# Patient Record
Sex: MALE | Race: WHITE | Employment: OTHER | URBAN - METROPOLITAN AREA
[De-identification: names, ages, dates, MRNs, and addresses within clinical notes are randomized per-mention and may not be internally consistent; named-entity substitution may affect disease eponyms.]

---

## 2018-04-20 ENCOUNTER — TELEPHONE (OUTPATIENT)
Dept: CARDIOLOGY CLINIC | Age: 73
End: 2018-04-20

## 2018-04-20 ENCOUNTER — OFFICE VISIT (OUTPATIENT)
Dept: CARDIOLOGY CLINIC | Age: 73
End: 2018-04-20

## 2018-04-20 VITALS
HEIGHT: 63 IN | WEIGHT: 247.2 LBS | OXYGEN SATURATION: 96 % | HEART RATE: 111 BPM | DIASTOLIC BLOOD PRESSURE: 80 MMHG | RESPIRATION RATE: 24 BRPM | BODY MASS INDEX: 43.8 KG/M2 | SYSTOLIC BLOOD PRESSURE: 140 MMHG

## 2018-04-20 DIAGNOSIS — I50.9 CONGESTIVE HEART FAILURE, UNSPECIFIED HF CHRONICITY, UNSPECIFIED HEART FAILURE TYPE (HCC): ICD-10-CM

## 2018-04-20 DIAGNOSIS — I25.83 CORONARY ARTERY DISEASE DUE TO LIPID RICH PLAQUE: ICD-10-CM

## 2018-04-20 DIAGNOSIS — M79.89 LEG SWELLING: ICD-10-CM

## 2018-04-20 DIAGNOSIS — E66.01 OBESITY, MORBID (HCC): ICD-10-CM

## 2018-04-20 DIAGNOSIS — I25.10 CORONARY ARTERY DISEASE DUE TO LIPID RICH PLAQUE: ICD-10-CM

## 2018-04-20 DIAGNOSIS — R06.02 SOB (SHORTNESS OF BREATH): Primary | ICD-10-CM

## 2018-04-20 RX ORDER — FUROSEMIDE 80 MG/1
80 TABLET ORAL DAILY
Qty: 30 TAB | Refills: 0 | Status: SHIPPED | OUTPATIENT
Start: 2018-04-20 | End: 2018-05-09 | Stop reason: SDUPTHER

## 2018-04-20 RX ORDER — FUROSEMIDE 40 MG/1
TABLET ORAL DAILY
COMMUNITY
End: 2018-04-20 | Stop reason: SDUPTHER

## 2018-04-20 RX ORDER — ACETAMINOPHEN 325 MG/1
TABLET ORAL
Status: ON HOLD | COMMUNITY
End: 2019-05-23 | Stop reason: DRUGHIGH

## 2018-04-20 RX ORDER — SIMVASTATIN 10 MG/1
TABLET, FILM COATED ORAL
COMMUNITY
End: 2018-05-07 | Stop reason: SDUPTHER

## 2018-04-20 RX ORDER — ASPIRIN 81 MG/1
TABLET ORAL DAILY
COMMUNITY
End: 2018-05-07 | Stop reason: SDUPTHER

## 2018-04-20 RX ORDER — DULOXETIN HYDROCHLORIDE 60 MG/1
60 CAPSULE, DELAYED RELEASE ORAL DAILY
COMMUNITY
End: 2018-05-07 | Stop reason: SDUPTHER

## 2018-04-20 RX ORDER — FISH OIL/DHA/EPA 1200-144MG
1 CAPSULE ORAL 2 TIMES DAILY
Status: ON HOLD | COMMUNITY
End: 2019-05-23 | Stop reason: DRUGHIGH

## 2018-04-20 RX ORDER — LISINOPRIL 40 MG/1
40 TABLET ORAL DAILY
COMMUNITY
End: 2018-05-07 | Stop reason: SDUPTHER

## 2018-04-20 RX ORDER — CARVEDILOL 3.12 MG/1
TABLET ORAL 2 TIMES DAILY WITH MEALS
COMMUNITY
End: 2018-05-07 | Stop reason: SDUPTHER

## 2018-04-20 RX ORDER — ALLOPURINOL 100 MG/1
TABLET ORAL DAILY
COMMUNITY
End: 2018-05-07 | Stop reason: SDUPTHER

## 2018-04-20 RX ORDER — METOLAZONE 5 MG/1
TABLET ORAL DAILY
COMMUNITY
End: 2018-05-07 | Stop reason: SDUPTHER

## 2018-04-20 NOTE — MR AVS SNAPSHOT
[address] 
[phone] Patient: Nhi Mosquera 
MRN: [id number] :1945 Visit Information Date & Time Provider Department Dept. Phone Encounter #  
 2018  1:00 PM Valorie Wayne, [address] [facility] [phone] Your Appointments 2018  8:30 AM  
PHYSICAL PRE OP with Dylan Otoole MD  
[facility]) Appt Note: NP Diabetes, feels like can't breath ezra 18  
 [facility] [address] [address]  
[address] [facility] [address]  
  
    
 2018  1:30 PM  
ECHO CARDIOGRAMS 2D with [address] [facility]) Appt Note: DR TRONCOSO 2D ECHO COMPLETE ADULT (TTE) W OR WO CONTR [[id number]] (Order [id number])  
 [id number] [facility] [address]  
[phone] [id number] [facility] [address] [address]  
  
    
 2018  9:00 AM  
NUCLEAR MEDICINE with NUCLEAR, [facility]) Appt Note: Dr Matias Alexander [[id number]] (Order [id number]) 5'3\" 247 lbs [id number] [facility] [address]  
[phone] [id number] [facility] [address] Upcoming Health Maintenance Date Due DTaP/Tdap/Td series (1 - Tdap) 12/10/1968 BREAST CANCER SCRN MAMMOGRAM 12/10/1997 FOBT Q 1 YEAR AGE 50-75 12/10/1997 ZOSTER VACCINE AGE 60> 10/10/2007 GLAUCOMA SCREENING Q2Y 12/10/2012 Pneumococcal 65+ Low/Medium Risk (1 of 2 - PCV13) 12/10/2012 Influenza Age 5 to Adult 2017 Allergies as of 2018  Review Complete On: 2018 By: Zohra Viramontes LPN Severity Noted Reaction Type Reactions Morphine High 2018    Anaphylaxis Current Immunizations  Never Reviewed No immunizations on file. Not reviewed this visit You Were Diagnosed With   
  
 Codes Comments SOB (shortness of breath)    -  Primary ICD-10-CM: R06.02 
ICD-9-CM: 786.05 Obesity, morbid ([facility]     ICD-10-CM: E66.01 
ICD-9-CM: 278.01 Leg swelling     ICD-10-CM: M79.89 ICD-9-CM: 729.81 Coronary artery disease due to lipid rich plaque     ICD-10-CM: I25.10, I25.83 ICD-9-CM: 414.00, 414.3 Congestive heart failure, unspecified HF chronicity, unspecified heart failure type ([facility]     ICD-10-CM: I50.9 ICD-9-CM: 428.0 Vitals BP Pulse Resp Height(growth percentile) Weight(growth percentile) SpO2  
 140/80 (BP 1 Location: Right arm, BP Patient Position: Sitting) (!) 111 24 5' 3\" (1.6 m) 247 lb 3.2 oz (112.1 kg) 96% BMI Smoking Status 43.79 kg/m2 Former Smoker Vitals History BMI and BSA Data Body Mass Index Body Surface Area 43.79 kg/m 2 2.23 m 2 Preferred Pharmacy Pharmacy Name Phone [facility] [address] [place] [address] Dr Kapadia, [address] [phone] Your Updated Medication List  
  
   
This list is accurate as of 4/20/18  2:09 PM.  Always use your most recent med list. ADVIL 100 mg tablet Generic drug:  ibuprofen Take 100 mg by mouth every six (6) hours as needed for Pain. allopurinol 100 mg tablet Commonly known as:  [place] Take  by mouth daily. aspirin delayed-release 81 mg tablet Take  by mouth daily. carvedilol 3.125 mg tablet Commonly known as:  Clementina Alstrom Take  by mouth two (2) times daily (with meals). DULoxetine 60 mg capsule Commonly known as:  CYMBALTA Take 60 mg by mouth daily. fish oil-dha-epa 1,200-144-216 mg Cap Take 1 Cap by mouth daily. furosemide 80 mg tablet Commonly known as:  LASIX Take 1 Tab by mouth daily. lisinopril 40 mg tablet Commonly known as:  Mignon Bills Take 40 mg by mouth daily. metOLazone 5 mg tablet Commonly known as:  Cynthia Demar Take  by mouth daily. NovoLIN 70/30 U-100 Insulin 100 unit/mL (70-30) injection Generic drug:  insulin NPH/insulin regular  
by SubCUTAneous route. simvastatin 10 mg tablet Commonly known as:  ZOCOR Take  by mouth nightly. TYLENOL 325 mg tablet Generic drug:  acetaminophen Take  by mouth every four (4) hours as needed for Pain. Prescriptions Sent to Pharmacy Refills  
 furosemide (LASIX) 80 mg tablet 0 Sig: Take 1 Tab by mouth daily. Class: Normal  
 Pharmacy: [facility] DR DANA ZELAYA [address] Ph #: [phone] Route: Oral  
  
We Performed the Following 2D ECHO COMPLETE ADULT (TTE) W OR WO CONTR [05686 CPT(R)] AMB POC EKG ROUTINE W/ 12 LEADS, INTER & REP [39658 CPT(R)] METABOLIC PANEL, BASIC [97374 CPT(R)] NT-PRO BNP [id number] CPT(R)] To-Do List   
 04/20/2018 ECG:  STRESS TEST LEXISCAN/CARDIOLITE Introducing [facility]! Chel Curtis introduces [employer] patient portal. Now you can access parts of your medical record, email your doctor's office, and request medication refills online. 1. In your internet browser, go to [email or web address]. [employer] 2. Click on the First Time User? Click Here link in the Sign In box. You will see the New Member Sign Up page. 3. Enter your [employer] Access Code exactly as it appears below. You will not need to use this code after youve completed the sign-up process. If you do not sign up before the expiration date, you must request a new code. · [employer] Access Code: [email or web address] Expires: 7/19/2018  2:01 PM 
 
4. Enter the last four digits of your Social Security Number (xxxx) and Date of Birth (mm/dd/yyyy) as indicated and click Submit. You will be taken to the next sign-up page. 5. Create a [employer] ID.  This will be your [employer] login ID and cannot be changed, so think of one that is secure and easy to remember. 6. Create a [facility] password. You can change your password at any time. 7. Enter your Password Reset Question and Answer. This can be used at a later time if you forget your password. 8. Enter your e-mail address. You will receive e-mail notification when new information is available in [address] 9. Click Sign Up. You can now view and download portions of your medical record. 10. Click the Download Summary menu link to download a portable copy of your medical information. If you have questions, please visit the Frequently Asked Questions section of the [facility] website. Remember, [facility] is NOT to be used for urgent needs. For medical emergencies, dial 911. Now available from your iPhone and Android! Please provide this summary of care documentation to your next provider. If you have any questions after today's visit, please call [phone].

## 2018-04-20 NOTE — TELEPHONE ENCOUNTER
Spoke with Dr Christian's office was informed LOV 9/2017 was due to f/u in January but cancelled appointment   Will fax most recent labs and last office note.

## 2018-04-20 NOTE — PROGRESS NOTES
[address], [address] [facility], [address]  [phone]     Subjective:      Michael Staton is a 81 y.o. male pmhx HTN, HLD, DM, CAD with stents is here to establish care and further evaluation of BLE swelling and SOB. Denies chest pain, orthopnea, PND, palpitations, syncope, or presyncope. Cardiac risk factors: male, elevated BMI, HTN, HLD, CAD, sedentary lifestyle  Denies etoh/illegal drug use. Former smoker  Family hx: n/a for maternal and paternal.  Siblings: 1 has PFO, 1 had heart attack      Patient Active Problem List    Diagnosis Date Noted    Obesity, morbid ([address] 04/20/2018    SOB (shortness of breath) 04/20/2018      No primary care provider on file. No past medical history on file. No past surgical history on file. Allergies   Allergen Reactions    Morphine Anaphylaxis      No family history on file. Social History     Social History    Marital status:      Spouse name: N/A    Number of children: N/A    Years of education: N/A     Occupational History    Not on file. Social History Main Topics    Smoking status: Former Smoker     Quit date: 4/20/1963    Smokeless tobacco: Never Used    Alcohol use Not on file    Drug use: Not on file    Sexual activity: Not on file     Other Topics Concern    Not on file     Social History Narrative    No narrative on file      Current Outpatient Prescriptions   Medication Sig    insulin NPH/insulin regular (NOVOLIN 70/30 U-100 INSULIN) 100 unit/mL (70-30) injection by SubCUTAneous route.  simvastatin (ZOCOR) 10 mg tablet Take  by mouth nightly.  DULoxetine (CYMBALTA) 60 mg capsule Take 60 mg by mouth daily.  metOLazone (ZAROXOLYN) 5 mg tablet Take  by mouth daily.  aspirin delayed-release 81 mg tablet Take  by mouth daily.  fish oil-dha-epa 1,200-144-216 mg cap Take 1 Cap by mouth daily.  lisinopril (PRINIVIL, ZESTRIL) 40 mg tablet Take 40 mg by mouth daily.     carvedilol (COREG) 3.125 mg tablet Take  by mouth two (2) times daily (with meals).  allopurinol (ZYLOPRIM) 100 mg tablet Take  by mouth daily.  ibuprofen (ADVIL) 100 mg tablet Take 100 mg by mouth every six (6) hours as needed for Pain.  acetaminophen (TYLENOL) 325 mg tablet Take  by mouth every four (4) hours as needed for Pain.  furosemide (LASIX) 80 mg tablet Take 1 Tab by mouth daily. No current facility-administered medications for this visit. Review of Symptoms:  11 systems reviewed, negative other than as stated in the HPI    Physical ExamPhysical Exam:    Vitals:    04/20/18 1304 04/20/18 1322   BP: 140/84 140/80   Pulse: (!) 111    Resp: 24    SpO2: 96%    Weight: 247 lb 3.2 oz (112.1 kg)    Height: 5' 3\" (1.6 m)      Body mass index is 43.79 kg/(m^2). General PE   Gen:  NAD  Mental Status - Alert. General Appearance - Not in acute distress. Chest and Lung Exam   Inspection: Accessory muscles - No use of accessory muscles in breathing. Auscultation:   Breath sounds: - few crackles at the base  Cardiovascular   Inspection: Jugular vein - Bilateral - Inspection Normal.   Palpation/Percussion:   Apical Impulse: - Normal.   Auscultation: Rhythm - Regular. Heart Sounds - S1 WNL and S2 WNL. No S3 or S4. Murmurs & Other Heart Sounds: Auscultation of the heart reveals - No Murmurs. Peripheral Vascular   Upper Extremity: Inspection - Bilateral - No Cyanotic nailbeds or Digital clubbing. Lower Extremity:   Palpation: Edema - Bilateral - + 2  Abdomen:   Soft, non-tender, bowel sounds are active. Neuro: A&O times 3, CN and motor grossly WNL    Labs:   No results found for: CHOL, CHOLX, CHLST, CHOLV, 417018, HDL, LDL, LDLC, DLDLP, TGLX, TRIGL, TRIGP, CHHD, CHHDX  No results found for: CPK, CPKX, CPX  No results found for: NA, K, CL, CO2, AGAP, GLU, BUN, CREA, BUCR, GFRAA, GFRNA, CA, TBIL, TBILI, GPT, SGOT, AP, TP, ALB, GLOB, AGRAT, ALT    EKG:  ST with PVCs     Assessment:     Assessment:      1.  SOB (shortness of breath)    2. Obesity, morbid (HCC)    3. Leg swelling    4. Coronary artery disease due to lipid rich plaque    5. Congestive heart failure, unspecified HF chronicity, unspecified heart failure type ([facility]        Orders Placed This Encounter    METABOLIC PANEL, BASIC    NT-PRO BNP    AMB POC EKG ROUTINE W/ 12 LEADS, INTER & REP     Order Specific Question:   Reason for Exam:     Answer:   routine    LEXISCAN/CARDIOLITE, Clinic Performed     Standing Status:   Future     Standing Expiration Date:   10/20/2018     Order Specific Question:   Reason for Exam:     Answer:   sob    2D ECHO COMPLETE ADULT (TTE) W OR WO CONTR     Order Specific Question:   Reason for Exam:     Answer:   sob     Order Specific Question:   Contrast Enhancement (Bubble Study, Definity, Optison) may be used if criteria listed in established evidence-based protocol has been identified. Answer: Yes    insulin NPH/insulin regular (NOVOLIN 70/30 U-100 INSULIN) 100 unit/mL (70-30) injection     Sig: by SubCUTAneous route.  simvastatin (ZOCOR) 10 mg tablet     Sig: Take  by mouth nightly.  DULoxetine (CYMBALTA) 60 mg capsule     Sig: Take 60 mg by mouth daily.  metOLazone (ZAROXOLYN) 5 mg tablet     Sig: Take  by mouth daily.  aspirin delayed-release 81 mg tablet     Sig: Take  by mouth daily.  fish oil-dha-epa 1,200-144-216 mg cap     Sig: Take 1 Cap by mouth daily.  DISCONTD: furosemide (LASIX) 40 mg tablet     Sig: Take  by mouth daily.  lisinopril (PRINIVIL, ZESTRIL) 40 mg tablet     Sig: Take 40 mg by mouth daily.  carvedilol (COREG) 3.125 mg tablet     Sig: Take  by mouth two (2) times daily (with meals).  allopurinol (ZYLOPRIM) 100 mg tablet     Sig: Take  by mouth daily.  ibuprofen (ADVIL) 100 mg tablet     Sig: Take 100 mg by mouth every six (6) hours as needed for Pain.  acetaminophen (TYLENOL) 325 mg tablet     Sig: Take  by mouth every four (4) hours as needed for Pain.     furosemide (LASIX) 80 mg tablet     Sig: Take 1 Tab by mouth daily. Dispense:  30 Tab     Refill:  0        Plan: This is a 82 YO male with pmhx HTN, HLD, gout, DM, obesity who presents to clinic for further evaluation of BLE swelling and SOB x 1 year. He states he had coronary stents 5 years ago in [place]. He does not have any labwork, no medical record to review at all. States Dr Rochelle Hurtado, who is his endocrinologist refills all his prescription. Will request record. BLE swelling, SOB suspect underlying HF given the medications he's on  Clinically decompensated. He is OOB even just talking to me. O2 saturation 96% at rest.   On Lisinopril and Coreg which we will titrate up to 6.125 mg BID  Already on daily dose of Zaroxolyn and Furosemide. Will increase Furosemide to 80 mg. Will check BMP, proBNP. Will also order echocardiogram     CAD hx coronary stents 5 years ago in [place]  Denies CP. Endorses bilateral arm numbness, cardiac versus diabetic neuropathy  On ASA, BB, statin  Will obtain nuclear stress test    HTN: Mildly elevated. Has room to increase Coreg in future if needed. HLD: On statin per endocrinologist    DM:  On insulin therapy    Continue current care and f/u in 1 week    Oz Palomares MD

## 2018-04-20 NOTE — PROGRESS NOTES
1. Have you been to the ER, urgent care clinic since your last visit? Hospitalized since your last visit? NO    2. Have you seen or consulted any other health care providers outside of the [address] [place] [address] since your last visit? Include any pap smears or colon screening. DR. Slick Adrian    NEW PATIENT. C/O SWELLING IN BLE, SOB.

## 2018-04-20 NOTE — TELEPHONE ENCOUNTER
----- Message from Karan Stokes NP sent at 4/20/2018  1:56 PM EDT -----  Regarding: notes  Pls obtain records from Dr Rashard Mireles per Dr Marina Godinez

## 2018-04-21 LAB
BUN SERPL-MCNC: 38 MG/DL (ref 8–27)
BUN/CREAT SERPL: 25 (ref 10–24)
CALCIUM SERPL-MCNC: 9.7 MG/DL (ref 8.6–10.2)
CHLORIDE SERPL-SCNC: 101 MMOL/L (ref 96–106)
CO2 SERPL-SCNC: 29 MMOL/L (ref 18–29)
CREAT SERPL-MCNC: 1.52 MG/DL (ref 0.76–1.27)
GFR SERPLBLD CREATININE-BSD FMLA CKD-EPI: 46 ML/MIN/1.73
GFR SERPLBLD CREATININE-BSD FMLA CKD-EPI: 53 ML/MIN/1.73
GLUCOSE SERPL-MCNC: 136 MG/DL (ref 65–99)
INTERPRETATION: NORMAL
NT-PROBNP SERPL-MCNC: 943 PG/ML (ref 0–376)
POTASSIUM SERPL-SCNC: 4.9 MMOL/L (ref 3.5–5.2)
SODIUM SERPL-SCNC: 147 MMOL/L (ref 134–144)

## 2018-04-23 ENCOUNTER — OFFICE VISIT (OUTPATIENT)
Dept: INTERNAL MEDICINE CLINIC | Age: 73
End: 2018-04-23

## 2018-04-23 VITALS
SYSTOLIC BLOOD PRESSURE: 104 MMHG | BODY MASS INDEX: 43.38 KG/M2 | OXYGEN SATURATION: 94 % | HEIGHT: 63 IN | WEIGHT: 244.8 LBS | TEMPERATURE: 98.1 F | RESPIRATION RATE: 18 BRPM | DIASTOLIC BLOOD PRESSURE: 61 MMHG | HEART RATE: 110 BPM

## 2018-04-23 DIAGNOSIS — M79.642 PAIN IN BOTH HANDS: ICD-10-CM

## 2018-04-23 DIAGNOSIS — R53.83 FATIGUE, UNSPECIFIED TYPE: ICD-10-CM

## 2018-04-23 DIAGNOSIS — I50.9 CONGESTIVE HEART FAILURE, UNSPECIFIED HF CHRONICITY, UNSPECIFIED HEART FAILURE TYPE (HCC): ICD-10-CM

## 2018-04-23 DIAGNOSIS — E55.9 VITAMIN D DEFICIENCY: ICD-10-CM

## 2018-04-23 DIAGNOSIS — M79.641 PAIN IN BOTH HANDS: ICD-10-CM

## 2018-04-23 DIAGNOSIS — E78.00 PURE HYPERCHOLESTEROLEMIA: ICD-10-CM

## 2018-04-23 DIAGNOSIS — R79.89 ELEVATED SERUM CREATININE: ICD-10-CM

## 2018-04-23 DIAGNOSIS — Z91.89 AT RISK FOR SLEEP APNEA: ICD-10-CM

## 2018-04-23 DIAGNOSIS — R73.09 ELEVATED GLUCOSE: Primary | ICD-10-CM

## 2018-04-23 LAB — HBA1C MFR BLD HPLC: 8.9 % (ref 4.8–5.6)

## 2018-04-23 NOTE — MR AVS SNAPSHOT
[address] [place] [address] 
[phone] Patient: London Stevens 
MRN: [id number] :1945 Visit Information Date & Time Provider Department Dept. Phone Encounter #  
 2018  8:30 AM Higinio Moss, [address] [place] [address],[address] [phone] [id number] Follow-up Instructions Return in about 2 months (around 2018) for follow up pending labs and 2 months. .  
  
Your Appointments 2018  1:30 PM  
ECHO CARDIOGRAMS 2D with [facility] [address] [place] [address]) Appt Note: DR TRONCOSO 2D ECHO COMPLETE ADULT (TTE) W OR WO CONTR [[id number]] (Order [id number])  
 [address] [place] [address]  
[phone] 2 [address] [address]  
  
    
 2018  9:00 AM  
NUCLEAR MEDICINE with NUCLEAR, [facility] [address]) Appt Note: Dr Billie Cortez [[id number]] (Order [id number]) 5'3\" 247 lbs [address] [place] [address]  
[phone] [address] 24 Upcoming Health Maintenance Date Due Hepatitis C Screening 1945 DTaP/Tdap/Td series (1 - Tdap) 12/10/1968 FOBT Q 1 YEAR AGE 50-75 12/10/1997 ZOSTER VACCINE AGE 60> 10/10/2007 GLAUCOMA SCREENING Q2Y 12/10/2012 Pneumococcal 65+ Low/Medium Risk (1 of 2 - PCV13) 12/10/2012 Influenza Age 5 to Adult 2017 MEDICARE YEARLY EXAM 2018 Allergies as of 2018  Review Complete On: 2018 By: Higinio Moss MD  
  
 Severity Noted Reaction Type Reactions Morphine High 2018    Anaphylaxis Current Immunizations  Never Reviewed No immunizations on file. Not reviewed this visit You Were Diagnosed With   
  
 Codes Comments Elevated glucose    -  Primary ICD-10-CM: R73.09 
ICD-9-CM: 790.29 At risk for sleep apnea     ICD-10-CM: Z91.89 ICD-9-CM: V49.89 Uncontrolled type 2 diabetes mellitus with complication, with long-term current use of insulin (HCC)     ICD-10-CM: E11.8, E11.65, Z79.4 ICD-9-CM: 250.82, V58.67 Pure hypercholesterolemia     ICD-10-CM: E78.00 ICD-9-CM: 272.0 Congestive heart failure, unspecified HF chronicity, unspecified heart failure type ([facility]     ICD-10-CM: I50.9 ICD-9-CM: 428.0 Vitamin D deficiency     ICD-10-CM: E55.9 ICD-9-CM: 268.9 Fatigue, unspecified type     ICD-10-CM: R53.83 ICD-9-CM: 780.79 Class 3 obesity with serious comorbidity and body mass index (BMI) of 40.0 to 44.9 in adult, unspecified obesity type ([facility].)     ICD-10-CM: E66.9, Z68.41 
ICD-9-CM: 278.00, V85.41 Pain in both hands     ICD-10-CM: M79.641, C90.777 ICD-9-CM: 729.5 Vitals BP Pulse Temp Resp Height(growth percentile) Weight(growth percentile) 104/61 (BP 1 Location: Left arm, BP Patient Position: Sitting) (!) 110 98.1 °F (36.7 °C) (Oral) 18 5' 3\" (1.6 m) 244 lb 12.8 oz (111 kg) SpO2 BMI Smoking Status 94% 43.36 kg/m2 Former Smoker BMI and BSA Data Body Mass Index Body Surface Area  
 43.36 kg/m 2 2.22 m 2 Preferred Pharmacy Pharmacy Name Phone [facility] [address], [address] [place] [address] Avenue [phone] Your Updated Medication List  
  
   
This list is accurate as of 4/23/18  9:33 AM.  Always use your most recent med list. ADVIL 100 mg tablet Generic drug:  ibuprofen Take 100 mg by mouth every six (6) hours as needed for Pain. allopurinol 100 mg tablet Commonly known as:  Maciej Merles Take  by mouth daily. aspirin delayed-release 81 mg tablet Take  by mouth daily. carvedilol 3.125 mg tablet Commonly known as:  Erika Ballesteros Take  by mouth two (2) times daily (with meals). DULoxetine 60 mg capsule Commonly known as:  CYMBALTA Take 60 mg by mouth daily. fish oil-dha-epa 1,200-144-216 mg Cap Take 1 Cap by mouth daily. furosemide 80 mg tablet Commonly known as:  LASIX Take 1 Tab by mouth daily. lisinopril 40 mg tablet Commonly known as:  Matthew Little Take 40 mg by mouth daily. metOLazone 5 mg tablet Commonly known as:  Vertie Shone Take  by mouth daily. NovoLIN 70/30 U-100 Insulin 100 unit/mL (70-30) injection Generic drug:  insulin NPH/insulin regular  
by SubCUTAneous route. Takes 80 units in am and 70 units in pm.  
  
 simvastatin 10 mg tablet Commonly known as:  ZOCOR Take  by mouth nightly. TYLENOL 325 mg tablet Generic drug:  acetaminophen Take  by mouth every four (4) hours as needed for Pain. We Performed the Following AMB POC HEMOGLOBIN A1C [56124 CPT(R)] CBC W/O DIFF [47271 CPT(R)] LIPID PANEL [35158 CPT(R)] METABOLIC PANEL, COMPREHENSIVE [64551 CPT(R)] REFERRAL TO ENDOCRINOLOGY [VQR99 Custom] Comments:  
 Insulin requiring diabetic. REFERRAL TO OPHTHALMOLOGY [REF57 Custom] Comments:  
 Diabetic eye exam  
 REFERRAL TO SLEEP STUDIES [REF99 Custom] TSH 3RD GENERATION [05053 CPT(R)] VITAMIN D, 25 HYDROXY [id number] CPT(R)] Follow-up Instructions Return in about 2 months (around 6/23/2018) for follow up pending labs and 2 months. .  
  
  
Referral Information Referral ID Referred By Referred To  
  
 [id number] Divya Nettles [address] Shefali MD   
   [address] [place] [address] [address] [place], [facility] Phone: [phone] Fax: [phone] Visits Status Start Date End Date 1 New Request 4/23/18 4/23/19 If your referral has a status of pending review or denied, additional information will be sent to support the outcome of this decision. Referral ID Referred By Referred To  
 [id number] [facility]OP [facility]: [phone] Fax: [phone] Visits Status Start Date End Date 1 New Request 4/23/18 4/23/19 If your referral has a status of pending review or denied, additional information will be sent to support the outcome of this decision. Referral ID Referred By Referred To  
 [id number] Tho, Cedrick Alva Dr, MD  
   [address] [place] [address] [address] [place], [address] [place] [address] Phone: [phone] Fax: [phone] Visits Status Start Date End Date 1 New Request 4/23/18 4/23/19 If your referral has a status of pending review or denied, additional information will be sent to support the outcome of this decision. Patient Instructions Hand Arthritis: Exercises Your Care Instructions Here are some examples of exercises for hand arthritis. Start each exercise slowly. Ease off the exercise if you start to have pain. Your doctor or your physical or occupational therapist will tell you when you can start these exercises and which ones will work best for you. How to do the exercises Tendon glides 1. In this exercise, the steps follow one another to a make a continuous movement. 2. With your affected hand, point your fingers and thumb straight up. Your wrist should be relaxed, following the line of your fingers and thumb. 3. Curl your fingers so that the top two joints in them are bent, and your fingers wrap down. Your fingertips should touch or be near the base of your fingers. Your fingers will look like a hook. 4. Make a fist by bending your knuckles. Your thumb can gently rest against your index (pointing) finger. 5. Unwind your fingers slightly so that your fingertips can touch the base of your palm. Your thumb can rest against your index finger. 6. Move back to your starting position, with your fingers and thumb pointing up. 7. Repeat the series of motions 8 to 12 times. 8. Switch hands and repeat steps 1 through 6, even if only one hand is sore. Intrinsic flexion 1. Rest your affected hand on a table and bend the large joints where your fingers connect to your hand. Keep your thumb and the other joints in your fingers straight. 2. Slowly straighten your fingers. Your wrist should be relaxed, following the line of your fingers and thumb. 3. Move back to your starting position, with your hand bent. 4. Repeat 8 to 12 times. 5. Switch hands and repeat steps 1 through 4, even if only one hand is sore. Finger extension 1. Place your affected hand flat on a table. 2. Lift and then lower one finger at a time off the table. 3. Repeat 8 to 12 times. 4. Switch hands and repeat steps 1 through 3, even if only one hand is sore. MP extension 1. Place your good hand on a table, palm up. Put your affected hand on top of your good hand with your fingers wrapped around the thumb of your good hand like you are making a fist. 
2. Slowly uncurl the joints of your affected hand where your fingers connect to your hand so that only the top two joints of your fingers are bent. Your fingers will look like a hook. 3. Move back to your starting position, with your fingers wrapped around your good thumb. 4. Repeat 8 to 12 times. 5. Switch hands and repeat steps 1 through 4, even if only one hand is sore. PIP extension (with MP extension) 1. Place your good hand on a table, palm up. Put your affected hand on top of your good hand, palm up. 2. Use the thumb and fingers of your good hand to grasp below the middle joint of one finger of your affected hand. 3. Straighten the last two joints of that finger. 4. Repeat 8 to 12 times. 5. Repeat steps 1 through 4 with each finger. 6. Switch hands and repeat steps 1 through 5, even if only one hand is sore. DIP flexion 1. With your good hand, grasp one finger of your affected hand.  Your thumb will be on the top side of your finger just below the joint that is closest to your fingernail. 2. Slowly bend your affected finger only at the joint closest to your fingernail. 3. Repeat 8 to 12 times. 4. Repeat steps 1 through 3 with each finger. 5. Switch hands and repeat steps 1 through 4, even if only one hand is sore. Follow-up care is a key part of your treatment and safety. Be sure to make and go to all appointments, and call your doctor if you are having problems. It's also a good idea to know your test results and keep a list of the medicines you take. Where can you learn more? Go to http://lalo-ashley.info/. Enter V349 in the search box to learn more about \"Hand Arthritis: Exercises. \" Current as of: March 21, 2017 Content Version: 11.4 © 6894-0523 [employer]. Care instructions adapted under license by [employer] (which disclaims liability or warranty for this information). If you have questions about a medical condition or this instruction, always ask your healthcare professional. [address] any warranty or liability for your use of this information. Tylenol 500mg 1-2 twice a day as needed. Avoid ibuprofen (advil, motrin) and naproxen (aleve), bc, goodes, excedrin, extra aspirin. Avoid salt in diet. Low Sodium Diet (2,000 Milligram): Care Instructions Your Care Instructions Too much sodium causes your body to hold on to extra water. This can raise your blood pressure and force your heart and kidneys to work harder. In very serious cases, this could cause you to be put in the hospital. It might even be life-threatening. By limiting sodium, you will feel better and lower your risk of serious problems. The most common source of sodium is salt. People get most of the salt in their diet from canned, prepared, and packaged foods.  Fast food and restaurant meals also are very high in sodium. Your doctor will probably limit your sodium to less than 2,000 milligrams (mg) a day. This limit counts all the sodium in prepared and packaged foods and any salt you add to your food. Follow-up care is a key part of your treatment and safety. Be sure to make and go to all appointments, and call your doctor if you are having problems. It's also a good idea to know your test results and keep a list of the medicines you take. How can you care for yourself at home? Read food labels · Read labels on cans and food packages. The labels tell you how much sodium is in each serving. Make sure that you look at the serving size. If you eat more than the serving size, you have eaten more sodium. · Food labels also tell you the Percent Daily Value for sodium. Choose products with low Percent Daily Values for sodium. · Be aware that sodium can come in forms other than salt, including monosodium glutamate (MSG), sodium citrate, and sodium bicarbonate (baking soda). MSG is often added to Asian food. When you eat out, you can sometimes ask for food without MSG or added salt. Buy low-sodium foods · Buy foods that are labeled \"unsalted\" (no salt added), \"sodium-free\" (less than 5 mg of sodium per serving), or \"low-sodium\" (less than 140 mg of sodium per serving). Foods labeled \"reduced-sodium\" and \"light sodium\" may still have too much sodium. Be sure to read the label to see how much sodium you are getting. · Buy fresh vegetables, or frozen vegetables without added sauces. Buy low-sodium versions of canned vegetables, soups, and other canned goods. Prepare low-sodium meals · Cut back on the amount of salt you use in cooking. This will help you adjust to the taste. Do not add salt after cooking. One teaspoon of salt has about 2,300 mg of sodium. · Take the salt shaker off the table.  
· Flavor your food with garlic, lemon juice, onion, vinegar, herbs, and spices. Do not use soy sauce, lite soy sauce, steak sauce, onion salt, garlic salt, celery salt, mustard, or ketchup on your food. · Use low-sodium salad dressings, sauces, and ketchup. Or make your own salad dressings and sauces without adding salt. · Use less salt (or none) when recipes call for it. You can often use half the salt a recipe calls for without losing flavor. Other foods such as rice, pasta, and grains do not need added salt. · Rinse canned vegetables, and cook them in fresh water. This removes some-but not all-of the salt. · Avoid water that is naturally high in sodium or that has been treated with water softeners, which add sodium. Call your local water company to find out the sodium content of your water supply. If you buy bottled water, read the label and choose a sodium-free brand. Avoid high-sodium foods · Avoid eating: ¨ Smoked, cured, salted, and canned meat, fish, and poultry. ¨ Ham, francisco, hot dogs, and luncheon meats. ¨ Regular, hard, and processed cheese and regular peanut butter. ¨ Crackers with salted tops, and other salted snack foods such as pretzels, chips, and salted popcorn. ¨ Frozen prepared meals, unless labeled low-sodium. ¨ Canned and dried soups, broths, and bouillon, unless labeled sodium-free or low-sodium. ¨ Canned vegetables, unless labeled sodium-free or low-sodium. ¨ [place] fries, pizza, tacos, and other fast foods. ¨ Pickles, olives, ketchup, and other condiments, especially soy sauce, unless labeled sodium-free or low-sodium. Where can you learn more? Go to http://lalo-ashley.info/. Enter R470 in the search box to learn more about \"Low Sodium Diet (2,000 Milligram): Care Instructions. \" Current as of: May 12, 2017 Content Version: 11.4 © 3945-6432 [employer].  Care instructions adapted under license by [employer] (which disclaims liability or warranty for this information). If you have questions about a medical condition or this instruction, always ask your healthcare professional. [address] any warranty or liability for your use of this information. Introducing [facility] & HEALTH SERVICES! [facility] introduces miCab patient portal. Now you can access parts of your medical record, email your doctor's office, and request medication refills online. 1. In your internet browser, go to [email or web address] 2. Click on the First Time User? Click Here link in the Sign In box. You will see the New Member Sign Up page. 3. Enter your miCab Access Code exactly as it appears below. You will not need to use this code after youve completed the sign-up process. If you do not sign up before the expiration date, you must request a new code. · miCab Access Code: [email or web address] Expires: 7/19/2018  2:01 PM 
 
4. Enter the last four digits of your Social Security Number (xxxx) and Date of Birth (mm/dd/yyyy) as indicated and click Submit. You will be taken to the next sign-up page. 5. Create a miCab ID. This will be your miCab login ID and cannot be changed, so think of one that is secure and easy to remember. 6. Create a miCab password. You can change your password at any time. 7. Enter your Password Reset Question and Answer. This can be used at a later time if you forget your password. 8. Enter your e-mail address. You will receive e-mail notification when new information is available in [address] 9. Click Sign Up. You can now view and download portions of your medical record. 10. Click the Download Summary menu link to download a portable copy of your medical information. If you have questions, please visit the Frequently Asked Questions section of the miCab website. Remember, miCab is NOT to be used for urgent needs. For medical emergencies, dial 911. Now available from your iPhone and Android! Please provide this summary of care documentation to your next provider. Your primary care clinician is listed as [facility]. If you have any questions after today's visit, please call [phone].

## 2018-04-23 NOTE — PROGRESS NOTES
Reviewed record in preparation for visit and have obtained necessary documentation. Identified pt with two pt identifiers(name and ). Chief Complaint   Patient presents with    Establish Care     Pt nonfasting    New Patient    Diabetes       Health Maintenance Due   Topic Date Due    Hepatitis C Test  1945    DTaP/Tdap/Td  (1 - Tdap) 12/10/1968    Stool testing for trace blood  12/10/1997    Shingles Vaccine  10/10/2007    Glaucoma Screening   12/10/2012    Pneumococcal Vaccine (1 of 2 - PCV13) 12/10/2012    Flu Vaccine  2017    Annual Well Visit  2018       Mr. Edson Rubin has a reminder for a \"due or due soon\" health maintenance. I have asked that he discuss this further with his primary care provider for follow-up on this health maintenance. Coordination of Care Questionnaire:  :     1) Have you been to an emergency room, urgent care clinic since your last visit? no   Hospitalized since your last visit? no             2) Have you seen or consulted any other health care providers outside of [address] [place] [address] since your last visit? no  (Include any pap smears or colon screenings in this section.)    3) In the event something were to happen to you and you were unable to speak on your behalf, do you have an Advance Directive/ Living Will in place stating your wishes? NO    Do you have an Advance Directive on file? no    4) Are you interested in receiving information on Advance Directives? NO    Patient is accompanied by daughter I have received verbal consent from eNry Mora to discuss any/all medical information while they are present in the room.

## 2018-04-23 NOTE — PATIENT INSTRUCTIONS
Hand Arthritis: Exercises  Your Care Instructions  Here are some examples of exercises for hand arthritis. Start each exercise slowly. Ease off the exercise if you start to have pain. Your doctor or your physical or occupational therapist will tell you when you can start these exercises and which ones will work best for you. How to do the exercises  Tendon vonda    1. In this exercise, the steps follow one another to a make a continuous movement. 2. With your affected hand, point your fingers and thumb straight up. Your wrist should be relaxed, following the line of your fingers and thumb. 3. Curl your fingers so that the top two joints in them are bent, and your fingers wrap down. Your fingertips should touch or be near the base of your fingers. Your fingers will look like a hook. 4. Make a fist by bending your knuckles. Your thumb can gently rest against your index (pointing) finger. 5. Unwind your fingers slightly so that your fingertips can touch the base of your palm. Your thumb can rest against your index finger. 6. Move back to your starting position, with your fingers and thumb pointing up. 7. Repeat the series of motions 8 to 12 times. 8. Switch hands and repeat steps 1 through 6, even if only one hand is sore. Intrinsic flexion    1. Rest your affected hand on a table and bend the large joints where your fingers connect to your hand. Keep your thumb and the other joints in your fingers straight. 2. Slowly straighten your fingers. Your wrist should be relaxed, following the line of your fingers and thumb. 3. Move back to your starting position, with your hand bent. 4. Repeat 8 to 12 times. 5. Switch hands and repeat steps 1 through 4, even if only one hand is sore. Finger extension    1. Place your affected hand flat on a table. 2. Lift and then lower one finger at a time off the table. 3. Repeat 8 to 12 times.   4. Switch hands and repeat steps 1 through 3, even if only one hand is sore.  MP extension    1. Place your good hand on a table, palm up. Put your affected hand on top of your good hand with your fingers wrapped around the thumb of your good hand like you are making a fist.  2. Slowly uncurl the joints of your affected hand where your fingers connect to your hand so that only the top two joints of your fingers are bent. Your fingers will look like a hook. 3. Move back to your starting position, with your fingers wrapped around your good thumb. 4. Repeat 8 to 12 times. 5. Switch hands and repeat steps 1 through 4, even if only one hand is sore. PIP extension (with MP extension)    1. Place your good hand on a table, palm up. Put your affected hand on top of your good hand, palm up. 2. Use the thumb and fingers of your good hand to grasp below the middle joint of one finger of your affected hand. 3. Straighten the last two joints of that finger. 4. Repeat 8 to 12 times. 5. Repeat steps 1 through 4 with each finger. 6. Switch hands and repeat steps 1 through 5, even if only one hand is sore. DIP flexion    1. With your good hand, grasp one finger of your affected hand. Your thumb will be on the top side of your finger just below the joint that is closest to your fingernail. 2. Slowly bend your affected finger only at the joint closest to your fingernail. 3. Repeat 8 to 12 times. 4. Repeat steps 1 through 3 with each finger. 5. Switch hands and repeat steps 1 through 4, even if only one hand is sore. Follow-up care is a key part of your treatment and safety. Be sure to make and go to all appointments, and call your doctor if you are having problems. It's also a good idea to know your test results and keep a list of the medicines you take. Where can you learn more? Go to http://lalo-ashley.info/. Enter G104 in the search box to learn more about \"Hand Arthritis: Exercises. \"  Current as of: March 21, 2017  Content Version: 11.4  © 0879-0310 [employer]. Care instructions adapted under license by [employer] (which disclaims liability or warranty for this information). If you have questions about a medical condition or this instruction, always ask your healthcare professional. [address] any warranty or liability for your use of this information. Tylenol 500mg 1-2 twice a day as needed. Avoid ibuprofen (advil, motrin) and naproxen (aleve), bc, goodes, excedrin, extra aspirin. Avoid salt in diet. Low Sodium Diet (2,000 Milligram): Care Instructions  Your Care Instructions    Too much sodium causes your body to hold on to extra water. This can raise your blood pressure and force your heart and kidneys to work harder. In very serious cases, this could cause you to be put in the hospital. It might even be life-threatening. By limiting sodium, you will feel better and lower your risk of serious problems. The most common source of sodium is salt. People get most of the salt in their diet from canned, prepared, and packaged foods. Fast food and restaurant meals also are very high in sodium. Your doctor will probably limit your sodium to less than 2,000 milligrams (mg) a day. This limit counts all the sodium in prepared and packaged foods and any salt you add to your food. Follow-up care is a key part of your treatment and safety. Be sure to make and go to all appointments, and call your doctor if you are having problems. It's also a good idea to know your test results and keep a list of the medicines you take. How can you care for yourself at home? Read food labels  · Read labels on cans and food packages. The labels tell you how much sodium is in each serving. Make sure that you look at the serving size. If you eat more than the serving size, you have eaten more sodium. · Food labels also tell you the Percent Daily Value for sodium. Choose products with low Percent Daily Values for sodium.   · Be aware that sodium can come in forms other than salt, including monosodium glutamate (MSG), sodium citrate, and sodium bicarbonate (baking soda). MSG is often added to Asian food. When you eat out, you can sometimes ask for food without MSG or added salt. Buy low-sodium foods  · Buy foods that are labeled \"unsalted\" (no salt added), \"sodium-free\" (less than 5 mg of sodium per serving), or \"low-sodium\" (less than 140 mg of sodium per serving). Foods labeled \"reduced-sodium\" and \"light sodium\" may still have too much sodium. Be sure to read the label to see how much sodium you are getting. · Buy fresh vegetables, or frozen vegetables without added sauces. Buy low-sodium versions of canned vegetables, soups, and other canned goods. Prepare low-sodium meals  · Cut back on the amount of salt you use in cooking. This will help you adjust to the taste. Do not add salt after cooking. One teaspoon of salt has about 2,300 mg of sodium. · Take the salt shaker off the table. · Flavor your food with garlic, lemon juice, onion, vinegar, herbs, and spices. Do not use soy sauce, lite soy sauce, steak sauce, onion salt, garlic salt, celery salt, mustard, or ketchup on your food. · Use low-sodium salad dressings, sauces, and ketchup. Or make your own salad dressings and sauces without adding salt. · Use less salt (or none) when recipes call for it. You can often use half the salt a recipe calls for without losing flavor. Other foods such as rice, pasta, and grains do not need added salt. · Rinse canned vegetables, and cook them in fresh water. This removes some-but not all-of the salt. · Avoid water that is naturally high in sodium or that has been treated with water softeners, which add sodium. Call your local water company to find out the sodium content of your water supply. If you buy bottled water, read the label and choose a sodium-free brand.   Avoid high-sodium foods  · Avoid eating:  ¨ Smoked, cured, salted, and canned meat, fish, and poultry. ¨ Ham, francisco, hot dogs, and luncheon meats. ¨ Regular, hard, and processed cheese and regular peanut butter. ¨ Crackers with salted tops, and other salted snack foods such as pretzels, chips, and salted popcorn. ¨ Frozen prepared meals, unless labeled low-sodium. ¨ Canned and dried soups, broths, and bouillon, unless labeled sodium-free or low-sodium. ¨ Canned vegetables, unless labeled sodium-free or low-sodium. ¨ [place] fries, pizza, tacos, and other fast foods. ¨ Pickles, olives, ketchup, and other condiments, especially soy sauce, unless labeled sodium-free or low-sodium. Where can you learn more? Go to http://lalo-ashley.info/. Enter I881 in the search box to learn more about \"Low Sodium Diet (2,000 Milligram): Care Instructions. \"  Current as of: May 12, 2017  Content Version: 11.4  © 1861-9500 [employer]. Care instructions adapted under license by [employer] (which disclaims liability or warranty for this information). If you have questions about a medical condition or this instruction, always ask your healthcare professional. [address] any warranty or liability for your use of this information.

## 2018-04-23 NOTE — LETTER
5/17/2018 3:50 PM 
 
Mr. Nikunj Moran. 
[place] [address] Dear Nikunj Moran.: 
 
Please find your most recent results below. Resulted Orders AMB POC HEMOGLOBIN A1C Result Value Ref Range Hemoglobin A1c (POC) 8.9 (A) 4.8 - 5.6 % METABOLIC PANEL, COMPREHENSIVE Result Value Ref Range Glucose 352 (H) 65 - 99 mg/dL BUN 54 (H) 8 - 27 mg/dL Creatinine 1.67 (H) 0.76 - 1.27 mg/dL GFR est non-AA 41 (L) >59 mL/min/1.73 GFR est AA 47 (L) >59 mL/min/1.73  
 BUN/Creatinine ratio 32 (H) 10 - 24 Sodium 138 134 - 144 mmol/L Potassium 4.2 3.5 - 5.2 mmol/L Chloride 90 (L) 96 - 106 mmol/L  
 CO2 27 18 - 29 mmol/L Calcium 9.1 8.6 - 10.2 mg/dL Protein, total 6.3 6.0 - 8.5 g/dL Albumin 4.1 3.5 - 4.8 g/dL GLOBULIN, TOTAL 2.2 1.5 - 4.5 g/dL A-G Ratio 1.9 1.2 - 2.2 Bilirubin, total <0.2 0.0 - 1.2 mg/dL Alk. phosphatase 97 39 - 117 IU/L  
 AST (SGOT) 11 0 - 40 IU/L  
 ALT (SGPT) 18 0 - 44 IU/L Narrative Performed at:  [address]  [id number] : Elvis Vazquez MD, Phone:  [phone] CBC W/O DIFF Result Value Ref Range WBC 10.1 3.4 - 10.8 x10E3/uL  
 RBC 4.63 4.14 - 5.80 x10E6/uL HGB 14.1 13.0 - 17.7 g/dL HCT 42.7 37.5 - 51.0 % MCV 92 79 - 97 fL  
 MCH 30.5 26.6 - 33.0 pg  
 MCHC 33.0 31.5 - 35.7 g/dL  
 RDW 13.4 12.3 - 15.4 % PLATELET 097 111 - 283 x10E3/uL Narrative Performed at:  [address] [place]  [id number] : Elvis Vazquez MD, Phone:  [phone] VITAMIN D, 25 HYDROXY Result Value Ref Range VITAMIN D, 25-HYDROXY 11.3 (L) 30.0 - 100.0 ng/mL Comment:  
   Vitamin D deficiency has been defined by the [facility] practice guideline as a 
level of serum 25-OH vitamin D less than 20 ng/mL (1,2).  
The Endocrine Society went on to further define vitamin D 
 insufficiency as a level between 21 and 29 ng/mL (2). 1. IOM ([place] of Medicine). 2010. Dietary reference 
   intakes for calcium and D. [facility]: The 
   [employer]. 2. Neel MF, Ivan BAILEY, Reina SWARTZ, et al. 
   Evaluation, treatment, and prevention of vitamin D 
   deficiency: an Endocrine Society clinical practice 
   guideline. JCEM. 2011 Jul; 96(7):1911-30. Narrative Performed at:  [address]  [id number] : Torie Cosby MD, Phone:  [phone] TSH 3RD GENERATION Result Value Ref Range TSH 0.681 0.450 - 4.500 uIU/mL Narrative Performed at:  [address]  [id number] : Torie Cosby MD, Phone:  [phone] LIPID PANEL Result Value Ref Range Cholesterol, total 147 100 - 199 mg/dL Triglyceride 277 (H) 0 - 149 mg/dL HDL Cholesterol 35 (L) >39 mg/dL VLDL, calculated 55 (H) 5 - 40 mg/dL LDL, calculated 57 0 - 99 mg/dL Narrative Performed at:  [address]  [id number] : Torie Cosby MD, Phone:  [phone] RECOMMENDATIONS: 
Impaired kidney function and elevated triglycerides, both likely due to uncontrolled diabetes.   Low vitamin D, recommend vitamin D3 2,000 iu daily. Normal blood counts. Normal liver. Normal thyroid. We are referring you to  Dr. Ivy Novak, nephrology.  
    
   
 
 
 
Please call me if you have any questions: [phone] Sincerely, 
 
 
Rivera Vogt MD

## 2018-04-23 NOTE — PROGRESS NOTES
HPI: Michael Staton is a 81 y.o. male presents to establish. In with daughter in law. Moved from [place] 3 years ago. Has not seen a PCP. Sees endocrinologist, Dr Jadyn Murillo. On high dose insulin. Not consistent with diet. No DM retinopathy. Overdue for eye exam.  Has neuropathy in both feet to knees. On cymbalta, helpful for pain. Has limited ROM of hands and shoulder. Right shoulder surgery. Left ruptured bicep, no surgery. He reports taking advil. Per daughter in law, he is on tylenol. Treated for allopurinol. He does not recall having gout. Saw Dr. Lyndsey Rangel on 4/20. Was SOB at rest. The lasix was increased to 40mg to 80mg and on zaroxolyn  . Per daughter in law, he is less SOB at rest, still has ROLLE. Reports orthopnea for 2 years. The leg swelling is little better. Treated for CHF. Elevated BNP and creatinine 1.52. To have nuclear stress and echo. Falling asleep easily during the day. Feels fatigued. ROS:  As per HPI    Past Medical History:   Diagnosis Date    Arthritis     Asthma     CAD (coronary artery disease)     Calculus of kidney     Carotid artery disease (HCC)     Cong[facility] failure ([facility]     Diabetes ([facility]     Hypercholesterolemia     Hypertension      Past Surgical History:   Procedure Laterality Date    HX CAROTID STENT      HX CHOLECYSTECTOMY      HX HEENT       Social History     Social History    Marital status:      Spouse name: N/A    Number of children: N/A    Years of education: N/A     Social History Main Topics    Smoking status: Former Smoker     Quit date: 4/20/1963    Smokeless tobacco: Never Used    Alcohol use No    Drug use: No    Sexual activity: Yes     Partners: Female     Birth control/ protection: None     Other Topics Concern    None     Social History Narrative    ** Merged History Encounter **          No family history on file.   Current Outpatient Prescriptions   Medication Sig Dispense Refill    insulin NPH/insulin regular (NOVOLIN 70/30 U-100 INSULIN) 100 unit/mL (70-30) injection by SubCUTAneous route. Takes 80 units in am and 70 units in pm.      simvastatin (ZOCOR) 10 mg tablet Take  by mouth nightly.  DULoxetine (CYMBALTA) 60 mg capsule Take 60 mg by mouth daily.  metOLazone (ZAROXOLYN) 5 mg tablet Take  by mouth daily.  aspirin delayed-release 81 mg tablet Take  by mouth daily.  fish oil-dha-epa 1,200-144-216 mg cap Take 1 Cap by mouth daily.  lisinopril (PRINIVIL, ZESTRIL) 40 mg tablet Take 40 mg by mouth daily.  carvedilol (COREG) 3.125 mg tablet Take  by mouth two (2) times daily (with meals).  allopurinol (ZYLOPRIM) 100 mg tablet Take  by mouth daily.  ibuprofen (ADVIL) 100 mg tablet Take 100 mg by mouth every six (6) hours as needed for Pain.  acetaminophen (TYLENOL) 325 mg tablet Take  by mouth every four (4) hours as needed for Pain.  furosemide (LASIX) 80 mg tablet Take 1 Tab by mouth daily. 30 Tab 0    insulin NPH/insulin regular (NOVOLIN 70/30) 100 unit/mL (70-30) injection by SubCUTAneous route.  metolazone (ZAROXOLYN) 5 mg tablet Take 5 mg by mouth daily.  allopurinol (ZYLOPRIM) 100 mg tablet Take  by mouth daily.  carvedilol (COREG) 3.125 mg tablet Take  by mouth two (2) times a day.  DULoxetine (CYMBALTA) 60 mg capsule Take 60 mg by mouth daily.  furosemide (LASIX) 40 mg tablet Take 40 mg by mouth daily.  aspirin delayed-release 81 mg tablet Take 81 mg by mouth daily.  lisinopril (PRINIVIL, ZESTRIL) 40 mg tablet Take 40 mg by mouth daily.  simvastatin (ZOCOR) 10 mg tablet Take 10 mg by mouth nightly.        Allergies   Allergen Reactions    Morphine Anaphylaxis         Physical exam:  Visit Vitals    /61 (BP 1 Location: Left arm, BP Patient Position: Sitting)    Pulse (!) 110    Temp 98.1 °F (36.7 °C) (Oral)    Resp 18    Ht 5' 3\" (1.6 m)    Wt 244 lb 12.8 oz (111 kg)    SpO2 94%    BMI 43.36 kg/m2 General appearance - alert, well appearing, and in no distress  HEENT- PERLL,normal conjunctiva, TM normal bilaterally, mucous membranes moist, pharynx normal without lesions  Neck - supple, no significant adenopathy   Pulm- clear to auscultation, no wheezes, rales or rhonchi  CV- normal rate, regular tachycardia,normal S1, S2  Abdomen - soft, nontender, nondistended, no masses or organomegaly  Extrem-+2 pitting edema to knee bilaterally. Limited ROM of shoulder and hands. OA changes in hands. Neuro -alert, oriented,nonfocal      Assessment/Plan:    1. Elevated glucose    - AMB POC HEMOGLOBIN A1C    2. At risk for sleep apnea    - REFERRAL TO SLEEP STUDIES    3. Uncontrolled type 2 diabetes mellitus with complication, with long-term current use of insulin ([facility]- patient with uncontrolled diabetes. Recommend compliance with diabetic diet. Continue medications for diabetes. Monitor blood sugar readings as discussed. Keep up on regular diabetic eye exams. Refer to endocrinology.     - REFERRAL TO OPHTHALMOLOGY  - METABOLIC PANEL, COMPREHENSIVE  - CBC W/O DIFF  - REFERRAL TO ENDOCRINOLOGY    4. Pure hypercholesterolemia- Recommend AHA diet. Continue statin therapy. - LIPID PANEL    5. Congestive heart failure, unspecified HF chronicity, unspecified heart failure type ([facility]- continue medications as per cardiology. Low sodium diet. 6. Vitamin D deficiency    - VITAMIN D, 25 HYDROXY    7. Fatigue, unspecified type    - TSH 3RD GENERATION    8. Class 3 obesity with serious comorbidity and body mass index (BMI) of 40.0 to 44.9 in adult, unspecified obesity type (HCC)    - TSH 3RD GENERATION    9. Pain in both hands-given hand exercises. Use tylenol prn. Avoid NSAIDS. Follow-up Disposition:  Return in about 2 months (around 6/23/2018) for follow up pending labs and 2 months.  Whit Peck MD

## 2018-04-24 ENCOUNTER — CLINICAL SUPPORT (OUTPATIENT)
Dept: CARDIOLOGY CLINIC | Age: 73
End: 2018-04-24

## 2018-04-24 DIAGNOSIS — R06.02 SOB (SHORTNESS OF BREATH): Primary | ICD-10-CM

## 2018-04-24 LAB
25(OH)D3+25(OH)D2 SERPL-MCNC: 11.3 NG/ML (ref 30–100)
ALBUMIN SERPL-MCNC: 4.1 G/DL (ref 3.5–4.8)
ALBUMIN/GLOB SERPL: 1.9 {RATIO} (ref 1.2–2.2)
ALP SERPL-CCNC: 97 IU/L (ref 39–117)
ALT SERPL-CCNC: 18 IU/L (ref 0–44)
AST SERPL-CCNC: 11 IU/L (ref 0–40)
BILIRUB SERPL-MCNC: <0.2 MG/DL (ref 0–1.2)
BUN SERPL-MCNC: 54 MG/DL (ref 8–27)
BUN/CREAT SERPL: 32 (ref 10–24)
CALCIUM SERPL-MCNC: 9.1 MG/DL (ref 8.6–10.2)
CHLORIDE SERPL-SCNC: 90 MMOL/L (ref 96–106)
CHOLEST SERPL-MCNC: 147 MG/DL (ref 100–199)
CO2 SERPL-SCNC: 27 MMOL/L (ref 18–29)
CREAT SERPL-MCNC: 1.67 MG/DL (ref 0.76–1.27)
ERYTHROCYTE [DISTWIDTH] IN BLOOD BY AUTOMATED COUNT: 13.4 % (ref 12.3–15.4)
GFR SERPLBLD CREATININE-BSD FMLA CKD-EPI: 41 ML/MIN/1.73
GFR SERPLBLD CREATININE-BSD FMLA CKD-EPI: 47 ML/MIN/1.73
GLOBULIN SER CALC-MCNC: 2.2 G/DL (ref 1.5–4.5)
GLUCOSE SERPL-MCNC: 352 MG/DL (ref 65–99)
HCT VFR BLD AUTO: 42.7 % (ref 37.5–51)
HDLC SERPL-MCNC: 35 MG/DL
HGB BLD-MCNC: 14.1 G/DL (ref 13–17.7)
LDLC SERPL CALC-MCNC: 57 MG/DL (ref 0–99)
MCH RBC QN AUTO: 30.5 PG (ref 26.6–33)
MCHC RBC AUTO-ENTMCNC: 33 G/DL (ref 31.5–35.7)
MCV RBC AUTO: 92 FL (ref 79–97)
PLATELET # BLD AUTO: 267 X10E3/UL (ref 150–379)
POTASSIUM SERPL-SCNC: 4.2 MMOL/L (ref 3.5–5.2)
PROT SERPL-MCNC: 6.3 G/DL (ref 6–8.5)
RBC # BLD AUTO: 4.63 X10E6/UL (ref 4.14–5.8)
SODIUM SERPL-SCNC: 138 MMOL/L (ref 134–144)
TRIGL SERPL-MCNC: 277 MG/DL (ref 0–149)
TSH SERPL DL<=0.005 MIU/L-ACNC: 0.68 UIU/ML (ref 0.45–4.5)
VLDLC SERPL CALC-MCNC: 55 MG/DL (ref 5–40)
WBC # BLD AUTO: 10.1 X10E3/UL (ref 3.4–10.8)

## 2018-04-25 ENCOUNTER — TELEPHONE (OUTPATIENT)
Dept: INTERNAL MEDICINE CLINIC | Age: 73
End: 2018-04-25

## 2018-04-25 PROBLEM — E11.21 TYPE 2 DIABETES WITH NEPHROPATHY (HCC): Status: ACTIVE | Noted: 2018-04-25

## 2018-04-25 NOTE — PROGRESS NOTES
Notify patient's family. Impaired kidney function and elevated triglycerides, both likely due to uncontrolled diabetes. Low vitamin D, recommend vitamin D3 2,000 iu daily. Normal blood counts. Normal liver. Normal thyroid. Refer to Dr. Adam Cuevas, nephrology.

## 2018-04-25 NOTE — TELEPHONE ENCOUNTER
Nathan Segura pt's wife is calling to advise  to fax over a note from Monday 4/23/18 when the pt was last seen and lab work  to Kenneth Antunez C)[phone]. aNthan Segura stated, they advised to add a note stating the pt needs to be seen ASAP. Best contact number is [phone] or [phone].        Message received & copied from [facility]

## 2018-04-26 ENCOUNTER — CLINICAL SUPPORT (OUTPATIENT)
Dept: CARDIOLOGY CLINIC | Age: 73
End: 2018-04-26

## 2018-04-26 DIAGNOSIS — R06.02 SOB (SHORTNESS OF BREATH): Primary | ICD-10-CM

## 2018-04-27 ENCOUNTER — CLINICAL SUPPORT (OUTPATIENT)
Dept: CARDIOLOGY CLINIC | Age: 73
End: 2018-04-27

## 2018-04-27 DIAGNOSIS — I50.9 CONGESTIVE HEART FAILURE, UNSPECIFIED HF CHRONICITY, UNSPECIFIED HEART FAILURE TYPE (HCC): ICD-10-CM

## 2018-04-27 DIAGNOSIS — R06.02 SOB (SHORTNESS OF BREATH): ICD-10-CM

## 2018-04-27 DIAGNOSIS — I25.83 CORONARY ARTERY DISEASE DUE TO LIPID RICH PLAQUE: ICD-10-CM

## 2018-04-27 DIAGNOSIS — R93.1 ABNORMAL NUCLEAR CARDIAC IMAGING TEST: Primary | ICD-10-CM

## 2018-04-27 DIAGNOSIS — M79.89 LEG SWELLING: ICD-10-CM

## 2018-04-27 DIAGNOSIS — E66.01 OBESITY, MORBID (HCC): ICD-10-CM

## 2018-04-27 DIAGNOSIS — I25.10 CORONARY ARTERY DISEASE DUE TO LIPID RICH PLAQUE: ICD-10-CM

## 2018-04-30 NOTE — PROGRESS NOTES
Verified patient with two identifiers. Spoke with patient regarding STRESS test results. Lynette, please call pt to schedule a f/u with Dr. Kapil De.   Thanks!!

## 2018-05-07 ENCOUNTER — OFFICE VISIT (OUTPATIENT)
Dept: SLEEP MEDICINE | Age: 73
End: 2018-05-07

## 2018-05-07 ENCOUNTER — OFFICE VISIT (OUTPATIENT)
Dept: ENDOCRINOLOGY | Age: 73
End: 2018-05-07

## 2018-05-07 VITALS
BODY MASS INDEX: 42.1 KG/M2 | WEIGHT: 237.6 LBS | HEIGHT: 63 IN | SYSTOLIC BLOOD PRESSURE: 109 MMHG | DIASTOLIC BLOOD PRESSURE: 71 MMHG | HEART RATE: 111 BPM

## 2018-05-07 VITALS
HEIGHT: 63 IN | BODY MASS INDEX: 41.99 KG/M2 | WEIGHT: 237 LBS | SYSTOLIC BLOOD PRESSURE: 121 MMHG | TEMPERATURE: 98.3 F | HEART RATE: 103 BPM | DIASTOLIC BLOOD PRESSURE: 78 MMHG | OXYGEN SATURATION: 98 %

## 2018-05-07 DIAGNOSIS — G47.33 OBSTRUCTIVE SLEEP APNEA (ADULT) (PEDIATRIC): Primary | ICD-10-CM

## 2018-05-07 DIAGNOSIS — E11.21 TYPE 2 DIABETES WITH NEPHROPATHY (HCC): ICD-10-CM

## 2018-05-07 DIAGNOSIS — Z79.4 TYPE 2 DIABETES MELLITUS WITHOUT COMPLICATION, WITH LONG-TERM CURRENT USE OF INSULIN (HCC): Primary | ICD-10-CM

## 2018-05-07 DIAGNOSIS — E11.9 TYPE 2 DIABETES MELLITUS WITHOUT COMPLICATION, WITH LONG-TERM CURRENT USE OF INSULIN (HCC): Primary | ICD-10-CM

## 2018-05-07 DIAGNOSIS — I50.9 CONGESTIVE HEART FAILURE, UNSPECIFIED HF CHRONICITY, UNSPECIFIED HEART FAILURE TYPE (HCC): ICD-10-CM

## 2018-05-07 DIAGNOSIS — I10 ESSENTIAL HYPERTENSION WITH GOAL BLOOD PRESSURE LESS THAN 130/80: ICD-10-CM

## 2018-05-07 DIAGNOSIS — E78.5 HYPERLIPIDEMIA, UNSPECIFIED HYPERLIPIDEMIA TYPE: ICD-10-CM

## 2018-05-07 RX ORDER — METFORMIN HYDROCHLORIDE 500 MG/1
500 TABLET, EXTENDED RELEASE ORAL
Qty: 180 TAB | Refills: 3 | Status: ON HOLD | OUTPATIENT
Start: 2018-05-07 | End: 2018-05-11

## 2018-05-07 RX ORDER — ROSUVASTATIN CALCIUM 20 MG/1
10 TABLET, COATED ORAL
COMMUNITY
Start: 2018-02-02 | End: 2018-05-08 | Stop reason: ALTCHOICE

## 2018-05-07 NOTE — MR AVS SNAPSHOT
[facility] [address] [facility] [address] [address] [phone] Patient: Saba Leung 
MRN: [id number] :1945 Visit Information Date & Time Provider Department Dept. Phone Encounter #  
 2018  8:30 AM Susan Emmanuel, [address] [place] [address] Diabetes and Endocrinology [id number] Follow-up Instructions Return in about 2 months (around 2018). Your Appointments 2018  2:20 PM  
New Patient with Alexus Chu MD  
9[facility] I[facility]a ([facility] Appt Note: NP; referred by Dr. Napoleon Stanley; previously diagnosis of VIJAY; asap appt per cardiologist  
 [address] [place] [address], [address] [address] 72 [facility], [address] [address]  
  
    
 2018  2:15 PM  
ESTABLISHED PATIENT with Mirella Louis NP [facility] Appt Note: test results per Jessica Rides Dr. Myah Cummings has nothin and will be out for 2.5 weeks [address] [place] [address] [facility]  
[phone] [address] [facility] Upcoming Health Maintenance Date Due Hepatitis C Screening 1945 FOOT EXAM Q1 12/10/1957 MICROALBUMIN Q1 12/10/1957 EYE EXAM RETINAL OR DILATED Q1 12/10/1957 DTaP/Tdap/Td series (1 - Tdap) 12/10/1968 FOBT Q 1 YEAR AGE 50-75 12/10/1997 ZOSTER VACCINE AGE 60> 10/10/2007 GLAUCOMA SCREENING Q2Y 12/10/2012 Pneumococcal 65+ Low/Medium Risk (1 of 2 - PCV13) 12/10/2012 MEDICARE YEARLY EXAM 2018 Influenza Age 5 to Adult 2018 HEMOGLOBIN A1C Q6M 10/23/2018 LIPID PANEL Q1 2019 Allergies as of 2018  Review Complete On: 2018 By: Susan Emmanuel MD  
  
 Severity Noted Reaction Type Reactions Morphine High 2018    Anaphylaxis Current Immunizations  Never Reviewed No immunizations on file. Not reviewed this visit You Were Diagnosed With   
  
 Codes Comments Type 2 diabetes mellitus without complication, with long-term current use of insulin (HCC)    -  Primary ICD-10-CM: E11.9, Z79.4 ICD-9-CM: 250.00, V58.67 Essential hypertension with goal blood pressure less than 130/80     ICD-10-CM: I10 
ICD-9-CM: 401.9 Hyperlipidemia, unspecified hyperlipidemia type     ICD-10-CM: E78.5 ICD-9-CM: 272.4 Vitals BP Pulse Height(growth percentile) Weight(growth percentile) BMI Smoking Status 109/71 (BP 1 Location: Left arm, BP Patient Position: Sitting) (!) 111 5' 3\" (1.6 m) 237 lb 9.6 oz (107.8 kg) 42.09 kg/m2 Former Smoker BMI and BSA Data Body Mass Index Body Surface Area 42.09 kg/m 2 2.19 m 2 Preferred Pharmacy Pharmacy Name Phone [facility] [address], [address] [place] [address] Avenue [phone] Your Updated Medication List  
  
   
This list is accurate as of 5/7/18  9:28 AM.  Always use your most recent med list. ADVIL 100 mg tablet Generic drug:  ibuprofen Take 100 mg by mouth every six (6) hours as needed for Pain. allopurinol 100 mg tablet Commonly known as:  Theoplis Spry Take  by mouth daily. aspirin delayed-release 81 mg tablet Take 81 mg by mouth daily. carvedilol 3.125 mg tablet Commonly known as:  Martina Farmer Take  by mouth two (2) times a day. DULoxetine 60 mg capsule Commonly known as:  CYMBALTA Take 60 mg by mouth daily. fish oil-dha-epa 1,200-144-216 mg Cap Take 1 Cap by mouth daily. furosemide 80 mg tablet Commonly known as:  LASIX Take 1 Tab by mouth daily. lisinopril 40 mg tablet Commonly known as:  Yoselyn Thaddeus Take 40 mg by mouth daily. metFORMIN  mg tablet Commonly known as:  GLUCOPHAGE XR Take 1 Tab by mouth Before breakfast and dinner. metOLazone 5 mg tablet Commonly known as:  Lisa Mas Take 5 mg by mouth daily. NovoLIN 70/30 U-100 Insulin 100 unit/mL (70-30) injection Generic drug:  insulin NPH/insulin regular  
by SubCUTAneous route. Takes 70 units in am and 60 units in pm.  
  
 simvastatin 10 mg tablet Commonly known as:  ZOCOR Take 10 mg by mouth nightly. TYLENOL 325 mg tablet Generic drug:  acetaminophen Take  by mouth every four (4) hours as needed for Pain. Prescriptions Sent to Pharmacy Refills  
 metFORMIN ER (GLUCOPHAGE XR) 500 mg tablet 3 Sig: Take 1 Tab by mouth Before breakfast and dinner. Class: Normal  
 Pharmacy: [address], [address] [place] [address] #: [phone] Route: Oral  
  
We Performed the Following  DIABETES FOOT EXAM [7 Custom] MICROALBUMIN, UR, RAND W/ MICROALB/CREAT RATIO [id number] CPT(R)] REFERRAL TO DIABETIC EDUCATION [REF20 Custom] Comments:  
 Poor diet, uncontrolled DM 2, daughter reports he needs someone \"tough\" to work with him. Follow-up Instructions Return in about 2 months (around 7/7/2018). Referral Information Referral ID Referred By Referred To  
  
 [id number] Overton Alpers Not Available Visits Status Start Date End Date 1 New Request 5/7/18 5/7/19 If your referral has a status of pending review or denied, additional information will be sent to support the outcome of this decision. Patient Instructions Start metformin 500mg twice daily, in the AM and in the PM 
 
Insulin: Novolin 70/30 insulin:  
 
Before Breakfast: 70 units Before Dinner: 60 units (Insulin must be taken at the start of a meal, do not take at bedtime as before) *When you start metformin, monitor your blood sugars, if your blood sugars are steadily less than 130 most of the time, you need to reduce your insulin doses, it would be best to send me an update of your sugars so we can make the next step changes,  
 
 Veronika Espinoza. 6014 [facility] Diabetes & Endocrinology [address] [place] [address] Please note our new policy, you must arrive to the clinic 15 minutes before your appointment time to allow enough time for proper check-in, adequate time to spend with your doctor, and also to respect the appointment time of the next patient. Not arriving 15 minutes in advance may result in having your appointment rescheduled for the next available day/time. 
---------------------------------------------------------------------------------------------------------------------- Below you will find a glucose log sheet which you can use to record your blood sugars. Without checking and recording what your home glucose levels are, it will be difficult to make any changes to your medication dose, even when significant changes may be needed. Please feel free to use the log below to record your home glucose levels. At the very least, I would like for you to login the entire 2-3 weeks just before your visit so we can make your visit much more productive and beneficial to you. GLUCOSE LOG SHEET: 
 
Date Breakfast Lunch Dinner Bedtime Comments ? GLUCOSE LOG SHEET: 
 
Date Breakfast Lunch Dinner Bedtime Comments ? GLUCOSE LOG SHEET: 
 
Date Breakfast Lunch Dinner Bedtime Comments ? Introducing [facility]! [employer] introduces [employer] patient portal. Now you can access parts of your medical record, email your doctor's office, and request medication refills online. 1. In your internet browser, go to [email or web address]. [email or web address] Ratings 2. Click on the First Time User? Click Here link in the Sign In box. You will see the New Member Sign Up page. 3. Enter your [employer] Access Code exactly as it appears below. You will not need to use this code after youve completed the sign-up process. If you do not sign up before the expiration date, you must request a new code. · [employer] Access Code: [email or web address] Expires: 7/19/2018  2:01 PM 
 
4. Enter the last four digits of your Social Security Number (xxxx) and Date of Birth (mm/dd/yyyy) as indicated and click Submit. You will be taken to the next sign-up page. 5. Create a [employer] ID. This will be your [employer] login ID and cannot be changed, so think of one that is secure and easy to remember. 6. Create a [employer] password. You can change your password at any time. 7. Enter your Password Reset Question and Answer. This can be used at a later time if you forget your password. 8. Enter your e-mail address. You will receive e-mail notification when new information is available in [address] 9. Click Sign Up. You can now view and download portions of your medical record. 10. Click the Download Summary menu link to download a portable copy of your medical information. If you have questions, please visit the Frequently Asked Questions section of the [employer] website. Remember, [employer] is NOT to be used for urgent needs. For medical emergencies, dial 911. Now available from your iPhone and Android! Please provide this summary of care documentation to your next provider. Your primary care clinician is listed as Rutherford Barrier.  If you have any questions after today's visit, please call [phone].

## 2018-05-07 NOTE — PROGRESS NOTES
217 [facility]., [place]. [place], [address]  Tel.  [phone]  Fax. 100 [facility] 60  [place], [address] [facility]  Tel.  [phone]  Fax. [phone] [address]   Tel.  [phone]  Fax. [phone]         Subjective:      Saba Leung is an 81 y.o. male referred for evaluation for a sleep disorder. He complains of snoring, snorting, choking, periods of not breathing associated with excessive daytime sleepiness. Symptoms began several years ago, gradually worsening since that time. He usually can fall asleep in few minutes. Family or house members note snoring, periods of not breathing. He denies falling asleep while driving. Saba Leung does wake up frequently at night. He is not bothered by waking up too early and left unable to get back to sleep. He actually sleeps about   hours at night and wakes up about 1 times during the night. He does not work shifts: Jarek West indicates he does get too little sleep at night. His bedtime Is 12-1 am  . He awakens at 6:30 am  . He does take naps. He takes 7 naps a week lasting 1. He has the following observed behaviors: Loud snoring, Sleep talking, Pauses in breathing; Nightmares. Other remarks: Reji Favorite  Retired from construction  [employer] Sleepiness Score: 15   which reflect moderate daytime drowsiness. Allergies   Allergen Reactions    Morphine Anaphylaxis         Current Outpatient Prescriptions:     rosuvastatin (CRESTOR) 20 mg tablet, 10 mg., Disp: , Rfl:     insulin NPH/insulin regular (NOVOLIN 70/30 U-100 INSULIN) 100 unit/mL (70-30) injection, by SubCUTAneous route. Takes 70 units in am and 60 units in pm., Disp: , Rfl:     fish oil-dha-epa 1,200-144-216 mg cap, Take 1 Cap by mouth daily. , Disp: , Rfl:     furosemide (LASIX) 80 mg tablet, Take 1 Tab by mouth daily. , Disp: 30 Tab, Rfl: 0    metolazone (ZAROXOLYN) 5 mg tablet, Take 5 mg by mouth daily. , Disp: , Rfl:    allopurinol (ZYLOPRIM) 100 mg tablet, Take  by mouth daily. , Disp: , Rfl:     carvedilol (COREG) 3.125 mg tablet, Take  by mouth two (2) times a day., Disp: , Rfl:     DULoxetine (CYMBALTA) 60 mg capsule, Take 60 mg by mouth daily. , Disp: , Rfl:     aspirin delayed-release 81 mg tablet, Take 81 mg by mouth daily. , Disp: , Rfl:     lisinopril (PRINIVIL, ZESTRIL) 40 mg tablet, Take 40 mg by mouth daily. , Disp: , Rfl:     metFORMIN ER (GLUCOPHAGE XR) 500 mg tablet, Take 1 Tab by mouth Before breakfast and dinner., Disp: 180 Tab, Rfl: 3    ibuprofen (ADVIL) 100 mg tablet, Take 100 mg by mouth every six (6) hours as needed for Pain., Disp: , Rfl:     acetaminophen (TYLENOL) 325 mg tablet, Take  by mouth every four (4) hours as needed for Pain., Disp: , Rfl:     simvastatin (ZOCOR) 10 mg tablet, Take 10 mg by mouth nightly., Disp: , Rfl:      He  has a past medical history of Arthritis; Asthma; CAD (coronary artery disease); Calculus of kidney; Carotid artery disease ([address] Congestive heart failure ([address] Diabetes ([address] Hypercholesterolemia; and Hypertension. He  has a past surgical history that includes hx cholecystectomy; hx carotid stent; and hx heent. He family history includes Diabetes in his nephew; Heart Disease in his brother. He  reports that he quit smoking about 55 years ago. He has never used smokeless tobacco. He reports that he does not drink alcohol or use illicit drugs. Review of Systems:  Constitutional:  + weight gain. Eyes:  No blurred vision.   CVS:  No significant chest pain  Pulm: + significant shortness of breath  GI:  No significant nausea or vomiting  :  No significant nocturia  Musculoskeletal:  + significant joint pain at night  Skin:  No significant rashes  Neuro:  No significant dizziness   Psych:  No active mood issues    Sleep Review of Systems: notable for no difficulty falling asleep; +frequent awakenings at night;  regular dreaming noted; + nightmares ; no early morning headaches; no memory problems; no concentration issues; no history of any automobile or occupational accidents due to daytime drowsiness. Objective:     Visit Vitals    /78    Pulse (!) 103    Temp 98.3 °F (36.8 °C)    Ht 5' 3\" (1.6 m)    Wt 237 lb (107.5 kg)    SpO2 98%    BMI 41.98 kg/m2         General:   Not in acute distress   Eyes:  Anicteric sclerae, no obvious strabismus   Nose:  No obvious nasal septum deviation    Oropharynx:   Class 3 oropharyngeal outlet, thick tongue base, enlarged and boggy uvula, low-lying soft palate, narrow tonsilo-pharyngeal pilars   Tonsils:   tonsils are present and normal   Neck:   Neck circ. in \"inches\": 19.5; midline trachea   Chest/Lungs:  Equal lung expansion, clear on auscultation    CVS:  Normal rate, regular rhythm; no JVD   Skin:  Warm to touch; no obvious rashes   Neuro:  No focal deficits ; no obvious tremor    Psych:  Normal affect,  normal countenance;          Assessment:       ICD-10-CM ICD-9-CM    1. Obstructive sleep apnea (adult) (pediatric) G47.33 327.23 POLYSOMNOGRAPHY 1 NIGHT   2. Type 2 diabetes with nephropathy (HCC) E11.21 250.40      583.81    3. Congestive heart failure, unspecified HF chronicity, unspecified heart failure type ([facility] I50.9 428.0          Plan:     * The patient currently has a Moderate Risk for having sleep apnea. STOP-BANG score 8.  * PSG was ordered for initial evaluation. We will follow the American Academy of Sleep Medicine protocol regarding split-night procedures and offering a trial of Positive Airway Pressure (CPAP, BPAP, etc.)  * He was provided information on sleep apnea including coresponding risk factors and the importance of proper treatment. * Counseling was provided regarding proper sleep hygiene and safe driving. Treatment options for sleep apnea were reviewed. he is not against a trial of PAP if found to have significant sleep apnea.    The treatment plan was reviewed with the patient in detail and reviewed with the patient and the lead technologist. he understands that the lead technologist will be calling him  with the results and assisting with the next step in the treatment plan as outlined today during the consultation with me. All of his questions were addressed. 2. Type II diabetes - he continues on his current regimen. I have reviewed the relationship between sleep disordered breathing as it relates to diabetes. 3. CHF - he continues on his current regimen. In lab sleep study indicated due to history of heart failure. Thank you for allowing us to participate in your patient's medical care. We'll keep you updated on these investigations.   Logan Beck MD  Diplomate in Sleep Medicine  [facility]

## 2018-05-07 NOTE — Clinical Note
Thank you for the referral.  I will keep you informed of his progress.  [address], [place]ino Benz

## 2018-05-07 NOTE — PATIENT INSTRUCTIONS
Start metformin 500mg twice daily, in the AM and in the PM    Insulin: Novolin 70/30 insulin:     Before Breakfast: 70 units  Before Dinner: 60 units  (Insulin must be taken at the start of a meal, do not take at bedtime as before)    *When you start metformin, monitor your blood sugars, if your blood sugars are steadily less than 130 most of the time, you need to reduce your insulin doses, it would be best to send me an update of your sugars so we can make the next step changes,     Veronika Espinoza. [address] [facility] [place]      Please note our new policy, you must arrive to the clinic 15 minutes before your appointment time to allow enough time for proper check-in, adequate time to spend with your doctor, and also to respect the appointment time of the next patient. Not arriving 15 minutes in advance may result in having your appointment rescheduled for the next available day/time.  ----------------------------------------------------------------------------------------------------------------------    Below you will find a glucose log sheet which you can use to record your blood sugars. Without checking and recording what your home glucose levels are, it will be difficult to make any changes to your medication dose, even when significant changes may be needed. Please feel free to use the log below to record your home glucose levels. At the very least, I would like for you to login the entire 2-3 weeks just before your visit so we can make your visit much more productive and beneficial to you. GLUCOSE LOG SHEET:    Date Breakfast Lunch Dinner Bedtime Comments ? GLUCOSE LOG SHEET:    Date Breakfast Lunch Dinner Bedtime Comments ? GLUCOSE LOG SHEET:    Date Breakfast Lunch Dinner Bedtime Comments ?

## 2018-05-07 NOTE — PROGRESS NOTES
CONSULTATION REQUESTED BY: Shahid Lamas MD     REASON FOR CONSULT:  Uncontrolled type 2 diabetes    CHIEF COMPLAINT: Blood glucose is high    HISTORY OF PRESENT ILLNESS:   Marlen Muller is a 81 y.o. male with a PMHx as noted below who was referred to our endocrinology clinic for evaluation of uncontrolled type 2 diabetes. Diabetes History:  Diabetes was diagnosed x 10 years  Family History of diabetes is negative  Last A1c prior to initial visit was 8.9%    Current Home Regimen:  - Novolin 70/30; 80 units breakfast, 70 units dinner         Taking his evening dose at bedtime rather than at dinner time, which is 5 hours after his dinner ! Does not take insulin when sugar is in the 130's, about 10% of the time      Review of home glucose:  180-250 most of the time, rare lows but describes 60's on an occasion    Review of most recent diabetes-related labs:  Lab Results   Component Value Date    LPR4NZIW 8.9 (A) 04/23/2018    CHOL 147 04/23/2018    LDLC 57 04/23/2018    GFRAA 47 (L) 04/23/2018    GFRNA 41 (L) 04/23/2018    TSH 0.681 04/23/2018    VITD3 11.3 (L) 04/23/2018     Lab Key:  [id number] = IA-2 pancreatic islet cell autoantibody  GADLT = MARIAN-65 autoantibody   MCACR = Urine Microalbumin  INSUL = Insulin level  CPEPL = C-peptide level    PAST MEDICAL/SURGICAL HISTORY:   Past Medical History:   Diagnosis Date    Arthritis     Asthma     CAD (coronary artery disease)     Calculus of kidney     Carotid artery disease (HCC)     Congestive heart failure (HCC)     Diabetes ([address]     Hypercholesterolemia     Hypertension      Past Surgical History:   Procedure Laterality Date    HX CAROTID STENT      HX CHOLECYSTECTOMY      HX HEENT         ALLERGIES:   Allergies   Allergen Reactions    Morphine Anaphylaxis       MEDICATIONS ON ADMISSION:     Current Outpatient Prescriptions:     insulin NPH/insulin regular (NOVOLIN 70/30 U-100 INSULIN) 100 unit/mL (70-30) injection, by SubCUTAneous route.  Takes 80 units in am and 70 units in pm., Disp: , Rfl:     fish oil-dha-epa 1,200-144-216 mg cap, Take 1 Cap by mouth daily. , Disp: , Rfl:     furosemide (LASIX) 80 mg tablet, Take 1 Tab by mouth daily. , Disp: 30 Tab, Rfl: 0    metolazone (ZAROXOLYN) 5 mg tablet, Take 5 mg by mouth daily. , Disp: , Rfl:     allopurinol (ZYLOPRIM) 100 mg tablet, Take  by mouth daily. , Disp: , Rfl:     carvedilol (COREG) 3.125 mg tablet, Take  by mouth two (2) times a day., Disp: , Rfl:     DULoxetine (CYMBALTA) 60 mg capsule, Take 60 mg by mouth daily. , Disp: , Rfl:     aspirin delayed-release 81 mg tablet, Take 81 mg by mouth daily. , Disp: , Rfl:     lisinopril (PRINIVIL, ZESTRIL) 40 mg tablet, Take 40 mg by mouth daily. , Disp: , Rfl:     ibuprofen (ADVIL) 100 mg tablet, Take 100 mg by mouth every six (6) hours as needed for Pain., Disp: , Rfl:     acetaminophen (TYLENOL) 325 mg tablet, Take  by mouth every four (4) hours as needed for Pain., Disp: , Rfl:     simvastatin (ZOCOR) 10 mg tablet, Take 10 mg by mouth nightly., Disp: , Rfl:     SOCIAL HISTORY:   Social History     Social History    Marital status:      Spouse name: N/A    Number of children: N/A    Years of education: N/A     Occupational History    Not on file. Social History Main Topics    Smoking status: Former Smoker     Quit date: 4/20/1963    Smokeless tobacco: Never Used    Alcohol use No    Drug use: No    Sexual activity: Yes     Partners: Female     Birth control/ protection: None     Other Topics Concern    Not on file     Social History Narrative    ** Merged History Encounter **            FAMILY HISTORY:  Family History   Problem Relation Age of Onset    Heart Disease Brother     Diabetes Nephew        REVIEW OF SYSTEMS: Complete ROS assessed and noted for that which is described above, all else are negative.   Eyes: normal  ENT: normal  CVS: normal  Resp: normal  GI: normal  : normal  GYN: normal  Endocrine: normal  Integument: normal  Musculoskeletal: normal  Neuro: normal  Psych: normal      PHYSICAL EXAMINATION:    VITAL SIGNS:  Visit Vitals    /71 (BP 1 Location: Left arm, BP Patient Position: Sitting)    Pulse (!) 111    Ht 5' 3\" (1.6 m)    Wt 237 lb 9.6 oz (107.8 kg)    BMI 42.09 kg/m2       GENERAL: NCAT, Sitting comfortably, NAD  EYES: EOMI, non-icteric, no proptosis  Ear/Nose/Throat: NCAT, no inflammation, no masses  LYMPH NODES: No LAD  CARDIOVASCULAR: S1 S2, RRR, No murmur, 2+ radial pulses  RESPIRATORY: CTA b/l, no wheeze/rales  GASTROINTESTINAL: NT, ND  MUSCULOSKELETAL: Normal ROM, no atrophy  SKIN: warm, no edema/rash/ or other skin changes  NEUROLOGIC: 5/5 power all extremities, no tremor, AAOx3  PSYCHIATRIC: Normal affect, Normal insight and judgement         Diabetic foot exam performed by Davide Shah MD     Measurement  Response Nurse Comment Physician Comment   Monofilament  R - normal sensation with micro filament  L - normal sensation with micro filament     Pulse DP R - 2+ (normal)  L - 2+ (normal)     Vibration R - Normal    L - Normal     Structural deformity R - None  L - None     Skin Integrity / Deformity R - Yes - thick long nails, edema  L - Yes - thick long nails, small wound on great toe from self-clipping, wrapped in band-aid. Reviewed by:             REVIEW OF LABORATORY AND RADIOLOGY DATA:   Labs and documentation have been reviewed as described above. ASSESSMENT AND PLAN:   Nhi Mosquera is a 81 y.o. male with a PMHx as noted above who was referred to our endocrinology clinic for evaluation of uncontrolled type 2 diabetes. Problems:  Type 2 diabetes Uncontrolled  Hyperlipidemia  Hypertension    We had the pleasure of reviewing together the basics of diabetes including basic pathophysiology and diabetes care. We further discussed the importance of checking home glucose regularly and taking all of their scheduled medications in order to have the best possible outcome.  I was able to answer any questions they had in clinic today and they are invited to reach me if they have any further questions. Based upon our discussion together today we have decided to make the following changes:    A couple of findings for which we can make some good modifications. 1. Patient is taking his evening dose of mixed insulin at bedtime. We discussed how this should be taken with his dinner and not at bedtime due to the short acting insulin in this mix. Also since his dinner was about 5 hours before his bedtime, he is at very high risk of overnight lows when taking at bedtime. Additionally this results in delayed treatment of his meal at dinner with significant post prandial hyperglycemia. He demonstrated his understanding. 2. He has been holding his insulin when his sugar is normal, however should continue to take insulin with meals when normal to keep it normal. Discussed this point. 3. He is 81years of age, and if we can get him on some oral medications and reduce his insulin a bit, it would be better and safer for him. I have considered the use of metformin for him, and I have reviewed his renal function, and it is ok for him to take 500mg twice daily. As long as his GFR is >30, I believe the benefit significantly outweighs any risks. We will reduce his insulin a bit, and have him take it at the right time (with his meals) as part of an optimization. Patient will need further modification, but this is a good initial step, and he is advised to bring me a blood sugar log in 2-3 weeks after starting metformin. I advised that he could need a dose adjustment in his insulin soon and he is aware. His daughter who is present also understood that point.        PLAN  Type 2 Diabetes  Medications:  Start metformin 500mg ER BID  Reduce Novolin 70/30 insulin to  70 units with breakfast, 60 units with dinner   Again advised to take at start of dinner, not 5 hours later at bedtime as not safe or effective for treating post-prandial hyperglycemia  Advised to check glucose 2-3 times per day  Provided with glucose log sheets for later review. Labs: Urine Microalbumin  Feet: Examination performed today. Encouraged regular \"self-checks\" at home. Provided options for foot care centers to have professional foot/nail care. Eyes: Advised updated eye exam report faxed to our office for review. Kidney: GFR/Urine microalbumin as discussed. HTN: BP stable on coreg/lisinopril  HLD: stable on simvastatin 10mg    RTC: I would like to see them back in 2 months, to send updated log for me to review in 2-3 weeks  >60 minutes spent together with patient today of which >50% of this time was spent in counseling and coordination of care. Deisi Loving.  [address] [facility]

## 2018-05-07 NOTE — PATIENT INSTRUCTIONS
[address] [facility] [place], [address]  Tel.  [phone]  Fax. [address] [facility] [address]  [place], [address] [facility]  Tel.  [phone]  Fax. [phone] [address]  Tel.  [phone]  Fax. [phone]     Sleep Apnea: After Your Visit  Your Care Instructions  Sleep apnea occurs when you frequently stop breathing for 10 seconds or longer during sleep. It can be mild to severe, based on the number of times per hour that you stop breathing or have slowed breathing. Blocked or narrowed airways in your nose, mouth, or throat can cause sleep apnea. Your airway can become blocked when your throat muscles and tongue relax during sleep. Sleep apnea is common, occurring in 1 out of 20 individuals. Individuals having any of the following characteristics should be evaluated and treated right away due to high risk and detrimental consequences from untreated sleep apnea:  1. Obesity  2. Congestive Heart failure  3. Atrial Fibrillation  4. Uncontrolled Hypertension  5. Type II Diabetes  6. Night-time Arrhythmias  7. Stroke  8. Pulmonary Hypertension  9. High-risk Driving Populations (pilots, truck drivers, etc.)  10. Patients Considering Weight-loss Surgery    How do you know you have sleep apnea? You probably have sleep apnea if you answer 'yes' to 3 or more of the following questions:  S - Have you been told that you Snore? T - Are you often Tired during the day? O - Has anyone Observed you stop breathing while sleeping? P- Do you have (or are being treated for) high blood Pressure? B - Are you obese (Body Mass Index > 35)? A - Is your Age 48years old or older? N - Is your Neck size greater than 16 inches? G - Are you male Gender? A sleep physician can prescribe a breathing device that prevents tissues in the throat from blocking your airway.  Or your doctor may recommend using a dental device (oral breathing device) to help keep your airway open. In some cases, surgery may be needed to remove enlarged tissues in the throat. Follow-up care is a key part of your treatment and safety. Be sure to make and go to all appointments, and call your doctor if you are having problems. It's also a good idea to know your test results and keep a list of the medicines you take. How can you care for yourself at home? · Lose weight, if needed. It may reduce the number of times you stop breathing or have slowed breathing. · Go to bed at the same time every night. · Sleep on your side. It may stop mild apnea. If you tend to roll onto your back, sew a pocket in the back of your pajama top. Put a tennis ball into the pocket, and stitch the pocket shut. This will help keep you from sleeping on your back. · Avoid alcohol and medicines such as sleeping pills and sedatives before bed. · Do not smoke. Smoking can make sleep apnea worse. If you need help quitting, talk to your doctor about stop-smoking programs and medicines. These can increase your chances of quitting for good. · Prop up the head of your bed 4 to 6 inches by putting bricks under the legs of the bed. · Treat breathing problems, such as a stuffy nose, caused by a cold or allergies. · Use a continuous positive airway pressure (CPAP) breathing machine if lifestyle changes do not help your apnea and your doctor recommends it. The machine keeps your airway from closing when you sleep. · If CPAP does not help you, ask your doctor whether you should try other breathing machines. A bilevel positive airway pressure machine has two types of air pressureâone for breathing in and one for breathing out. Another device raises or lowers air pressure as needed while you breathe. · If your nose feels dry or bleeds when using one of these machines, talk with your doctor about increasing moisture in the air. A humidifier may help.   · If your nose is runny or stuffy from using a breathing machine, talk with your doctor about using decongestants or a corticosteroid nasal spray. When should you call for help? Watch closely for changes in your health, and be sure to contact your doctor if:  · You still have sleep apnea even though you have made lifestyle changes. · You are thinking of trying a device such as CPAP. · You are having problems using a CPAP or similar machine. Where can you learn more? Go to [employer]. Enter E262 in the search box to learn more about \"Sleep Apnea: After Your Visit. \"   © 4724-6937 Healthwise, Incorporated. Care instructions adapted under license by [employer] (which disclaims liability or warranty for this information). This care instruction is for use with your licensed healthcare professional. If you have questions about a medical condition or this instruction, always ask your healthcare professional. Angie Crowleys any warranty or liability for your use of this information. PROPER SLEEP HYGIENE    What to avoid  · Do not have drinks with caffeine, such as coffee or black tea, for 8 hours before bed. · Do not smoke or use other types of tobacco near bedtime. Nicotine is a stimulant and can keep you awake. · Avoid drinking alcohol late in the evening, because it can cause you to wake in the middle of the night. · Do not eat a big meal close to bedtime. If you are hungry, eat a light snack. · Do not drink a lot of water close to bedtime, because the need to urinate may wake you up during the night. · Do not read or watch TV in bed. Use the bed only for sleeping and sexual activity. What to try  · Go to bed at the same time every night, and wake up at the same time every morning. Do not take naps during the day. · Keep your bedroom quiet, dark, and cool. · Get regular exercise, but not within 3 to 4 hours of your bedtime. .  · Sleep on a comfortable pillow and mattress.   · If watching the clock makes you anxious, turn it facing away from you so you cannot see the time. · If you worry when you lie down, start a worry book. Well before bedtime, write down your worries, and then set the book and your concerns aside. · Try meditation or other relaxation techniques before you go to bed. · If you cannot fall asleep, get up and go to another room until you feel sleepy. Do something relaxing. Repeat your bedtime routine before you go to bed again. · Make your house quiet and calm about an hour before bedtime. Turn down the lights, turn off the TV, log off the computer, and turn down the volume on music. This can help you relax after a busy day. Drowsy Driving  The [address] [place] [address] Road Traffic Safety Administration cites drowsiness as a causing factor in more than 401,002 police reported crashes annually, resulting in 76,000 injuries and 1,500 deaths. Other surveys suggest 55% of people polled have driven while drowsy in the past year, 23% had fallen asleep but not crashed, 3% crashed, and 2% had and accident due to drowsy driving. Who is at risk? Young Drivers: One study of drowsy driving accidents states that 55% of the drivers were under 25 years. Of those, 75% were male. Shift Workers and Travelers: People who work overnight or travel across time zones frequently are at higher risk of experiencing Circadian Rhythm Disorders. They are trying to work and function when their body is programed to sleep. Sleep Deprived: Lack of sleep has a serious impact on your ability to pay attention or focus on a task. Consistently getting less than the average of 8 hours your body needs creates partial or cumulative sleep deprivation. Untreated Sleep Disorders: Sleep Apnea, Narcolepsy, R.L.S., and other sleep disorders (untreated) prevent a person from getting enough restful sleep. This leads to excessive daytime sleepiness and increases the risk for drowsy driving accidents by up to 7 times.   Medications / Alcohol: Even over the counter medications can cause drowsiness. Medications that impair a drivers attention should have a warning label. Alcohol naturally makes you sleepy and on its own can cause accidents. Combined with excessive drowsiness its effects are amplified. Signs of Drowsy Driving:   * You don't remember driving the last few miles   * You may drift out of your angelito   * You are unable to focus and your thoughts wander   * You may yawn more often than normal   * You have difficulty keeping your eyes open / nodding off   * Missing traffic signs, speeding, or tailgating  Prevention-   Good sleep hygiene, lifestyle and behavioral choices have the most impact on drowsy driving. There is no substitute for sleep and the average person requires 8 hours nightly. If you find yourself driving drowsy, stop and sleep. Consider the sleep hygiene tips provided during your visit as well. Medication Refill Policy: Refills for all medications require 1 week advance notice. Please have your pharmacy fax a refill request. We are unable to fax, or call in \"controled substance\" medications and you will need to pick these prescriptions up from our office. [employer] Activation    Thank you for requesting access to [employer]. Please follow the instructions below to securely access and download your online medical record. [employer] allows you to send messages to your doctor, view your test results, renew your prescriptions, schedule appointments, and more. How Do I Sign Up? 1. In your internet browser, go to [email or web address]. 2. Click on the First Time User? Click Here link in the Sign In box. You will see the New Member Sign Up page. 3. Enter your [employer] Access Code exactly as it appears below. You will not need to use this code after youve completed the sign-up process. If you do not sign up before the expiration date, you must request a new code.     [employer] Access Code: [email or web address]  Expires: 7/19/2018  2:01 PM (This is the date your [employer] access code will )    4. Enter the last four digits of your Social Security Number (xxxx) and Date of Birth (mm/dd/yyyy) as indicated and click Submit. You will be taken to the next sign-up page. 5. Create a Kilopasst ID. This will be your [employer] login ID and cannot be changed, so think of one that is secure and easy to remember. 6. Create a [employer] password. You can change your password at any time. 7. Enter your Password Reset Question and Answer. This can be used at a later time if you forget your password. 8. Enter your e-mail address. You will receive e-mail notification when new information is available in [address] 9. Click Sign Up. You can now view and download portions of your medical record. 10. Click the Download Summary menu link to download a portable copy of your medical information. Additional Information    If you have questions, please call [phone]. Remember, [employer] is NOT to be used for urgent needs. For medical emergencies, dial 911.

## 2018-05-08 ENCOUNTER — OFFICE VISIT (OUTPATIENT)
Dept: CARDIOLOGY CLINIC | Age: 73
End: 2018-05-08

## 2018-05-08 VITALS
BODY MASS INDEX: 41.64 KG/M2 | HEART RATE: 62 BPM | WEIGHT: 235 LBS | SYSTOLIC BLOOD PRESSURE: 118 MMHG | RESPIRATION RATE: 18 BRPM | DIASTOLIC BLOOD PRESSURE: 70 MMHG | HEIGHT: 63 IN | OXYGEN SATURATION: 96 %

## 2018-05-08 DIAGNOSIS — R06.09 DOE (DYSPNEA ON EXERTION): Primary | ICD-10-CM

## 2018-05-08 DIAGNOSIS — E78.00 PURE HYPERCHOLESTEROLEMIA: ICD-10-CM

## 2018-05-08 DIAGNOSIS — I50.22 CHRONIC SYSTOLIC CONGESTIVE HEART FAILURE (HCC): ICD-10-CM

## 2018-05-08 DIAGNOSIS — I25.10 ASHD (ARTERIOSCLEROTIC HEART DISEASE): ICD-10-CM

## 2018-05-08 DIAGNOSIS — E11.21 TYPE 2 DIABETES WITH NEPHROPATHY (HCC): ICD-10-CM

## 2018-05-08 RX ORDER — LISINOPRIL 20 MG/1
20 TABLET ORAL DAILY
Qty: 90 TAB | Refills: 1 | Status: SHIPPED | OUTPATIENT
Start: 2018-05-08 | End: 2018-05-09 | Stop reason: SDUPTHER

## 2018-05-08 RX ORDER — ISOSORBIDE MONONITRATE 30 MG/1
30 TABLET, EXTENDED RELEASE ORAL DAILY
Qty: 30 TAB | Refills: 1 | Status: SHIPPED | OUTPATIENT
Start: 2018-05-08 | End: 2018-07-17 | Stop reason: SDUPTHER

## 2018-05-08 RX ORDER — CARVEDILOL 6.25 MG/1
6.25 TABLET ORAL 2 TIMES DAILY
Qty: 60 TAB | Refills: 3 | Status: SHIPPED | OUTPATIENT
Start: 2018-05-08 | End: 2018-05-31 | Stop reason: SDUPTHER

## 2018-05-08 NOTE — PROGRESS NOTES
1. Have you been to the ER, urgent care clinic since your last visit? Hospitalized since your last visit? No    2. Have you seen or consulted any other health care providers outside of the [address] [place] [address] since your last visit? Include any pap smears or colon screening.  No    Chief Complaint   Patient presents with    Results     for stress test and echo    Shortness of Breath     exertional    Hand Swelling     bilateral    Ankle swelling     bilateral    Numbness     pt reports constant numbness and tingling sensation to arms bilaterally    Medication Evaluation     for cholesterol; pt was taking

## 2018-05-08 NOTE — PROGRESS NOTES
Divya Bowen DNP, ANP-BC  Subjective/HPI:     Anny Arellano is a 81 y.o. male is here for test results and reevaluation. Mr. Rhett Meier reports continued and progressive dyspnea on exertion which is limiting activities of daily life. He reports walking up a flight of stairs he becomes profoundly dyspneic and will also develop anterior chest fullness. Patient has a history of multivessel PTCA stenting approximately 10 years ago when in [facility], [place]. She also reports he is getting bilateral arm numbness and tingling, increasing fatigue. Patient is accompanied by his daughter-in-law who endorses she has noted him to be significantly fatigued and dyspneic with minimal activities. SPECT images demonstrate a medium, reversible abnormality of moderate degree in the inferior region   on the stress and rest images. Gated SPECT images demonstrates dyskinesis of the anterior, inferior, septal and apical region. The left ventricular ejection fraction was calculated to be 38 %. Impression:   Myocardial perfusion imaging is abnormal: there is a medium area of ischemia in the inferior wall. Overall left ventricular systolic function was abnormal: with regional wall motion abnormalities (as noted above). These test results indicate high likelihood for the presence of angiographically significant coronary artery disease    ECHO  Left ventricle: Systolic function was mildly reduced by EF (biplane method  of disks). Ejection fraction was estimated in the range of 45 % to 50 %. There was mild diffuse hypokinesis. Wall thickness was mildly increased. Features were consistent with a pseudonormal left ventricular filling  pattern, with concomitant abnormal relaxation and increased filling  pressure (grade 2 diastolic dysfunction). Left atrium: The atrium was mildly dilated. Aortic valve: The valve was probably trileaflet. Leaflets exhibited mildly  increased thickness and moderate calcification.  Not well visualized. PCP Provider  Raj Begum MD  Past Medical History:   Diagnosis Date    Arthritis     Asthma     CAD (coronary artery disease)     Calculus of kidney     Carotid artery disease (HCC)     [facility] failure ([facility]     Diabetes ([facility]     Hypercholesterolemia     Hypertension       Past Surgical History:   Procedure Laterality Date    HX CAROTID STENT      HX CHOLECYSTECTOMY      HX HEENT       Allergies   Allergen Reactions    Morphine Anaphylaxis      Family History   Problem Relation Age of Onset    Heart Disease Brother     Diabetes Nephew       Current Outpatient Prescriptions   Medication Sig    isosorbide mononitrate ER (IMDUR) 30 mg tablet Take 1 Tab by mouth daily.  carvedilol (COREG) 6.25 mg tablet Take 1 Tab by mouth two (2) times a day.  lisinopril (PRINIVIL, ZESTRIL) 20 mg tablet Take 1 Tab by mouth daily. Reduced 5/8/18    metFORMIN ER (GLUCOPHAGE XR) 500 mg tablet Take 1 Tab by mouth Before breakfast and dinner.  insulin NPH/insulin regular (NOVOLIN 70/30 U-100 INSULIN) 100 unit/mL (70-30) injection by SubCUTAneous route. Takes 70 units in am and 60 units in pm.    fish oil-dha-epa 1,200-144-216 mg cap Take 1 Cap by mouth daily.  acetaminophen (TYLENOL) 325 mg tablet Take  by mouth every four (4) hours as needed for Pain.  furosemide (LASIX) 80 mg tablet Take 1 Tab by mouth daily.  metolazone (ZAROXOLYN) 5 mg tablet Take 5 mg by mouth daily.  allopurinol (ZYLOPRIM) 100 mg tablet Take  by mouth daily.  DULoxetine (CYMBALTA) 60 mg capsule Take 60 mg by mouth daily.  aspirin delayed-release 81 mg tablet Take 81 mg by mouth daily.  simvastatin (ZOCOR) 10 mg tablet Take 10 mg by mouth nightly.  ibuprofen (ADVIL) 100 mg tablet Take 100 mg by mouth every six (6) hours as needed for Pain. No current facility-administered medications for this visit.        Vitals:    05/08/18 1421   BP: 118/70   Pulse: 62   Resp: 18   SpO2: 96% Weight: 235 lb (106.6 kg)   Height: 5' 3\" (1.6 m)     Social History     Social History    Marital status:      Spouse name: N/A    Number of children: N/A    Years of education: N/A     Occupational History    Not on file. Social History Main Topics    Smoking status: Former Smoker     Quit date: 4/20/1963    Smokeless tobacco: Never Used    Alcohol use No    Drug use: No    Sexual activity: Yes     Partners: Female     Birth control/ protection: None     Other Topics Concern    Not on file     Social History Narrative    ** Merged History Encounter **            I have reviewed the nurses notes, vitals, problem list, allergy list, medical history, family, social history and medications. Review of Symptoms:    General: Pt denies excessive weight gain or loss.  + Limitations in activities of daily life secondary to dyspnea  HEENT: Denies blurred vision, headaches, epistaxis and difficulty swallowing. Respiratory: Denies shortness of breath, + ROLLE, denies wheezing or stridor. Cardiovascular: + Chest fullness, palpitations, edema or PND  Gastrointestinal: Denies poor appetite, indigestion, abdominal pain or blood in stool  Musculoskeletal: Denies pain or swelling from muscles or joints  Neurologic: Denies tremor, paresthesias, or sensory motor disturbance  Skin: Denies rash, itching or texture change. Physical Exam:      General: Well developed, in no acute distress, cooperative and alert  HEENT: No carotid bruits, no JVD, trach is midline. Neck Supple, PEERL, EOM intact. Heart:  Normal S1/S2 negative S3 or S4. Regular, no murmur, gallop or rub.   Respiratory: Clear bilaterally x 4, no wheezing or rales  Abdomen:   Soft obese, non-tender, no masses, bowel sounds are active.   Extremities: +2 bilateral dependent nonpitting edema normal cap refill, no cyanosis, atraumatic. Neuro: A&Ox3, speech clear, gait stable. Skin: Skin color is normal. No rashes or lesions.  Non diaphoretic  Vascular: 2+ pulses symmetric in all extremities    Cardiographics    ECG: Sinus rhythm  No results found for this or any previous visit. Cardiology Labs:  Lab Results   Component Value Date/Time    Cholesterol, total 147 04/23/2018 10:02 AM    HDL Cholesterol 35 (L) 04/23/2018 10:02 AM    LDL, calculated 57 04/23/2018 10:02 AM    Triglyceride 277 (H) 04/23/2018 10:02 AM       Lab Results   Component Value Date/Time    Sodium 138 04/23/2018 10:02 AM    Potassium 4.2 04/23/2018 10:02 AM    Chloride 90 (L) 04/23/2018 10:02 AM    CO2 27 04/23/2018 10:02 AM    Anion gap 9 11/30/2015 02:44 AM    Glucose 352 (H) 04/23/2018 10:02 AM    BUN 54 (H) 04/23/2018 10:02 AM    Creatinine 1.67 (H) 04/23/2018 10:02 AM    BUN/Creatinine ratio 32 (H) 04/23/2018 10:02 AM    GFR est AA 47 (L) 04/23/2018 10:02 AM    GFR est non-AA 41 (L) 04/23/2018 10:02 AM    Calcium 9.1 04/23/2018 10:02 AM    Bilirubin, total <0.2 04/23/2018 10:02 AM    AST (SGOT) 11 04/23/2018 10:02 AM    Alk. phosphatase 97 04/23/2018 10:02 AM    Protein, total 6.3 04/23/2018 10:02 AM    Albumin 4.1 04/23/2018 10:02 AM    Globulin 3.9 11/30/2015 02:44 AM    A-G Ratio 1.9 04/23/2018 10:02 AM    ALT (SGPT) 18 04/23/2018 10:02 AM           Assessment:     Assessment:     Diagnoses and all orders for this visit:    1. ROLLE (dyspnea on exertion)  -     CARDIAC CATHETERIZATION; Future  -     METABOLIC PANEL, COMPREHENSIVE  -     CBC WITH AUTOMATED DIFF  -     PROTHROMBIN TIME + INR  -     URIC ACID    2. Pure hypercholesterolemia  -     AMB POC EKG ROUTINE W/ 12 LEADS, INTER & REP  -     CARDIAC CATHETERIZATION; Future  -     METABOLIC PANEL, COMPREHENSIVE  -     CBC WITH AUTOMATED DIFF  -     PROTHROMBIN TIME + INR  -     URIC ACID    3. Chronic systolic congestive heart failure (HCC)  -     CARDIAC CATHETERIZATION; Future  -     METABOLIC PANEL, COMPREHENSIVE  -     CBC WITH AUTOMATED DIFF  -     PROTHROMBIN TIME + INR  -     URIC ACID    4.  Type 2 diabetes with nephropathy ([facility]  -     CARDIAC CATHETERIZATION; Future  -     METABOLIC PANEL, COMPREHENSIVE  -     CBC WITH AUTOMATED DIFF  -     PROTHROMBIN TIME + INR  -     URIC ACID    5. ASHD (arteriosclerotic heart disease)  -     CARDIAC CATHETERIZATION; Future  -     METABOLIC PANEL, COMPREHENSIVE  -     CBC WITH AUTOMATED DIFF  -     PROTHROMBIN TIME + INR  -     URIC ACID    Other orders  -     isosorbide mononitrate ER (IMDUR) 30 mg tablet; Take 1 Tab by mouth daily. -     carvedilol (COREG) 6.25 mg tablet; Take 1 Tab by mouth two (2) times a day. -     lisinopril (PRINIVIL, ZESTRIL) 20 mg tablet; Take 1 Tab by mouth daily. Reduced 5/8/18        ICD-10-CM ICD-9-CM    1. ROLLE (dyspnea on exertion) R06.09 786.09 CARDIAC CATHETERIZATION      METABOLIC PANEL, COMPREHENSIVE      CBC WITH AUTOMATED DIFF      PROTHROMBIN TIME + INR      URIC ACID   2. Pure hypercholesterolemia E78.00 272.0 AMB POC EKG ROUTINE W/ 12 LEADS, INTER & REP      CARDIAC CATHETERIZATION      METABOLIC PANEL, COMPREHENSIVE      CBC WITH AUTOMATED DIFF      PROTHROMBIN TIME + INR      URIC ACID   3. Chronic systolic congestive heart failure (HCC) I50.22 428.22 CARDIAC CATHETERIZATION     823.8 METABOLIC PANEL, COMPREHENSIVE      CBC WITH AUTOMATED DIFF      PROTHROMBIN TIME + INR      URIC ACID   4. Type 2 diabetes with nephropathy (HCC) E11.21 250.40 CARDIAC CATHETERIZATION     829.67 METABOLIC PANEL, COMPREHENSIVE      CBC WITH AUTOMATED DIFF      PROTHROMBIN TIME + INR      URIC ACID   5.  ASHD (arteriosclerotic heart disease) I25.10 414.00 CARDIAC CATHETERIZATION      METABOLIC PANEL, COMPREHENSIVE      CBC WITH AUTOMATED DIFF      PROTHROMBIN TIME + INR      URIC ACID     Orders Placed This Encounter    METABOLIC PANEL, COMPREHENSIVE    CBC WITH AUTOMATED DIFF    PROTHROMBIN TIME + INR    URIC ACID    CARDIAC CATHETERIZATION     Standing Status:   Future     Standing Expiration Date:   11/8/2018     Order Specific Question: Reason for Exam:     Answer:   ROLLE as anginal equivalent    AMB POC EKG ROUTINE W/ 12 LEADS, INTER & REP     Order Specific Question:   Reason for Exam:     Answer:   Routine    isosorbide mononitrate ER (IMDUR) 30 mg tablet     Sig: Take 1 Tab by mouth daily. Dispense:  30 Tab     Refill:  1    carvedilol (COREG) 6.25 mg tablet     Sig: Take 1 Tab by mouth two (2) times a day. Dispense:  60 Tab     Refill:  3    lisinopril (PRINIVIL, ZESTRIL) 20 mg tablet     Sig: Take 1 Tab by mouth daily. Reduced 5/8/18     Dispense:  90 Tab     Refill:  1        Plan:     Patient is a 74-year-old male with a history of diabetes, hypertension, hyperlipidemia and multivessel atherosclerotic heart disease presenting with increasing degree of dyspnea on exertion which is now limiting as well as anterior chest fullness. The symptoms are consistent with angina functional class III, dyspnea on exertion as anginal equivalent. Nuclear stress test showing inferior wall ischemia. Will start second antianginal therapy isosorbide 30 mg, increase carvedilol, and decrease lisinopril to offset blood pressure lowering effect of medication changes. Discussed risk benefits of coronary angiography PTCA stenting, patient understands the risk and agrees to proceed. We will arrange procedure to be completed by Dr. Marv Sanabria 11 afternoon case. Discussed with patient not to engage in physical activity, if any chest pain or recurrence of dyspnea on exertion that does not resolve immediately with rest he is to proceed to the nearest ER. During the exam patient brought up the question as to why he takes allopurinol, was prescribed by primary care in [place] no history known to patient of gout. Will evaluate baseline uric acid level, discussed with patient the fact that he is on aggressive diuretics can increase his uric acid levels and allopurinol may have been used as preventative therapy.     Dependent edema: Weight is down 2 pounds with further titration of furosemide, has been maintained on Zaroxolyn daily. BUN 54 creatinine 1.67. Patient will need hydration prior to cardiac catheterization. Follow-up after procedure complete. Divya Bowen NP    This note was created using voice recognition software. Despite editing, there may be syntax errors.

## 2018-05-08 NOTE — MR AVS SNAPSHOT
[address] 
[phone] Patient: Payal Warner 
MRN: [id number] :1945 Visit Information Date & Time Provider Department Dept. Phone Encounter #  
 2018  2:15 PM Tg Lara NP [facility] [phone] Your Appointments 2018 11:50 AM  
Follow Up with Hari Andrews MD  
[facility]: [facility]u       dm           3 month  
 [address] [facility] [phone] [id number] [address] [facility] Upcoming Health Maintenance Date Due Hepatitis C Screening 1945 MICROALBUMIN Q1 12/10/1957 EYE EXAM RETINAL OR DILATED Q1 12/10/1957 DTaP/Tdap/Td series (1 - Tdap) 12/10/1968 FOBT Q 1 YEAR AGE 50-75 12/10/1997 ZOSTER VACCINE AGE 60> 10/10/2007 GLAUCOMA SCREENING Q2Y 12/10/2012 Pneumococcal 65+ Low/Medium Risk (1 of 2 - PCV13) 12/10/2012 MEDICARE YEARLY EXAM 2018 Influenza Age 5 to Adult 2018 HEMOGLOBIN A1C Q6M 10/23/2018 LIPID PANEL Q1 2019 FOOT EXAM Q1 2019 Allergies as of 2018  Review Complete On: 2018 By: Tg Lara NP Severity Noted Reaction Type Reactions Morphine High 2018    Anaphylaxis Current Immunizations  Never Reviewed No immunizations on file. Not reviewed this visit You Were Diagnosed With   
  
 Codes Comments ROLLE (dyspnea on exertion)    -  Primary ICD-10-CM: R06.09 
ICD-9-CM: 786.09   
 Pure hypercholesterolemia     ICD-10-CM: E78.00 ICD-9-CM: 272.0 Chronic systolic congestive heart failure (HCC)     ICD-10-CM: I50.22 ICD-9-CM: 428.22, 428.0 Type 2 diabetes with nephropathy (HCC)     ICD-10-CM: E11.21 
ICD-9-CM: 250.40, 583.81   
 ASHD (arteriosclerotic heart disease)     ICD-10-CM: I25.10 ICD-9-CM: 414.00 Vitals BP Pulse Resp Height(growth percentile) Weight(growth percentile) SpO2  
 118/70 (BP 1 Location: Right arm, BP Patient Position: Sitting) 62 18 5' 3\" (1.6 m) 235 lb (106.6 kg) 96% BMI Smoking Status 41.63 kg/m2 Former Smoker Vitals History BMI and BSA Data Body Mass Index Body Surface Area  
 41.63 kg/m 2 2.18 m 2 Preferred Pharmacy Pharmacy Name Phone [facility] [address], [address] [place] [address] Avenue [phone] Your Updated Medication List  
  
   
This list is accurate as of 5/8/18  3:33 PM.  Always use your most recent med list. ADVIL 100 mg tablet Generic drug:  ibuprofen Take 100 mg by mouth every six (6) hours as needed for Pain. allopurinol 100 mg tablet Commonly known as:  Vena Ditch Take  by mouth daily. aspirin delayed-release 81 mg tablet Take 81 mg by mouth daily. carvedilol 6.25 mg tablet Commonly known as:  Pennye Louder Take 1 Tab by mouth two (2) times a day. DULoxetine 60 mg capsule Commonly known as:  CYMBALTA Take 60 mg by mouth daily. fish oil-dha-epa 1,200-144-216 mg Cap Take 1 Cap by mouth daily. furosemide 80 mg tablet Commonly known as:  LASIX Take 1 Tab by mouth daily. isosorbide mononitrate ER 30 mg tablet Commonly known as:  IMDUR Take 1 Tab by mouth daily. lisinopril 20 mg tablet Commonly known as:  Virgil Boehringer Take 1 Tab by mouth daily. Reduced 5/8/18  
  
 metFORMIN  mg tablet Commonly known as:  GLUCOPHAGE XR Take 1 Tab by mouth Before breakfast and dinner. metOLazone 5 mg tablet Commonly known as:  [place] Most Take 5 mg by mouth daily. NovoLIN 70/30 U-100 Insulin 100 unit/mL (70-30) injection Generic drug:  insulin NPH/insulin regular  
by SubCUTAneous route. Takes 70 units in am and 60 units in pm.  
  
 simvastatin 10 mg tablet Commonly known as:  ZOCOR  
 Take 10 mg by mouth nightly. TYLENOL 325 mg tablet Generic drug:  acetaminophen Take  by mouth every four (4) hours as needed for Pain. Prescriptions Sent to Pharmacy Refills  
 isosorbide mononitrate ER (IMDUR) 30 mg tablet 1 Sig: Take 1 Tab by mouth daily. Class: Normal  
 Pharmacy: [facility] [address] DANA ZELAYA [address], [address] [place] [address] Ph #: [phone] Route: Oral  
 carvedilol (COREG) 6.25 mg tablet 3 Sig: Take 1 Tab by mouth two (2) times a day. Class: Normal  
 Pharmacy: [facility] [address] DANA ZELAYA [address], [address] [place] [address] Ph #: [phone] Route: Oral  
 lisinopril (PRINIVIL, ZESTRIL) 20 mg tablet 1 Sig: Take 1 Tab by mouth daily. Reduced 5/8/18 Class: Normal  
 Pharmacy: [facility] [address] DANA ZELAYA [address], [address] [place] [address] Ph #: [phone] Route: Oral  
  
We Performed the Following AMB POC EKG ROUTINE W/ 12 LEADS, INTER & REP [80484 CPT(R)] CBC WITH AUTOMATED DIFF [32124 CPT(R)] METABOLIC PANEL, COMPREHENSIVE [50330 CPT(R)] PROTHROMBIN TIME + INR [00479 CPT(R)] URIC ACID [id number] CPT(R)] To-Do List   
 05/10/2018 Cardiac Services:  CARDIAC CATHETERIZATION   
  
 07/03/2018 8:30 PM  
  Appointment with [facility] at [address] [place] [address] ([phone]) 1. Do not take a nap the day of the study  2. No caffeine after 12 noon the day of the study  3. Bring a 2 piece sleeping garment  4. Hair should be clean and dry, no oils, sprays, powders and remove wigs, weaves or other hair accessories  6. Patient should eat dinner prior to arriving for the test and a light breakfast will be provided upon discharge in the morning  7. Patient encouraged to bring activity items such as books, magazines, laptop, IPAD, etc, as well as toiletry items needed for the next morning  8. Bring all medications with you to the center  9.  For specific questions please contact the sleep center directly, [phone] to [phone] 10. Do not arrive more than 15 minutes prior to appt time and use the doorbell to enter the sleep center. Introducing [facility]! [employer] introduces Wizdee patient portal. Now you can access parts of your medical record, email your doctor's office, and request medication refills online. 1. In your internet browser, go to [email or web address] 2. Click on the First Time User? Click Here link in the Sign In box. You will see the New Member Sign Up page. 3. Enter your Wizdee Access Code exactly as it appears below. You will not need to use this code after youve completed the sign-up process. If you do not sign up before the expiration date, you must request a new code. · Wizdee Access Code: [email or web address] Expires: 7/19/2018  2:01 PM 
 
4. Enter the last four digits of your Social Security Number (xxxx) and Date of Birth (mm/dd/yyyy) as indicated and click Submit. You will be taken to the next sign-up page. 5. Create a Wizdee ID. This will be your Wizdee login ID and cannot be changed, so think of one that is secure and easy to remember. 6. Create a Wizdee password. You can change your password at any time. 7. Enter your Password Reset Question and Answer. This can be used at a later time if you forget your password. 8. Enter your e-mail address. You will receive e-mail notification when new information is available in [address] 9. Click Sign Up. You can now view and download portions of your medical record. 10. Click the Download Summary menu link to download a portable copy of your medical information. If you have questions, please visit the Frequently Asked Questions section of the Wizdee website. Remember, Wizdee is NOT to be used for urgent needs. For medical emergencies, dial 911. Now available from your iPhone and Android! Please provide this summary of care documentation to your next provider. Your primary care clinician is listed as Breonna [place]. If you have any questions after today's visit, please call [phone].

## 2018-05-09 ENCOUNTER — TELEPHONE (OUTPATIENT)
Dept: CARDIOLOGY CLINIC | Age: 73
End: 2018-05-09

## 2018-05-09 ENCOUNTER — HOSPITAL ENCOUNTER (OUTPATIENT)
Dept: SLEEP MEDICINE | Age: 73
Discharge: HOME | End: 2018-05-09
Attending: INTERNAL MEDICINE
Payer: MEDICARE

## 2018-05-09 DIAGNOSIS — R79.89 AZOTEMIA: Primary | ICD-10-CM

## 2018-05-09 DIAGNOSIS — G47.33 OBSTRUCTIVE SLEEP APNEA (ADULT) (PEDIATRIC): ICD-10-CM

## 2018-05-09 LAB
ALBUMIN SERPL-MCNC: 4.4 G/DL (ref 3.5–4.8)
ALBUMIN/CREAT UR: 456.2 MG/G CREAT (ref 0–30)
ALBUMIN/GLOB SERPL: 1.8 {RATIO} (ref 1.2–2.2)
ALP SERPL-CCNC: 78 IU/L (ref 39–117)
ALT SERPL-CCNC: 19 IU/L (ref 0–44)
AST SERPL-CCNC: 15 IU/L (ref 0–40)
BASOPHILS # BLD AUTO: 0.1 X10E3/UL (ref 0–0.2)
BASOPHILS NFR BLD AUTO: 1 %
BILIRUB SERPL-MCNC: 0.3 MG/DL (ref 0–1.2)
BUN SERPL-MCNC: 62 MG/DL (ref 8–27)
BUN/CREAT SERPL: 27 (ref 10–24)
CALCIUM SERPL-MCNC: 9.8 MG/DL (ref 8.6–10.2)
CHLORIDE SERPL-SCNC: 95 MMOL/L (ref 96–106)
CO2 SERPL-SCNC: 27 MMOL/L (ref 18–29)
CREAT SERPL-MCNC: 2.3 MG/DL (ref 0.76–1.27)
CREAT UR-MCNC: 38.1 MG/DL
EOSINOPHIL # BLD AUTO: 0.4 X10E3/UL (ref 0–0.4)
EOSINOPHIL NFR BLD AUTO: 3 %
ERYTHROCYTE [DISTWIDTH] IN BLOOD BY AUTOMATED COUNT: 13.8 % (ref 12.3–15.4)
GFR SERPLBLD CREATININE-BSD FMLA CKD-EPI: 28 ML/MIN/1.73
GFR SERPLBLD CREATININE-BSD FMLA CKD-EPI: 32 ML/MIN/1.73
GLOBULIN SER CALC-MCNC: 2.4 G/DL (ref 1.5–4.5)
GLUCOSE SERPL-MCNC: 83 MG/DL (ref 65–99)
HCT VFR BLD AUTO: 44.4 % (ref 37.5–51)
HGB BLD-MCNC: 15.1 G/DL (ref 13–17.7)
IMM GRANULOCYTES # BLD: 0 X10E3/UL (ref 0–0.1)
IMM GRANULOCYTES NFR BLD: 0 %
INR PPP: 1 (ref 0.8–1.2)
INTERPRETATION: NORMAL
LYMPHOCYTES # BLD AUTO: 1.3 X10E3/UL (ref 0.7–3.1)
LYMPHOCYTES NFR BLD AUTO: 12 %
Lab: NORMAL
MCH RBC QN AUTO: 30.2 PG (ref 26.6–33)
MCHC RBC AUTO-ENTMCNC: 34 G/DL (ref 31.5–35.7)
MCV RBC AUTO: 89 FL (ref 79–97)
MICROALBUMIN UR-MCNC: 173.8 UG/ML
MONOCYTES # BLD AUTO: 1.1 X10E3/UL (ref 0.1–0.9)
MONOCYTES NFR BLD AUTO: 10 %
NEUTROPHILS # BLD AUTO: 8 X10E3/UL (ref 1.4–7)
NEUTROPHILS NFR BLD AUTO: 74 %
PLATELET # BLD AUTO: 302 X10E3/UL (ref 150–379)
POTASSIUM SERPL-SCNC: 5 MMOL/L (ref 3.5–5.2)
PROT SERPL-MCNC: 6.8 G/DL (ref 6–8.5)
PROTHROMBIN TIME: 10.4 SEC (ref 9.1–12)
RBC # BLD AUTO: 5 X10E6/UL (ref 4.14–5.8)
SODIUM SERPL-SCNC: 141 MMOL/L (ref 134–144)
URATE SERPL-MCNC: 8.3 MG/DL (ref 3.7–8.6)
WBC # BLD AUTO: 10.8 X10E3/UL (ref 3.4–10.8)

## 2018-05-09 PROCEDURE — 95810 POLYSOM 6/> YRS 4/> PARAM: CPT | Performed by: INTERNAL MEDICINE

## 2018-05-09 RX ORDER — LISINOPRIL 20 MG/1
20 TABLET ORAL DAILY
Qty: 90 TAB | Refills: 1 | Status: SHIPPED | OUTPATIENT
Start: 2018-05-09 | End: 2018-05-31

## 2018-05-09 RX ORDER — SIMVASTATIN 10 MG/1
10 TABLET, FILM COATED ORAL
Qty: 90 TAB | Refills: 1 | Status: SHIPPED | OUTPATIENT
Start: 2018-05-09 | End: 2018-11-15 | Stop reason: SDUPTHER

## 2018-05-09 RX ORDER — FUROSEMIDE 40 MG/1
TABLET ORAL
Qty: 90 TAB | Refills: 0 | Status: ON HOLD | OUTPATIENT
Start: 2018-05-09 | End: 2018-05-11 | Stop reason: CLARIF

## 2018-05-09 RX ORDER — METOLAZONE 5 MG/1
5 TABLET ORAL DAILY
Qty: 90 TAB | Refills: 0 | Status: SHIPPED | OUTPATIENT
Start: 2018-05-09 | End: 2018-05-31 | Stop reason: SDUPTHER

## 2018-05-09 RX ORDER — FUROSEMIDE 20 MG/1
TABLET ORAL
Qty: 90 TAB | Refills: 0 | Status: SHIPPED | OUTPATIENT
Start: 2018-05-09 | End: 2018-05-31 | Stop reason: ALTCHOICE

## 2018-05-09 NOTE — TELEPHONE ENCOUNTER
Patient's wife has some questions in regards to medication change. Pt's wife states that Ana Connell is giving her hard time with getting the medication. Per pt's wife, patient is going to have a sleep study done tonight, also.     Thanks,    [employer]

## 2018-05-09 NOTE — TELEPHONE ENCOUNTER
Please call patients daughter in [place]) who is listed on HIPAA. Checking status of medication refills for Cholesterol medication has it been sent to pharmacy.     Thanks  Meda Spatz

## 2018-05-09 NOTE — PROGRESS NOTES
Spoke to patient using 2 identifiers. Per Tyra Ernst NP, pt was made aware that lab work showed a decline in his renal function from last visit. Yesterday, lisinopril was reduced from 40 mg to 20 mg. Would like to try reducing lasix from 80 mg to 60 mg daily. Repeat labs for renal panel in 2 wks. Pt verbalized understanding.

## 2018-05-09 NOTE — PROGRESS NOTES
Farzana: Please call patient lab work shows a decline in his renal function from last visit.   Yesterday I reduced his lisinopril from 40 mg down to 20 mg, I would like to try reducing his Lasix from 80 mg daily back down to 60 mg daily and repeat his renal panel in 2 weeks please (bmp)

## 2018-05-09 NOTE — TELEPHONE ENCOUNTER
Spoke to Meme, on HIPAA using 2 identifiers. She was made aware that I spoke to the pharmacist at [facility] who stated that all prescriptions are ready for .

## 2018-05-11 ENCOUNTER — HOSPITAL ENCOUNTER (OUTPATIENT)
Dept: CARDIAC CATH/INVASIVE PROCEDURES | Age: 73
Discharge: HOME | End: 2018-05-11
Attending: INTERNAL MEDICINE | Admitting: INTERNAL MEDICINE
Payer: MEDICARE

## 2018-05-11 VITALS
BODY MASS INDEX: 41.64 KG/M2 | HEART RATE: 100 BPM | WEIGHT: 235 LBS | RESPIRATION RATE: 20 BRPM | DIASTOLIC BLOOD PRESSURE: 101 MMHG | OXYGEN SATURATION: 83 % | HEIGHT: 63 IN | SYSTOLIC BLOOD PRESSURE: 143 MMHG | TEMPERATURE: 98.7 F

## 2018-05-11 DIAGNOSIS — I25.10 ASHD (ARTERIOSCLEROTIC HEART DISEASE): ICD-10-CM

## 2018-05-11 DIAGNOSIS — E78.00 PURE HYPERCHOLESTEROLEMIA: ICD-10-CM

## 2018-05-11 DIAGNOSIS — I50.22 CHRONIC SYSTOLIC CONGESTIVE HEART FAILURE (HCC): ICD-10-CM

## 2018-05-11 DIAGNOSIS — R06.09 DOE (DYSPNEA ON EXERTION): ICD-10-CM

## 2018-05-11 DIAGNOSIS — E11.21 TYPE 2 DIABETES WITH NEPHROPATHY (HCC): ICD-10-CM

## 2018-05-11 LAB
GLUCOSE BLD STRIP.AUTO-MCNC: 121 MG/DL (ref 65–100)
GLUCOSE BLD STRIP.AUTO-MCNC: 122 MG/DL (ref 65–100)
GLUCOSE BLD STRIP.AUTO-MCNC: 128 MG/DL (ref 65–100)
SERVICE CMNT-IMP: ABNORMAL

## 2018-05-11 PROCEDURE — 74011250636 HC RX REV CODE- 250/636

## 2018-05-11 PROCEDURE — 74011636320 HC RX REV CODE- 636/320: Performed by: INTERNAL MEDICINE

## 2018-05-11 PROCEDURE — 76937 US GUIDE VASCULAR ACCESS: CPT

## 2018-05-11 PROCEDURE — 77030004549 HC CATH ANGI DX PRF MRTM -A

## 2018-05-11 PROCEDURE — 77030030195 HC CATH ANGI DX PRF4 MRTM -A

## 2018-05-11 PROCEDURE — C1894 INTRO/SHEATH, NON-LASER: HCPCS

## 2018-05-11 PROCEDURE — 82962 GLUCOSE BLOOD TEST: CPT

## 2018-05-11 PROCEDURE — 77030010221 HC SPLNT WR POS TELE -B

## 2018-05-11 PROCEDURE — 77030019698 HC SYR ANGI MDLON MRTM -A

## 2018-05-11 PROCEDURE — 74011250636 HC RX REV CODE- 250/636: Performed by: INTERNAL MEDICINE

## 2018-05-11 PROCEDURE — 77030015766

## 2018-05-11 PROCEDURE — 77030008543 HC TBNG MON PRSS MRTM -A

## 2018-05-11 PROCEDURE — 74011000250 HC RX REV CODE- 250

## 2018-05-11 PROCEDURE — C1769 GUIDE WIRE: HCPCS

## 2018-05-11 PROCEDURE — 77030019569 HC BND COMPR RAD TERU -B

## 2018-05-11 PROCEDURE — 77030028837 HC SYR ANGI PWR INJ COEU -A

## 2018-05-11 RX ORDER — HEPARIN SODIUM 200 [USP'U]/100ML
INJECTION, SOLUTION INTRAVENOUS
Status: COMPLETED
Start: 2018-05-11 | End: 2018-05-11

## 2018-05-11 RX ORDER — HEPARIN SODIUM 1000 [USP'U]/ML
1000-10000 INJECTION, SOLUTION INTRAVENOUS; SUBCUTANEOUS
Status: DISCONTINUED | OUTPATIENT
Start: 2018-05-11 | End: 2018-05-11

## 2018-05-11 RX ORDER — METOPROLOL TARTRATE 5 MG/5ML
5 INJECTION INTRAVENOUS ONCE
Status: COMPLETED | OUTPATIENT
Start: 2018-05-11 | End: 2018-05-11

## 2018-05-11 RX ORDER — VERAPAMIL HYDROCHLORIDE 2.5 MG/ML
2.5 INJECTION, SOLUTION INTRAVENOUS ONCE
Status: COMPLETED | OUTPATIENT
Start: 2018-05-11 | End: 2018-05-11

## 2018-05-11 RX ORDER — FENTANYL CITRATE 50 UG/ML
25-50 INJECTION, SOLUTION INTRAMUSCULAR; INTRAVENOUS
Status: DISCONTINUED | OUTPATIENT
Start: 2018-05-11 | End: 2018-05-11

## 2018-05-11 RX ORDER — LIDOCAINE HYDROCHLORIDE 10 MG/ML
1-30 INJECTION, SOLUTION EPIDURAL; INFILTRATION; INTRACAUDAL; PERINEURAL
Status: DISCONTINUED | OUTPATIENT
Start: 2018-05-11 | End: 2018-05-11

## 2018-05-11 RX ORDER — METFORMIN HYDROCHLORIDE 500 MG/1
500 TABLET, EXTENDED RELEASE ORAL
Qty: 180 TAB | Refills: 3 | Status: SHIPPED
Start: 2018-05-11 | End: 2019-05-15 | Stop reason: SDUPTHER

## 2018-05-11 RX ORDER — SODIUM CHLORIDE 9 MG/ML
150 INJECTION, SOLUTION INTRAVENOUS
Status: COMPLETED | OUTPATIENT
Start: 2018-05-11 | End: 2018-05-11

## 2018-05-11 RX ORDER — MIDAZOLAM HYDROCHLORIDE 1 MG/ML
INJECTION, SOLUTION INTRAMUSCULAR; INTRAVENOUS
Status: COMPLETED
Start: 2018-05-11 | End: 2018-05-11

## 2018-05-11 RX ORDER — LIDOCAINE HYDROCHLORIDE 10 MG/ML
INJECTION, SOLUTION EPIDURAL; INFILTRATION; INTRACAUDAL; PERINEURAL
Status: COMPLETED
Start: 2018-05-11 | End: 2018-05-11

## 2018-05-11 RX ORDER — VERAPAMIL HYDROCHLORIDE 2.5 MG/ML
INJECTION, SOLUTION INTRAVENOUS
Status: COMPLETED
Start: 2018-05-11 | End: 2018-05-11

## 2018-05-11 RX ORDER — HEPARIN SODIUM 200 [USP'U]/100ML
500 INJECTION, SOLUTION INTRAVENOUS ONCE
Status: COMPLETED | OUTPATIENT
Start: 2018-05-11 | End: 2018-05-11

## 2018-05-11 RX ORDER — FENTANYL CITRATE 50 UG/ML
INJECTION, SOLUTION INTRAMUSCULAR; INTRAVENOUS
Status: DISCONTINUED
Start: 2018-05-11 | End: 2018-05-11

## 2018-05-11 RX ORDER — MIDAZOLAM HYDROCHLORIDE 1 MG/ML
.5-2 INJECTION, SOLUTION INTRAMUSCULAR; INTRAVENOUS
Status: DISCONTINUED | OUTPATIENT
Start: 2018-05-11 | End: 2018-05-11

## 2018-05-11 RX ORDER — METOPROLOL TARTRATE 5 MG/5ML
INJECTION INTRAVENOUS
Status: COMPLETED
Start: 2018-05-11 | End: 2018-05-11

## 2018-05-11 RX ORDER — HEPARIN SODIUM 1000 [USP'U]/ML
INJECTION, SOLUTION INTRAVENOUS; SUBCUTANEOUS
Status: COMPLETED
Start: 2018-05-11 | End: 2018-05-11

## 2018-05-11 RX ADMIN — MIDAZOLAM HYDROCHLORIDE 1.5 MG: 1 INJECTION, SOLUTION INTRAMUSCULAR; INTRAVENOUS at 14:54

## 2018-05-11 RX ADMIN — HEPARIN SODIUM 1000 UNITS: 200 INJECTION, SOLUTION INTRAVENOUS at 14:23

## 2018-05-11 RX ADMIN — VERAPAMIL HYDROCHLORIDE 2.5 MG: 2.5 INJECTION, SOLUTION INTRAVENOUS at 14:54

## 2018-05-11 RX ADMIN — HEPARIN SODIUM 1000 UNITS: 200 INJECTION, SOLUTION INTRAVENOUS at 14:22

## 2018-05-11 RX ADMIN — SODIUM CHLORIDE 150 ML/HR: 900 INJECTION, SOLUTION INTRAVENOUS at 12:30

## 2018-05-11 RX ADMIN — MIDAZOLAM HYDROCHLORIDE 1.5 MG: 1 INJECTION, SOLUTION INTRAMUSCULAR; INTRAVENOUS at 14:27

## 2018-05-11 RX ADMIN — HEPARIN SODIUM 2500 UNITS: 1000 INJECTION, SOLUTION INTRAVENOUS; SUBCUTANEOUS at 14:53

## 2018-05-11 RX ADMIN — LIDOCAINE HYDROCHLORIDE 6 ML: 10 INJECTION, SOLUTION EPIDURAL; INFILTRATION; INTRACAUDAL; PERINEURAL at 14:52

## 2018-05-11 RX ADMIN — IOPAMIDOL 70 ML: 755 INJECTION, SOLUTION INTRAVENOUS at 14:59

## 2018-05-11 RX ADMIN — METOPROLOL TARTRATE 5 MG: 5 INJECTION INTRAVENOUS at 14:50

## 2018-05-11 RX ADMIN — MIDAZOLAM HYDROCHLORIDE 1 MG: 1 INJECTION, SOLUTION INTRAMUSCULAR; INTRAVENOUS at 14:51

## 2018-05-11 RX ADMIN — NITROGLYCERIN 200 MCG: 5 INJECTION, SOLUTION INTRAVENOUS at 14:52

## 2018-05-11 RX ADMIN — METOROPROLOL TARTRATE 5 MG: 5 INJECTION, SOLUTION INTRAVENOUS at 14:50

## 2018-05-11 RX ADMIN — MIDAZOLAM 1.5 MG: 1 INJECTION INTRAMUSCULAR; INTRAVENOUS at 14:27

## 2018-05-11 RX ADMIN — VERAPAMIL HYDROCHLORIDE 2.5 MG: 2.5 INJECTION INTRAVENOUS at 14:54

## 2018-05-11 NOTE — H&P (VIEW-ONLY)
Mirella Louis DNP, ANP-BC  Subjective/HPI:     Saba Leung is a 81 y.o. male is here for test results and reevaluation. Mr. Sreekanth Bojorquez reports continued and progressive dyspnea on exertion which is limiting activities of daily life. He reports walking up a flight of stairs he becomes profoundly dyspneic and will also develop anterior chest fullness. Patient has a history of multivessel PTCA stenting approximately 10 years ago when in [place]. She also reports he is getting bilateral arm numbness and tingling, increasing fatigue. Patient is accompanied by his daughter-in-law who endorses she has noted him to be significantly fatigued and dyspneic with minimal activities. SPECT images demonstrate a medium, reversible abnormality of moderate degree in the inferior region   on the stress and rest images. Gated SPECT images demonstrates dyskinesis of the anterior, inferior, septal and apical region. The left ventricular ejection fraction was calculated to be 38 %. Impression:   Myocardial perfusion imaging is abnormal: there is a medium area of ischemia in the inferior wall. Overall left ventricular systolic function was abnormal: with regional wall motion abnormalities (as noted above). These test results indicate high likelihood for the presence of angiographically significant coronary artery disease    ECHO  Left ventricle: Systolic function was mildly reduced by EF (biplane method  of disks). Ejection fraction was estimated in the range of 45 % to 50 %. There was mild diffuse hypokinesis. Wall thickness was mildly increased. Features were consistent with a pseudonormal left ventricular filling  pattern, with concomitant abnormal relaxation and increased filling  pressure (grade 2 diastolic dysfunction). Left atrium: The atrium was mildly dilated. Aortic valve: The valve was probably trileaflet. Leaflets exhibited mildly  increased thickness and moderate calcification.  Not well visualized. PCP Provider  Ave Conley MD  Past Medical History:   Diagnosis Date    Arthritis     Asthma     CAD (coronary artery disease)     Calculus of kidney     Carotid artery disease (HCC)     [facility] [address]     Diabetes ([facility] [address]     Hypercholesterolemia     Hypertension       Past Surgical History:   Procedure Laterality Date    HX CAROTID STENT      HX CHOLECYSTECTOMY      HX HEENT       Allergies   Allergen Reactions    Morphine Anaphylaxis      Family History   Problem Relation Age of Onset    Heart Disease Brother     Diabetes Nephew       Current Outpatient Prescriptions   Medication Sig    isosorbide mononitrate ER (IMDUR) 30 mg tablet Take 1 Tab by mouth daily.  carvedilol (COREG) 6.25 mg tablet Take 1 Tab by mouth two (2) times a day.  lisinopril (PRINIVIL, ZESTRIL) 20 mg tablet Take 1 Tab by mouth daily. Reduced 5/8/18    metFORMIN ER (GLUCOPHAGE XR) 500 mg tablet Take 1 Tab by mouth Before breakfast and dinner.  insulin NPH/insulin regular (NOVOLIN 70/30 U-100 INSULIN) 100 unit/mL (70-30) injection by SubCUTAneous route. Takes 70 units in am and 60 units in pm.    fish oil-dha-epa 1,200-144-216 mg cap Take 1 Cap by mouth daily.  acetaminophen (TYLENOL) 325 mg tablet Take  by mouth every four (4) hours as needed for Pain.  furosemide (LASIX) 80 mg tablet Take 1 Tab by mouth daily.  metolazone (ZAROXOLYN) 5 mg tablet Take 5 mg by mouth daily.  allopurinol (ZYLOPRIM) 100 mg tablet Take  by mouth daily.  DULoxetine (CYMBALTA) 60 mg capsule Take 60 mg by mouth daily.  aspirin delayed-release 81 mg tablet Take 81 mg by mouth daily.  simvastatin (ZOCOR) 10 mg tablet Take 10 mg by mouth nightly.  ibuprofen (ADVIL) 100 mg tablet Take 100 mg by mouth every six (6) hours as needed for Pain. No current facility-administered medications for this visit.        Vitals:    05/08/18 1421   BP: 118/70   Pulse: 62   Resp: 18   SpO2: 96% Weight: 235 lb (106.6 kg)   Height: 5' 3\" (1.6 m)     Social History     Social History    Marital status:      Spouse name: N/A    Number of children: N/A    Years of education: N/A     Occupational History    Not on file. Social History Main Topics    Smoking status: Former Smoker     Quit date: 4/20/1963    Smokeless tobacco: Never Used    Alcohol use No    Drug use: No    Sexual activity: Yes     Partners: Female     Birth control/ protection: None     Other Topics Concern    Not on file     Social History Narrative    ** Merged History Encounter **            I have reviewed the nurses notes, vitals, problem list, allergy list, medical history, family, social history and medications. Review of Symptoms:    General: Pt denies excessive weight gain or loss.  + Limitations in activities of daily life secondary to dyspnea  HEENT: Denies blurred vision, headaches, epistaxis and difficulty swallowing. Respiratory: Denies shortness of breath, + ROLLE, denies wheezing or stridor. Cardiovascular: + Chest fullness, palpitations, edema or PND  Gastrointestinal: Denies poor appetite, indigestion, abdominal pain or blood in stool  Musculoskeletal: Denies pain or swelling from muscles or joints  Neurologic: Denies tremor, paresthesias, or sensory motor disturbance  Skin: Denies rash, itching or texture change. Physical Exam:      General: Well developed, in no acute distress, cooperative and alert  HEENT: No carotid bruits, no JVD, trach is midline. Neck Supple, PEERL, EOM intact. Heart:  Normal S1/S2 negative S3 or S4. Regular, no murmur, gallop or rub.   Respiratory: Clear bilaterally x 4, no wheezing or rales  Abdomen:   Soft obese, non-tender, no masses, bowel sounds are active.   Extremities: +2 bilateral dependent nonpitting edema normal cap refill, no cyanosis, atraumatic. Neuro: A&Ox3, speech clear, gait stable. Skin: Skin color is normal. No rashes or lesions.  Non diaphoretic  Vascular: 2+ pulses symmetric in all extremities    Cardiographics    ECG: Sinus rhythm  No results found for this or any previous visit. Cardiology Labs:  Lab Results   Component Value Date/Time    Cholesterol, total 147 04/23/2018 10:02 AM    HDL Cholesterol 35 (L) 04/23/2018 10:02 AM    LDL, calculated 57 04/23/2018 10:02 AM    Triglyceride 277 (H) 04/23/2018 10:02 AM       Lab Results   Component Value Date/Time    Sodium 138 04/23/2018 10:02 AM    Potassium 4.2 04/23/2018 10:02 AM    Chloride 90 (L) 04/23/2018 10:02 AM    CO2 27 04/23/2018 10:02 AM    Anion gap 9 11/30/2015 02:44 AM    Glucose 352 (H) 04/23/2018 10:02 AM    BUN 54 (H) 04/23/2018 10:02 AM    Creatinine 1.67 (H) 04/23/2018 10:02 AM    BUN/Creatinine ratio 32 (H) 04/23/2018 10:02 AM    GFR est AA 47 (L) 04/23/2018 10:02 AM    GFR est non-AA 41 (L) 04/23/2018 10:02 AM    Calcium 9.1 04/23/2018 10:02 AM    Bilirubin, total <0.2 04/23/2018 10:02 AM    AST (SGOT) 11 04/23/2018 10:02 AM    Alk. phosphatase 97 04/23/2018 10:02 AM    Protein, total 6.3 04/23/2018 10:02 AM    Albumin 4.1 04/23/2018 10:02 AM    Globulin 3.9 11/30/2015 02:44 AM    A-G Ratio 1.9 04/23/2018 10:02 AM    ALT (SGPT) 18 04/23/2018 10:02 AM           Assessment:     Assessment:     Diagnoses and all orders for this visit:    1. ROLLE (dyspnea on exertion)  -     CARDIAC CATHETERIZATION; Future  -     METABOLIC PANEL, COMPREHENSIVE  -     CBC WITH AUTOMATED DIFF  -     PROTHROMBIN TIME + INR  -     URIC ACID    2. Pure hypercholesterolemia  -     AMB POC EKG ROUTINE W/ 12 LEADS, INTER & REP  -     CARDIAC CATHETERIZATION; Future  -     METABOLIC PANEL, COMPREHENSIVE  -     CBC WITH AUTOMATED DIFF  -     PROTHROMBIN TIME + INR  -     URIC ACID    3. Chronic systolic congestive heart failure (HCC)  -     CARDIAC CATHETERIZATION; Future  -     METABOLIC PANEL, COMPREHENSIVE  -     CBC WITH AUTOMATED DIFF  -     PROTHROMBIN TIME + INR  -     URIC ACID    4.  Type 2 diabetes with nephropathy ([facility] [address]  -     CARDIAC CATHETERIZATION; Future  -     METABOLIC PANEL, COMPREHENSIVE  -     CBC WITH AUTOMATED DIFF  -     PROTHROMBIN TIME + INR  -     URIC ACID    5. ASHD (arteriosclerotic heart disease)  -     CARDIAC CATHETERIZATION; Future  -     METABOLIC PANEL, COMPREHENSIVE  -     CBC WITH AUTOMATED DIFF  -     PROTHROMBIN TIME + INR  -     URIC ACID    Other orders  -     isosorbide mononitrate ER (IMDUR) 30 mg tablet; Take 1 Tab by mouth daily. -     carvedilol (COREG) 6.25 mg tablet; Take 1 Tab by mouth two (2) times a day. -     lisinopril (PRINIVIL, ZESTRIL) 20 mg tablet; Take 1 Tab by mouth daily. Reduced 5/8/18        ICD-10-CM ICD-9-CM    1. ROLLE (dyspnea on exertion) R06.09 786.09 CARDIAC CATHETERIZATION      METABOLIC PANEL, COMPREHENSIVE      CBC WITH AUTOMATED DIFF      PROTHROMBIN TIME + INR      URIC ACID   2. Pure hypercholesterolemia E78.00 272.0 AMB POC EKG ROUTINE W/ 12 LEADS, INTER & REP      CARDIAC CATHETERIZATION      METABOLIC PANEL, COMPREHENSIVE      CBC WITH AUTOMATED DIFF      PROTHROMBIN TIME + INR      URIC ACID   3. Chronic systolic congestive heart failure (HCC) I50.22 428.22 CARDIAC CATHETERIZATION     883.0 METABOLIC PANEL, COMPREHENSIVE      CBC WITH AUTOMATED DIFF      PROTHROMBIN TIME + INR      URIC ACID   4. Type 2 diabetes with nephropathy (HCC) E11.21 250.40 CARDIAC CATHETERIZATION     717.78 METABOLIC PANEL, COMPREHENSIVE      CBC WITH AUTOMATED DIFF      PROTHROMBIN TIME + INR      URIC ACID   5.  ASHD (arteriosclerotic heart disease) I25.10 414.00 CARDIAC CATHETERIZATION      METABOLIC PANEL, COMPREHENSIVE      CBC WITH AUTOMATED DIFF      PROTHROMBIN TIME + INR      URIC ACID     Orders Placed This Encounter    METABOLIC PANEL, COMPREHENSIVE    CBC WITH AUTOMATED DIFF    PROTHROMBIN TIME + INR    URIC ACID    CARDIAC CATHETERIZATION     Standing Status:   Future     Standing Expiration Date:   11/8/2018     Order Specific Question: Reason for Exam:     Answer:   ROLLE as anginal equivalent    AMB POC EKG ROUTINE W/ 12 LEADS, INTER & REP     Order Specific Question:   Reason for Exam:     Answer:   Routine    isosorbide mononitrate ER (IMDUR) 30 mg tablet     Sig: Take 1 Tab by mouth daily. Dispense:  30 Tab     Refill:  1    carvedilol (COREG) 6.25 mg tablet     Sig: Take 1 Tab by mouth two (2) times a day. Dispense:  60 Tab     Refill:  3    lisinopril (PRINIVIL, ZESTRIL) 20 mg tablet     Sig: Take 1 Tab by mouth daily. Reduced 5/8/18     Dispense:  90 Tab     Refill:  1        Plan:     Patient is a 33-year-old male with a history of diabetes, hypertension, hyperlipidemia and multivessel atherosclerotic heart disease presenting with increasing degree of dyspnea on exertion which is now limiting as well as anterior chest fullness. The symptoms are consistent with angina functional class III, dyspnea on exertion as anginal equivalent. Nuclear stress test showing inferior wall ischemia. Will start second antianginal therapy isosorbide 30 mg, increase carvedilol, and decrease lisinopril to offset blood pressure lowering effect of medication changes. Discussed risk benefits of coronary angiography PTCA stenting, patient understands the risk and agrees to proceed. We will arrange procedure to be completed by Dr. Kiah Darby 11 afternoon case. Discussed with patient not to engage in physical activity, if any chest pain or recurrence of dyspnea on exertion that does not resolve immediately with rest he is to proceed to the nearest ER. During the exam patient brought up the question as to why he takes allopurinol, was prescribed by primary care in [place] no history known to patient of gout. Will evaluate baseline uric acid level, discussed with patient the fact that he is on aggressive diuretics can increase his uric acid levels and allopurinol may have been used as preventative therapy.     Dependent edema: Weight is down 2 pounds with further titration of furosemide, has been maintained on Zaroxolyn daily. BUN 54 creatinine 1.67. Patient will need hydration prior to cardiac catheterization. Follow-up after procedure complete. Palmer Corey NP    This note was created using voice recognition software. Despite editing, there may be syntax errors.

## 2018-05-11 NOTE — PROGRESS NOTES
Reason for Admission:   Cath. RRAT Score:    unknown                 Plan for utilizing home health:    Not at this time                      Likelihood of Readmission:                           Transition of Care Plan:    Patients son and daughter in-law are in the room and will be transporting pt home. Patient and wife have a home in [address] [place] [address], but have been staying at their son's home here in [place] for quite some time. Patient and wife plan to go back to [place] once pt is well enough. [facility] contacted. PCP- Dr Denia Álvarez Management Interventions  PCP Verified by CM: Yes (Dr Alek Day)  Mode of Transport at Discharge:  Other (see comment) (Son)  Transition of Care Consult (CM Consult): Discharge Planning  Discharge Durable Medical Equipment: No (no DME use)  Physical Therapy Consult: No  Occupational Therapy Consult: No  Speech Therapy Consult: No  Current Support Network: Lives with Spouse, Relative's Home (Lives in a one story home (sons home) with 4 steps to enter the home)  Confirm Follow Up Transport: Self  Plan discussed with Pt/Family/Caregiver: Yes (son and daughter in-law in room and verified info while pt slept)  Discharge Location  Discharge Placement: Home      [facility]  Ext [phone]

## 2018-05-11 NOTE — IP AVS SNAPSHOT
[address] [facility] 
[phone] Patient: John Rogers MRN: [id number] :1945 A check mary indicates which time of day the medication should be taken. My Medications CHANGE how you take these medications Instructions Each Dose to Equal  
 Morning Noon Evening Bedtime  
 furosemide 20 mg tablet Commonly known as:  LASIX What changed:   
- how much to take - when to take this 
- additional instructions Your last dose was: Your next dose is: Take one tablet by mouth daily. metFORMIN  mg tablet Commonly known as:  GLUCOPHAGE XR What changed:  additional instructions Your last dose was: Your next dose is: Take 1 Tab by mouth Before breakfast and dinner. Please restart Metformin on , 18  
 500 mg CONTINUE taking these medications Instructions Each Dose to Equal  
 Morning Noon Evening Bedtime  
 allopurinol 100 mg tablet Commonly known as:  Phisurjit Carter Your last dose was: Your next dose is: Take  by mouth daily. aspirin delayed-release 81 mg tablet Your last dose was: Your next dose is: Take 81 mg by mouth daily. 81 mg  
    
   
   
   
  
 carvedilol 6.25 mg tablet Commonly known as:  Jerardo Smiley Your last dose was: Your next dose is: Take 1 Tab by mouth two (2) times a day. 6.25 mg DULoxetine 60 mg capsule Commonly known as:  CYMBALTA Your last dose was: Your next dose is: Take 60 mg by mouth daily. 60 mg  
    
   
   
   
  
 fish oil-dha-epa 1,200-144-216 mg Cap Your last dose was: Your next dose is: Take 1 Cap by mouth daily. 1 Cap isosorbide mononitrate ER 30 mg tablet Commonly known as:  IMDUR Your last dose was: Your next dose is: Take 1 Tab by mouth daily. 30 mg  
    
   
   
   
  
 lisinopril 20 mg tablet Commonly known as:  Angela Medina Your last dose was: Your next dose is: Take 1 Tab by mouth daily. 20 mg  
    
   
   
   
  
 metOLazone 5 mg tablet Commonly known as:  Lisa Mas Your last dose was: Your next dose is: Take 1 Tab by mouth daily. 5 mg NovoLIN 70/30 U-100 Insulin 100 unit/mL (70-30) injection Generic drug:  insulin NPH/insulin regular Your last dose was: Your next dose is:    
   
   
 by SubCUTAneous route. Takes 70 units in am and 60 units in pm.  
     
   
   
   
  
 simvastatin 10 mg tablet Commonly known as:  ZOCOR Your last dose was: Your next dose is: Take 1 Tab by mouth nightly. 10 mg  
    
   
   
   
  
 TYLENOL 325 mg tablet Generic drug:  acetaminophen Your last dose was: Your next dose is: Take  by mouth every four (4) hours as needed for Pain. Where to Get Your Medications Information on where to get these meds will be given to you by the nurse or doctor. ! Ask your nurse or doctor about these medications  
  metFORMIN  mg tablet

## 2018-05-11 NOTE — IP AVS SNAPSHOT
[address] [facility] 
[phone] Patient: Butch Shea MRN: [id number] :1945 About your hospitalization You were admitted on:  May 11, 2018 You last received care in the:  [facility] 2 INTRVNTNL CARDIO You were discharged on:  May 11, 2018 Why you were hospitalized Your primary diagnosis was:  Not on File Follow-up Information Follow up With Details Comments Contact Info Bj Friedman MD   [address] [facility] [address] [facility] 
[phone] Amando ePrdue MD Schedule an appointment as soon as possible for a visit in 2 weeks  [address] [facility] 
[phone] Discharge Orders None A check mary indicates which time of day the medication should be taken. My Medications CHANGE how you take these medications Instructions Each Dose to Equal  
 Morning Noon Evening Bedtime  
 furosemide 20 mg tablet Commonly known as:  LASIX What changed:   
- how much to take - when to take this 
- additional instructions Your last dose was: Your next dose is: Take one tablet by mouth daily. metFORMIN  mg tablet Commonly known as:  GLUCOPHAGE XR What changed:  additional instructions Your last dose was: Your next dose is: Take 1 Tab by mouth Before breakfast and dinner. Please restart Metformin on , 18  
 500 mg CONTINUE taking these medications Instructions Each Dose to Equal  
 Morning Noon Evening Bedtime  
 allopurinol 100 mg tablet Commonly known as:  Marny Carlie Your last dose was: Your next dose is: Take  by mouth daily. aspirin delayed-release 81 mg tablet Your last dose was: Your next dose is: Take 81 mg by mouth daily. 81 mg  
    
   
   
   
  
 carvedilol 6.25 mg tablet Commonly known as:  Sharona William Your last dose was: Your next dose is: Take 1 Tab by mouth two (2) times a day. 6.25 mg DULoxetine 60 mg capsule Commonly known as:  CYMBALTA Your last dose was: Your next dose is: Take 60 mg by mouth daily. 60 mg  
    
   
   
   
  
 fish oil-dha-epa 1,200-144-216 mg Cap Your last dose was: Your next dose is: Take 1 Cap by mouth daily. 1 Cap  
    
   
   
   
  
 isosorbide mononitrate ER 30 mg tablet Commonly known as:  IMDUR Your last dose was: Your next dose is: Take 1 Tab by mouth daily. 30 mg  
    
   
   
   
  
 lisinopril 20 mg tablet Commonly known as:  Piero Decker Your last dose was: Your next dose is: Take 1 Tab by mouth daily. 20 mg  
    
   
   
   
  
 metOLazone 5 mg tablet Commonly known as:  Rosa Albert Your last dose was: Your next dose is: Take 1 Tab by mouth daily. 5 mg NovoLIN 70/30 U-100 Insulin 100 unit/mL (70-30) injection Generic drug:  insulin NPH/insulin regular Your last dose was: Your next dose is:    
   
   
 by SubCUTAneous route. Takes 70 units in am and 60 units in pm.  
     
   
   
   
  
 simvastatin 10 mg tablet Commonly known as:  ZOCOR Your last dose was: Your next dose is: Take 1 Tab by mouth nightly. 10 mg  
    
   
   
   
  
 TYLENOL 325 mg tablet Generic drug:  acetaminophen Your last dose was: Your next dose is: Take  by mouth every four (4) hours as needed for Pain. Where to Get Your Medications Information on where to get these meds will be given to you by the nurse or doctor. ! Ask your nurse or doctor about these medications  
  metFORMIN  mg tablet Discharge Instructions [address], [place], [address] [facility]  [phone] Patient ID: 
Lobo Rodriguez 
[id number] 
81 y.o. 
1945 Admit Date: 5/11/2018 Discharge Date: 5/11/2018 Admitting Physician: Amanda Gastelum MD  
 
Discharge Physician: Isaac Smith NP Admission Diagnoses:  
Pure hypercholesterolemia [E78.00] Chronic systolic congestive heart failure ([facility] [address] [address] [I50.22] Type 2 diabetes with nephropathy ([facility] [address] [address] [E11.21] ROLLE (dyspnea on exertion) [R06.09] ASHD (arteriosclerotic heart disease) [I25.10] Discharge Diagnoses: Active Problems: * No active hospital problems. * Discharge Condition: Good Cardiology Procedures this Admission:  Diagnostic left heart catheterization Disposition: home Reference discharge instructions provided by nursing for diet and activity. Signed: 
Isaac Smith NP 
5/11/2018 
3:46 PM 
 
 
Radial Cardiac Catheterization/Angiography Discharge Instructions It is normal to feel tired the first couple days. Take it easy and follow the physicians instructions. CHECK THE CATHETER INSERTION SITE DAILY: 
 
Remove the wrist dressing 24 hours after the procedure. You may shower 24 hours after the procedure. Wash with soap and water and pat dry. Gentle cleaning of the site with soap and water is sufficient, cover with a dry clean dressing or bandage. Do not apply creams or powders to the area. No soaking the wrist for 3 days. Leave the puncture site open to air after 24 hours post-procedure. CALL THE PHYSICIANS:  
 
If the site becomes red, swollen or feels warm to the touch If there is bleeding or drainage or if there is unusual pain at the radial site. If there is any minor oozing, you may apply a band-aid and remove after 12 hours. If the bleeding continues, hold pressure with the middle finger against the puncture site and the thumb against the back of the wrist,call 911 to be transported to the hospital. 
DO NOT DRIVE YOURSELF, [address]. ACTIVITY:  
For the first 24 hours do not manipulate the wrist. 
No lifting, pushing or pulling over 3-5 pounds with the affected wrist for 7 daysand no straining the insertion site. Do not life grocery bags or the garbage can, do not run the vacuum  or  for 7 days. Start with short walks as in the hospital and gradually increase as tolerated each day. It is recommended to walk 30 minutes 5-7 days per week. Follow your physicians instructions on activity. Avoid walking outside in extremes of heat or cold. Walk inside when it is cold and windy or hot and humid. Things to keep in mind: 
No driving for at least 24 hours, or as designated by your physician. Limit the number of times you go up and down the stairs Take rests and pace yourself with activity. Be careful and do not strain with bowel movements. MEDICATIONS: 
 
Take all medications as prescribed Call your physician if you have any questions Keep an updated list of your medications with you at all times and give a list to your physician and pharmacist 
 
SIGNS AND SYMPTOMS:  
Be cautious of symptoms of angina or recurrent symptoms such as chest discomfort, unusual shortness of breath or fatigue. These could be symptoms of restenosis, a new blockage or a heart attack. If your symptoms are relieved with rest it is still recommended that you notify your physician of recurrent chest pain or discomfort. For CHEST PAIN or symptoms of angina not relieved with rest:  If the discomfort is not relieved with rest, and you have been prescribed Nitroglycerin, take as directed (taken under the tongue, one at a time 5 minutes apart for a total of 3 doses).  If the discomfort is not relieved after the 3rd nitroglycerin, call 911. If you have not been prescribed Nitroglycerin  and your chest discomfort is not relieved with rest, call 911. AFTER CARE:  
Follow up with your physician as instructed. Follow a heart healthy diet with proper portion control, daily stress management, daily exercise, blood pressure and cholesterol control , and smoking cessation. Introducing [facility]! [address] introduces [employer] patient portal. Now you can access parts of your medical record, email your doctor's office, and request medication refills online. 1. In your internet browser, go to [email or web address] 2. Click on the First Time User? Click Here link in the Sign In box. You will see the New Member Sign Up page. 3. Enter your [employer] Access Code exactly as it appears below. You will not need to use this code after youve completed the sign-up process. If you do not sign up before the expiration date, you must request a new code. · [employer] Access Code: [email or web address] Expires: 7/19/2018  2:01 PM 
 
4. Enter the last four digits of your Social Security Number (xxxx) and Date of Birth (mm/dd/yyyy) as indicated and click Submit. You will be taken to the next sign-up page. 5. Create a [employer] ID. This will be your [employer] login ID and cannot be changed, so think of one that is secure and easy to remember. 6. Create a [employer] password. You can change your password at any time. 7. Enter your Password Reset Question and Answer. This can be used at a later time if you forget your password. 8. Enter your e-mail address. You will receive e-mail notification when new information is available in [address] 9. Click Sign Up. You can now view and download portions of your medical record. 10. Click the Download Summary menu link to download a portable copy of your medical information.  
 
If you have questions, please visit the Frequently Asked Questions section of the Friend[employer] W[employer] App. Remember, StayT[employer] is NOT to be used for urgent needs. For medical emergencies, dial 911. Now available from your iPhone and Android! Introducing [facility] As a [facility] patient, I wanted to make you aware of our electronic visit tool called [facility]. [facility] 24/7 allows you to connect within minutes with a medical provider 24 hours a day, seven days a week via a mobile device or tablet or logging into a secure website from your computer. You can access [facility] P[facility] from anywhere in the [place]. A virtual visit might be right for you when you have a simple condition and feel like you just dont want to get out of bed, or cant get away from work for an appointment, when your regular [facility] provider is not available (evenings, weekends or holidays), or when youre out of town and need minor care. Electronic visits cost only $49 and if the [facility] 24/7 provider determines a prescription is needed to treat your condition, one can be electronically transmitted to a nearby pharmacy*. Please take a moment to enroll today if you have not already done so. The enrollment process is free and takes just a few minutes. To enroll, please download the [facility] 24/7 lindsay to your tablet or phone, or visit [email or web address] to enroll on your computer. And, as an [address] [place] [address] patient with a [employer] account, the results of your visits will be scanned into your electronic medical record and your primary care provider will be able to view the scanned results. We urge you to continue to see your regular [facility] provider for your ongoing medical care. And while your primary care provider may not be the one available when you seek a [facility] virtual visit, the peace of mind you get from getting a real diagnosis real time can be priceless. For more information on [facility]den oPnd, view our Frequently Asked Questions (FAQs) at [email or web address]. Sincerely, 
 
Marlin Santiago MD 
Chief Medical Officer Brandee8 Vivien Salomon *:  certain medications cannot be prescribed via [facility] Providers Seen During Your Hospitalization Provider Specialty Primary office phone Mark Jaimes MD Cardiology [phone] Your Primary Care Physician (PCP) Primary Care Physician Office Phone Office Fax Kareem Estephania [phone] You are allergic to the following Allergen Reactions Morphine Anaphylaxis Recent Documentation Height Weight BMI Smoking Status 1.6 m 106.6 kg 41.63 kg/m2 Former Smoker Emergency Contacts Name Discharge Info Relation Home Work Mobile Debra Magdaleno DISCHARGE CAREGIVER [3] Spouse [3] Vadim Magdaleno DISCHARGE CAREGIVER [3] Child [2] [phone] Sharlene Lutz DISCHARGE CAREGIVER [3] Other Relative [6] [phone] Patient Belongings The following personal items are in your possession at time of discharge: 
  Dental Appliances: None         Home Medications: None   Jewelry: Ring  Clothing: At bedside    Other Valuables: None Please provide this summary of care documentation to your next provider. Signatures-by signing, you are acknowledging that this After Visit Summary has been reviewed with you and you have received a copy. Patient Signature:  ____________________________________________________________ Date:  ____________________________________________________________  
  
Bre Horton Provider Signature:  ____________________________________________________________ Date:  ____________________________________________________________

## 2018-05-11 NOTE — INTERVAL H&P NOTE
H&P Update:  Cassidy Hernandez Sr. was seen and examined. History and physical has been reviewed. The patient has been examined.  There have been no significant clinical changes since the completion of the originally dated History and Physical.    Signed By: Maosn Mcgowan MD     May 11, 2018 2:29 PM

## 2018-05-11 NOTE — DISCHARGE INSTRUCTIONS
[address] [place] [address] [address] [facility]  [phone]        Patient ID:  Jemima Mason  [id number]  81 y.o.  1945    Admit Date: 5/11/2018    Discharge Date: 5/11/2018     Admitting Physician: Kaelyn Rich MD     Discharge Physician: Adore Biggs NP    Admission Diagnoses:   Pure hypercholesterolemia [F89.90]  Chronic systolic congestive heart failure (HCC) [I50.22]  Type 2 diabetes with nephropathy ([address] [E11.21]  ROLLE (dyspnea on exertion) [R06.09]  ASHD (arteriosclerotic heart disease) [I25.10]    Discharge Diagnoses: Active Problems:    * No active hospital problems. *      Discharge Condition: Good    Cardiology Procedures this Admission:  Diagnostic left heart catheterization    Disposition: home    Reference discharge instructions provided by nursing for diet and activity. Signed:  Adore Biggs NP  5/11/2018  3:46 PM      Radial Cardiac Catheterization/Angiography Discharge Instructions    It is normal to feel tired the first couple days. Take it easy and follow the physicians instructions. CHECK THE CATHETER INSERTION SITE DAILY:    Remove the wrist dressing 24 hours after the procedure. You may shower 24 hours after the procedure. Wash with soap and water and pat dry. Gentle cleaning of the site with soap and water is sufficient, cover with a dry clean dressing or bandage. Do not apply creams or powders to the area. No soaking the wrist for 3 days. Leave the puncture site open to air after 24 hours post-procedure. CALL THE PHYSICIANS:     If the site becomes red, swollen or feels warm to the touch  If there is bleeding or drainage or if there is unusual pain at the radial site. If there is any minor oozing, you may apply a band-aid and remove after 12 hours.    If the bleeding continues, hold pressure with the middle finger against the puncture site and the thumb against the back of the wrist,call 911 to be transported to the hospital.  DO NOT DRIVE YOURSELF, OR HAVE ANYONE ELSE DRIVE YOU - CALL [phone]. ACTIVITY:   For the first 24 hours do not manipulate the wrist.  No lifting, pushing or pulling over 3-5 pounds with the affected wrist for 7 daysand no straining the insertion site. Do not life grocery bags or the garbage can, do not run the vacuum  or  for 7 days. Start with short walks as in the hospital and gradually increase as tolerated each day. It is recommended to walk 30 minutes 5-7 days per week. Follow your physicians instructions on activity. Avoid walking outside in extremes of heat or cold. Walk inside when it is cold and windy or hot and humid. Things to keep in mind:  No driving for at least 24 hours, or as designated by your physician. Limit the number of times you go up and down the stairs  Take rests and pace yourself with activity. Be careful and do not strain with bowel movements. MEDICATIONS:    Take all medications as prescribed  Call your physician if you have any questions  Keep an updated list of your medications with you at all times and give a list to your physician and pharmacist    SIGNS AND SYMPTOMS:   Be cautious of symptoms of angina or recurrent symptoms such as chest discomfort, unusual shortness of breath or fatigue. These could be symptoms of restenosis, a new blockage or a heart attack. If your symptoms are relieved with rest it is still recommended that you notify your physician of recurrent chest pain or discomfort. For CHEST PAIN or symptoms of angina not relieved with rest:  If the discomfort is not relieved with rest, and you have been prescribed Nitroglycerin, take as directed (taken under the tongue, one at a time 5 minutes apart for a total of 3 doses). If the discomfort is not relieved after the 3rd nitroglycerin, call 911. If you have not been prescribed Nitroglycerin  and your chest discomfort is not relieved with rest, call 911.      AFTER CARE: Follow up with your physician as instructed. Follow a heart healthy diet with proper portion control, daily stress management, daily exercise, blood pressure and cholesterol control , and smoking cessation.

## 2018-05-15 ENCOUNTER — DOCUMENTATION ONLY (OUTPATIENT)
Dept: SLEEP MEDICINE | Age: 73
End: 2018-05-15

## 2018-05-18 ENCOUNTER — TELEPHONE (OUTPATIENT)
Dept: ENDOCRINOLOGY | Age: 73
End: 2018-05-18

## 2018-05-18 DIAGNOSIS — Z79.4 TYPE 2 DIABETES MELLITUS WITH STAGE 3 CHRONIC KIDNEY DISEASE, WITH LONG-TERM CURRENT USE OF INSULIN (HCC): Primary | ICD-10-CM

## 2018-05-18 DIAGNOSIS — E11.22 TYPE 2 DIABETES MELLITUS WITH STAGE 3 CHRONIC KIDNEY DISEASE, WITH LONG-TERM CURRENT USE OF INSULIN (HCC): Primary | ICD-10-CM

## 2018-05-18 DIAGNOSIS — N18.30 TYPE 2 DIABETES MELLITUS WITH STAGE 3 CHRONIC KIDNEY DISEASE, WITH LONG-TERM CURRENT USE OF INSULIN (HCC): Primary | ICD-10-CM

## 2018-05-18 NOTE — TELEPHONE ENCOUNTER
Thank you Eileen Gutierrez,     This is because he is responding so well to the metformin. We can now start backing off on the insulin more. Plan:   Continue metformin 500mg twice daily,   Reduce insulin to 55 units with breakfast, 45 units with dinner,      Nelly Johnson.  [address] [facility] Endocrinology  [address] [place] [address]

## 2018-05-18 NOTE — TELEPHONE ENCOUNTER
I received a call from Mr. Raisa Velásquez who is concerned that his blood sugars are dropping too low. He is taking his Metformin 500mg before breakfast and dinner along with his 70/30 insulin and he is waking up between 4-5 every morning cold, sweaty and dizzy. He doesn't take his blood sugar then because he feels like he needs to eat something because it is so low. He is scared because that was how it was when he went into a coma. His numbers are :  5/15/2018--90 Breakfast and 91 Dinner  5/16/2018--110 Breakfast and 101 Dinner  5/17/2018--68 Breakfast and 72 Dinner  5/18/2018--79 Breakfast      I told him I would pass this on to Dr. Mehul Joyner and give him a call back.  [phone]  Onel Adams

## 2018-05-18 NOTE — TELEPHONE ENCOUNTER
Patient did not receive a call back earlier today, he paged me this afternoon, I advised him of the plan, except reducing insulin instead to 50 / 40. Problem 1: Renal function,  When arriving home, I was reviewing his chart and noted an acute significant decline in his renal function. He reports taking 60mg (40+20) of lasix twice daily in addition to the zaroxolyn. I am a bit concerned about dehydration. Notes also that he is constipated. I advised him to hold this evenings lasix dose (took the AM), and take only 40mg twice daily this weekend. Advised he be sure to stay hydrated with water, one cup with each meal. I advised him I would let Dr. Roni Santana know. He will hold this evenings metformin, take only once daily, I am repeating BMP right away, will either stop metformin at that time or increase it back up to twice daily. Note that the uncontrolled DM is a big risk factor for him, and the metformin brought his sugars down and almost cut his insulin dose in half. Ideally we can get his GFR back up into the 40's but if not, will consider an alternative. Patient advised to call me over the weekend with concerns,     Edwige Patterson.  [address] Endocrinology  [employer]

## 2018-05-21 ENCOUNTER — TELEPHONE (OUTPATIENT)
Dept: CARDIOLOGY CLINIC | Age: 73
End: 2018-05-21

## 2018-05-21 NOTE — TELEPHONE ENCOUNTER
Pt's wife called back and she states to please call her  to discuss his medications. Not sure if the dosage is correct or not. Please call back as soon as possible.  [phone] (H)      Thanks  Gege Redd

## 2018-05-21 NOTE — TELEPHONE ENCOUNTER
Verified patient with two identifiers. Spoke with Adria Estrada on HIPAA, pt was lethargic, dizzy over the weekend. Spoke with Dr. Navneet Carter, he ran labs today waiting for results to come back. Dr. Navneet Carter stated pt was dehydrated, his diuretics may need to be adjusted. Dr. Clay Essex advised pt to only take Lasix once a day until lab results come back, then cardiologist can review for correct dosage.

## 2018-05-21 NOTE — TELEPHONE ENCOUNTER
Pt's wife called- she spoke with endocrinologist and was told to call us regarding the dieretics that he is on. Her phone number is [phone].  Thanks, [facility]

## 2018-05-22 LAB
BUN SERPL-MCNC: 81 MG/DL (ref 8–27)
BUN/CREAT SERPL: 46 (ref 10–24)
CALCIUM SERPL-MCNC: 10.1 MG/DL (ref 8.6–10.2)
CHLORIDE SERPL-SCNC: 101 MMOL/L (ref 96–106)
CO2 SERPL-SCNC: 25 MMOL/L (ref 18–29)
CREAT SERPL-MCNC: 1.76 MG/DL (ref 0.76–1.27)
GFR SERPLBLD CREATININE-BSD FMLA CKD-EPI: 38 ML/MIN/1.73
GFR SERPLBLD CREATININE-BSD FMLA CKD-EPI: 44 ML/MIN/1.73
GLUCOSE SERPL-MCNC: 44 MG/DL (ref 65–99)
INTERPRETATION: NORMAL
Lab: NORMAL
POTASSIUM SERPL-SCNC: 6.2 MMOL/L (ref 3.5–5.2)
SODIUM SERPL-SCNC: 141 MMOL/L (ref 134–144)

## 2018-05-23 DIAGNOSIS — R79.89 AZOTEMIA: ICD-10-CM

## 2018-05-23 NOTE — TELEPHONE ENCOUNTER
Patient has hospital follow up scheduled 5/31/2018 1015 am will address medication questions at time of visit.

## 2018-05-24 ENCOUNTER — TELEPHONE (OUTPATIENT)
Dept: ENDOCRINOLOGY | Age: 73
End: 2018-05-24

## 2018-05-24 NOTE — TELEPHONE ENCOUNTER
I called Mr. Enrique Rhodes and ended up speaking with both him and his wife. I relayed the message from Dr. Stephanie Washington. They understood the information and will do as instructed.   Mayte Mckay

## 2018-05-24 NOTE — TELEPHONE ENCOUNTER
----- Message from Savanah Fay MD sent at 5/24/2018 11:03 AM EDT -----  Shannon Yovani, please let Mr. Pierre Cousins know that his kidney function is slowly improving, though not back to his baseline yet. 1. He needs to stay hydrated. 2. He needs to be careful with over diuresis with his diuretics, again he should discuss with his cardiologist.   3. He can increase the metformin back up to 2 times daily for now. 4. His potassium is elevated, if he is taking a potassium supplement he needs to hold it for a few days. 5. H needs to keep an eye on his sugars and if still on the low side, we need an update so I can decrease his insulin further. Thanks,    Tim Neal.  [address] [facility] Endocrinology  [facility]

## 2018-05-24 NOTE — TELEPHONE ENCOUNTER
Wicho Kerr, please let Mr. Servando Isaac know that his kidney function is slowly improving, though not back to his baseline yet. 1. He needs to stay hydrated. 2. He needs to be careful with over diuresis with his diuretics, again he should discuss with his cardiologist.   3. He can increase the metformin back up to 2 times daily for now. 4. His potassium is elevated, if he is taking a potassium supplement he needs to hold it for a few days. 5. H needs to keep an eye on his sugars and if still on the low side, we need an update so I can decrease his insulin further. Thanks,    Drenda Habermann.  [address] [facility] Endocrinology  [address] [place] [address]

## 2018-05-31 ENCOUNTER — TELEPHONE (OUTPATIENT)
Dept: INTERNAL MEDICINE CLINIC | Age: 73
End: 2018-05-31

## 2018-05-31 ENCOUNTER — OFFICE VISIT (OUTPATIENT)
Dept: CARDIOLOGY CLINIC | Age: 73
End: 2018-05-31

## 2018-05-31 VITALS
OXYGEN SATURATION: 93 % | RESPIRATION RATE: 24 BRPM | HEIGHT: 63 IN | WEIGHT: 236.6 LBS | HEART RATE: 103 BPM | BODY MASS INDEX: 41.92 KG/M2 | SYSTOLIC BLOOD PRESSURE: 122 MMHG | DIASTOLIC BLOOD PRESSURE: 70 MMHG

## 2018-05-31 DIAGNOSIS — I51.89 DIASTOLIC DYSFUNCTION: Primary | ICD-10-CM

## 2018-05-31 DIAGNOSIS — E11.21 TYPE 2 DIABETES WITH NEPHROPATHY (HCC): ICD-10-CM

## 2018-05-31 DIAGNOSIS — E78.00 PURE HYPERCHOLESTEROLEMIA: ICD-10-CM

## 2018-05-31 DIAGNOSIS — I50.22 CHRONIC SYSTOLIC CONGESTIVE HEART FAILURE (HCC): ICD-10-CM

## 2018-05-31 RX ORDER — FUROSEMIDE 80 MG/1
TABLET ORAL
COMMUNITY
Start: 2018-04-20 | End: 2018-05-31 | Stop reason: ALTCHOICE

## 2018-05-31 RX ORDER — FUROSEMIDE 40 MG/1
TABLET ORAL
COMMUNITY
Start: 2018-05-09 | End: 2018-05-31 | Stop reason: SDUPTHER

## 2018-05-31 RX ORDER — LISINOPRIL 2.5 MG/1
2.5 TABLET ORAL DAILY
Qty: 30 TAB | Refills: 1 | Status: SHIPPED | OUTPATIENT
Start: 2018-05-31 | End: 2018-08-02 | Stop reason: SDUPTHER

## 2018-05-31 RX ORDER — CARVEDILOL 12.5 MG/1
12.5 TABLET ORAL 2 TIMES DAILY
Qty: 60 TAB | Refills: 3 | Status: SHIPPED | OUTPATIENT
Start: 2018-05-31 | End: 2018-11-02 | Stop reason: SDUPTHER

## 2018-05-31 RX ORDER — METOLAZONE 5 MG/1
5 TABLET ORAL EVERY OTHER DAY
Qty: 45 TAB | Refills: 0 | Status: SHIPPED | OUTPATIENT
Start: 2018-05-31 | End: 2018-11-16

## 2018-05-31 RX ORDER — FUROSEMIDE 40 MG/1
40 TABLET ORAL DAILY
Qty: 30 TAB | Refills: 1 | Status: SHIPPED | OUTPATIENT
Start: 2018-05-31 | End: 2019-01-16 | Stop reason: SDUPTHER

## 2018-05-31 NOTE — Clinical Note
Ef 50% range, has diastolic dysfunction, will need to maintain diuretics, came to us on 5mg zaroxylen and high dose lasix. Has CKD, and hyperkalemia, I significantly dropped the ACEI dose, and reduced Zaroxylen dose in 1/2. Labs to be done in a few weeks and he prefers to follow up with Dr Carolina Hernandez due to his out of pocket cost for Cardiology.  Will follow up with you in 1 month, if you need anything from me let me know thanks Hali Villarreal

## 2018-05-31 NOTE — PROGRESS NOTES
Tyra Ernst DNP, ANP-BC  Subjective/HPI:     Tamiko Lentz is a 81 y.o. male is here for hospital follow-up from diagnostic cardiac catheterization showing nonobstructive coronary artery disease. As part of precath labs noted significant jump in creatinine, dose of lisinopril was reduced with improvement in his creatinine however there is been elevation in his potassium level. Cardiac catheterization:  CORONARY CIRCULATION: The coronary circulation is co-dominant. Left main:  Normal. LAD: Angiography showed minor luminal irregularities. Proximal  LAD: There was a 30 % stenosis. Distal LAD: There was a 40 % stenosis. 1st  diagonal: There was a 30 % stenosis. Mid circumflex: There was a discrete  25 % stenosis. RCA: Angiography showed minor luminal irregularities. Echocardiogram    Left ventricle: Systolic function was mildly reduced by EF (biplane method  of disks). Ejection fraction was estimated in the range of 45 % to 50 %. There was mild diffuse hypokinesis. Wall thickness was mildly increased. Features were consistent with a pseudonormal left ventricular filling  pattern, with concomitant abnormal relaxation and increased filling  pressure (grade 2 diastolic dysfunction). Left atrium: The atrium was mildly dilated. Aortic valve: The valve was probably trileaflet. Leaflets exhibited mildly  increased thickness and moderate calcification. Not well visualized.     PCP Provider  Willy Brooks MD  Past Medical History:   Diagnosis Date    Arthritis     Asthma     CAD (coronary artery disease)     Calculus of kidney     Carotid artery disease (HCC)     [facility]     Diabetes ([facility]     Hypercholesterolemia     Hypertension       Past Surgical History:   Procedure Laterality Date    HX CAROTID STENT      HX CHOLECYSTECTOMY      HX HEENT       Allergies   Allergen Reactions    Morphine Anaphylaxis    Gabapentin Other (comments)     Made him \"loopy\"    Lyrica [Pregabalin] Other (comments)     Makes him \"loopy\"      Family History   Problem Relation Age of Onset    Heart Disease Brother     Diabetes Nephew       Current Outpatient Prescriptions   Medication Sig    metOLazone (ZAROXOLYN) 5 mg tablet Take 1 Tab by mouth every other day.  carvedilol (COREG) 12.5 mg tablet Take 1 Tab by mouth two (2) times a day.  furosemide (LASIX) 40 mg tablet Take 1 Tab by mouth daily.  lisinopril (PRINIVIL, ZESTRIL) 2.5 mg tablet Take 1 Tab by mouth daily. Restart on 6/7/18    metFORMIN ER (GLUCOPHAGE XR) 500 mg tablet Take 1 Tab by mouth Before breakfast and dinner. Please restart Metformin on Sunday, 5/13/18 (Patient taking differently: Take 500 mg by mouth daily. Please restart Metformin on Sunday, 5/13/18)    simvastatin (ZOCOR) 10 mg tablet Take 1 Tab by mouth nightly.  isosorbide mononitrate ER (IMDUR) 30 mg tablet Take 1 Tab by mouth daily.  insulin NPH/insulin regular (NOVOLIN 70/30 U-100 INSULIN) 100 unit/mL (70-30) injection by SubCUTAneous route. Takes 50 units in am and 40 units in pm.    fish oil-dha-epa 1,200-144-216 mg cap Take 1 Cap by mouth daily.  acetaminophen (TYLENOL) 325 mg tablet Take  by mouth every four (4) hours as needed for Pain.  DULoxetine (CYMBALTA) 60 mg capsule Take 60 mg by mouth daily.  aspirin delayed-release 81 mg tablet Take 81 mg by mouth daily. No current facility-administered medications for this visit. Vitals:    05/31/18 1025 05/31/18 1043   BP: 110/68 122/70   Pulse: (!) 103    Resp: 24    SpO2: 93%    Weight: 236 lb 9.6 oz (107.3 kg)    Height: 5' 3\" (1.6 m)      Social History     Social History    Marital status:      Spouse name: N/A    Number of children: N/A    Years of education: N/A     Occupational History    Not on file. Social History Main Topics    Smoking status: Former Smoker     Quit date: 4/20/1963    Smokeless tobacco: Never Used    Alcohol use No    Drug use:  No  Sexual activity: Yes     Partners: Female     Birth control/ protection: None     Other Topics Concern    Not on file     Social History Narrative    ** Merged History Encounter **            I have reviewed the nurses notes, vitals, problem list, allergy list, medical history, family, social history and medications. Review of Symptoms:    General: Pt denies excessive weight gain or loss. Pt is able to conduct ADL's  HEENT: Denies blurred vision, headaches, epistaxis and difficulty swallowing. Respiratory: Denies shortness of breath, sign ROLLE, wheezing or stridor. Cardiovascular: Denies precordial pain, palpitations, + edema or PND  Gastrointestinal: Denies poor appetite, indigestion, abdominal pain or blood in stool  Musculoskeletal: Denies pain or swelling from muscles or joints  Neurologic: Denies tremor, paresthesias, or sensory motor disturbance  Skin: Denies rash, itching or texture change. Physical Exam:      General: Well developed, obese in no acute distress, cooperative and alert  HEENT: No carotid bruits, no JVD, trach is midline. Neck Supple  Heart:  Normal S1/S2 negative S3 or S4. Regular, no murmur, gallop or rub.   Respiratory: Clear bilaterally x 4, no wheezing or rales  Abdomen:   Soft, non-tender, no masses, bowel sounds are active.   Extremities: +1 bilateral dependent pitting ankle edema, normal cap refill, no cyanosis, atraumatic. Osteoarthritic hands bilaterally  Neuro: A&Ox3, speech clear, gait stable. Skin: Skin color is normal. No rashes or lesions. Non diaphoretic  Vascular: 2+ pulses symmetric in all extremities    Cardiographics    ECG:   No results found for this or any previous visit.       Cardiology Labs:  Lab Results   Component Value Date/Time    Cholesterol, total 147 04/23/2018 10:02 AM    HDL Cholesterol 35 (L) 04/23/2018 10:02 AM    LDL, calculated 57 04/23/2018 10:02 AM    Triglyceride 277 (H) 04/23/2018 10:02 AM       Lab Results   Component Value Date/Time Sodium 141 05/21/2018 01:44 PM    Potassium 6.2 (H) 05/21/2018 01:44 PM    Chloride 101 05/21/2018 01:44 PM    CO2 25 05/21/2018 01:44 PM    Anion gap 9 11/30/2015 02:44 AM    Glucose 44 (L) 05/21/2018 01:44 PM    BUN 81 (HH) 05/21/2018 01:44 PM    Creatinine 1.76 (H) 05/21/2018 01:44 PM    BUN/Creatinine ratio 46 (H) 05/21/2018 01:44 PM    GFR est AA 44 (L) 05/21/2018 01:44 PM    GFR est non-AA 38 (L) 05/21/2018 01:44 PM    Calcium 10.1 05/21/2018 01:44 PM    Bilirubin, total 0.3 05/08/2018 03:51 PM    AST (SGOT) 15 05/08/2018 03:51 PM    Alk. phosphatase 78 05/08/2018 03:51 PM    Protein, total 6.8 05/08/2018 03:51 PM    Albumin 4.4 05/08/2018 03:51 PM    Globulin 3.9 11/30/2015 02:44 AM    A-G Ratio 1.8 05/08/2018 03:51 PM    ALT (SGPT) 19 05/08/2018 03:51 PM           Assessment:     Assessment:     Diagnoses and all orders for this visit:    1. Diastolic dysfunction    2. Chronic systolic congestive heart failure (HCC)  -     METABOLIC PANEL, BASIC; Future    3. Pure hypercholesterolemia    4. Type 2 diabetes with nephropathy (HCC)    Other orders  -     metOLazone (ZAROXOLYN) 5 mg tablet; Take 1 Tab by mouth every other day. -     carvedilol (COREG) 12.5 mg tablet; Take 1 Tab by mouth two (2) times a day. -     furosemide (LASIX) 40 mg tablet; Take 1 Tab by mouth daily. -     lisinopril (PRINIVIL, ZESTRIL) 2.5 mg tablet; Take 1 Tab by mouth daily. Restart on 6/7/18        ICD-10-CM [id number]    1. Diastolic dysfunction M86.0 429.9    2. Chronic systolic congestive heart failure (HCC) V32.22 855.10 METABOLIC PANEL, BASIC     428.0    3. Pure hypercholesterolemia E78.00 272.0    4.  Type 2 diabetes with nephropathy (HCC) E11.21 250.40      583.81      Orders Placed This Encounter    METABOLIC PANEL, BASIC     Standing Status:   Future     Standing Expiration Date:   11/30/2018    DISCONTD: furosemide (LASIX) 80 mg tablet    DISCONTD: furosemide (LASIX) 40 mg tablet    metOLazone (ZAROXOLYN) 5 mg tablet Sig: Take 1 Tab by mouth every other day. Dispense:  45 Tab     Refill:  0    carvedilol (COREG) 12.5 mg tablet     Sig: Take 1 Tab by mouth two (2) times a day. Dispense:  60 Tab     Refill:  3    furosemide (LASIX) 40 mg tablet     Sig: Take 1 Tab by mouth daily. Dispense:  30 Tab     Refill:  1    lisinopril (PRINIVIL, ZESTRIL) 2.5 mg tablet     Sig: Take 1 Tab by mouth daily. Restart on 6/7/18     Dispense:  30 Tab     Refill:  1        Plan:     Patient is a 53-year-old male status post diagnostic cardiac catheterization showing nonobstructive coronary artery disease. LDL at target (57) discussed with patient need for improvement in diet exercise and weight loss for risk factor modification. There is a history of systolic heart failure: Echocardiogram shows an ejection fraction 45-45% however also noted grade 2 diastolic dysfunction. Patient came into this practice with significant dosing of diuretics from previous healthcare providers in [place]. His renal function has been compromised in reviewing labs from 2015. I will further reduce ACE inhibitor secondary to hyperkalemia down to 2.5 mg daily. His weight has been stable we will continue furosemide 40 mg daily and will trial reduction of Zaroxolyn from 5 mg daily to every other day. Should he remain hyperkalemic will then need to discontinue ACE inhibitor altogether. Recommend strict sodium restriction 1500 mg daily, fluid intake not to exceed 2 quarts. Compression stockings and legs elevated when sitting dependent. We will repeat renal function in 2-3 weeks. Patient will follow up with primary care due to cost issue of co-pays between [facility] versus primary care. Patient advised to maintain daily weight, if his weight is exceeding 242 pounds he will need to call the office for instructions on diuretic dosing.     Carvedilol increased to 12.5 mg twice a day for hypertension control given for reduction of ACE inhibitor as well as preservation of systolic function. Follow-up with Dr. Sergio Couch in 6 months. Ethyl Search, NP    This note was created using voice recognition software. Despite editing, there may be syntax errors.

## 2018-05-31 NOTE — PROGRESS NOTES
1. Have you been to the ER, urgent care clinic since your last visit? Hospitalized since your last visit? Yes, at [facility] on 5/11/18 for pure cholesterolemia    2. Have you seen or consulted any other health care providers outside of the [facility] since your last visit? Include any pap smears or colon screening.  No    Chief Complaint   Patient presents with   [facility] Follow Up     for cholesterolemia    Fatigue    Shortness of Breath

## 2018-05-31 NOTE — TELEPHONE ENCOUNTER
Attempted to return call to patients daughter in law Mela Vargas ([facility]), phone ringing busy. Will attempt to call again later.

## 2018-05-31 NOTE — MR AVS SNAPSHOT
Tona Fischer [address] [facility] 
[phone] Patient: Evin Schafer 
MRN: [id number] :1945 Visit Information Date & Time Provider Department Dept. Phone Encounter #  
 2018 10:15 AM Rachel Portillo NP [facility] [phone] [id number] Your Appointments 2018 11:50 AM  
Follow Up with MD [facility] [address] [place] [address]) Appt Note: f/u       dm           3 month  
 [address] [facility] [address] [id number] [address]  
  
    
 2018 10:45 AM  
ESTABLISHED PATIENT with Rachel Portillo NP [facility] [address] [place] [address]) [address]  
[phone] [address] [place] [address] Upcoming Health Maintenance Date Due Hepatitis C Screening 1945 EYE EXAM RETINAL OR DILATED Q1 12/10/1957 DTaP/Tdap/Td series (1 - Tdap) 12/10/1968 FOBT Q 1 YEAR AGE 50-75 12/10/1997 ZOSTER VACCINE AGE 60> 10/10/2007 GLAUCOMA SCREENING Q2Y 12/10/2012 Pneumococcal 65+ Low/Medium Risk (1 of 2 - PCV13) 12/10/2012 MEDICARE YEARLY EXAM 2018 Influenza Age 5 to Adult 2018 HEMOGLOBIN A1C Q6M 10/23/2018 LIPID PANEL Q1 2019 FOOT EXAM Q1 2019 MICROALBUMIN Q1 2019 Allergies as of 2018  Review Complete On: 2018 By: Violetta Sanchez Kanu Severity Noted Reaction Type Reactions Morphine High 2018    Anaphylaxis Gabapentin  2018    Other (comments) Made him \"loopy\" Lyrica [Pregabalin]  2018    Other (comments) Makes him \"loopy\" Current Immunizations  Never Reviewed No immunizations on file. Not reviewed this visit You Were Diagnosed With   
  
 Codes Comments Chronic systolic congestive heart failure (HCC)    -  Primary ICD-10-CM: K27.02 ICD-9-CM: 428.22, 428.0 Vitals BP Pulse Resp Height(growth percentile) Weight(growth percentile) SpO2  
 122/70 (BP 1 Location: Left arm, BP Patient Position: Sitting) (!) 103 24 5' 3\" (1.6 m) 236 lb 9.6 oz (107.3 kg) 93% BMI Smoking Status 41.91 kg/m2 Former Smoker Vitals History BMI and BSA Data Body Mass Index Body Surface Area 41.91 kg/m 2 2.18 m 2 Preferred Pharmacy Pharmacy Name Phone [facility] [address] [place] [address] Dr Kapadia, [address] [phone] Your Updated Medication List  
  
   
This list is accurate as of 5/31/18 11:20 AM.  Always use your most recent med list.  
  
  
  
  
 aspirin delayed-release 81 mg tablet Take 81 mg by mouth daily. carvedilol 12.5 mg tablet Commonly known as:  Pink Parrot Take 1 Tab by mouth two (2) times a day. DULoxetine 60 mg capsule Commonly known as:  CYMBALTA Take 60 mg by mouth daily. fish oil-dha-epa 1,200-144-216 mg Cap Take 1 Cap by mouth daily. furosemide 40 mg tablet Commonly known as:  LASIX Take 1 Tab by mouth daily. isosorbide mononitrate ER 30 mg tablet Commonly known as:  IMDUR Take 1 Tab by mouth daily. lisinopril 2.5 mg tablet Commonly known as:  Gary Cuong Take 1 Tab by mouth daily. Restart on 6/7/18  
  
 metFORMIN  mg tablet Commonly known as:  GLUCOPHAGE XR Take 1 Tab by mouth Before breakfast and dinner. Please restart Metformin on Sunday, 5/13/18  
  
 metOLazone 5 mg tablet Commonly known as:  Cynthia Demar Take 1 Tab by mouth every other day. NovoLIN 70/30 U-100 Insulin 100 unit/mL (70-30) injection Generic drug:  insulin NPH/insulin regular  
by SubCUTAneous route. Takes 50 units in am and 40 units in pm.  
  
 simvastatin 10 mg tablet Commonly known as:  ZOCOR Take 1 Tab by mouth nightly. TYLENOL 325 mg tablet Generic drug:  acetaminophen Take  by mouth every four (4) hours as needed for Pain. Prescriptions Sent to Pharmacy Refills  
 metOLazone (ZAROXOLYN) 5 mg tablet 0 Sig: Take 1 Tab by mouth every other day. Class: Normal  
 Pharmacy: [facility] DR DANA HAWKINS [address]ia, [address] Ph #: [phone] Route: Oral  
 carvedilol (COREG) 12.5 mg tablet 3 Sig: Take 1 Tab by mouth two (2) times a day. Class: Normal  
 Pharmacy: [facility] DR DANA HAWKINS [address]ia, [address] Ph #: [phone] Route: Oral  
 furosemide (LASIX) 40 mg tablet 1 Sig: Take 1 Tab by mouth daily. Class: Normal  
 Pharmacy: [facility] DR DANA HAWKINS [address] Kapadia, [address] Ph #: [phone] Route: Oral  
 lisinopril (PRINIVIL, ZESTRIL) 2.5 mg tablet 1 Sig: Take 1 Tab by mouth daily. Restart on 6/7/18 Class: Normal  
 Pharmacy: [facility] DR CORTEZ [address] Kapadia, [address] Ph #: [phone] Route: Oral  
  
To-Do List   
 06/11/2018 Lab:  METABOLIC PANEL, BASIC Introducing [facility]! [employer] introduces Anaphore patient portal. Now you can access parts of your medical record, email your doctor's office, and request medication refills online. 1. In your internet browser, go to [email or web address] 2. Click on the First Time User? Click Here link in the Sign In box. You will see the New Member Sign Up page. 3. Enter your Anaphore Access Code exactly as it appears below. You will not need to use this code after youve completed the sign-up process. If you do not sign up before the expiration date, you must request a new code. · Anaphore Access Code: [email or web address] Expires: 7/19/2018  2:01 PM 
 
4. Enter the last four digits of your Social Security Number (xxxx) and Date of Birth (mm/dd/yyyy) as indicated and click Submit.  You will be taken to the next sign-up page. 5. Create a newScale ID. This will be your newScale login ID and cannot be changed, so think of one that is secure and easy to remember. 6. Create a newScale password. You can change your password at any time. 7. Enter your Password Reset Question and Answer. This can be used at a later time if you forget your password. 8. Enter your e-mail address. You will receive e-mail notification when new information is available in [address] 9. Click Sign Up. You can now view and download portions of your medical record. 10. Click the Download Summary menu link to download a portable copy of your medical information. If you have questions, please visit the Frequently Asked Questions section of the newScale website. Remember, newScale is NOT to be used for urgent needs. For medical emergencies, dial 911. Now available from your iPhone and Android! Please provide this summary of care documentation to your next provider. Your primary care clinician is listed as Ayleen Marie. If you have any questions after today's visit, please call [phone].

## 2018-05-31 NOTE — TELEPHONE ENCOUNTER
Spoke with patients wife Adria Jose Guadalupereece (Addie Jaramillo)  Adria Estrada states that patient is having severe arthritis pain. Adria Estrada states that the pain in patients hands is so bad that he can not hold onto anything. Adria Estrada is requesting an appointment for the patient as soon as possible. Offered an appointment on 06/06/18 @ 3:30pm.  Adria Estrada verbalized understanding of information discussed w/ no further questions at this time.

## 2018-05-31 NOTE — TELEPHONE ENCOUNTER
Danielle Alexandra, Daughter in law, requesting call to patient's home. Carlos Manuel Bal is in so much pain with arthritis in hands. Does not have enough money to see specialist.  Has run out of food this month.  Spec copays are 40$, PCP is just 10$.  Saw cardiologist today who told him to see PCP.  Has CHF, DM.  She ould like him to see Dr Joce Angulo received & copied from [facility]

## 2018-06-06 ENCOUNTER — OFFICE VISIT (OUTPATIENT)
Dept: INTERNAL MEDICINE CLINIC | Age: 73
End: 2018-06-06

## 2018-06-06 VITALS
SYSTOLIC BLOOD PRESSURE: 90 MMHG | BODY MASS INDEX: 41.99 KG/M2 | HEIGHT: 63 IN | WEIGHT: 237 LBS | RESPIRATION RATE: 20 BRPM | HEART RATE: 96 BPM | DIASTOLIC BLOOD PRESSURE: 59 MMHG | OXYGEN SATURATION: 96 % | TEMPERATURE: 98.1 F

## 2018-06-06 DIAGNOSIS — M54.5 LOW BACK PAIN, UNSPECIFIED BACK PAIN LATERALITY, UNSPECIFIED CHRONICITY, WITH SCIATICA PRESENCE UNSPECIFIED: Primary | ICD-10-CM

## 2018-06-06 DIAGNOSIS — M25.551 RIGHT HIP PAIN: ICD-10-CM

## 2018-06-06 DIAGNOSIS — M25.551 PAIN OF BOTH HIP JOINTS: ICD-10-CM

## 2018-06-06 DIAGNOSIS — M54.50 CHRONIC BILATERAL LOW BACK PAIN WITHOUT SCIATICA: ICD-10-CM

## 2018-06-06 DIAGNOSIS — M25.552 LEFT HIP PAIN: ICD-10-CM

## 2018-06-06 DIAGNOSIS — M25.50 ARTHRALGIA, UNSPECIFIED JOINT: Primary | ICD-10-CM

## 2018-06-06 DIAGNOSIS — M25.552 PAIN OF BOTH HIP JOINTS: ICD-10-CM

## 2018-06-06 DIAGNOSIS — E55.9 VITAMIN D DEFICIENCY: ICD-10-CM

## 2018-06-06 DIAGNOSIS — M79.641 PAIN IN BOTH HANDS: ICD-10-CM

## 2018-06-06 DIAGNOSIS — M79.642 PAIN IN BOTH HANDS: ICD-10-CM

## 2018-06-06 DIAGNOSIS — M79.642 LEFT HAND PAIN: ICD-10-CM

## 2018-06-06 DIAGNOSIS — M79.641 RIGHT HAND PAIN: ICD-10-CM

## 2018-06-06 DIAGNOSIS — G89.29 CHRONIC BILATERAL LOW BACK PAIN WITHOUT SCIATICA: ICD-10-CM

## 2018-06-06 RX ORDER — CHOLECALCIFEROL TAB 125 MCG (5000 UNIT) 125 MCG
5000 TAB ORAL DAILY
Qty: 30 TAB | Refills: 5 | Status: SHIPPED | OUTPATIENT
Start: 2018-06-06

## 2018-06-06 RX ORDER — ALLOPURINOL 100 MG/1
TABLET ORAL
COMMUNITY
Start: 2018-04-25 | End: 2018-11-16 | Stop reason: ALTCHOICE

## 2018-06-06 NOTE — PROGRESS NOTES
John Saunders is a 81 y.o. male who presents with arthritis pain. In with daughter. Reports diffuse joint pain. This is ongoing for many years. No prior rheumatology evaluation. Pain in both hands, both wrists, both elbows, both shoulders. Upper back pain. Lower back pain. Bilateral hips and bilateral knees. Both ankles and toe pain. Tried voltaren gel. Tried lidoderm. Has renal insufficiency, cannot take NSAIDS. Has diabetic neuropathy and nephropathy. Sees Dr. Stefano Armendariz, endocrinology. Past Medical History:   Diagnosis Date    Arthritis     Asthma     CAD (coronary artery disease)     Calculus of kidney     Carotid artery disease (HCC)     Congestive heart failure (HCC)     Diabetes ([facility] [address]     Hypercholesterolemia     Hypertension        Family History   Problem Relation Age of Onset    Heart Disease Brother     Diabetes Nephew        Social History     Social History    Marital status:      Spouse name: N/A    Number of children: N/A    Years of education: N/A     Occupational History    Not on file. Social History Main Topics    Smoking status: Former Smoker     Quit date: 4/20/1963    Smokeless tobacco: Never Used    Alcohol use No    Drug use: No    Sexual activity: Yes     Partners: Female     Birth control/ protection: None     Other Topics Concern    Not on file     Social History Narrative    ** Merged History Encounter **            Current Outpatient Prescriptions on File Prior to Visit   Medication Sig Dispense Refill    metOLazone (ZAROXOLYN) 5 mg tablet Take 1 Tab by mouth every other day. 45 Tab 0    carvedilol (COREG) 12.5 mg tablet Take 1 Tab by mouth two (2) times a day. 60 Tab 3    furosemide (LASIX) 40 mg tablet Take 1 Tab by mouth daily. 30 Tab 1    lisinopril (PRINIVIL, ZESTRIL) 2.5 mg tablet Take 1 Tab by mouth daily.  Restart on 6/7/18 30 Tab 1    metFORMIN ER (GLUCOPHAGE XR) 500 mg tablet Take 1 Tab by mouth Before breakfast and dinner. Please restart Metformin on Sunday, 5/13/18 (Patient taking differently: Take 500 mg by mouth two (2) times a day. Please restart Metformin on Sunday, 5/13/18) 180 Tab 3    simvastatin (ZOCOR) 10 mg tablet Take 1 Tab by mouth nightly. 90 Tab 1    isosorbide mononitrate ER (IMDUR) 30 mg tablet Take 1 Tab by mouth daily. 30 Tab 1    insulin NPH/insulin regular (NOVOLIN 70/30 U-100 INSULIN) 100 unit/mL (70-30) injection by SubCUTAneous route. Takes 50 units in am and 40 units in pm.      fish oil-dha-epa 1,200-144-216 mg cap Take 1 Cap by mouth daily.  acetaminophen (TYLENOL) 325 mg tablet Take  by mouth every four (4) hours as needed for Pain.  DULoxetine (CYMBALTA) 60 mg capsule Take 60 mg by mouth daily.  aspirin delayed-release 81 mg tablet Take 81 mg by mouth daily. No current facility-administered medications on file prior to visit. Review of Systems  Pertinent items are noted in HPI. Objective:     Visit Vitals    BP 90/59 (BP 1 Location: Right arm, BP Patient Position: Sitting)    Pulse 96    Temp 98.1 °F (36.7 °C) (Oral)    Resp 20    Ht 5' 3\" (1.6 m)    Wt 237 lb (107.5 kg)    SpO2 96%    BMI 41.98 kg/m2     Gen: well appearing male  HEENT:   PERRL,normal conjunctiva. OP no erythema, no exudates, MMM  Neck:  No masses or LAD  Resp:  No wheezing, no rhonchi, no rales. CV:  RRR, normal S1S2, no murmur. GI: soft, nontender, upright exam.   Extrem:  +2 pulses, no edema, warm distally, hand OA changes. Back- paraspinous muscle pain on palpation lower back, no vertebral pain  Neuro- alert, unsteady gait,  negative SLR, 4/5 motor in lower extremities      Assessment/Plan:       ICD-10-CM ICD-9-CM    1. Arthralgia, unspecified joint M25.50 719.40 TONI QL, W/REFLEX CASCADE      RHEUMATOID FACTOR, QL      COMPLEMENT, C3      COMPLEMENT, C4   2. Pain in both hands M79.641 729.5 CANCELED: XR HAND LT AP/LAT    M79.642  CANCELED: XR HAND RT AP/LAT   3.  Pain of both hip joints M25.551 719.45 XR HIPS BI W AP PELV    M25.552     4. Chronic bilateral low back pain without sciatica M54.5 724.2 XR SPINE LUMB 2 OR 3 V    G89.29 338.29    5. Vitamin D deficiency E55.9 268.9 cholecalciferol, VITAMIN D3, (VITAMIN D3) 5,000 unit tab tablet   Tylenol 500 mg 1-2 twice a day as needed for joint pain. Starting vitamin D3 5,000 iu daily. Follow-up Disposition:  Return for follow up for fasting labs.   Marvin Patricia MD

## 2018-06-06 NOTE — PROGRESS NOTES
Reviewed record in preparation for visit and have obtained necessary documentation. Identified pt with two pt identifiers(name and ). Chief Complaint   Patient presents with    Arthritis     back, bilateral hand, arms, legs,hips and elbow       Health Maintenance Due   Topic Date Due   Jordis Cipro Test  1945    Eye Exam  12/10/1957    DTaP/Tdap/Td  (1 - Tdap) 12/10/1968    Stool testing for trace blood  12/10/1997    Shingles Vaccine  10/10/2007    Glaucoma Screening   12/10/2012    Pneumococcal Vaccine (1 of 2 - PCV13) 12/10/2012    Annual Well Visit  2018       Mr. Dora Nelson has a reminder for a \"due or due soon\" health maintenance. I have asked that he discuss this further with his primary care provider for follow-up on this health maintenance. Coordination of Care Questionnaire:  :     1) Have you been to an emergency room, urgent care clinic since your last visit? no   Hospitalized since your last visit? no             2) Have you seen or consulted any other health care providers outside of [address] [place] [address] since your last visit? no  (Include any pap smears or colon screenings in this section.)    3) In the event something were to happen to you and you were unable to speak on your behalf, do you have an Advance Directive/ Living Will in place stating your wishes? NO    Do you have an Advance Directive on file? no    4) Are you interested in receiving information on Advance Directives? NO    Patient is accompanied by daughter I have received verbal consent from Frandy Pederson to discuss any/all medical information while they are present in the room.

## 2018-06-06 NOTE — MR AVS SNAPSHOT
[address] [facility] 
[phone] Patient: Nikunj Moran 
MRN: [id number] :1945 Visit Information Date & Time Provider Department Dept. Phone Encounter #  
 2018  3:30 PM Royal Chao, [address] [id number] Follow-up Instructions Return for follow up for fasting labs. .  
  
Your Appointments 2018 11:50 AM  
Follow Up with Scotty Crum MD  
[facility] [address] [place] [address]) Appt Note: f/u       dm           3 month  
 [phone] [address]  
  
    
 2018 10:45 AM  
ESTABLISHED PATIENT with [facility] [address] [facility]) [address] [facility]  
[phone] [address] [facility] Upcoming Health Maintenance Date Due Hepatitis C Screening 1945 EYE EXAM RETINAL OR DILATED Q1 12/10/1957 DTaP/Tdap/Td series (1 - Tdap) 12/10/1968 FOBT Q 1 YEAR AGE 50-75 12/10/1997 ZOSTER VACCINE AGE 60> 10/10/2007 GLAUCOMA SCREENING Q2Y 12/10/2012 Pneumococcal 65+ Low/Medium Risk (1 of 2 - PCV13) 12/10/2012 MEDICARE YEARLY EXAM 2018 Influenza Age 5 to Adult 2018 HEMOGLOBIN A1C Q6M 10/23/2018 LIPID PANEL Q1 2019 FOOT EXAM Q1 2019 MICROALBUMIN Q1 2019 Allergies as of 2018  Review Complete On: 2018 By: Lj Canas LPN Severity Noted Reaction Type Reactions Morphine High 2018    Anaphylaxis Gabapentin  2018    Other (comments) Made him \"loopy\" Lyrica [Pregabalin]  2018    Other (comments) Makes him \"loopy\" Current Immunizations  Never Reviewed No immunizations on file. Not reviewed this visit You Were Diagnosed With   
  
 Codes Comments Arthralgia, unspecified joint    -  Primary ICD-10-CM: M25.50 ICD-9-CM: 719.40 Pain in both hands     ICD-10-CM: M79.641, A82.030 ICD-9-CM: 729.5 Pain of both hip joints     ICD-10-CM: M25.551, M25.552 ICD-9-CM: 719.45 Chronic bilateral low back pain without sciatica     ICD-10-CM: M54.5, G89.29 ICD-9-CM: 724.2, 338.29 Vitamin D deficiency     ICD-10-CM: E55.9 ICD-9-CM: 268.9 Vitals BP Pulse Temp Resp Height(growth percentile) Weight(growth percentile) 90/59 (BP 1 Location: Right arm, BP Patient Position: Sitting) 96 98.1 °F (36.7 °C) (Oral) 20 5' 3\" (1.6 m) 237 lb (107.5 kg) SpO2 BMI Smoking Status 96% 41.98 kg/m2 Former Smoker Vitals History BMI and BSA Data Body Mass Index Body Surface Area 41.98 kg/m 2 2.19 m 2 Preferred Pharmacy Pharmacy Name Phone [facility] [address] [place] [address] Dr Kapadia, [address] [phone] Your Updated Medication List  
  
   
This list is accurate as of 6/6/18  5:06 PM.  Always use your most recent med list.  
  
  
  
  
 allopurinol 100 mg tablet Commonly known as:  ZYLOPRIM  
  
 aspirin delayed-release 81 mg tablet Take 81 mg by mouth daily. carvedilol 12.5 mg tablet Commonly known as:  Wendall Lundborg Take 1 Tab by mouth two (2) times a day. cholecalciferol (VITAMIN D3) 5,000 unit Tab tablet Commonly known as:  VITAMIN D3 Take 1 Tab by mouth daily. DULoxetine 60 mg capsule Commonly known as:  CYMBALTA Take 60 mg by mouth daily. fish oil-dha-epa 1,200-144-216 mg Cap Take 1 Cap by mouth daily. furosemide 40 mg tablet Commonly known as:  LASIX Take 1 Tab by mouth daily. isosorbide mononitrate ER 30 mg tablet Commonly known as:  IMDUR Take 1 Tab by mouth daily. lisinopril 2.5 mg tablet Commonly known as:  Tildon Navneet  
 Take 1 Tab by mouth daily. Restart on 6/7/18  
  
 metFORMIN  mg tablet Commonly known as:  GLUCOPHAGE XR Take 1 Tab by mouth Before breakfast and dinner. Please restart Metformin on Sunday, 5/13/18  
  
 metOLazone 5 mg tablet Commonly known as:  Luvenia Bunde Take 1 Tab by mouth every other day. NovoLIN 70/30 U-100 Insulin 100 unit/mL (70-30) injection Generic drug:  insulin NPH/insulin regular  
by SubCUTAneous route. Takes 50 units in am and 40 units in pm.  
  
 simvastatin 10 mg tablet Commonly known as:  ZOCOR Take 1 Tab by mouth nightly. TYLENOL 325 mg tablet Generic drug:  acetaminophen Take  by mouth every four (4) hours as needed for Pain. Prescriptions Sent to Pharmacy Refills  
 cholecalciferol, VITAMIN D3, (VITAMIN D3) 5,000 unit tab tablet 5 Sig: Take 1 Tab by mouth daily. Class: Normal  
 Pharmacy: [facility] DR DANA HAWKINS [address] Dr Kapadia, [address] Ph #: [phone] Route: Oral  
  
We Performed the Following RHEUMATOID FACTOR, QL [id number] CPT(R)] Follow-up Instructions Return for follow up for fasting labs. Benna Him To-Do List   
 06/06/2018 Lab:  TONI QL, W/REFLEX CASCADE   
  
 06/06/2018 Lab:  COMPLEMENT, C3   
  
 06/06/2018 Lab:  COMPLEMENT, C4   
  
 06/06/2018 Imaging:  XR HAND LT AP/LAT   
  
 06/06/2018 Imaging:  XR HAND RT AP/LAT   
  
 06/06/2018 Imaging:  XR HIPS BI W AP PELV   
  
 06/06/2018 Imaging:  XR SPINE LUMB 2 OR 3 V Referral Information Referral ID Referred By Referred To  
  
 [id number] Leila Alonso Not Available Visits Status Start Date End Date 1 New Request 6/6/18 6/6/19 If your referral has a status of pending review or denied, additional information will be sent to support the outcome of this decision. Referral ID Referred By Referred To  
 [id number] Leila Alonso Not Available Visits Status Start Date End Date 1 New Request 6/6/18 6/6/19 If your referral has a status of pending review or denied, additional information will be sent to support the outcome of this decision. Referral ID Referred By Referred To  
 [id number] Rimma Darnell Not Available Visits Status Start Date End Date 1 New Request 6/6/18 6/6/19 If your referral has a status of pending review or denied, additional information will be sent to support the outcome of this decision. Referral ID Referred By Referred To  
 [id number] Rimma Darnell Not Available Visits Status Start Date End Date 1 New Request 6/6/18 6/6/19 If your referral has a status of pending review or denied, additional information will be sent to support the outcome of this decision. Patient Instructions Tylenol 500 mg 1-2 twice a day as needed for joint pain. Starting vitamin D3 5,000 iu daily. Introducing \[facility] [place] [facility]\"" & [facility] SERVICES! Leo Kong introduces [employer] patient portal. Now you can access parts of your medical record, email your doctor's office, and request medication refills online. 1. In your internet browser, go to [email or web address] 2. Click on the First Time User? Click Here link in the Sign In box. You will see the New Member Sign Up page. 3. Enter your [facility]ia Access Code exactly as it appears below. You will not need to use this code after youve completed the sign-up process. If you do not sign up before the expiration date, you must request a new code. · [facility] Access Code: [email or web address] Expires: 7/19/2018  2:01 PM 
 
4. Enter the last four digits of your Social Security Number (xxxx) and Date of Birth (mm/dd/yyyy) as indicated and click Submit. You will be taken to the next sign-up page. 5. Create a [facility]ia ID. This will be your [facility]ia login ID and cannot be changed, so think of one that is secure and easy to remember. 6. Create a [employer] password. You can change your password at any time. 7. Enter your Password Reset Question and Answer. This can be used at a later time if you forget your password. 8. Enter your e-mail address. You will receive e-mail notification when new information is available in [address] 9. Click Sign Up. You can now view and download portions of your medical record. 10. Click the Download Summary menu link to download a portable copy of your medical information. If you have questions, please visit the Frequently Asked Questions section of the [employer] website. Remember, [employer] is NOT to be used for urgent needs. For medical emergencies, dial 911. Now available from your iPhone and Android! Please provide this summary of care documentation to your next provider. Your primary care clinician is listed as Ulises Campbell. If you have any questions after today's visit, please call [phone].

## 2018-06-09 LAB
BUN SERPL-MCNC: 42 MG/DL (ref 8–27)
BUN/CREAT SERPL: 30 (ref 10–24)
CALCIUM SERPL-MCNC: 9 MG/DL (ref 8.6–10.2)
CHLORIDE SERPL-SCNC: 106 MMOL/L (ref 96–106)
CO2 SERPL-SCNC: 23 MMOL/L (ref 18–29)
CREAT SERPL-MCNC: 1.4 MG/DL (ref 0.76–1.27)
GFR SERPLBLD CREATININE-BSD FMLA CKD-EPI: 51 ML/MIN/1.73
GFR SERPLBLD CREATININE-BSD FMLA CKD-EPI: 58 ML/MIN/1.73
GLUCOSE SERPL-MCNC: 127 MG/DL (ref 65–99)
INTERPRETATION: NORMAL
POTASSIUM SERPL-SCNC: 5.8 MMOL/L (ref 3.5–5.2)
SODIUM SERPL-SCNC: 143 MMOL/L (ref 134–144)

## 2018-06-10 NOTE — PROGRESS NOTES
Please call patient, labs look improved after medication changes, I would like to repeat just a BMP in 1 month to follow the trend.

## 2018-06-11 DIAGNOSIS — I50.22 CHRONIC SYSTOLIC CONGESTIVE HEART FAILURE (HCC): ICD-10-CM

## 2018-06-12 ENCOUNTER — TELEPHONE (OUTPATIENT)
Dept: INTERNAL MEDICINE CLINIC | Age: 73
End: 2018-06-12

## 2018-06-12 ENCOUNTER — TELEPHONE (OUTPATIENT)
Dept: CARDIOLOGY CLINIC | Age: 73
End: 2018-06-12

## 2018-06-12 ENCOUNTER — TELEPHONE (OUTPATIENT)
Dept: ENDOCRINOLOGY | Age: 73
End: 2018-06-12

## 2018-06-12 DIAGNOSIS — I10 ESSENTIAL HYPERTENSION: Primary | ICD-10-CM

## 2018-06-12 NOTE — PROGRESS NOTES
Spoke to patient using 2 identifiers. Per Miguel Cook NP, pt was made aware kidney function is medication related, keep doing what he has been doing.

## 2018-06-12 NOTE — PROGRESS NOTES
Spoke to patient using 2 identifiers. Per Mariela Bermudez, ELIZABETH, pt was made aware that labs look improved after medication changes. Would like to repeat BMP in 1 mo to follow trends. Shawnee Smiley, patient would like to know what he can do to improve kidney function prior to lab draw such as more hydration, etc?  Please advise.

## 2018-06-12 NOTE — TELEPHONE ENCOUNTER
Called and spoke with patient and his wife in response to their in3Dgalleryt message. Reassured them that the patients kidney function appeared to be improving, there is no harm in seeing a nephrologist and they could discuss that with Dr. Sailaja Piper. I recommended they send me an updated sugar log for me to review which they will do tomorrow. Divya Restrepo.  [address] [facility]  [address] [place] [address]

## 2018-07-09 ENCOUNTER — OFFICE VISIT (OUTPATIENT)
Dept: ENDOCRINOLOGY | Age: 73
End: 2018-07-09

## 2018-07-09 VITALS
WEIGHT: 235.1 LBS | DIASTOLIC BLOOD PRESSURE: 76 MMHG | TEMPERATURE: 98 F | BODY MASS INDEX: 41.66 KG/M2 | RESPIRATION RATE: 18 BRPM | HEIGHT: 63 IN | SYSTOLIC BLOOD PRESSURE: 138 MMHG | HEART RATE: 98 BPM | OXYGEN SATURATION: 98 %

## 2018-07-09 DIAGNOSIS — N18.30 TYPE 2 DIABETES MELLITUS WITH STAGE 3 CHRONIC KIDNEY DISEASE, WITH LONG-TERM CURRENT USE OF INSULIN (HCC): Primary | ICD-10-CM

## 2018-07-09 DIAGNOSIS — E11.22 TYPE 2 DIABETES MELLITUS WITH STAGE 3 CHRONIC KIDNEY DISEASE, WITH LONG-TERM CURRENT USE OF INSULIN (HCC): Primary | ICD-10-CM

## 2018-07-09 DIAGNOSIS — Z79.4 TYPE 2 DIABETES MELLITUS WITH STAGE 3 CHRONIC KIDNEY DISEASE, WITH LONG-TERM CURRENT USE OF INSULIN (HCC): Primary | ICD-10-CM

## 2018-07-09 DIAGNOSIS — E78.5 HYPERLIPIDEMIA, UNSPECIFIED HYPERLIPIDEMIA TYPE: ICD-10-CM

## 2018-07-09 DIAGNOSIS — I10 ESSENTIAL HYPERTENSION WITH GOAL BLOOD PRESSURE LESS THAN 130/80: ICD-10-CM

## 2018-07-09 LAB — HBA1C MFR BLD HPLC: 5.8 %

## 2018-07-09 NOTE — PROGRESS NOTES
CHIEF COMPLAINT: f/u evaluation for uncontrolled type 2 diabetes    HISTORY OF PRESENT ILLNESS:   Jaimee Mclaughlin is a 81 y.o. male with a PMHx as noted below who presents to the endocrinology clinic for f/u evaluation of uncontrolled type 2 diabetes. Patient reports that he had adjusted his insulin doses just a bit. Currently taking the following meds:  Continue metformin 500mg twice daily,   Novolin 70/30 to 40 units with breakfast, 30 units with dinner,    Review of home blood glucose:  AM:  55-80's mostly  Lunch:   Dinner:     Review of most recent diabetes-related labs:  Lab Results   Component Value Date    OPV1MYPJ 8.9 (A) 04/23/2018    CHOL 147 04/23/2018    LDLC 57 04/23/2018    GFRAA 58 (L) 06/08/2018    GFRNA 51 (L) 06/08/2018    MCACR 456.2 (H) 05/07/2018    TSH 0.681 04/23/2018    VITD3 11.3 (L) 04/23/2018     Lab Key:  [id number] = IA-2 pancreatic islet cell autoantibody  GADLT = MARIAN-65 autoantibody   MCACR = Urine Microalbumin  INSUL = Insulin level  CPEPL = C-peptide level      PAST MEDICAL/SURGICAL HISTORY:   Past Medical History:   Diagnosis Date    Arthritis     Asthma     CAD (coronary artery disease)     Calculus of kidney     Carotid artery disease (HCC)     Congestive heart failure (HCC)     Diabetes ([address]     Hypercholesterolemia     Hypertension      Past Surgical History:   Procedure Laterality Date    HX CAROTID STENT      HX CHOLECYSTECTOMY      HX HEENT         ALLERGIES:   Allergies   Allergen Reactions    Morphine Anaphylaxis    Gabapentin Other (comments)     Made him \"loopy\"    Lyrica [Pregabalin] Other (comments)     Makes him \"loopy\"       MEDICATIONS ON ADMISSION:     Current Outpatient Prescriptions:     cholecalciferol, VITAMIN D3, (VITAMIN D3) 5,000 unit tab tablet, Take 1 Tab by mouth daily. , Disp: 30 Tab, Rfl: 5    metOLazone (ZAROXOLYN) 5 mg tablet, Take 1 Tab by mouth every other day., Disp: 45 Tab, Rfl: 0    carvedilol (COREG) 12.5 mg tablet, Take 1 Tab by mouth two (2) times a day., Disp: 60 Tab, Rfl: 3    furosemide (LASIX) 40 mg tablet, Take 1 Tab by mouth daily. , Disp: 30 Tab, Rfl: 1    lisinopril (PRINIVIL, ZESTRIL) 2.5 mg tablet, Take 1 Tab by mouth daily. Restart on 6/7/18, Disp: 30 Tab, Rfl: 1    metFORMIN ER (GLUCOPHAGE XR) 500 mg tablet, Take 1 Tab by mouth Before breakfast and dinner. Please restart Metformin on Sunday, 5/13/18 (Patient taking differently: Take 500 mg by mouth two (2) times a day. Please restart Metformin on Sunday, 5/13/18), Disp: 180 Tab, Rfl: 3    simvastatin (ZOCOR) 10 mg tablet, Take 1 Tab by mouth nightly., Disp: 90 Tab, Rfl: 1    isosorbide mononitrate ER (IMDUR) 30 mg tablet, Take 1 Tab by mouth daily. , Disp: 30 Tab, Rfl: 1    insulin NPH/insulin regular (NOVOLIN 70/30 U-100 INSULIN) 100 unit/mL (70-30) injection, by SubCUTAneous route. Takes 50 units in am and 40 units in pm., Disp: , Rfl:     fish oil-dha-epa 1,200-144-216 mg cap, Take 1 Cap by mouth daily. , Disp: , Rfl:     acetaminophen (TYLENOL) 325 mg tablet, Take  by mouth every four (4) hours as needed for Pain., Disp: , Rfl:     DULoxetine (CYMBALTA) 60 mg capsule, Take 60 mg by mouth daily. , Disp: , Rfl:     aspirin delayed-release 81 mg tablet, Take 81 mg by mouth daily. , Disp: , Rfl:     allopurinol (ZYLOPRIM) 100 mg tablet, , Disp: , Rfl:     SOCIAL HISTORY:   Social History     Social History    Marital status:      Spouse name: N/A    Number of children: N/A    Years of education: N/A     Occupational History    Not on file.      Social History Main Topics    Smoking status: Former Smoker     Quit date: 4/20/1963    Smokeless tobacco: Never Used    Alcohol use No    Drug use: No    Sexual activity: Yes     Partners: Female     Birth control/ protection: None     Other Topics Concern    Not on file     Social History Narrative    ** Merged History Encounter **            FAMILY HISTORY:  Family History   Problem Relation Age of Onset    Heart Disease Brother     Diabetes Nephew        REVIEW OF SYSTEMS: Complete ROS assessed and noted for that which is described above, all else are negative. Eyes: normal  ENT: normal  CVS: normal  Resp: normal  GI: normal  : normal  GYN: normal  Endocrine: normal  Integument: normal  Musculoskeletal: normal  Neuro: normal  Psych: normal      PHYSICAL EXAMINATION:    VITAL SIGNS:  Visit Vitals    /76 (BP 1 Location: Left arm, BP Patient Position: Sitting)    Pulse 98    Temp 98 °F (36.7 °C) (Oral)    Resp 18    Ht 5' 3\" (1.6 m)    Wt 235 lb 1.6 oz (106.6 kg)    SpO2 98%    BMI 41.65 kg/m2       GENERAL: NCAT, Sitting comfortably, NAD  EYES: EOMI, non-icteric, no proptosis  Ear/Nose/Throat: NCAT, no inflammation, no masses  LYMPH NODES: No LAD  CARDIOVASCULAR: S1 S2, RRR, No murmur, 2+ radial pulses  RESPIRATORY: CTA b/l, no wheeze/rales  GASTROINTESTINAL: ND  MUSCULOSKELETAL: Normal ROM, no atrophy  SKIN: warm, no edema/rash/ or other skin changes  NEUROLOGIC: 5/5 power all extremities, no tremors, AAOx3  PSYCHIATRIC: Normal affect, Normal insight and judgement         REVIEW OF LABORATORY AND RADIOLOGY DATA:   Labs and documentation have been reviewed as described above. ASSESSMENT AND PLAN:   Beth Peter is a 81 y.o. male with a PMHx as noted above who presents to the endocrinology clinic for f/u evaluation of uncontrolled type 2 diabetes. DM2 with hypoglycemia  HTN  HLD    Patients insulin requirements and blood sugar control improved quite a bit with the metformin. He his on far less insulin than before and requires further reductions in dose to avoid hypoglycemia. This is overall a good direction as he would be less likely to gain weight from the insulin, and his better diabetes control will reduce risk of further kidney damage.      DM2:  Continue metformin 500mg twice per day  Novolin 70/30 insulin:    Reduce to 32 units with breakfast   Reduce to 20 units with dinner  Continue to check glucose 4x/day  Provided with new log sheets      HTN: BP stable continue lisinopril/coreg  HLD: stable, simvastatin 10mg    Labs: up to date    * Feet: Patient has need for diabetic shoes. He currently has shoes that are in poor condition for his feet. He does have some mild sensory deficits in his feet and sub optimal circulation as evidenced by the condition of skin / thickened nails / pulse. I am treating the patient under a comprehensive plan of care for diabetes and the patient would benefit from diabetic footwear to protect their feet, and this includes shoes and insoles. 3 month f/u visit. Óscar Albert.  [address] [facility]

## 2018-07-09 NOTE — PROGRESS NOTES
Mack Collins is a 81 y.o. male  Chief Complaint   Patient presents with    Diabetes     follow up 3 month     Visit Vitals    /76 (BP 1 Location: Left arm, BP Patient Position: Sitting)    Pulse 98    Temp 98 °F (36.7 °C) (Oral)    Resp 18    Ht 5' 3\" (1.6 m)    Wt 235 lb 1.6 oz (106.6 kg)    SpO2 98%    BMI 41.65 kg/m2     1. Have you been to the ER, urgent care clinic since your last visit? Hospitalized since your last visit?no    2. Have you seen or consulted any other health care providers outside of the [address] [place] [address] since your last visit? Include any pap smears or colon screening.  no

## 2018-07-09 NOTE — PATIENT INSTRUCTIONS
Continue metformin 500mg twice per day    Novolin 70/30 insulin:    Reduce to 32 units with breakfast   Reduce to 20 units with dinner    It is ok for your blood sugar to be in the 130-160's range on average. Riddhi Asher. [address]      Please note our new policy, you must arrive to the clinic 15 minutes before your appointment time to allow enough time for proper check-in, adequate time to spend with your doctor, and also to respect the appointment time of the next patient. Not arriving 15 minutes in advance may result in having your appointment rescheduled for the next available day/time.  ----------------------------------------------------------------------------------------------------------------------    Below you will find a glucose log sheet which you can use to record your blood sugars. Without checking and recording what your home glucose levels are, it will be difficult to make any changes to your medication dose, even when significant changes may be needed. Please feel free to use the log below to record your home glucose levels. At the very least, I would like for you to login the entire 2-3 weeks just before your visit so we can make your visit much more productive and beneficial to you. GLUCOSE LOG SHEET:    Date Breakfast Lunch Dinner Bedtime Comments ? GLUCOSE LOG SHEET:    Date Breakfast Lunch Dinner Bedtime Comments ?                                                                                                                                                                                                                                                  GLUCOSE LOG SHEET:    Date Breakfast Lunch Dinner Bedtime Comments ?

## 2018-07-09 NOTE — MR AVS SNAPSHOT
[address] [place] [address] [address] [address] [phone] Patient: Maikol Ferguson 
MRN: [id number] :1945 Visit Information Date & Time Provider Department Dept. Phone Encounter #  
 2018 11:50 AM Mayi Carter, [address] [place] [address] Diabetes and Endocrinology [phone] Follow-up Instructions Return in about 3 months (around 10/9/2018). Your Appointments 2018 10:45 AM  
ESTABLISHED PATIENT with Rigo Randle NP [facility] [address] [place] [address]) [address] [facility]  
[phone] [address] [facility] Upcoming Health Maintenance Date Due Hepatitis C Screening 1945 EYE EXAM RETINAL OR DILATED Q1 12/10/1957 DTaP/Tdap/Td series (1 - Tdap) 12/10/1968 FOBT Q 1 YEAR AGE 50-75 12/10/1997 ZOSTER VACCINE AGE 60> 10/10/2007 GLAUCOMA SCREENING Q2Y 12/10/2012 Pneumococcal 65+ Low/Medium Risk (1 of 2 - PCV13) 12/10/2012 MEDICARE YEARLY EXAM 2018 Influenza Age 5 to Adult 2018 HEMOGLOBIN A1C Q6M 10/23/2018 LIPID PANEL Q1 2019 FOOT EXAM Q1 2019 MICROALBUMIN Q1 2019 Allergies as of 2018  Review Complete On: 2018 By: Mayi Carter MD  
  
 Severity Noted Reaction Type Reactions Morphine High 2018    Anaphylaxis Gabapentin  2018    Other (comments) Made him \"loopy\" Lyrica [Pregabalin]  2018    Other (comments) Makes him \"loopy\" Current Immunizations  Reviewed on 2018 No immunizations on file. Reviewed by Adolfo Chavez LPN on 3997 at 82:55 PM  
You Were Diagnosed With   
  
 Codes Comments Type 2 diabetes mellitus with stage 3 chronic kidney disease, with long-term current use of insulin (HCC)    -  Primary ICD-10-CM: E11.22, N18.3, Z79.4 ICD-9-CM: 250.40, 585.3, V58.67   
 Essential hypertension with goal blood pressure less than 130/80     ICD-10-CM: I10 
ICD-9-CM: 401.9 Hyperlipidemia, unspecified hyperlipidemia type     ICD-10-CM: E78.5 ICD-9-CM: 272.4 Vitals BP Pulse Temp Resp Height(growth percentile) Weight(growth percentile) 138/76 (BP 1 Location: Left arm, BP Patient Position: Sitting) 98 98 °F (36.7 °C) (Oral) 18 5' 3\" (1.6 m) 235 lb 1.6 oz (106.6 kg) SpO2 BMI Smoking Status 98% 41.65 kg/m2 Former Smoker Vitals History BMI and BSA Data Body Mass Index Body Surface Area  
 41.65 kg/m 2 2.18 m 2 Preferred Pharmacy Pharmacy Name Phone [facility] [address], [address] [place] [address] Avenue [phone] Your Updated Medication List  
  
   
This list is accurate as of 7/9/18 12:49 PM.  Always use your most recent med list.  
  
  
  
  
 allopurinol 100 mg tablet Commonly known as:  ZYLOPRIM  
  
 aspirin delayed-release 81 mg tablet Take 81 mg by mouth daily. carvedilol 12.5 mg tablet Commonly known as:  Darcie Varun Take 1 Tab by mouth two (2) times a day. cholecalciferol (VITAMIN D3) 5,000 unit Tab tablet Commonly known as:  VITAMIN D3 Take 1 Tab by mouth daily. DULoxetine 60 mg capsule Commonly known as:  CYMBALTA Take 60 mg by mouth daily. fish oil-dha-epa 1,200-144-216 mg Cap Take 1 Cap by mouth daily. furosemide 40 mg tablet Commonly known as:  LASIX Take 1 Tab by mouth daily. isosorbide mononitrate ER 30 mg tablet Commonly known as:  IMDUR Take 1 Tab by mouth daily. lisinopril 2.5 mg tablet Commonly known as:  Lawrence Economy Take 1 Tab by mouth daily. Restart on 6/7/18  
  
 metFORMIN  mg tablet Commonly known as:  GLUCOPHAGE XR Take 1 Tab by mouth Before breakfast and dinner. Please restart Metformin on Sunday, 5/13/18  
  
 metOLazone 5 mg tablet Commonly known as:  Winthrop Harada  
 Take 1 Tab by mouth every other day. NovoLIN 70/30 U-100 Insulin 100 unit/mL (70-30) injection Generic drug:  insulin NPH/insulin regular  
by SubCUTAneous route. Takes 50 units in am and 40 units in pm.  
  
 simvastatin 10 mg tablet Commonly known as:  ZOCOR Take 1 Tab by mouth nightly. TYLENOL 325 mg tablet Generic drug:  acetaminophen Take  by mouth every four (4) hours as needed for Pain. We Performed the Following AMB POC HEMOGLOBIN A1C [73343 CPT(R)] Follow-up Instructions Return in about 3 months (around 10/9/2018). Patient Instructions Continue metformin 500mg twice per day Novolin 70/30 insulin:  
 Reduce to 32 units with breakfast 
 Reduce to 20 units with dinner It is ok for your blood sugar to be in the 130-160's range on average. Mireya Skinner. [address] [facility] [address] [place] [address] Please note our new policy, you must arrive to the clinic 15 minutes before your appointment time to allow enough time for proper check-in, adequate time to spend with your doctor, and also to respect the appointment time of the next patient. Not arriving 15 minutes in advance may result in having your appointment rescheduled for the next available day/time. 
---------------------------------------------------------------------------------------------------------------------- Below you will find a glucose log sheet which you can use to record your blood sugars. Without checking and recording what your home glucose levels are, it will be difficult to make any changes to your medication dose, even when significant changes may be needed. Please feel free to use the log below to record your home glucose levels. At the very least, I would like for you to login the entire 2-3 weeks just before your visit so we can make your visit much more productive and beneficial to you.   
 
GLUCOSE LOG SHEET: 
 
 Date Breakfast Lunch Dinner Bedtime Comments ? GLUCOSE LOG SHEET: 
 
Date Breakfast Lunch Dinner Bedtime Comments ? GLUCOSE LOG SHEET: 
 
Date Breakfast Lunch Dinner Bedtime Comments ? Introducing [facility]! Dear Jose Encinas: Thank you for requesting a [employer] account. Our records indicate that you already have an active [employer] account. You can access your account anytime at [email or web address] Did you know that you can access your hospital and ER discharge instructions at any time in [employer]? You can also review all of your test results from your hospital stay or ER visit. Additional Information If you have questions, please visit the Frequently Asked Questions section of the [employer] website at [email or web address] Remember, [employer] is NOT to be used for urgent needs. For medical emergencies, dial 911. Now available from your iPhone and Android! Please provide this summary of care documentation to your next provider. Your primary care clinician is listed as Fabiola Jenkins. If you have any questions after today's visit, please call [phone].

## 2018-07-12 DIAGNOSIS — I10 ESSENTIAL HYPERTENSION: ICD-10-CM

## 2018-07-14 LAB
BUN SERPL-MCNC: 33 MG/DL (ref 8–27)
BUN/CREAT SERPL: 22 (ref 10–24)
CALCIUM SERPL-MCNC: 9.9 MG/DL (ref 8.6–10.2)
CHLORIDE SERPL-SCNC: 103 MMOL/L (ref 96–106)
CO2 SERPL-SCNC: 27 MMOL/L (ref 20–29)
CREAT SERPL-MCNC: 1.47 MG/DL (ref 0.76–1.27)
GLUCOSE SERPL-MCNC: 78 MG/DL (ref 65–99)
INTERPRETATION: NORMAL
POTASSIUM SERPL-SCNC: 5.1 MMOL/L (ref 3.5–5.2)
SODIUM SERPL-SCNC: 144 MMOL/L (ref 134–144)

## 2018-07-18 RX ORDER — ISOSORBIDE MONONITRATE 30 MG/1
TABLET, EXTENDED RELEASE ORAL
Qty: 30 TAB | Refills: 1 | Status: SHIPPED | OUTPATIENT
Start: 2018-07-18 | End: 2018-09-12 | Stop reason: SDUPTHER

## 2018-07-19 NOTE — PROGRESS NOTES
Spoke to patient using 2 identifiers. Per Hanane Moreno, NP, pt was made aware that labs look stable. Continue current regimen.

## 2018-08-01 ENCOUNTER — DOCUMENTATION ONLY (OUTPATIENT)
Dept: CARDIOLOGY CLINIC | Age: 73
End: 2018-08-01

## 2018-08-01 ENCOUNTER — TELEPHONE (OUTPATIENT)
Dept: CARDIOLOGY CLINIC | Age: 73
End: 2018-08-01

## 2018-08-01 NOTE — PROGRESS NOTES
Received approval for metolazone 8/1/2018 through 12/31/2018    [address] [place] [address] notified and will notify patient

## 2018-08-01 NOTE — PROGRESS NOTES
Faxed over tier exception request reference medication  Metolozone to [address] fax#[phone]  Ph#[phone]  Fax confirmation received

## 2018-08-03 ENCOUNTER — TELEPHONE (OUTPATIENT)
Dept: ENDOCRINOLOGY | Age: 73
End: 2018-08-03

## 2018-08-03 NOTE — TELEPHONE ENCOUNTER
I returned Debra's call. She wanted to know if Mr. Edda Riedel should take Allopurinol or not for his gout. I asked Dr. Chris Atkins who said that he does not treat that and he should talk to his PCP. I passed this on to [facility] and she will have him do that.   Wolf

## 2018-08-03 NOTE — TELEPHONE ENCOUNTER
Pt wife Divya Waldron called in with questions about not taking medication. ...zyloprim for gout issue. [phone].

## 2018-08-06 ENCOUNTER — TELEPHONE (OUTPATIENT)
Dept: INTERNAL MEDICINE CLINIC | Age: 73
End: 2018-08-06

## 2018-08-06 NOTE — TELEPHONE ENCOUNTER
[facility]  Two pt identifiers confirmed. Chance Brewer states that patient was prescribed allopurinol by his previous provider in [place]. Chance Brewer states that she would like to know if patient should still be taking this medication with his current kidney issues. Advised patient that I will have to check with Dr. Jyoti Ramirez and I will call her back with her recommendations. Chance Brewer verbalized understanding of information discussed w/ no further questions at this time.

## 2018-08-06 NOTE — TELEPHONE ENCOUNTER
----- Message from [place] sent at 8/3/2018  -----  Regarding: Dr. Nany Vincent, pt's wife is requesting a call back in regards to his medication, Allipurinol. She would like to know if he should take that medication, with his kidney health or not.  Best contact number: [phone]      Message copied/pasted from [facility]

## 2018-08-07 ENCOUNTER — TELEPHONE (OUTPATIENT)
Dept: CARDIOLOGY CLINIC | Age: 73
End: 2018-08-07

## 2018-08-07 NOTE — TELEPHONE ENCOUNTER
Spoke to patient's spouse, Divya Ellisve on HIPAA using 2 identifiers. She stated she is getting conflicting information regarding allopurinol medication - cardiology advised not to take it, but PCP saying it's ok to take it. She is concerned about his kidney function. She also stated that her  is not having a flare up now, but would like to know if he can or cannot take it for future  flare ups. Please advise.

## 2018-08-07 NOTE — TELEPHONE ENCOUNTER
Spoke to patient's wife, Judy Wolf on HIPAA using 2 identifiers. Per Larence Heimlich, NP, with regards to allopurinol, she was made aware that from cardiology standpoint it's OK to take, it can affect renal function at times a lower rx is used. Defer to Dr. Coni Thomas, pcp. She verbalized understanding.

## 2018-08-07 NOTE — TELEPHONE ENCOUNTER
MD Sophie Jones LPN        Caller: Unspecified (Yesterday,  6:59 AM)                      Allopurinol 100mg is the low dose that is used when someone has kidney impairment, so, yes it is good to stay on it to prevent gout and it will not hurt kidneys       Spoke with Debra (JUAN). Two pt identifiers confirmed. Debra notified of the above message from Dr. Jyoti Ramirez. Chance Brewer verbalized understanding of information discussed w/ no further questions at this time.

## 2018-10-01 RX ORDER — LISINOPRIL 2.5 MG/1
TABLET ORAL
Qty: 90 TAB | Refills: 0 | Status: SHIPPED | OUTPATIENT
Start: 2018-10-01 | End: 2019-01-08 | Stop reason: SDUPTHER

## 2018-10-09 ENCOUNTER — OFFICE VISIT (OUTPATIENT)
Dept: ENDOCRINOLOGY | Age: 73
End: 2018-10-09

## 2018-10-09 VITALS
SYSTOLIC BLOOD PRESSURE: 117 MMHG | WEIGHT: 226.4 LBS | BODY MASS INDEX: 40.11 KG/M2 | DIASTOLIC BLOOD PRESSURE: 73 MMHG | HEART RATE: 87 BPM | HEIGHT: 63 IN

## 2018-10-09 DIAGNOSIS — I10 ESSENTIAL HYPERTENSION WITH GOAL BLOOD PRESSURE LESS THAN 130/80: ICD-10-CM

## 2018-10-09 DIAGNOSIS — N18.30 TYPE 2 DIABETES MELLITUS WITH STAGE 3 CHRONIC KIDNEY DISEASE, WITH LONG-TERM CURRENT USE OF INSULIN (HCC): Primary | ICD-10-CM

## 2018-10-09 DIAGNOSIS — E78.5 HYPERLIPIDEMIA, UNSPECIFIED HYPERLIPIDEMIA TYPE: ICD-10-CM

## 2018-10-09 DIAGNOSIS — Z79.4 TYPE 2 DIABETES MELLITUS WITH STAGE 3 CHRONIC KIDNEY DISEASE, WITH LONG-TERM CURRENT USE OF INSULIN (HCC): Primary | ICD-10-CM

## 2018-10-09 DIAGNOSIS — E11.22 TYPE 2 DIABETES MELLITUS WITH STAGE 3 CHRONIC KIDNEY DISEASE, WITH LONG-TERM CURRENT USE OF INSULIN (HCC): Primary | ICD-10-CM

## 2018-10-09 LAB — HBA1C MFR BLD HPLC: 5.6 %

## 2018-10-09 RX ORDER — GLIMEPIRIDE 4 MG/1
4 TABLET ORAL
Qty: 90 TAB | Refills: 3 | Status: SHIPPED | OUTPATIENT
Start: 2018-10-09 | End: 2019-05-17 | Stop reason: ALTCHOICE

## 2018-10-09 NOTE — PROGRESS NOTES
CHIEF COMPLAINT: f/u evaluation for uncontrolled type 2 diabetes    HISTORY OF PRESENT ILLNESS:   Barbara Moody is a 81 y.o. male with a PMHx as noted below who presents to the endocrinology clinic for f/u evaluation of uncontrolled type 2 diabetes. A1c today is 5.6%.     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  GADLT = MARIAN-65 autoantibody   MCACR = Urine Microalbumin  INSUL = Insulin level  CPEPL = C-peptide level      PAST MEDICAL/SURGICAL HISTORY:   Past Medical History:   Diagnosis Date    Arthritis     Asthma     CAD (coronary artery disease)     Calculus of kidney     Carotid artery disease (HCC)     Congestive heart failure (HCC)     Diabetes ([facility] [address]     Hypercholesterolemia     Hypertension      Past Surgical History:   Procedure Laterality Date    HX CAROTID STENT      HX CHOLECYSTECTOMY      HX HEENT         ALLERGIES:   Allergies   Allergen Reactions    Morphine Anaphylaxis    Gabapentin Other (comments)     Made him \"loopy\"    Lyrica [Pregabalin] Other (comments)     Makes him \"loopy\"       MEDICATIONS ON ADMISSION:     Current Outpatient Prescriptions:     lisinopril (PRINIVIL, ZESTRIL) 2.5 mg tablet, TAKE 1 TABLET BY MOUTH ONCE DAILY *RESTART  6/7/2018*, Disp: 90 Tab, Rfl: 0    isosorbide mononitrate ER (IMDUR) 30 mg tablet, TAKE 1 TABLET BY MOUTH ONCE DAILY, Disp: 30 Tab, Rfl: 12    allopurinol (ZYLOPRIM) 100 mg tablet, , Disp: , Rfl:     metOLazone (ZAROXOLYN) 5 mg tablet, Take 1 Tab by mouth every other day., Disp: 45 Tab, Rfl: 0    carvedilol (COREG) 12.5 mg tablet, Take 1 Tab by mouth two (2) times a day., Disp: 60 Tab, Rfl: 3    furosemide (LASIX) 40 mg tablet, Take 1 Tab by mouth daily. , Disp: 30 Tab, Rfl: 1    metFORMIN ER (GLUCOPHAGE XR) 500 mg tablet, Take 1 Tab by mouth Before breakfast and dinner. Please restart Metformin on Sunday, 5/13/18 (Patient taking differently: Take 500 mg by mouth two (2) times a day. Please restart Metformin on Sunday, 5/13/18), Disp: 180 Tab, Rfl: 3    simvastatin (ZOCOR) 10 mg tablet, Take 1 Tab by mouth nightly., Disp: 90 Tab, Rfl: 1    insulin NPH/insulin regular (NOVOLIN 70/30 U-100 INSULIN) 100 unit/mL (70-30) injection, by SubCUTAneous route. Takes 25 units in am and 15 units in pm., Disp: , Rfl:     fish oil-dha-epa 1,200-144-216 mg cap, Take 1 Cap by mouth daily. , Disp: , Rfl:     acetaminophen (TYLENOL) 325 mg tablet, Take  by mouth every four (4) hours as needed for Pain., Disp: , Rfl:     DULoxetine (CYMBALTA) 60 mg capsule, Take 60 mg by mouth daily. , Disp: , Rfl:     aspirin delayed-release 81 mg tablet, Take 81 mg by mouth daily. , Disp: , Rfl:     cholecalciferol, VITAMIN D3, (VITAMIN D3) 5,000 unit tab tablet, Take 1 Tab by mouth daily. , Disp: 30 Tab, Rfl: 5    SOCIAL HISTORY:   Social History     Social History    Marital status:      Spouse name: N/A    Number of children: N/A    Years of education: N/A     Occupational History    Not on file.      Social History Main Topics    Smoking status: Former Smoker     Quit date: 4/20/1963    Smokeless tobacco: Never Used    Alcohol use No    Drug use: No    Sexual activity: Yes     Partners: Female     Birth control/ protection: None     Other Topics Concern    Not on file     Social History Narrative    ** Merged History Encounter **            FAMILY HISTORY:  Family History   Problem Relation Age of Onset    Heart Disease Brother     Diabetes Nephew        REVIEW OF SYSTEMS: Complete ROS assessed and noted for that which is described above, all else are negative. Eyes: normal  ENT: normal  CVS: normal  Resp: normal  GI: normal  : normal  GYN: normal  Endocrine: normal  Integument: normal  Musculoskeletal: normal  Neuro: normal  Psych: normal      PHYSICAL EXAMINATION:    VITAL SIGNS:  Visit Vitals    /73 (BP 1 Location: Right arm, BP Patient Position: Sitting)    Pulse 87    Ht 5' 3\" (1.6 m)    Wt 226 lb 6.4 oz (102.7 kg)    BMI 40.1 kg/m2       GENERAL: NCAT, Sitting comfortably, NAD  EYES: EOMI, non-icteric, no proptosis  Ear/Nose/Throat: NCAT, no inflammation, no masses  LYMPH NODES: No LAD  CARDIOVASCULAR: S1 S2, RRR, No murmur, 2+ radial pulses  RESPIRATORY: CTA b/l, no wheeze/rales  GASTROINTESTINAL: ND  MUSCULOSKELETAL: Normal ROM, no atrophy  SKIN: warm, no edema/rash/ or other skin changes  NEUROLOGIC: 5/5 power all extremities, no tremors, AAOx3  PSYCHIATRIC: Normal affect, Normal insight and judgement    REVIEW OF LABORATORY AND RADIOLOGY DATA:   Labs and documentation have been reviewed as described above. ASSESSMENT AND PLAN:   Clark Yoder is a 81 y.o. male with a PMHx as noted above who presents to the endocrinology clinic for f/u evaluation of uncontrolled type 2 diabetes. DM2  HTN  HLD    Patients sugars have been stable however many numbers <80 and suggest he could do well with a trial of glimepiride in place of the insulin. He has lost another 10 pounds since last visit and his daughter has continued to encourage him with his eating habits. In this case its worth a try. His daughter notes that he will be trying to move back up to [place] at some point however she wanted him to get all his medical conditions controlled with his doctors here first, and it would be more ideal for him to go back out of state alone on oral medicines rather than insulin.  We did note that there may be a chance he would need to restart insulin only at much smaller doses of insulin however depending on his response. He is advised to update me with any instability in his sugars. DM2:  Continue metformin 500mg twice per day  Stop Novolin 70/30 insulin  Start Glimepiride 4mg before breakfast each day  Continue to check glucose 1-2 times daily for now  Provided with new log sheets    HTN: BP stable continue lisinopril/coreg  HLD: stable, simvastatin 10mg    Labs: up to date    3 month f/u visit. Shirley Orellana.  [address] [facility]

## 2018-10-09 NOTE — PATIENT INSTRUCTIONS
Metformin 500mg twice daily    Glimepiride 4mg tablet once daily before breakfast     Stop insulin for now to see if we no longer need it, as long as most blood sugars remain less than 150 would be the goal,         Please note our new policy, you must arrive to the clinic 15 minutes before your appointment time to allow enough time for proper check-in, adequate time to spend with your doctor, and also to respect the appointment time of the next patient. Not arriving 15 minutes in advance may result in having your appointment rescheduled for the next available day/time.  ----------------------------------------------------------------------------------------------------------------------    Below you will find a glucose log sheet which you can use to record your blood sugars. Without checking and recording what your home glucose levels are, it will be difficult to make any changes to your medication dose, even when significant changes may be needed. Please feel free to use the log below to record your home glucose levels. At the very least, I would like for you to login the entire 2-3 weeks just before your visit so we can make your visit much more productive and beneficial to you. GLUCOSE LOG SHEET:    Date Breakfast Lunch Dinner Bedtime Comments ? GLUCOSE LOG SHEET:    Date Breakfast Lunch Dinner Bedtime Comments ? GLUCOSE LOG SHEET:    Date Breakfast Lunch Dinner Bedtime Comments ?

## 2018-10-09 NOTE — MR AVS SNAPSHOT
[address] [facility] [address] [address] [phone] Patient: Martell Rosales 
MRN: [id number] :1945 Visit Information Date & Time Provider Department Dept. Phone Encounter #  
 10/9/2018 12:10 PM Demetrio Ames, [facility] Endocrinology [phone] Follow-up Instructions Return in about 3 months (around 2019). Your Appointments 2018 10:45 AM  
ESTABLISHED PATIENT with Noble Elmore NP [facility]) [address]  
[phone] [address] [place] [address] Upcoming Health Maintenance Date Due Hepatitis C Screening 1945 EYE EXAM RETINAL OR DILATED Q1 12/10/1957 DTaP/Tdap/Td series (1 - Tdap) 12/10/1968 Shingrix Vaccine Age 50> (1 of 2) 12/10/1997 FOBT Q 1 YEAR AGE 50-75 12/10/1997 GLAUCOMA SCREENING Q2Y 12/10/2012 Pneumococcal 65+ Low/Medium Risk (1 of 2 - PCV13) 12/10/2012 MEDICARE YEARLY EXAM 2018 Influenza Age 5 to Adult 2018 HEMOGLOBIN A1C Q6M 2019 LIPID PANEL Q1 2019 FOOT EXAM Q1 2019 MICROALBUMIN Q1 2019 Allergies as of 10/9/2018  Review Complete On: 10/9/2018 By: Demetrio Ames MD  
  
 Severity Noted Reaction Type Reactions Morphine High 2018    Anaphylaxis Gabapentin  2018    Other (comments) Made him \"loopy\" Lyrica [Pregabalin]  2018    Other (comments) Makes him \"loopy\" Current Immunizations  Reviewed on 2018 No immunizations on file. Not reviewed this visit You Were Diagnosed With   
  
 Codes Comments Type 2 diabetes mellitus with stage 3 chronic kidney disease, with long-term current use of insulin (HCC)    -  Primary ICD-10-CM: E11.22, N18.3, Z79.4 ICD-9-CM: 250.40, 585.3, V58.67   
 Essential hypertension with goal blood pressure less than 130/80     ICD-10-CM: I10 
ICD-9-CM: 401.9 Hyperlipidemia, unspecified hyperlipidemia type     ICD-10-CM: E78.5 ICD-9-CM: 272.4 Vitals BP Pulse Height(growth percentile) Weight(growth percentile) BMI Smoking Status 117/73 (BP 1 Location: Right arm, BP Patient Position: Sitting) 87 5' 3\" (1.6 m) 226 lb 6.4 oz (102.7 kg) 40.1 kg/m2 Former Smoker Vitals History BMI and BSA Data Body Mass Index Body Surface Area  
 40.1 kg/m 2 2.14 m 2 Preferred Pharmacy Pharmacy Name Phone [facility] [address], [address] [place] [address] Avenue [phone] Your Updated Medication List  
  
   
This list is accurate as of 10/9/18 12:37 PM.  Always use your most recent med list.  
  
  
  
  
 allopurinol 100 mg tablet Commonly known as:  ZYLOPRIM  
  
 aspirin delayed-release 81 mg tablet Take 81 mg by mouth daily. carvedilol 12.5 mg tablet Commonly known as:  Libby Hoots Take 1 Tab by mouth two (2) times a day. cholecalciferol (VITAMIN D3) 5,000 unit Tab tablet Commonly known as:  VITAMIN D3 Take 1 Tab by mouth daily. DULoxetine 60 mg capsule Commonly known as:  CYMBALTA Take 60 mg by mouth daily. fish oil-dha-epa 1,200-144-216 mg Cap Take 1 Cap by mouth daily. furosemide 40 mg tablet Commonly known as:  LASIX Take 1 Tab by mouth daily. glimepiride 4 mg tablet Commonly known as:  AMARYL Take 1 Tab by mouth Daily (before breakfast). isosorbide mononitrate ER 30 mg tablet Commonly known as:  IMDUR  
TAKE 1 TABLET BY MOUTH ONCE DAILY  
  
 lisinopril 2.5 mg tablet Commonly known as:  PRINIVIL, ZESTRIL  
TAKE 1 TABLET BY MOUTH ONCE DAILY *RESTART  6/7/2018*  
  
 metFORMIN  mg tablet Commonly known as:  GLUCOPHAGE XR Take 1 Tab by mouth Before breakfast and dinner. Please restart Metformin on Sunday, 5/13/18  
  
 metOLazone 5 mg tablet Commonly known as:  [email or web address] [facility] Take 1 Tab by mouth every other day. NovoLIN 70/30 U-100 Insulin 100 unit/mL (70-30) injection Generic drug:  insulin NPH/insulin regular  
by SubCUTAneous route. Takes 25 units in am and 15 units in pm.  
  
 simvastatin 10 mg tablet Commonly known as:  ZOCOR Take 1 Tab by mouth nightly. TYLENOL 325 mg tablet Generic drug:  acetaminophen Take  by mouth every four (4) hours as needed for Pain. Prescriptions Sent to Pharmacy Refills  
 glimepiride (AMARYL) 4 mg tablet 3 Sig: Take 1 Tab by mouth Daily (before breakfast). Class: Normal  
 Pharmacy: [facility] [address] [place] [address], [address] [place] [address] #: [phone] Route: Oral  
  
Follow-up Instructions Return in about 3 months (around 1/9/2019). Patient Instructions Metformin 500mg twice daily Glimepiride 4mg tablet once daily before breakfast  
 
Stop insulin for now to see if we no longer need it, as long as most blood sugars remain less than 150 would be the goal, Please note our new policy, you must arrive to the clinic 15 minutes before your appointment time to allow enough time for proper check-in, adequate time to spend with your doctor, and also to respect the appointment time of the next patient. Not arriving 15 minutes in advance may result in having your appointment rescheduled for the next available day/time. 
---------------------------------------------------------------------------------------------------------------------- Below you will find a glucose log sheet which you can use to record your blood sugars. Without checking and recording what your home glucose levels are, it will be difficult to make any changes to your medication dose, even when significant changes may be needed. Please feel free to use the log below to record your home glucose levels.  At the very least, I would like for you to login the entire 2-3 weeks just before your visit so we can make your visit much more productive and beneficial to you. GLUCOSE LOG SHEET: 
 
Date Breakfast Lunch Dinner Bedtime Comments ? GLUCOSE LOG SHEET: 
 
Date Breakfast Lunch Dinner Bedtime Comments ? GLUCOSE LOG SHEET: 
 
Date Breakfast Lunch Dinner Bedtime Comments ? Introducing [facility]! Dear Jalil: Thank you for requesting a [employer] account. Our records indicate that you already have an active [employer] account. You can access your account anytime at [email or web address] Did you know that you can access your hospital and ER discharge instructions at any time in [employer]? You can also review all of your test results from your hospital stay or ER visit. Additional Information If you have questions, please visit the Frequently Asked Questions section of the [employer] website at [email or web address] Remember, [employer] is NOT to be used for urgent needs. For medical emergencies, dial 911. Now available from your iPhone and Android! Please provide this summary of care documentation to your next provider. Your primary care clinician is listed as Antonia Bedoya. If you have any questions after today's visit, please call [phone].

## 2018-11-02 RX ORDER — CARVEDILOL 12.5 MG/1
TABLET ORAL
Qty: 60 TAB | Refills: 3 | Status: SHIPPED | OUTPATIENT
Start: 2018-11-02 | End: 2019-03-15 | Stop reason: SDUPTHER

## 2018-11-14 ENCOUNTER — OFFICE VISIT (OUTPATIENT)
Dept: CARDIOLOGY CLINIC | Age: 73
End: 2018-11-14

## 2018-11-14 VITALS
SYSTOLIC BLOOD PRESSURE: 122 MMHG | BODY MASS INDEX: 39.67 KG/M2 | RESPIRATION RATE: 16 BRPM | HEIGHT: 63 IN | OXYGEN SATURATION: 95 % | DIASTOLIC BLOOD PRESSURE: 80 MMHG | WEIGHT: 223.9 LBS | HEART RATE: 91 BPM

## 2018-11-14 DIAGNOSIS — I50.22 CHRONIC SYSTOLIC CONGESTIVE HEART FAILURE (HCC): ICD-10-CM

## 2018-11-14 DIAGNOSIS — E11.21 TYPE 2 DIABETES WITH NEPHROPATHY (HCC): ICD-10-CM

## 2018-11-14 DIAGNOSIS — I51.89 DIASTOLIC DYSFUNCTION: Primary | ICD-10-CM

## 2018-11-14 DIAGNOSIS — E78.00 PURE HYPERCHOLESTEROLEMIA: ICD-10-CM

## 2018-11-14 DIAGNOSIS — I25.10 ASHD (ARTERIOSCLEROTIC HEART DISEASE): ICD-10-CM

## 2018-11-14 NOTE — PROGRESS NOTES
Chief Complaint   Patient presents with    Cholesterol Problem     6 month follow up, C/O off/on chest discomfort normally occurs with agitation     1. Have you been to the ER, urgent care clinic since your last visit? Hospitalized since your last visit? No    2. Have you seen or consulted any other health care providers outside of the [address] [place] [address] since your last visit? Include any pap smears or colon screening.  No

## 2018-11-14 NOTE — PROGRESS NOTES
Wade Menendez DNP, ANP-BC  Subjective/HPI:     Saúl Bledsoe is a 81 y.o. male is here for routine f/u. The patient denies chest pain/ shortness of breath, orthopnea, PND, LE edema, palpitations, syncope, presyncope or fatigue. 5/2018 cath    SUMMARY:    --  CORONARY CIRCULATION:  --  Proximal LAD: There was a 30 % stenosis. --  Distal LAD: There was a 40 % stenosis. --  1st diagonal: There was a 30 % stenosis. --  Mid circumflex: There was a discrete 25 % stenosis. PCP Provider  Darnell Argueta MD  Past Medical History:   Diagnosis Date    Arthritis     Asthma     CAD (coronary artery disease)     Calculus of kidney     Carotid artery disease (HCC)     Congestive heart failure (HCC)     Diabetes ([address]     Hypercholesterolemia     Hypertension       Past Surgical History:   Procedure Laterality Date    HX CAROTID STENT      HX CHOLECYSTECTOMY      HX HEENT       Allergies   Allergen Reactions    Morphine Anaphylaxis    Gabapentin Other (comments)     Made him \"loopy\"    Lyrica [Pregabalin] Other (comments)     Makes him \"loopy\"      Family History   Problem Relation Age of Onset    Heart Disease Brother     Diabetes Nephew       Current Outpatient Medications   Medication Sig    carvedilol (COREG) 12.5 mg tablet TAKE 1 TABLET BY MOUTH TWICE DAILY    glimepiride (AMARYL) 4 mg tablet Take 1 Tab by mouth Daily (before breakfast).  lisinopril (PRINIVIL, ZESTRIL) 2.5 mg tablet TAKE 1 TABLET BY MOUTH ONCE DAILY *RESTART  6/7/2018*    isosorbide mononitrate ER (IMDUR) 30 mg tablet TAKE 1 TABLET BY MOUTH ONCE DAILY    cholecalciferol, VITAMIN D3, (VITAMIN D3) 5,000 unit tab tablet Take 1 Tab by mouth daily.  furosemide (LASIX) 40 mg tablet Take 1 Tab by mouth daily.  metFORMIN ER (GLUCOPHAGE XR) 500 mg tablet Take 1 Tab by mouth Before breakfast and dinner.  Please restart Metformin on Sunday, 5/13/18 (Patient taking differently: Take 500 mg by mouth two (2) times a day. Please restart Metformin on , 18)    simvastatin (ZOCOR) 10 mg tablet Take 1 Tab by mouth nightly.  fish oil-dha-epa 1,200-144-216 mg cap Take 1 Cap by mouth daily.  acetaminophen (TYLENOL) 325 mg tablet Take  by mouth every four (4) hours as needed for Pain.  DULoxetine (CYMBALTA) 60 mg capsule Take 60 mg by mouth daily.  aspirin delayed-release 81 mg tablet Take 81 mg by mouth daily.  allopurinol (ZYLOPRIM) 100 mg tablet     metOLazone (ZAROXOLYN) 5 mg tablet Take 1 Tab by mouth every other day.  insulin NPH/insulin regular (NOVOLIN 70/30 U-100 INSULIN) 100 unit/mL (70-30) injection by SubCUTAneous route. Takes 25 units in am and 15 units in pm.     No current facility-administered medications for this visit.        Vitals:    18 1332 18 1346   BP: 118/78 122/80   Pulse: 91    Resp: 16    SpO2: 95%    Weight: 223 lb 14.4 oz (101.6 kg)    Height: 5' 3\" (1.6 m)      Social History     Socioeconomic History    Marital status:      Spouse name: Not on file    Number of children: Not on file    Years of education: Not on file    Highest education level: Not on file   Social Needs    Financial resource strain: Not on file    Food insecurity - worry: Not on file    Food insecurity - inability: Not on file   [employer] needs - medical: Not on file   [employer] needs - non-medical: Not on file   Occupational History    Not on file   Tobacco Use    Smoking status: Former Smoker     Last attempt to quit: 1963     Years since quittin.6    Smokeless tobacco: Never Used   Substance and Sexual Activity    Alcohol use: No     Alcohol/week: 0.0 oz    Drug use: No    Sexual activity: Yes     Partners: Female     Birth control/protection: None   Other Topics Concern    Not on file   Social History Narrative    ** Merged History Encounter **            I have reviewed the nurses notes, vitals, problem list, allergy list, medical history, family, social history and medications. Review of Symptoms:    General: Pt denies excessive weight gain or loss. Pt is able to conduct ADL's  HEENT: Denies blurred vision, headaches, epistaxis and difficulty swallowing. Respiratory: Denies shortness of breath, ROLLE, wheezing or stridor. Cardiovascular: Denies precordial pain, palpitations, edema or PND  Gastrointestinal: Denies poor appetite, indigestion, abdominal pain or blood in stool  Musculoskeletal: Denies pain or swelling from muscles or joints  Neurologic: Denies tremor, paresthesias, or sensory motor disturbance  Skin: Denies rash, itching or texture change. Physical Exam:      General: Well developed, in no acute distress, cooperative and alert  HEENT: No carotid bruits, no JVD, trach is midline. Neck Supple, PEERL, EOM intact. Heart:  Normal S1/S2 negative S3 or S4. Regular, no murmur, gallop or rub.   Respiratory: Clear bilaterally x 4, no wheezing or rales  Abdomen:   Soft, non-tender, no masses, bowel sounds are active.   Extremities:  No edema, normal cap refill, no cyanosis, atraumatic. Neuro: A&Ox3, speech clear, gait stable. Skin: Skin color is normal. No rashes or lesions. Non diaphoretic  Vascular: 2+ pulses symmetric in all extremities    Cardiographics    ECG: NSR   No results found for this or any previous visit.       Cardiology Labs:  Lab Results   Component Value Date/Time    Cholesterol, total 147 04/23/2018 10:02 AM    HDL Cholesterol 35 (L) 04/23/2018 10:02 AM    LDL, calculated 57 04/23/2018 10:02 AM    Triglyceride 277 (H) 04/23/2018 10:02 AM       Lab Results   Component Value Date/Time    Sodium 144 07/13/2018 02:41 PM    Potassium 5.1 07/13/2018 02:41 PM    Chloride 103 07/13/2018 02:41 PM    CO2 27 07/13/2018 02:41 PM    Anion gap 9 11/30/2015 02:44 AM    Glucose 78 07/13/2018 02:41 PM    BUN 33 (H) 07/13/2018 02:41 PM    Creatinine 1.47 (H) 07/13/2018 02:41 PM    BUN/Creatinine ratio 22 07/13/2018 02:41 PM    GFR est AA 55 (L) 07/13/2018 02:41 PM    GFR est non-AA 48 (L) 07/13/2018 02:41 PM    Calcium 9.9 07/13/2018 02:41 PM    Bilirubin, total 0.3 05/08/2018 03:51 PM    AST (SGOT) 15 05/08/2018 03:51 PM    Alk. phosphatase 78 05/08/2018 03:51 PM    Protein, total 6.8 05/08/2018 03:51 PM    Albumin 4.4 05/08/2018 03:51 PM    Globulin 3.9 11/30/2015 02:44 AM    A-G Ratio 1.8 05/08/2018 03:51 PM    ALT (SGPT) 19 05/08/2018 03:51 PM           Assessment:     Assessment:     Diagnoses and all orders for this visit:    1. ASHD (arteriosclerotic heart disease)    2. Pure hypercholesterolemia  -     AMB POC EKG ROUTINE W/ 12 LEADS, INTER & REP    3. Chronic systolic congestive heart failure ([facility]    4. Type 2 diabetes with nephropathy ([facility]    5. Diastolic dysfunction        ICD-10-CM ICD-9-CM    1. ASHD (arteriosclerotic heart disease) I25.10 414.00    2. Pure hypercholesterolemia E78.00 272.0 AMB POC EKG ROUTINE W/ 12 LEADS, INTER & REP   3. Chronic systolic congestive heart failure (HCC) I50.22 428.22      428.0    4. Type 2 diabetes with nephropathy (HCC) E11.21 250.40      583.81    5. Diastolic dysfunction Q01.1 429.9      Orders Placed This Encounter    AMB POC EKG ROUTINE W/ 12 LEADS, INTER & REP     Order Specific Question:   Reason for Exam:     Answer:   routine        Plan:     1. Atherosclerotic heart disease: Nonobstructive catheterization April 2018. 2.  Systolic heart failure: [place] heart class IIb, ejection fraction 45-50% continue lisinopril and carvedilol. 3.  Hypertension: Controlled continue current therapy  4. Grade 2 diastolic dysfunction: Weight has been trending down, he has been on furosemide 40 mg daily, at last visit Zaroxolyn was decreased to 5 mg every other day however he questions whether he is taking it at all at this point. He will call back to confirm if he is on or off Zaroxolyn. 5.  Type 2 diabetes:  Followed by endocrinology has significantly improved his diet, he is off insulin, he is losing weight. 6.  Hyperlipidemia: LDL 57 April 2018  We will check labs tomorrow CMP, lipids, CK. Follow-up with cardiology in 6 months. Erica Buckley NP        11/16/18: Pt called back he is NOT of zaroxylen, BMP elevated will restart at 2.5mg M/W/F, repeat labs in 1 month. This note was created using voice recognition software. Despite editing, there may be syntax errors.

## 2018-11-15 RX ORDER — SIMVASTATIN 10 MG/1
TABLET, FILM COATED ORAL
Qty: 90 TAB | Refills: 1 | Status: SHIPPED | OUTPATIENT
Start: 2018-11-15 | End: 2019-05-15 | Stop reason: SDUPTHER

## 2018-11-16 ENCOUNTER — TELEPHONE (OUTPATIENT)
Dept: CARDIOLOGY CLINIC | Age: 73
End: 2018-11-16

## 2018-11-16 DIAGNOSIS — I50.9 CONGESTIVE HEART FAILURE, UNSPECIFIED HF CHRONICITY, UNSPECIFIED HEART FAILURE TYPE (HCC): Primary | ICD-10-CM

## 2018-11-16 LAB
ALBUMIN SERPL-MCNC: 4.2 G/DL (ref 3.5–4.8)
ALBUMIN/GLOB SERPL: 1.8 {RATIO} (ref 1.2–2.2)
ALP SERPL-CCNC: 74 IU/L (ref 39–117)
ALT SERPL-CCNC: 20 IU/L (ref 0–44)
AST SERPL-CCNC: 12 IU/L (ref 0–40)
BILIRUB SERPL-MCNC: 0.7 MG/DL (ref 0–1.2)
BUN SERPL-MCNC: 39 MG/DL (ref 8–27)
BUN/CREAT SERPL: 32 (ref 10–24)
CALCIUM SERPL-MCNC: 9.6 MG/DL (ref 8.6–10.2)
CHLORIDE SERPL-SCNC: 103 MMOL/L (ref 96–106)
CHOLEST SERPL-MCNC: 103 MG/DL (ref 100–199)
CK SERPL-CCNC: 179 U/L (ref 24–204)
CO2 SERPL-SCNC: 27 MMOL/L (ref 20–29)
CREAT SERPL-MCNC: 1.22 MG/DL (ref 0.76–1.27)
GLOBULIN SER CALC-MCNC: 2.3 G/DL (ref 1.5–4.5)
GLUCOSE SERPL-MCNC: 126 MG/DL (ref 65–99)
HDLC SERPL-MCNC: 41 MG/DL
INTERPRETATION, 910389: NORMAL
LDLC SERPL CALC-MCNC: 45 MG/DL (ref 0–99)
NT-PROBNP SERPL-MCNC: 2327 PG/ML (ref 0–376)
POTASSIUM SERPL-SCNC: 4.7 MMOL/L (ref 3.5–5.2)
PROT SERPL-MCNC: 6.5 G/DL (ref 6–8.5)
SODIUM SERPL-SCNC: 146 MMOL/L (ref 134–144)
TRIGL SERPL-MCNC: 84 MG/DL (ref 0–149)
VLDLC SERPL CALC-MCNC: 17 MG/DL (ref 5–40)

## 2018-11-16 RX ORDER — METOLAZONE 5 MG/1
2.5 TABLET ORAL
Qty: 40 TAB | Refills: 1 | Status: ON HOLD | OUTPATIENT
Start: 2018-11-16 | End: 2019-05-23 | Stop reason: DRUGHIGH

## 2018-11-16 NOTE — TELEPHONE ENCOUNTER
----- Message from Clarence Gray NP sent at 11/16/2018  2:32 PM EST -----  [facility]: Please call patient lab work shows his kidneys are good, his heart failure blood test is elevated his shortness of breath is from being slightly fluid overloaded. Will restart Zaroxolyn however at 2.5 mg every Monday Wednesday Friday repeat labs in 30 days complete metabolic panel, proBNP. I have sent the prescription already to his pharmacy [facility].   I also took allopurinol off his medication list.

## 2018-11-16 NOTE — PROGRESS NOTES
Paulie: Please call patient lab work shows his kidneys are good, his heart failure blood test is elevated his shortness of breath is from being slightly fluid overloaded. Will restart Zaroxolyn however at 2.5 mg every Monday Wednesday Friday repeat labs in 30 days complete metabolic panel, proBNP. I have sent the prescription already to his pharmacy [facility].   I also took allopurinol off his medication list.

## 2018-11-16 NOTE — PROGRESS NOTES
Spoke with patient Jyoti Hay daughter in law (verified [employer])  Verified patient with 2 patient identifiers  Informed per Jame Melo NP patient lab work shows his kidneys are good, his heart failure blood test is elevated his shortness of breath is from being slightly fluid overloaded. Will restart Zaroxolyn however at 2.5 mg every Monday Wednesday Friday . Will repeat labs in 30 days. Informed  also took allopurinol off his medication list.  Jyoti Hay  verbalized understanding.

## 2018-11-19 ENCOUNTER — TELEPHONE (OUTPATIENT)
Dept: ENDOCRINOLOGY | Age: 73
End: 2018-11-19

## 2018-11-19 NOTE — TELEPHONE ENCOUNTER
Pt called regarding blood sugar. He states when he takes his Rx Metformin at night his blood sugar is dropping down to 50-60 in the morning. But if he don't take his Metformin at night his getting a reading of  in the morning. Please give pt a call back when you get a chance @ [phone].

## 2018-11-20 NOTE — TELEPHONE ENCOUNTER
I returned this call and Mr. Adi Brown said that when he takes his metformin in the PM his AM blood sugar is between 50-60. If he dose not take the PM dose then his blood sugar is between 99 and 110. I asked him what it ran the rest of the day but he said he only takes it in the morning. I also spoke with  His daughter in law who wonders if maybe he shoule cut back on the metformin because she doesn't want him to go too low. I told both of them that I would pass this on to Dr. Kim Garcia and get back to them.   Ryan Gillespie

## 2018-11-20 NOTE — TELEPHONE ENCOUNTER
The patients lower sugars are from the glimepiride not the metformin. He should continue the metformin twice daily,   But he should follow these steps:     1. Reduce the glimepiride to only 2mg (half tablet) each day before breakfast. He can see how he does with this and the metformin twice daily. 2. If sugars after few days remain low in the AM, he can try without the glimepiride, but continuing only the metformin twice daily. Thanks,     Venkata Para.  [facility]  [address] [place] [address]

## 2018-11-20 NOTE — TELEPHONE ENCOUNTER
I called and spoke with Marcia Horta (mr. Lutz's daughter in law) and relayed the message from Dr. Luz Elena Mcgarry.  She wrote this down and will explain it to Mr. Tristen East

## 2018-11-28 DIAGNOSIS — I25.10 ASHD (ARTERIOSCLEROTIC HEART DISEASE): ICD-10-CM

## 2018-11-28 DIAGNOSIS — I51.89 DIASTOLIC DYSFUNCTION: ICD-10-CM

## 2018-12-16 DIAGNOSIS — I50.9 CONGESTIVE HEART FAILURE, UNSPECIFIED HF CHRONICITY, UNSPECIFIED HEART FAILURE TYPE (HCC): ICD-10-CM

## 2018-12-17 ENCOUNTER — TELEPHONE (OUTPATIENT)
Dept: ENDOCRINOLOGY | Age: 73
End: 2018-12-17

## 2018-12-17 RX ORDER — DULOXETIN HYDROCHLORIDE 60 MG/1
60 CAPSULE, DELAYED RELEASE ORAL DAILY
Qty: 30 CAP | Refills: 1 | Status: SHIPPED | OUTPATIENT
Start: 2018-12-17 | End: 2019-02-21 | Stop reason: SDUPTHER

## 2018-12-17 NOTE — TELEPHONE ENCOUNTER
Patient called, regarding his Duloxetine 60 mg prescription through [employer]. He can be reached at:  [phone].

## 2018-12-17 NOTE — TELEPHONE ENCOUNTER
Needs a 30 day supply  As she will will have to pay out of pocket until gthe first of the year. The insurance company is also supposed to be sending something regarding getting a tier reduction. I told her we had not received anything yet. She will call them again.   Hailee Law

## 2018-12-17 NOTE — TELEPHONE ENCOUNTER
Needs a 30 day supply  As she will will have to pay out of pocket until gthe first of the year. The insurance company is also supposed to be sending something regarding getting a tier reduction. I told her we had not received anything yet. She will call them again.   Jennifer Smith

## 2018-12-18 ENCOUNTER — TELEPHONE (OUTPATIENT)
Dept: ENDOCRINOLOGY | Age: 73
End: 2018-12-18

## 2018-12-18 NOTE — TELEPHONE ENCOUNTER
Pt's wife Mrs. Phyllis Rivera called to let you that she contacted [facility], she was told they faxed over something about lowering the Tier on pt  Rx Duloxetine.

## 2018-12-22 LAB
ALBUMIN SERPL-MCNC: 4.2 G/DL (ref 3.5–4.8)
ALBUMIN/GLOB SERPL: 1.8 {RATIO} (ref 1.2–2.2)
ALP SERPL-CCNC: 73 IU/L (ref 39–117)
ALT SERPL-CCNC: 17 IU/L (ref 0–44)
AST SERPL-CCNC: 15 IU/L (ref 0–40)
BILIRUB SERPL-MCNC: 0.2 MG/DL (ref 0–1.2)
BUN SERPL-MCNC: 37 MG/DL (ref 8–27)
BUN/CREAT SERPL: 31 (ref 10–24)
CALCIUM SERPL-MCNC: 9.5 MG/DL (ref 8.6–10.2)
CHLORIDE SERPL-SCNC: 105 MMOL/L (ref 96–106)
CO2 SERPL-SCNC: 23 MMOL/L (ref 20–29)
CREAT SERPL-MCNC: 1.21 MG/DL (ref 0.76–1.27)
GLOBULIN SER CALC-MCNC: 2.4 G/DL (ref 1.5–4.5)
GLUCOSE SERPL-MCNC: 73 MG/DL (ref 65–99)
NT-PROBNP SERPL-MCNC: 1564 PG/ML (ref 0–376)
POTASSIUM SERPL-SCNC: 4.4 MMOL/L (ref 3.5–5.2)
PROT SERPL-MCNC: 6.6 G/DL (ref 6–8.5)
SODIUM SERPL-SCNC: 144 MMOL/L (ref 134–144)

## 2018-12-27 ENCOUNTER — TELEPHONE (OUTPATIENT)
Dept: CARDIOLOGY CLINIC | Age: 73
End: 2018-12-27

## 2018-12-27 ENCOUNTER — DOCUMENTATION ONLY (OUTPATIENT)
Dept: CARDIOLOGY CLINIC | Age: 73
End: 2018-12-27

## 2018-12-27 NOTE — TELEPHONE ENCOUNTER
Called and spoke to Melvin Armendariz NP regarding rx for metolazone. Also spoke to Eugene Rouse and gave her his phone number should there be any questions. Follow up call placed to Eugene Rouse and she stated that all questions were answered by NP and office will be notified.

## 2018-12-27 NOTE — TELEPHONE ENCOUNTER
Pt please can you return Ms. Daryl Hartley call  From [employer] need a appeal for pt medication metolazone she can be reached at [phone] thx.

## 2018-12-27 NOTE — PROGRESS NOTES
Daryl Hartley from [employer] called to let us know that pt's metolazone has been approved from 1-1-19 thru 12-31-19.

## 2019-01-04 ENCOUNTER — TELEPHONE (OUTPATIENT)
Dept: CARDIOLOGY CLINIC | Age: 74
End: 2019-01-04

## 2019-01-08 RX ORDER — LISINOPRIL 2.5 MG/1
TABLET ORAL
Qty: 90 TAB | Refills: 0 | Status: SHIPPED | OUTPATIENT
Start: 2019-01-08 | End: 2019-04-08 | Stop reason: SDUPTHER

## 2019-01-09 ENCOUNTER — HOSPITAL ENCOUNTER (OUTPATIENT)
Dept: MRI IMAGING | Age: 74
Discharge: HOME | End: 2019-01-09
Attending: PHYSICAL MEDICINE & REHABILITATION
Payer: MEDICARE

## 2019-01-09 DIAGNOSIS — M47.12 CERVICAL SPONDYLOSIS WITH MYELOPATHY: ICD-10-CM

## 2019-01-09 PROCEDURE — 72141 MRI NECK SPINE W/O DYE: CPT

## 2019-01-16 RX ORDER — FUROSEMIDE 40 MG/1
TABLET ORAL
Qty: 30 TAB | Refills: 1 | Status: SHIPPED | OUTPATIENT
Start: 2019-01-16 | End: 2019-03-15 | Stop reason: SDUPTHER

## 2019-01-17 ENCOUNTER — OFFICE VISIT (OUTPATIENT)
Dept: ENDOCRINOLOGY | Age: 74
End: 2019-01-17

## 2019-01-17 VITALS
WEIGHT: 213.8 LBS | HEIGHT: 63 IN | BODY MASS INDEX: 37.88 KG/M2 | SYSTOLIC BLOOD PRESSURE: 104 MMHG | HEART RATE: 99 BPM | DIASTOLIC BLOOD PRESSURE: 65 MMHG

## 2019-01-17 DIAGNOSIS — G62.9 NEUROPATHY: ICD-10-CM

## 2019-01-17 DIAGNOSIS — I10 ESSENTIAL HYPERTENSION WITH GOAL BLOOD PRESSURE LESS THAN 130/80: ICD-10-CM

## 2019-01-17 DIAGNOSIS — E78.5 HYPERLIPIDEMIA, UNSPECIFIED HYPERLIPIDEMIA TYPE: ICD-10-CM

## 2019-01-17 DIAGNOSIS — Z79.4 TYPE 2 DIABETES MELLITUS WITH STAGE 3 CHRONIC KIDNEY DISEASE, WITH LONG-TERM CURRENT USE OF INSULIN (HCC): Primary | ICD-10-CM

## 2019-01-17 DIAGNOSIS — N18.30 TYPE 2 DIABETES MELLITUS WITH STAGE 3 CHRONIC KIDNEY DISEASE, WITH LONG-TERM CURRENT USE OF INSULIN (HCC): Primary | ICD-10-CM

## 2019-01-17 DIAGNOSIS — E11.22 TYPE 2 DIABETES MELLITUS WITH STAGE 3 CHRONIC KIDNEY DISEASE, WITH LONG-TERM CURRENT USE OF INSULIN (HCC): Primary | ICD-10-CM

## 2019-01-17 LAB — HBA1C MFR BLD HPLC: 5.3 %

## 2019-01-17 NOTE — PATIENT INSTRUCTIONS
Your HbA1c is 5.3% today,    Continue metformin 500mg twice per day,    Trial off glimepiride, let us know if sugars rise > 150 persistently      Please note our new policy, you must arrive to the clinic 15 minutes before your appointment time to allow enough time for proper check-in, adequate time to spend with your doctor, and also to respect the appointment time of the next patient. Not arriving 15 minutes in advance may result in having your appointment rescheduled for the next available day/time.  ----------------------------------------------------------------------------------------------------------------------    Below you will find a glucose log sheet which you can use to record your blood sugars. Without checking and recording what your home glucose levels are, it will be difficult to make any changes to your medication dose, even when significant changes may be needed. Please feel free to use the log below to record your home glucose levels. At the very least, I would like for you to login the entire 2-3 weeks just before your visit so we can make your visit much more productive and beneficial to you. GLUCOSE LOG SHEET:    Date Breakfast Lunch Dinner Bedtime Comments ? GLUCOSE LOG SHEET:    Date Breakfast Lunch Dinner Bedtime Comments ? GLUCOSE LOG SHEET:    Date Breakfast Lunch Dinner Bedtime Comments ?

## 2019-02-08 ENCOUNTER — OFFICE VISIT (OUTPATIENT)
Dept: NEUROLOGY | Age: 74
End: 2019-02-08

## 2019-02-08 VITALS
OXYGEN SATURATION: 97 % | SYSTOLIC BLOOD PRESSURE: 138 MMHG | DIASTOLIC BLOOD PRESSURE: 70 MMHG | HEIGHT: 63 IN | WEIGHT: 213 LBS | BODY MASS INDEX: 37.74 KG/M2 | HEART RATE: 62 BPM

## 2019-02-08 DIAGNOSIS — G95.9 MYELOPATHY (HCC): Primary | ICD-10-CM

## 2019-02-08 NOTE — PROGRESS NOTES
HISTORY OF PRESENT ILLNESS  Lisa Collins is a 72 y.o. male. This patient is a 69-year old right-handed  male who comes in today with pain and weakness in his upper extremities as a complaint. He states this is been going on slowly over the last 18 months. He is having trouble walking he has some trouble holding his water at times and his arms are weak and have pain running from his neck down into them. He denies any bowel complaints. He also has tingling in his hands. He does have a history of congestive heart failure diabetes and hypertension. Review of Systems   Constitutional:        Review of systems is positive for fatigue, hearing loss, joint pain, leg swelling, muscle pain, muscle weakness, neuropathy, shortness of breath and snoring. Complete review of systems done all others negative     Current Outpatient Medications on File Prior to Visit   Medication Sig Dispense Refill    furosemide (LASIX) 40 mg tablet TAKE 1 TABLET BY MOUTH ONCE DAILY 30 Tab 1    lisinopril (PRINIVIL, ZESTRIL) 2.5 mg tablet TAKE 1 TABLET BY MOUTH ONCE DAILY. *RESTART 6/7/2018* 90 Tab 0    DULoxetine (CYMBALTA) 60 mg capsule Take 1 Cap by mouth daily. For feet pain 30 Cap 1    metOLazone (ZAROXOLYN) 5 mg tablet Take 0.5 Tabs by mouth every Monday, Wednesday, Friday. 40 Tab 1    simvastatin (ZOCOR) 10 mg tablet TAKE 1 TABLET BY MOUTH NIGHTLY 90 Tab 1    carvedilol (COREG) 12.5 mg tablet TAKE 1 TABLET BY MOUTH TWICE DAILY 60 Tab 3    isosorbide mononitrate ER (IMDUR) 30 mg tablet TAKE 1 TABLET BY MOUTH ONCE DAILY 30 Tab 12    cholecalciferol, VITAMIN D3, (VITAMIN D3) 5,000 unit tab tablet Take 1 Tab by mouth daily. 30 Tab 5    metFORMIN ER (GLUCOPHAGE XR) 500 mg tablet Take 1 Tab by mouth Before breakfast and dinner. Please restart Metformin on Sunday, 5/13/18 (Patient taking differently: Take 500 mg by mouth two (2) times a day.  Please restart Metformin on Sunday, 5/13/18) 180 Tab 3    fish oil-dha-epa 1,200-144-216 mg cap Take 1 Cap by mouth daily.  acetaminophen (TYLENOL) 325 mg tablet Take  by mouth every four (4) hours as needed for Pain.  aspirin delayed-release 81 mg tablet Take 81 mg by mouth daily.  glimepiride (AMARYL) 4 mg tablet Take 1 Tab by mouth Daily (before breakfast). 90 Tab 3     No current facility-administered medications on file prior to visit. Past Medical History:   Diagnosis Date    Arthritis     Asthma     CAD (coronary artery disease)     Calculus of kidney     Carotid artery disease (HCC)     Congestive heart failure (HCC)     Diabetes ([facility] [address]     Hypercholesterolemia     Hypertension      Family History   Problem Relation Age of Onset    Heart Disease Brother     Diabetes Nephew      Social History     Tobacco Use    Smoking status: Former Smoker     Last attempt to quit: 1963     Years since quittin.8    Smokeless tobacco: Never Used   Substance Use Topics    Alcohol use: No     Alcohol/week: 0.0 oz    Drug use: No     /70   Pulse 62   Ht 5' 3\" (1.6 m)   Wt 96.6 kg (213 lb)   SpO2 97%   BMI 37.73 kg/m²     MRI Results (most recent):  Results from Hospital Encounter encounter on 19   MRI CERV SPINE WO CONT    Narrative EXAM: MRI CERV SPINE WO CONT    INDICATION: Bilateral upper extremity numbness and tingling. Limited abduction  of upper extremities. COMPARISON: None    TECHNIQUE: MR imaging of the cervical spine was performed using the following  sequences: sagittal T1, T2. Patient terminated examination prior to completion  secondary to shortness of breath. Examination presented for my interpretation on  2019. CONTRAST:  None. FINDINGS:    There is normal alignment of the cervical spine. Vertebral body heights are  maintained. Degenerative bone marrow signal is chronic at C6 and C7. Disc  desiccation is moderate at C6-C7. No discitis. The craniocervical junction is intact.  There is cord compression at C3 and C4. Hyperintense T2 signal is in the spinal cord at C3-C4. No cord expansion or  syrinx. Paraspinal soft tissues are not well evaluated. C3-C4: Severe central spinal canal stenosis. C4-C5: Moderate central spinal canal stenosis. C5-C6: Mild central spinal canal stenosis. C6-C7: Mild central spinal canal stenosis. C7-T1: No stenosis. Impression IMPRESSION:    1. Limited study, sagittal imaging only. Patient terminated examination prior to  completion. 2. Severe central spinal canal stenosis at C3-4 with cord compression. Cord  myelomalacia is more likely than cord contusion. Completing the examination with  sedation or anesthesia may provide more information. 23X         Physical Exam  Constitutional: Oriented to person, place, and time, appears well-developed and well-nourished. No distress. HENT:   Head: Normocephalic and atraumatic. Mouth/Throat: Oropharynx is clear and moist. No oropharyngeal exudate. Eyes: Conjunctivae and EOM are normal. Pupils are equal, round, and reactive to light. No scleral icterus. Neck: Normal range of motion. Neck supple. No thyromegaly present. Cardiovascular: Normal rate, regular rhythm and normal heart sounds. Musculoskeletal: Normal range of motion, exhibits no edema, tenderness or deformity. Lymphadenopathy: no cervical adenopathy. Neurological: Alert and oriented to person, place, and time. He is diffusely weak but weaker in his knee extensors and flexors anywhere else. Deep tendon reflexes are brisker in his arms than they should be, he is normal at the knees is ankle reflexes absent both of his toes appear to be upgoing. Displays no atrophy and no tremor. No cranial nerve deficit he has moderate decreased vibratory sensation in both his upper and lower extremities, lower extremities. Exhibits normal muscle tone. when he closes his eyes he will fall if not kept upright , no seizure activity. Coordination normal,    Speech, language and mentation are normal  Visual fields are full to confrontation, funduscopic exam reveals flat discs, the retina and vasculature are normal   Skin: Skin is warm and dry. No rash noted, not diaphoretic. No erythema. Psychiatric: Normal mood and affect,  behavior is normal. Judgment and thought content normal.   Vitals reviewed. ASSESSMENT and PLAN  CERVICAL MYELOPATHY  I suspect most of this patient's complaints are related to cervical myelopathy, there may be some minimal radiculopathy here as well but by far and away most worrisome problem is myelopathy. I am going to refer him to Dr. Treva Menon, neurosurgery for consideration of decompression. HYPERTENSION  Stroke risk, stable, managed by primary care  DIABETES MELLITUS  Stroke Risk, stable, managed by primary care  HYPERCHOLESTEROLEMIA  Stroke risk, managed by primary care  This note will not be viewable in MyChart.

## 2019-02-08 NOTE — LETTER
2/8/2019 2:48 PM 
 
Patient:  Iscac Reeder Sr  
YOB: 1945 Date of Visit: 2/8/2019 Dear Sabrina Vizcarra MD 
[address] [address] VIA In Basket Gina Estrella MD 
[address] [facility] [place] [facility] [address] VIA Facsimile: [phone] 
 : Thank you for referring Mr. Álvaro Cerna to me for evaluation/treatment. Below are the relevant portions of my assessment and plan of care. HISTORY OF PRESENT ILLNESS Lucy Morgan is a 72 y.o. male. This patient is a 69-year old right-handed  male who comes in today with pain and weakness in his upper extremities as a complaint. He states this is been going on slowly over the last 18 months. He is having trouble walking he has some trouble holding his water at times and his arms are weak and have pain running from his neck down into them. He denies any bowel complaints. He also has tingling in his hands. He does have a history of congestive heart failure diabetes and hypertension. Review of Systems Constitutional:  
     Review of systems is positive for fatigue, hearing loss, joint pain, leg swelling, muscle pain, muscle weakness, neuropathy, shortness of breath and snoring. Complete review of systems done all others negative Current Outpatient Medications on File Prior to Visit Medication Sig Dispense Refill  furosemide (LASIX) 40 mg tablet TAKE 1 TABLET BY MOUTH ONCE DAILY 30 Tab 1  
 lisinopril (PRINIVIL, ZESTRIL) 2.5 mg tablet TAKE 1 TABLET BY MOUTH ONCE DAILY. *RESTART 6/7/2018* 90 Tab 0  
 DULoxetine (CYMBALTA) 60 mg capsule Take 1 Cap by mouth daily. For feet pain 30 Cap 1  
 metOLazone (ZAROXOLYN) 5 mg tablet Take 0.5 Tabs by mouth every Monday, Wednesday, Friday. 40 Tab 1  
 simvastatin (ZOCOR) 10 mg tablet TAKE 1 TABLET BY MOUTH NIGHTLY 90 Tab 1  carvedilol (COREG) 12.5 mg tablet TAKE 1 TABLET BY MOUTH TWICE DAILY 60 Tab 3  
  isosorbide mononitrate ER (IMDUR) 30 mg tablet TAKE 1 TABLET BY MOUTH ONCE DAILY 30 Tab 12  
 cholecalciferol, VITAMIN D3, (VITAMIN D3) 5,000 unit tab tablet Take 1 Tab by mouth daily. 30 Tab 5  
 metFORMIN ER (GLUCOPHAGE XR) 500 mg tablet Take 1 Tab by mouth Before breakfast and dinner. Please restart Metformin on , 18 (Patient taking differently: Take 500 mg by mouth two (2) times a day. Please restart Metformin on , 18) 180 Tab 3  
 fish oil-dha-epa 1,200-144-216 mg cap Take 1 Cap by mouth daily.  acetaminophen (TYLENOL) 325 mg tablet Take  by mouth every four (4) hours as needed for Pain.  aspirin delayed-release 81 mg tablet Take 81 mg by mouth daily.  glimepiride (AMARYL) 4 mg tablet Take 1 Tab by mouth Daily (before breakfast). 90 Tab 3 No current facility-administered medications on file prior to visit. Past Medical History:  
Diagnosis Date  Arthritis  Asthma  CAD (coronary artery disease)  Calculus of kidney  Carotid artery disease ([address]  Congestive heart failure ([facility] [address]  Diabetes ([facility] [address]  Hypercholesterolemia  Hypertension Family History Problem Relation Age of Onset  Heart Disease Brother  Diabetes Nephew Social History Tobacco Use  Smoking status: Former Smoker Last attempt to quit: 1963 Years since quittin.8  Smokeless tobacco: Never Used Substance Use Topics  Alcohol use: No  
  Alcohol/week: 0.0 oz  Drug use: No  
 
/70   Pulse 62   Ht 5' 3\" (1.6 m)   Wt 96.6 kg (213 lb)   SpO2 97%   BMI 37.73 kg/m² MRI Results (most recent): 
Results from Hospital Encounter encounter on 19 MRI CERV SPINE WO CONT Narrative EXAM: MRI CERV SPINE WO CONT INDICATION: Bilateral upper extremity numbness and tingling. Limited abduction 
of upper extremities. COMPARISON: None TECHNIQUE: MR imaging of the cervical spine was performed using the following sequences: sagittal T1, T2. Patient terminated examination prior to completion 
secondary to shortness of breath. Examination presented for my interpretation on 
1/14/2019. CONTRAST:  None. FINDINGS: 
 
There is normal alignment of the cervical spine. Vertebral body heights are 
maintained. Degenerative bone marrow signal is chronic at C6 and C7. Disc 
desiccation is moderate at C6-C7. No discitis. The craniocervical junction is intact. There is cord compression at C3 and C4. Hyperintense T2 signal is in the spinal cord at C3-C4. No cord expansion or 
syrinx. Paraspinal soft tissues are not well evaluated. C3-C4: Severe central spinal canal stenosis. C4-C5: Moderate central spinal canal stenosis. C5-C6: Mild central spinal canal stenosis. C6-C7: Mild central spinal canal stenosis. C7-T1: No stenosis. Impression IMPRESSION: 
 
1. Limited study, sagittal imaging only. Patient terminated examination prior to 
completion. 2. Severe central spinal canal stenosis at C3-4 with cord compression. Cord 
myelomalacia is more likely than cord contusion. Completing the examination with 
sedation or anesthesia may provide more information. 23X Physical Exam 
Constitutional: Oriented to person, place, and time, appears well-developed and well-nourished. No distress. HENT:  
Head: Normocephalic and atraumatic. Mouth/Throat: Oropharynx is clear and moist. No oropharyngeal exudate. Eyes: Conjunctivae and EOM are normal. Pupils are equal, round, and reactive to light. No scleral icterus. Neck: Normal range of motion. Neck supple. No thyromegaly present. Cardiovascular: Normal rate, regular rhythm and normal heart sounds. Musculoskeletal: Normal range of motion, exhibits no edema, tenderness or deformity. Lymphadenopathy: no cervical adenopathy. Neurological: Alert and oriented to person, place, and time. He is diffusely weak but weaker in his knee extensors and flexors anywhere else. Deep tendon reflexes are brisker in his arms than they should be, he is normal at the knees is ankle reflexes absent both of his toes appear to be upgoing. Displays no atrophy and no tremor. No cranial nerve deficit he has moderate decreased vibratory sensation in both his upper and lower extremities, lower extremities. Exhibits normal muscle tone. when he closes his eyes he will fall if not kept upright , no seizure activity. Coordination normal, Speech, language and mentation are normal 
Visual fields are full to confrontation, funduscopic exam reveals flat discs, the retina and vasculature are normal  
Skin: Skin is warm and dry. No rash noted, not diaphoretic. No erythema. Psychiatric: Normal mood and affect,  behavior is normal. Judgment and thought content normal.  
Vitals reviewed. ASSESSMENT and PLAN 
CERVICAL MYELOPATHY I suspect most of this patient's complaints are related to cervical myelopathy, there may be some minimal radiculopathy here as well but by far and away most worrisome problem is myelopathy. I am going to refer him to Dr. John Sánchez, neurosurgery for consideration of decompression. HYPERTENSION Stroke risk, stable, managed by primary care DIABETES MELLITUS Stroke Risk, stable, managed by primary care HYPERCHOLESTEROLEMIA Stroke risk, managed by primary care This note will not be viewable in [address] If you have questions, please do not hesitate to call me. I look forward to following Mr. Fran Malone along with you. Sincerely, Kalli Christy MD

## 2019-02-08 NOTE — PATIENT INSTRUCTIONS

## 2019-02-11 ENCOUNTER — DOCUMENTATION ONLY (OUTPATIENT)
Dept: NEUROLOGY | Age: 74
End: 2019-02-11

## 2019-02-21 ENCOUNTER — TELEPHONE (OUTPATIENT)
Dept: ENDOCRINOLOGY | Age: 74
End: 2019-02-21

## 2019-02-21 RX ORDER — DULOXETIN HYDROCHLORIDE 60 MG/1
60 CAPSULE, DELAYED RELEASE ORAL DAILY
Qty: 90 CAP | Refills: 1 | Status: SHIPPED | OUTPATIENT
Start: 2019-02-21 | End: 2019-08-13 | Stop reason: SDUPTHER

## 2019-02-21 NOTE — TELEPHONE ENCOUNTER
I returned this call and Ms. Joce Musa wanted me to do another script for the cymbalta and see if the insurance will pay for it. This was done.   Larry Wong

## 2019-02-21 NOTE — TELEPHONE ENCOUNTER
I returned this call and Ms. Richard Vanessa wanted me to do another script for the cymbalta and see if the insurance will pay for it. This was done.   Dalton Booth

## 2019-03-15 ENCOUNTER — TELEPHONE (OUTPATIENT)
Dept: CARDIOLOGY CLINIC | Age: 74
End: 2019-03-15

## 2019-03-15 RX ORDER — FUROSEMIDE 40 MG/1
TABLET ORAL
Qty: 30 TAB | Refills: 1 | Status: SHIPPED | OUTPATIENT
Start: 2019-03-15 | End: 2019-05-15 | Stop reason: SDUPTHER

## 2019-03-15 RX ORDER — CARVEDILOL 12.5 MG/1
TABLET ORAL
Qty: 60 TAB | Refills: 3 | Status: SHIPPED | OUTPATIENT
Start: 2019-03-15 | End: 2019-06-27 | Stop reason: SDUPTHER

## 2019-03-15 NOTE — TELEPHONE ENCOUNTER
Please call daughter back, Cruz Allen on HIPAA, and advise if patient can have a MRI in which they will put him to sleep.

## 2019-03-15 NOTE — TELEPHONE ENCOUNTER
Spoke to Brandon brody, pt's daughter on HIPAA using 2 identifiers. Per Anuj Torres NP, she was made aware that he can have the MRI with sedation.

## 2019-03-21 ENCOUNTER — DOCUMENTATION ONLY (OUTPATIENT)
Dept: SLEEP MEDICINE | Age: 74
End: 2019-03-21

## 2019-03-21 ENCOUNTER — OFFICE VISIT (OUTPATIENT)
Dept: SLEEP MEDICINE | Age: 74
End: 2019-03-21

## 2019-03-21 VITALS
OXYGEN SATURATION: 95 % | SYSTOLIC BLOOD PRESSURE: 128 MMHG | HEIGHT: 63 IN | DIASTOLIC BLOOD PRESSURE: 82 MMHG | WEIGHT: 220 LBS | BODY MASS INDEX: 38.98 KG/M2 | HEART RATE: 104 BPM | RESPIRATION RATE: 17 BRPM

## 2019-03-21 DIAGNOSIS — G47.33 OBSTRUCTIVE SLEEP APNEA (ADULT) (PEDIATRIC): Primary | ICD-10-CM

## 2019-03-21 DIAGNOSIS — E11.21 TYPE 2 DIABETES WITH NEPHROPATHY (HCC): ICD-10-CM

## 2019-03-21 DIAGNOSIS — I50.9 CONGESTIVE HEART FAILURE, UNSPECIFIED HF CHRONICITY, UNSPECIFIED HEART FAILURE TYPE (HCC): ICD-10-CM

## 2019-03-21 NOTE — PROGRESS NOTES
[address] [facility]. [place], [address]  Tel.  [phone]  Fax. [address] [facility] [address]  [place], [address] [facility]  Tel.  [phone]  Fax. [phone] [address] [facility] [address]   Tel.  [phone]  Fax. [phone]     S>Kirill Stone is a 72 y.o. male seen for a positive airway pressure follow-up. He reports no problems using the device. The following problems are identified:    Drowsiness no Problems exhaling no   Snoring No, but feels he needs more air later in the night Forget to put on Yes and takes off during night to eat and often doesn't put it back   Mask Comfortable yes Can't fall asleep no   Dry Mouth no Mask falls off no   Air Leaking no Frequent awakenings no     Download reviewed. He admits that his sleep has improved. Therapy Apnea Index averaged over PAP use: 5 /hr which reflects improved sleep breathing condition. Allergies   Allergen Reactions    Morphine Anaphylaxis    Gabapentin Other (comments)     Made him \"loopy\"    Lyrica [Pregabalin] Other (comments)     Makes him \"loopy\"       He has a current medication list which includes the following prescription(s): furosemide, carvedilol, duloxetine, lisinopril, metolazone, simvastatin, glimepiride, isosorbide mononitrate er, cholecalciferol (vitamin d3), metformin er, fish oil-dha-epa, acetaminophen, and aspirin delayed-release. Zaira Juan He  has a past medical history of Arthritis, Asthma, CAD (coronary artery disease), Calculus of kidney, Carotid artery disease ([facility] [address] Congestive heart failure ([facility] [address] Diabetes ([facility] [address] Hypercholesterolemia, and Hypertension.     [place] Sleepiness Score: 18   and Modified F.O.S.Q. Score Total / 2: 13.5      O>    Visit Vitals  /82 (BP 1 Location: Left arm, BP Patient Position: Sitting)   Pulse (!) 104   Resp 17   Ht 5' 3\" (1.6 m)   Wt 220 lb (99.8 kg)   SpO2 95%   BMI 38.97 kg/m²           General:   Alert, oriented, not in distress   Neck: No JVD    Chest/Lungs:  symetrical lung expansion , no accessory muscle use    Extremities:  no obvious rashes , negative edema    Neuro:  No focal deficits ; No obvious tremor    Psych:  Normal affect ,  Normal countenance ;         A>    ICD-10-CM ICD-9-CM    1. Obstructive sleep apnea (adult) (pediatric) G47.33 327.23    2. Type 2 diabetes with nephropathy (HCC) E11.21 250.40      583.81    3. Congestive heart failure, unspecified HF chronicity, unspecified heart failure type ([facility]) I50.9 428.0      AHI = 20(5-18). On CPAP :  8-12 cmH2O. Compliant:      no    Therapeutic Response:  Positive    P>      * change setting to 8-12 cmH20   Results of sleep study in m-drive    * He was asked to contact our office for any problems regarding PAP therapy. * Counseling was provided regarding the importance of regular PAP use and on proper sleep hygiene and safe driving. * Re-enforced proper and regular cleaning for the device. 2. Type II diabetes - he continues on his current regimen. I have reviewed the relationship between sleep disordered breathing as it relates to diabetes. 3. CHF - he continues on his current regimen. I have reviewed the relationship between heart failure and sleep disordered breathing.     Electronically signed by    Brittany Banegas MD  Diplomate in Sleep Medicine  [facility]

## 2019-03-21 NOTE — PROGRESS NOTES
PAP Settings Change  Interaction Date 3/21/2019 Created By BAM Trujillo  Per Deb Velazco MD  [id number]   Setup date 6/7/2018  Device Model - DreamStation Auto CPAP   Device Mode - Auto CPAP - None    Current Parameter   Value  Min Pressure 8 cmH2O  Max Pressure 10 cmH2O    New Parameter   Value  Min Pressure 8 cmH2O  Max Pressure 12 cmH2O

## 2019-03-21 NOTE — PATIENT INSTRUCTIONS
[facility] [place], [address]  Tel.  [phone]  Fax. 100 [facility] 60  [place], [address] [facility], [place]  Tel.  [phone]  Fax. [phone] [address]  Tel.  [phone]  Fax. [phone]     Learning About CPAP for Sleep Apnea  What is CPAP? CPAP is a small machine that you use at home every night while you sleep. It increases air pressure in your throat to keep your airway open. When you have sleep apnea, this can help you sleep better so you feel much better. CPAP stands for \"continuous positive airway pressure. \"  The CPAP machine will have one of the following:  · A mask that covers your nose and mouth  · Prongs that fit into your nose  · A mask that covers your nose only, the most common type. This type is called NCPAP. The N stands for \"nasal.\"  Why is it done? CPAP is usually the best treatment for obstructive sleep apnea. It is the first treatment choice and the most widely used. Your doctor may suggest CPAP if you have:  · Moderate to severe sleep apnea. · Sleep apnea and coronary artery disease (CAD) or heart failure. How does it help? · CPAP can help you have more normal sleep, so you feel less sleepy and more alert during the daytime. · CPAP may help keep heart failure or other heart problems from getting worse. · NCPAP may help lower your blood pressure. · If you use CPAP, your bed partner may also sleep better because you are not snoring or restless. What are the side effects? Some people who use CPAP have:  · A dry or stuffy nose and a sore throat. · Irritated skin on the face. · Sore eyes. · Bloating. If you have any of these problems, work with your doctor to fix them. Here are some things you can try:  · Be sure the mask or nasal prongs fit well. · See if your doctor can adjust the pressure of your CPAP. · If your nose is dry, try a humidifier.   · If your nose is runny or stuffy, try decongestant medicine or a steroid nasal spray. If these things do not help, you might try a different type of machine. Some machines have air pressure that adjusts on its own. Others have air pressures that are different when you breathe in than when you breathe out. This may reduce discomfort caused by too much pressure in your nose. Where can you learn more? Go to [employer]be  Enter David Denney in the search box to learn more about \"Learning About CPAP for Sleep Apnea. \"   © 8039-0859 Healthwise, Incorporated. Care instructions adapted under license by [employer] (which disclaims liability or warranty for this information). This care instruction is for use with your licensed healthcare professional. If you have questions about a medical condition or this instruction, always ask your healthcare professional. [address] any warranty or liability for your use of this information. Content Version: 2.5.52504; Last Revised: January 11, 2010  PROPER SLEEP HYGIENE    What to avoid  · Do not have drinks with caffeine, such as coffee or black tea, for 8 hours before bed. · Do not smoke or use other types of tobacco near bedtime. Nicotine is a stimulant and can keep you awake. · Avoid drinking alcohol late in the evening, because it can cause you to wake in the middle of the night. · Do not eat a big meal close to bedtime. If you are hungry, eat a light snack. · Do not drink a lot of water close to bedtime, because the need to urinate may wake you up during the night. · Do not read or watch TV in bed. Use the bed only for sleeping and sexual activity. What to try  · Go to bed at the same time every night, and wake up at the same time every morning. Do not take naps during the day. · Keep your bedroom quiet, dark, and cool. · Get regular exercise, but not within 3 to 4 hours of your bedtime. .  · Sleep on a comfortable pillow and mattress.   · If watching the clock makes you anxious, turn it facing away from you so you cannot see the time. · If you worry when you lie down, start a worry book. Well before bedtime, write down your worries, and then set the book and your concerns aside. · Try meditation or other relaxation techniques before you go to bed. · If you cannot fall asleep, get up and go to another room until you feel sleepy. Do something relaxing. Repeat your bedtime routine before you go to bed again. · Make your house quiet and calm about an hour before bedtime. Turn down the lights, turn off the TV, log off the computer, and turn down the volume on music. This can help you relax after a busy day. Drowsy Driving: The [employer] cites drowsiness as a causing factor in more than 703,841 police reported crashes annually, resulting in 76,000 injuries and 1,500 deaths. Other surveys suggest 55% of people polled have driven while drowsy in the past year, 23% had fallen asleep but not crashed, 3% crashed, and 2% had and accident due to drowsy driving. Who is at risk? Young Drivers: One study of drowsy driving accidents states that 55% of the drivers were under 25 years. Of those, 75% were male. Shift Workers and Travelers: People who work overnight or travel across time zones frequently are at higher risk of experiencing Circadian Rhythm Disorders. They are trying to work and function when their body is programed to sleep. Sleep Deprived: Lack of sleep has a serious impact on your ability to pay attention or focus on a task. Consistently getting less than the average of 8 hours your body needs creates partial or cumulative sleep deprivation. Untreated Sleep Disorders: Sleep Apnea, Narcolepsy, R.L.S., and other sleep disorders (untreated) prevent a person from getting enough restful sleep. This leads to excessive daytime sleepiness and increases the risk for drowsy driving accidents by up to 7 times.   Medications / Alcohol: Even over the counter medications can cause drowsiness. Medications that impair a drivers attention should have a warning label. Alcohol naturally makes you sleepy and on its own can cause accidents. Combined with excessive drowsiness its effects are amplified. Signs of Drowsy Driving:   * You don't remember driving the last few miles   * You may drift out of your angelito   * You are unable to focus and your thoughts wander   * You may yawn more often than normal   * You have difficulty keeping your eyes open / nodding off   * Missing traffic signs, speeding, or tailgating  Prevention-   Good sleep hygiene, lifestyle and behavioral choices have the most impact on drowsy driving. There is no substitute for sleep and the average person requires 8 hours nightly. If you find yourself driving drowsy, stop and sleep. Consider the sleep hygiene tips provided during your visit as well. Medication Refill Policy: Refills for all medications require 1 week advance notice. Please have your pharmacy fax a refill request. We are unable to fax, or call in \"controled substance\" medications and you will need to pick these prescriptions up from our office. AdHack Activation    Thank you for requesting access to AdHack. Please follow the instructions below to securely access and download your online medical record. AdHack allows you to send messages to your doctor, view your test results, renew your prescriptions, schedule appointments, and more. How Do I Sign Up? 1. In your internet browser, go to [email or web address]. 2. Click on the First Time User? Click Here link in the Sign In box. You will see the New Member Sign Up page. 3. Enter your AdHack Access Code exactly as it appears below. You will not need to use this code after youve completed the sign-up process. If you do not sign up before the expiration date, you must request a new code. AdHack Access Code:  Activation code not generated  Current Omaze Status: Active (This is the date your Omaze access code will )    4. Enter the last four digits of your Social Security Number (xxxx) and Date of Birth (mm/dd/yyyy) as indicated and click Submit. You will be taken to the next sign-up page. 5. Create a Solidagext ID. This will be your Omaze login ID and cannot be changed, so think of one that is secure and easy to remember. 6. Create a Omaze password. You can change your password at any time. 7. Enter your Password Reset Question and Answer. This can be used at a later time if you forget your password. 8. Enter your e-mail address. You will receive e-mail notification when new information is available in [address] 9. Click Sign Up. You can now view and download portions of your medical record. 10. Click the Download Summary menu link to download a portable copy of your medical information. Additional Information    If you have questions, please call [phone]. Remember, Omaze is NOT to be used for urgent needs. For medical emergencies, dial 911.

## 2019-03-27 ENCOUNTER — ANESTHESIA EVENT (OUTPATIENT)
Dept: MRI IMAGING | Age: 74
End: 2019-03-27
Payer: MEDICARE

## 2019-03-28 ENCOUNTER — HOSPITAL ENCOUNTER (OUTPATIENT)
Dept: MRI IMAGING | Age: 74
Discharge: HOME | End: 2019-03-28
Attending: ORTHOPAEDIC SURGERY
Payer: MEDICARE

## 2019-03-28 ENCOUNTER — ANESTHESIA (OUTPATIENT)
Dept: MRI IMAGING | Age: 74
End: 2019-03-28
Payer: MEDICARE

## 2019-03-28 VITALS
BODY MASS INDEX: 38.98 KG/M2 | WEIGHT: 220 LBS | HEIGHT: 63 IN | RESPIRATION RATE: 10 BRPM | OXYGEN SATURATION: 94 % | SYSTOLIC BLOOD PRESSURE: 121 MMHG | HEART RATE: 81 BPM | DIASTOLIC BLOOD PRESSURE: 65 MMHG | TEMPERATURE: 98.4 F

## 2019-03-28 DIAGNOSIS — M54.12 CERVICAL RADICULOPATHY: ICD-10-CM

## 2019-03-28 DIAGNOSIS — M47.12 CERVICAL SPONDYLOSIS WITH MYELOPATHY: ICD-10-CM

## 2019-03-28 DIAGNOSIS — M48.02 SPINAL STENOSIS IN CERVICAL REGION: ICD-10-CM

## 2019-03-28 DIAGNOSIS — M54.2 CERVICALGIA: ICD-10-CM

## 2019-03-28 LAB
GLUCOSE BLD STRIP.AUTO-MCNC: 169 MG/DL (ref 65–100)
SERVICE CMNT-IMP: ABNORMAL

## 2019-03-28 PROCEDURE — 77030026438 HC STYL ET INTUB CARD -A: Performed by: ANESTHESIOLOGY

## 2019-03-28 PROCEDURE — 74011250636 HC RX REV CODE- 250/636

## 2019-03-28 PROCEDURE — 82962 GLUCOSE BLOOD TEST: CPT

## 2019-03-28 PROCEDURE — 77030008684 HC TU ET CUF COVD -B: Performed by: ANESTHESIOLOGY

## 2019-03-28 PROCEDURE — 72141 MRI NECK SPINE W/O DYE: CPT

## 2019-03-28 PROCEDURE — 76210000006 HC OR PH I REC 0.5 TO 1 HR

## 2019-03-28 PROCEDURE — 76060000035 HC ANESTHESIA 2 TO 2.5 HR

## 2019-03-28 RX ORDER — SODIUM CHLORIDE, SODIUM LACTATE, POTASSIUM CHLORIDE, CALCIUM CHLORIDE 600; 310; 30; 20 MG/100ML; MG/100ML; MG/100ML; MG/100ML
25 INJECTION, SOLUTION INTRAVENOUS CONTINUOUS
Status: CANCELLED | OUTPATIENT
Start: 2019-03-28

## 2019-03-28 RX ORDER — DIPHENHYDRAMINE HYDROCHLORIDE 50 MG/ML
12.5 INJECTION, SOLUTION INTRAMUSCULAR; INTRAVENOUS AS NEEDED
Status: CANCELLED | OUTPATIENT
Start: 2019-03-28 | End: 2019-03-28

## 2019-03-28 RX ORDER — LIDOCAINE HYDROCHLORIDE 10 MG/ML
0.1 INJECTION, SOLUTION EPIDURAL; INFILTRATION; INTRACAUDAL; PERINEURAL AS NEEDED
Status: CANCELLED | OUTPATIENT
Start: 2019-03-28

## 2019-03-28 RX ORDER — SODIUM CHLORIDE 0.9 % (FLUSH) 0.9 %
5-40 SYRINGE (ML) INJECTION EVERY 8 HOURS
Status: CANCELLED | OUTPATIENT
Start: 2019-03-28

## 2019-03-28 RX ORDER — FENTANYL CITRATE 50 UG/ML
25 INJECTION, SOLUTION INTRAMUSCULAR; INTRAVENOUS
Status: CANCELLED | OUTPATIENT
Start: 2019-03-28

## 2019-03-28 RX ORDER — SODIUM CHLORIDE 0.9 % (FLUSH) 0.9 %
5-40 SYRINGE (ML) INJECTION AS NEEDED
Status: CANCELLED | OUTPATIENT
Start: 2019-03-28

## 2019-03-28 NOTE — ROUTINE PROCESS
Dr. Walter Madrigal into assess pt prior to MRI with general anesthesia, assessment completed and procedure explained along with risks and benefits; consent obtained at this time. Pt being prepped for MRI.

## 2019-03-28 NOTE — DISCHARGE INSTRUCTIONS
[address]  Radiology Department  [phone]    Radiologist:  Dr. Fidencio Virgen    Date:  3/28/2019      MRI With Sedation Discharge Instructions      Go home and rest and restrict your activity the next 24 hours. You have been given sedating medications, so do not drive or drink alcohol today. Resume your previous diet and medications. You may return to work and resume normal activities tomorrow. Results of your MRI will be sent to your physician as soon as they become available.

## 2019-03-28 NOTE — PERIOP NOTES
Report given to Evy Tijerina RN to return to [facility] for discharge process.

## 2019-03-28 NOTE — ANESTHESIA PREPROCEDURE EVALUATION
Relevant Problems   No relevant active problems       Anesthetic History               Review of Systems / Medical History  Patient summary reviewed, nursing notes reviewed and pertinent labs reviewed    Pulmonary        Sleep apnea: CPAP  Shortness of breath  Asthma     Comments: Former smoker - Quit 1963   Neuro/Psych             Comments: Cervical Spondylosis with myelopathy/Cervicalgia Cardiovascular    Hypertension      CHF: orthopnea, dyspnea on exertion    CAD and hyperlipidemia    Exercise tolerance: <4 METS  Comments: Carotid Stenosis s/p Carotid Stent   GI/Hepatic/Renal         Renal disease: stones       Endo/Other    Diabetes    Obesity and arthritis  Pertinent negatives: No morbid obesity   Other Findings            Physical Exam    Airway  Mallampati: II  TM Distance: > 6 cm  Neck ROM: normal range of motion   Mouth opening: Normal     Cardiovascular  Regular rate and rhythm,  S1 and S2 normal,  no murmur, click, rub, or gallop             Dental  No notable dental hx       Pulmonary  Breath sounds clear to auscultation               Abdominal  GI exam deferred       Other Findings            Anesthetic Plan    ASA: 3  Anesthesia type: general          Induction: Intravenous  Anesthetic plan and risks discussed with: Patient

## 2019-03-28 NOTE — PROGRESS NOTES
TRANSFER - OUT REPORT:    Verbal report given to Efrain RN(name[facility]  being transferred to pacu(unit) for routine progression of care       Report consisted of patients Situation, Background, Assessment and   Recommendations(SBAR). Information from the following report(s) Procedure Summary was reviewed with the receiving nurse. Lines:   Peripheral IV 03/28/19 Anterior; Left Hand (Active)   Site Assessment Clean, dry, & intact 3/28/2019 11:45 AM   Phlebitis Assessment 0 3/28/2019 11:45 AM   Infiltration Assessment 0 3/28/2019 11:45 AM   Dressing Status Clean, dry, & intact 3/28/2019 11:45 AM   Dressing Type Tape;Transparent 3/28/2019 11:45 AM   Hub Color/Line Status Pink; Infusing 3/28/2019 11:45 AM        Opportunity for questions and clarification was provided.

## 2019-03-28 NOTE — ANESTHESIA POSTPROCEDURE EVALUATION
* No procedures listed *.    general    Anesthesia Post Evaluation        Patient location during evaluation: PACU  Note status: Adequate. Level of consciousness: responsive to verbal stimuli and sleepy but conscious  Pain management: satisfactory to patient  Airway patency: patent  Anesthetic complications: no  Cardiovascular status: acceptable  Respiratory status: acceptable  Hydration status: acceptable  Comments: +Post-Anesthesia Evaluation and Assessment    Patient: Judy Puckett MRN: [id number]  SSN: [id number]   YOB: 1945  Age: 72 y.o. Sex: male      Cardiovascular Function/Vital Signs    /65 (BP 1 Location: Right arm, BP Patient Position: At rest;Head of bed elevated (Comment degrees))   Pulse 81   Temp 36.8 °C (98.2 °F)   Resp 10   Ht 5' 3\" (1.6 m)   Wt 99.8 kg (220 lb)   SpO2 95%   BMI 38.97 kg/m²     Patient is status post * No procedures listed *. Nausea/Vomiting: Controlled. Postoperative hydration reviewed and adequate. Pain:  Pain Scale 1: Numeric (0 - 10) (03/28/19 1215)  Pain Intensity 1: 0 (03/28/19 1215)   Managed. Neurological Status:   Neuro (WDL): Exceptions to WDL (03/28/19 1145)   At baseline. Mental Status and Level of Consciousness: Arousable. Pulmonary Status:   O2 Device: Room air (03/28/19 1215)   Adequate oxygenation and airway patent. Complications related to anesthesia: None    Post-anesthesia assessment completed. No concerns. Signed By: Annemarie Rivera MD    3/28/2019  Post anesthesia nausea and vomiting:  controlled      Vitals Value Taken Time   /39 3/28/2019 12:20 PM   Temp 36.8 °C (98.2 °F) 3/28/2019 11:52 AM   Pulse 85 3/28/2019 12:25 PM   Resp 13 3/28/2019 12:25 PM   SpO2 88 % 3/28/2019 12:25 PM   Vitals shown include unvalidated device data.

## 2019-03-28 NOTE — PERIOP NOTES
Handoff Report from Operating Room to PACU    Report received from Eloina Mccord, [facility] and Domonique Cartagena CRNA regarding Sam Urrutia Sr.      * No surgeons listed *  And * No procedures listed *  confirmed   with allergies discussed. Anesthesia type, drugs, patient history, complications, estimated blood loss, vital signs, intake and output, and last pain medication, lines and temperature were reviewed. Recovery in main PACU s/p MRI with general anesthesia.

## 2019-04-08 RX ORDER — LISINOPRIL 2.5 MG/1
TABLET ORAL
Qty: 90 TAB | Refills: 0 | Status: SHIPPED | OUTPATIENT
Start: 2019-04-08 | End: 2019-07-08 | Stop reason: SDUPTHER

## 2019-05-03 ENCOUNTER — OFFICE VISIT (OUTPATIENT)
Dept: CARDIOLOGY CLINIC | Age: 74
End: 2019-05-03

## 2019-05-03 VITALS
HEART RATE: 80 BPM | WEIGHT: 217.5 LBS | BODY MASS INDEX: 38.54 KG/M2 | DIASTOLIC BLOOD PRESSURE: 62 MMHG | SYSTOLIC BLOOD PRESSURE: 106 MMHG | HEIGHT: 63 IN | RESPIRATION RATE: 16 BRPM | OXYGEN SATURATION: 96 %

## 2019-05-03 DIAGNOSIS — I25.10 ASHD (ARTERIOSCLEROTIC HEART DISEASE): Primary | ICD-10-CM

## 2019-05-03 DIAGNOSIS — E78.00 PURE HYPERCHOLESTEROLEMIA: ICD-10-CM

## 2019-05-03 DIAGNOSIS — I50.22 CHRONIC SYSTOLIC CONGESTIVE HEART FAILURE (HCC): ICD-10-CM

## 2019-05-03 NOTE — PROGRESS NOTES
1. Have you been to the ER, urgent care clinic since your last visit? Hospitalized since your last visit? NO    2. Have you seen or consulted any other health care providers outside of the [address] [place] [address] since your last visit? Include any pap smears or colon screening. YES, [facility]     CARDIAC CLEARANCE. C/O SWELLING IN BLE.

## 2019-05-03 NOTE — PROGRESS NOTES
Kati Hernández MD          NAME:  Beth Peter   :   1945   MRN:   [id number]   PCP:  Ayana Salvador MD           Subjective: The patient is a 72y.o. year old male  who returns for a routine follow-up. Since the last visit, patient reports no change in exercise tolerance, chest pain, edema, medication intolerance, palpitations, shortness of breath, PND/orthopnea wheezing, sputum, syncope, dizziness or light headedness. Doing well. Past Medical History:   Diagnosis Date    Arthritis     Asthma     CAD (coronary artery disease)     Calculus of kidney     Carotid artery disease (HCC)     Congestive heart failure (HCC)     Diabetes ([facility] [address]     Hypercholesterolemia     Hypertension         ICD-10-CM ICD-9-CM    1. ASHD (arteriosclerotic heart disease) I25.10 414.00    2. Pure hypercholesterolemia E78.00 272.0 AMB POC EKG ROUTINE W/ 12 LEADS, INTER & REP   3. Chronic systolic congestive heart failure (HCC) I50.22 428.22      428.0       Social History     Tobacco Use    Smoking status: Former Smoker     Last attempt to quit: 1963     Years since quittin.0    Smokeless tobacco: Never Used   Substance Use Topics    Alcohol use: No     Alcohol/week: 0.0 oz      Family History   Problem Relation Age of Onset    Heart Disease Brother     Diabetes Nephew         Review of Systems  Cardiovascular: Negative except as noted in HPI      Objective:       Vitals:    19 1454 19 1503   BP: 100/60 106/62   Pulse: 80    Resp: 16    SpO2: 96%    Weight: 217 lb 8 oz (98.7 kg)    Height: 5' 3\" (1.6 m)     Body mass index is 38.53 kg/m². General PE  Mental Status - Alert. General Appearance - Not in acute distress. Chest and Lung Exam   Inspection: Accessory muscles - No use of accessory muscles in breathing.   Auscultation:   Breath sounds: - Normal.    Cardiovascular   Inspection: Jugular vein - Bilateral - Inspection Normal.  Palpation/Percussion:   Apical Impulse: - Normal.  Auscultation: Rhythm - Regular. Heart Sounds - S1 WNL and S2 WNL. No S3 or S4. Murmurs & Other Heart Sounds: Auscultation of the heart reveals - No Murmurs. Peripheral Vascular   Upper Extremity: Inspection - Bilateral - No Cyanotic nailbeds or Digital clubbing. Lower Extremity:   Palpation: Edema - Bilateral - No edema. Data Review:     EKG -  EKG: unchanged from previous tracings, normal sinus rhythm, nonspecific ST and T waves changes. LABS- @brieflabs@      Allergies reviewed  Allergies   Allergen Reactions    Morphine Anaphylaxis    Gabapentin Other (comments)     Made him \"loopy\"    Lyrica [Pregabalin] Other (comments)     Makes him \"loopy\"       Medications reviewed  Current Outpatient Medications   Medication Sig    lisinopril (PRINIVIL, ZESTRIL) 2.5 mg tablet TAKE 1 TABLET BY MOUTH ONCE DAILY    furosemide (LASIX) 40 mg tablet TAKE 1 TABLET BY MOUTH ONCE DAILY    carvedilol (COREG) 12.5 mg tablet TAKE 1 TABLET BY MOUTH TWICE DAILY    DULoxetine (CYMBALTA) 60 mg capsule Take 1 Cap by mouth daily. For feet pain    metOLazone (ZAROXOLYN) 5 mg tablet Take 0.5 Tabs by mouth every Monday, Wednesday, Friday.  simvastatin (ZOCOR) 10 mg tablet TAKE 1 TABLET BY MOUTH NIGHTLY    glimepiride (AMARYL) 4 mg tablet Take 1 Tab by mouth Daily (before breakfast).  isosorbide mononitrate ER (IMDUR) 30 mg tablet TAKE 1 TABLET BY MOUTH ONCE DAILY    cholecalciferol, VITAMIN D3, (VITAMIN D3) 5,000 unit tab tablet Take 1 Tab by mouth daily.  metFORMIN ER (GLUCOPHAGE XR) 500 mg tablet Take 1 Tab by mouth Before breakfast and dinner. Please restart Metformin on Sunday, 5/13/18 (Patient taking differently: Take 500 mg by mouth two (2) times a day. Please restart Metformin on Sunday, 5/13/18)    fish oil-dha-epa 1,200-144-216 mg cap Take 1 Cap by mouth daily.  acetaminophen (TYLENOL) 325 mg tablet Take  by mouth every four (4) hours as needed for Pain.     aspirin delayed-release 81 mg tablet Take 81 mg by mouth daily. No current facility-administered medications for this visit. Assessment:       ICD-10-CM ICD-9-CM    1. ASHD (arteriosclerotic heart disease) I25.10 414.00    2. Pure hypercholesterolemia E78.00 272.0 AMB POC EKG ROUTINE W/ 12 LEADS, INTER & REP   3. Chronic systolic congestive heart failure (HCC) I50.22 428.22      428.0         Orders Placed This Encounter    AMB POC EKG ROUTINE W/ 12 LEADS, INTER & REP     Order Specific Question:   Reason for Exam:     Answer:   ROUTINE       Plan:     No angina or SOB. CHF comp. EKG fine. Neg cath one year ago. Mild LV dysfunction by echo one year ago. Cleared for cervical spine surgery.     Eric Crain MD

## 2019-05-11 LAB
ALBUMIN SERPL-MCNC: 4 G/DL (ref 3.5–4.8)
ALBUMIN/CREAT UR: 1624.4 MG/G CREAT (ref 0–30)
ALBUMIN/GLOB SERPL: 1.7 {RATIO} (ref 1.2–2.2)
ALP SERPL-CCNC: 74 IU/L (ref 39–117)
ALT SERPL-CCNC: 22 IU/L (ref 0–44)
AST SERPL-CCNC: 14 IU/L (ref 0–40)
BILIRUB SERPL-MCNC: 0.3 MG/DL (ref 0–1.2)
BUN SERPL-MCNC: 28 MG/DL (ref 8–27)
BUN/CREAT SERPL: 23 (ref 10–24)
CALCIUM SERPL-MCNC: 9.4 MG/DL (ref 8.6–10.2)
CHLORIDE SERPL-SCNC: 101 MMOL/L (ref 96–106)
CHOLEST SERPL-MCNC: 116 MG/DL (ref 100–199)
CO2 SERPL-SCNC: 25 MMOL/L (ref 20–29)
CREAT SERPL-MCNC: 1.21 MG/DL (ref 0.76–1.27)
CREAT UR-MCNC: 69.2 MG/DL
EST. AVERAGE GLUCOSE BLD GHB EST-MCNC: 174 MG/DL
GLOBULIN SER CALC-MCNC: 2.4 G/DL (ref 1.5–4.5)
GLUCOSE SERPL-MCNC: 172 MG/DL (ref 65–99)
HBA1C MFR BLD: 7.7 % (ref 4.8–5.6)
HDLC SERPL-MCNC: 43 MG/DL
INTERPRETATION, 910389: NORMAL
INTERPRETATION: NORMAL
LDLC SERPL CALC-MCNC: 49 MG/DL (ref 0–99)
Lab: NORMAL
MICROALBUMIN UR-MCNC: 1124.1 UG/ML
PDF IMAGE, 910387: NORMAL
POTASSIUM SERPL-SCNC: 4.8 MMOL/L (ref 3.5–5.2)
PROT SERPL-MCNC: 6.4 G/DL (ref 6–8.5)
SODIUM SERPL-SCNC: 143 MMOL/L (ref 134–144)
TRIGL SERPL-MCNC: 118 MG/DL (ref 0–149)
VLDLC SERPL CALC-MCNC: 24 MG/DL (ref 5–40)

## 2019-05-15 RX ORDER — METFORMIN HYDROCHLORIDE 500 MG/1
TABLET, EXTENDED RELEASE ORAL
Qty: 180 TAB | Refills: 3 | Status: SHIPPED | OUTPATIENT
Start: 2019-05-15 | End: 2020-04-06

## 2019-05-15 RX ORDER — SIMVASTATIN 10 MG/1
TABLET, FILM COATED ORAL
Qty: 90 TAB | Refills: 1 | Status: SHIPPED | OUTPATIENT
Start: 2019-05-15 | End: 2019-11-04 | Stop reason: SDUPTHER

## 2019-05-15 RX ORDER — FUROSEMIDE 40 MG/1
TABLET ORAL
Qty: 30 TAB | Refills: 1 | Status: SHIPPED | OUTPATIENT
Start: 2019-05-15 | End: 2019-07-08 | Stop reason: SDUPTHER

## 2019-05-16 ENCOUNTER — HOSPITAL ENCOUNTER (OUTPATIENT)
Dept: PREADMISSION TESTING | Age: 74
Discharge: HOME | End: 2019-05-16
Attending: ORTHOPAEDIC SURGERY
Payer: MEDICARE

## 2019-05-16 ENCOUNTER — HOSPITAL ENCOUNTER (OUTPATIENT)
Dept: GENERAL RADIOLOGY | Age: 74
Discharge: HOME | End: 2019-05-16
Attending: ORTHOPAEDIC SURGERY
Payer: MEDICARE

## 2019-05-16 VITALS
HEART RATE: 53 BPM | OXYGEN SATURATION: 99 % | DIASTOLIC BLOOD PRESSURE: 88 MMHG | HEIGHT: 63 IN | BODY MASS INDEX: 39.88 KG/M2 | WEIGHT: 225.09 LBS | RESPIRATION RATE: 20 BRPM | SYSTOLIC BLOOD PRESSURE: 133 MMHG | TEMPERATURE: 98.1 F

## 2019-05-16 LAB
25(OH)D3 SERPL-MCNC: 59.9 NG/ML (ref 30–100)
ABO + RH BLD: NORMAL
ALBUMIN SERPL-MCNC: 3.5 G/DL (ref 3.5–5)
ALBUMIN/GLOB SERPL: 1 {RATIO} (ref 1.1–2.2)
ALP SERPL-CCNC: 74 U/L (ref 45–117)
ALT SERPL-CCNC: 28 U/L (ref 12–78)
ANION GAP SERPL CALC-SCNC: 7 MMOL/L (ref 5–15)
APPEARANCE UR: CLEAR
AST SERPL-CCNC: 17 U/L (ref 15–37)
BACTERIA URNS QL MICRO: NEGATIVE /HPF
BILIRUB SERPL-MCNC: 0.3 MG/DL (ref 0.2–1)
BILIRUB UR QL: NEGATIVE
BLOOD GROUP ANTIBODIES SERPL: NORMAL
BUN SERPL-MCNC: 45 MG/DL (ref 6–20)
BUN/CREAT SERPL: 29 (ref 12–20)
CALCIUM SERPL-MCNC: 8.9 MG/DL (ref 8.5–10.1)
CHLORIDE SERPL-SCNC: 100 MMOL/L (ref 97–108)
CO2 SERPL-SCNC: 29 MMOL/L (ref 21–32)
COLOR UR: ABNORMAL
CREAT SERPL-MCNC: 1.56 MG/DL (ref 0.7–1.3)
EPITH CASTS URNS QL MICRO: ABNORMAL /LPF
ERYTHROCYTE [DISTWIDTH] IN BLOOD BY AUTOMATED COUNT: 12.7 % (ref 11.5–14.5)
GLOBULIN SER CALC-MCNC: 3.4 G/DL (ref 2–4)
GLUCOSE SERPL-MCNC: 263 MG/DL (ref 65–100)
GLUCOSE UR STRIP.AUTO-MCNC: 100 MG/DL
HCT VFR BLD AUTO: 42.4 % (ref 36.6–50.3)
HGB BLD-MCNC: 14.1 G/DL (ref 12.1–17)
HGB UR QL STRIP: ABNORMAL
HYALINE CASTS URNS QL MICRO: ABNORMAL /LPF (ref 0–5)
INR PPP: 1 (ref 0.9–1.1)
KETONES UR QL STRIP.AUTO: NEGATIVE MG/DL
LEUKOCYTE ESTERASE UR QL STRIP.AUTO: NEGATIVE
MCH RBC QN AUTO: 30.1 PG (ref 26–34)
MCHC RBC AUTO-ENTMCNC: 33.3 G/DL (ref 30–36.5)
MCV RBC AUTO: 90.6 FL (ref 80–99)
NITRITE UR QL STRIP.AUTO: NEGATIVE
NRBC # BLD: 0 K/UL (ref 0–0.01)
NRBC BLD-RTO: 0 PER 100 WBC
PH UR STRIP: 5.5 [PH] (ref 5–8)
PLATELET # BLD AUTO: 227 K/UL (ref 150–400)
PMV BLD AUTO: 10.3 FL (ref 8.9–12.9)
POTASSIUM SERPL-SCNC: 4.3 MMOL/L (ref 3.5–5.1)
PREALB SERPL-MCNC: 30.1 MG/DL (ref 20–40)
PROT SERPL-MCNC: 6.9 G/DL (ref 6.4–8.2)
PROT UR STRIP-MCNC: 30 MG/DL
PROTHROMBIN TIME: 10.6 SEC (ref 9–11.1)
RBC # BLD AUTO: 4.68 M/UL (ref 4.1–5.7)
RBC #/AREA URNS HPF: ABNORMAL /HPF (ref 0–5)
SODIUM SERPL-SCNC: 136 MMOL/L (ref 136–145)
SP GR UR REFRACTOMETRY: 1.01 (ref 1–1.03)
SPECIMEN EXP DATE BLD: NORMAL
UA: UC IF INDICATED,UAUC: ABNORMAL
UROBILINOGEN UR QL STRIP.AUTO: 0.2 EU/DL (ref 0.2–1)
WBC # BLD AUTO: 9.9 K/UL (ref 4.1–11.1)
WBC URNS QL MICRO: ABNORMAL /HPF (ref 0–4)

## 2019-05-16 PROCEDURE — 86900 BLOOD TYPING SEROLOGIC ABO: CPT

## 2019-05-16 PROCEDURE — 85027 COMPLETE CBC AUTOMATED: CPT

## 2019-05-16 PROCEDURE — 97116 GAIT TRAINING THERAPY: CPT

## 2019-05-16 PROCEDURE — 81001 URINALYSIS AUTO W/SCOPE: CPT

## 2019-05-16 PROCEDURE — 36415 COLL VENOUS BLD VENIPUNCTURE: CPT

## 2019-05-16 PROCEDURE — 80053 COMPREHEN METABOLIC PANEL: CPT

## 2019-05-16 PROCEDURE — 97161 PT EVAL LOW COMPLEX 20 MIN: CPT

## 2019-05-16 PROCEDURE — 71046 X-RAY EXAM CHEST 2 VIEWS: CPT

## 2019-05-16 PROCEDURE — 82306 VITAMIN D 25 HYDROXY: CPT

## 2019-05-16 PROCEDURE — 84134 ASSAY OF PREALBUMIN: CPT

## 2019-05-16 PROCEDURE — 85610 PROTHROMBIN TIME: CPT

## 2019-05-16 RX ORDER — ACETAMINOPHEN 10 MG/ML
1000 INJECTION, SOLUTION INTRAVENOUS ONCE
Status: CANCELLED | OUTPATIENT
Start: 2019-05-21 | End: 2019-05-21

## 2019-05-16 RX ORDER — CEFAZOLIN SODIUM/WATER 2 G/20 ML
2 SYRINGE (ML) INTRAVENOUS ONCE
Status: CANCELLED | OUTPATIENT
Start: 2019-05-21 | End: 2019-05-21

## 2019-05-16 RX ORDER — SODIUM CHLORIDE, SODIUM LACTATE, POTASSIUM CHLORIDE, CALCIUM CHLORIDE 600; 310; 30; 20 MG/100ML; MG/100ML; MG/100ML; MG/100ML
25 INJECTION, SOLUTION INTRAVENOUS CONTINUOUS
Status: CANCELLED | OUTPATIENT
Start: 2019-05-21

## 2019-05-16 NOTE — PERIOP NOTES
Orthopedic and Spine Patients: Instructions on When You Can    Eat or Drink Before Surgery    You were given 2 pre-surgery drinks at your pre-admission testing appointment.   Night before surgery:    o Drink one pre-surgery bottle at bedtime. o  No Food after midnight!   Day of Surgery:    o  Drink the 2nd  pre-surgery bottle 1 hour before you get to the hospital.        For questions call Pre-Admission Testing at [phone]. They are available from 8:00 am-5:00 pm, Monday through Friday.

## 2019-05-16 NOTE — PERIOP NOTES
Preventing Infections Before and After - Your Surgery    IMPORTANT INSTRUCTIONS    Please read and follow these instructions carefully. If you are unable to comply with the below instructions your procedure will be cancelled. Every Night for Three (3) nights before your surgery:  1. Shower with an antibacterial soap, such as Dial, or the soap provided at your preassessment appointment. A shower is better than a bath for cleaning your skin. 2. If needed, ask someone to help you reach all areas of your body. Dont forget to clean your belly button with every shower. The night before your surgery: If you lose your Hibiclens please contact surgery center or you can purchase it at a local pharmacy  1. On the night before your surgery, shower with an antibacterial soap, such as Dial, or the soap provided at your preassessment appointment. 2. With one packet of Hibiclens in hand, turn water off.  3. Apply Hibiclens antiseptic skin cleanser with a clean, freshly washed washcloth. ? Gently apply to your body from chin to toes (except the genital area) and especially the area(s) where your incision(s) will be. ? Leave Hibiclens on your skin for at least 20 seconds. CAUTION: If needed, Hibiclens may be used to clean the folds of skin of the legs (such as in the area of the groin) and on your buttocks and hips. However, do not use Hibiclens above the neck or in the genital area (your bottom) or put inside any area of your body. 4. Turn the water back on and rinse. 5. Dry gently with a clean, freshly washed towel. 6. After your shower, do not use any powder, deodorant, perfumes or lotion. 7. Use clean, freshly washed towels and washcloths every time you shower. 8. Wear clean, freshly washed pajamas to bed the night before surgery. 9. Sleep on clean, freshly washed sheets. 10. Do not allow pets to sleep in your bed with you. The Morning of your surgery:  1.  Shower again thoroughly with an antibacterial soap, such as Dial or the soap provided at your preassessment appointment. If needed, ask someone for help to reach all areas of your body. Dont forget to clean your belly button! Rinse. 2. Dry gently with a clean, freshly washed towel. 3. After your shower, do not use any powder, deodorant, perfumes or lotion prior to surgery. 4. Put on clean, freshly washed clothing. Tips to help prevent infections after your surgery:  1. Protect your surgical wound from germs:  ? Hand washing is the most important thing you and your caregivers can do to prevent infections. ? Keep your bandage clean and dry! ? Do not touch your surgical wound. 2. Use clean, freshly washed towels and washcloths every time you shower; do not share bath linens with others. 3. Until your surgical wound is healed, wear clothing and sleep on bed linens each day that are clean and freshly washed. 4. Do not allow pets to sleep in your bed with you or touch your surgical wound. 5. Do not smoke - smoking delays wound healing. This may be a good time to stop smoking. 6. If you have diabetes, it is important for you to manage your blood sugar levels properly before your surgery as well as after your surgery. Poorly managed blood sugar levels slow down wound healing and prevent you from healing completely. If you lose your Hibiclens, please call the [facility], or it is available for purchase at your pharmacy.                ___________________      ___________________      ________________  (Signature of Patient)          (Witness)                   (Date and Time)

## 2019-05-16 NOTE — PERIOP NOTES
[facility]  Preoperative Instructions        Surgery Date: 5/21/2019        Time of Arrival: 10:00 a.m.    1. On the day of your surgery, please report to the Surgical Services Registration Desk and sign in at your designated time. The Surgery Center is located to the right of the Emergency Room. 2. You must have someone with you to drive you home. You should not drive a car for 24 hours following surgery. Please make arrangements for a friend or family member to stay with you for the first 24 hours after your surgery. 3. Do not have anything to eat or drink (including water, gum, mints, coffee, juice) after midnight. ?This may not apply to medications prescribed by your physician. ?(Please note below the special instructions with medications to take the morning of your procedure.)    4. We recommend you do not drink any alcoholic beverages for 24 hours before and after your surgery. 5. Contact your surgeons office for instructions on the following medications: non-steroidal anti-inflammatory drugs (i.e. Advil, Aleve), vitamins, and supplements. (Some surgeons will want you to stop these medications prior to surgery and others may allow you to take them)  **If you are currently taking Plavix, Coumadin, Aspirin and/or other blood-thinning agents, contact your surgeon for instructions. ** Your surgeon will partner with the physician prescribing these medications to determine if it is safe to stop or if you need to continue taking. Please do not stop taking these medications without instructions from your surgeon    6. Wear comfortable clothes. Wear glasses instead of contacts. Do not bring any money or jewelry. Please bring picture ID, insurance card, and any prearranged co-payment or hospital payment. Do not wear make-up, particularly mascara the morning of your surgery. Do not wear nail polish, particularly if you are having foot /hand surgery.   Wear your hair loose or down, no ponytails, buns, haroon pins or clips. All body piercings must be removed. Please shower with antibacterial soap for three consecutive days before and on the morning of surgery, but do not apply any lotions, powders or deodorants after the shower on the day of surgery. Please use a fresh towels after each shower. Please sleep in clean clothes and change bed linens the night before surgery. Please do not shave for 48 hours prior to surgery. Shaving of the face is acceptable. 7. You should understand that if you do not follow these instructions your surgery may be cancelled. If your physical condition changes (I.e. fever, cold or flu) please contact your surgeon as soon as possible. 8. It is important that you be on time. If a situation occurs where you may be late, please call [phone] (OR Holding Area). 9. If you have any questions and or problems, please call [phone] (Pre-admission Testing). 10. Your surgery time may be subject to change. You will receive a phone call the evening prior if your time changes. 11.  If having outpatient surgery, you must have someone to drive you here, stay with you during the duration of your stay, and to drive you home at time of discharge. 12.   In an effort to improve the efficiency, privacy, and safety for all of our Pre-op patients visitors are not allowed in the Holding area. Once you arrive and are registered your family/visitors will be asked to remain in the waiting room. The Pre-op staff will get you from the Surgical Waiting Area and will explain to you and your family/visitors that the Pre-op phase is beginning. The staff will answer any questions and provide instructions for tracking of the patient, by use of the existing tracking number and color-coded status board in the waiting room.   At this time the staff will also ask for your designated spokesperson information in the event that the physician or staff need to provide an update or obtain any pertinent information. The designated spokesperson will be notified if the physician needs to speak to family during the pre-operative phase. If at any time your family/visitors has questions or concerns they may approach the volunteer desk in the waiting area for assistance. Special Instructions:Bring CPAP morning of surgery and incentive spirometer. Call [phone] to clarify medications. MEDICATIONS TO TAKE THE MORNING OF SURGERY WITH A SIP OF WATER: isosorbide monitrate, cymbalta, carvedilol, furosemide. I understand a pre-operative phone call will be made to verify my surgery time. In the event that I am not available, I give permission for a message to be left on my answering service and/or with another person?  Yes: [phone] daughter-in-law, Ligia Perry.          ___________________      __________   _________    Babara Showers of Patient)             (Witness)                (Date and Time)

## 2019-05-16 NOTE — PERIOP NOTES
Incentive Spirometer        Using the incentive spirometer helps expand the small air sacs of your lungs, helps you breathe deeply, and helps improve your lung function. Use your incentive spirometer twice a day (10 breaths each time) prior to surgery. How to Use Your Incentive Spirometer:  1. Hold the incentive spirometer in an upright position. 2. Breathe out as usual.   3. Place the mouthpiece in your mouth and seal your lips tightly around it. 4. Take a deep breath. Breathe in slowly and as deeply as possible. Keep the blue flow rate guide between the arrows. 5. Hold your breath as long as possible. Then exhale slowly and allow the piston to fall to the bottom of the column. 6. Rest for a few seconds and repeat steps one through five at least 10 times. PAT Tidal Volume____1000______________  x____3____________  Date_____5/16/19__________________    Nena Graywers THE INCENTIVE SPIROMETER WITH YOU TO THE HOSPITAL ON THE DAY OF YOUR SURGERY. Opportunity given to ask and answer questions as well as to observe return demonstration.     Patient signature_____________________________    Witness____________________________

## 2019-05-16 NOTE — PROGRESS NOTES
[facility]  Physical Therapy Pre-surgery evaluation  [address]  [place], [address] [facility]    PHYSICAL THERAPY PRE SPINE SURGERY EVALUATION  Patient: Gigi Cosme (59 y.o. male)  Date: 5/16/2019  Primary Diagnosis: pat       Precautions:        ASSESSMENT :  Based on the objective data described below, the patient presents with impaired BUE strength, ROM and radicular symptoms  due to end stage degenerative joint disease in the cervical spine. Discussed anticipated disposition to home with possible discharge within a 1 to 2 day time frame post-surgery. Patient and  in agreement. Anticipate patient will need acute PT and OT orders based on current deficits, decreased functional Burke and comorbidities. GOALS: (Goals have been discussed and agreed upon with patient.)  DISCHARGE GOALS: Time Frame: 1 DAY  1. Patient will demonstrate increased strength, range of motion, and pain control via a home exercise program in order to minimize functional deficits in preparation for their upcoming surgery. This will be achieved by using education, demonstration  and through the use of an informational handout including a home exercise program.  REHABILITATION POTENTIAL FOR STATED GOALS: Good     RECOMMENDATIONS AND PLANNED INTERVENTIONS: (Benefits and precautions of physical therapy have been discussed with the patient.)  · Home Exercise Program  TREATMENT PLAN EFFECTIVE DATES: 5/16/2019 to 5/16/2019  FREQUENCY/DURATION: Patient to continue to perform home exercise program at least twice daily until surgery. SUBJECTIVE:   Patient stated I can't feel anything in my hands.     OBJECTIVE DATA SUMMARY:   HISTORY:      Past Medical History:   Diagnosis Date    Arthritis     Asthma     CAD (coronary artery disease)     Calculus of kidney     Carotid artery disease (HCC)     Congestive heart failure (HCC)     Diabetes ([facility] [address]     Hypercholesterolemia     Hypertension Past Surgical History:   Procedure Laterality Date    HX CAROTID STENT      HX CHOLECYSTECTOMY      HX HEENT         Prior Level of Function/Home Situation: I but required increased time to complete tasks. Pt reports mowing the grass and pulling up logs in the yard. Pt report BLE neuropathies as well as BUE neuropathies. Pt will be going to stay with his son and daughter in law after discharge from the hospital, where he will have assistance from family 24/7. The Home is a 2 story home with james and interior steps. Personal factors and/or comorbidities impacting plan of care:      Home Situation  Home Environment: Private residence  # Steps to Enter: 4  Rails to Enter: Yes  Hand Rails : Bilateral  One/Two Story Residence: Two story(colonial)  # of Interior Steps: 12  Interior Rails: Right  Living Alone: No  Support Systems: Family member(s)  Patient Expects to be Discharged to[de-identified] Private residence  Current DME Used/Available at Home: CPAP, Glucometer, Grab bars, Raised toilet seat  Tub or Shower Type: Tub/Shower combination(also a walk in shower)    EXAMINATION/PRESENTATION/DECISION MAKING:     ADLs (Current Functional Status):    Bathing/Showering:   [] Independent  [] Requires Assistance from Someone  [] Sponge Bath Only   Ambulation:  [x] Independent  [] Walk Indoors Only  [] Walk Outdoors  [] Use Assistive Device  [] Use Wheelchair Only     Dressing:  [] [address] from Someone for:  [x] Sock/Shoes  [] Pants  [] Everything   Household Activities:  [x] Routine house and yard work  [] Light Housework Only  [] None         Critical Behavior:                Strength:    Strength: Generally decreased, functional                        Tone & Sensation:   Tone: Normal              Sensation: Impaired(numbness, tingling BUE)                   Range Of Motion:  AROM: Generally decreased, functional           PROM: Generally decreased, functional           Coordination:  Coordination: Generally decreased, functional    Functional Mobility:  Transfers:  Sit to Stand: Modified independent  Stand to Sit: Modified independent                       Balance:   Sitting: Intact  Standing: Intact  Ambulation/Gait Training:  Distance (ft): 150 Feet (ft)     Ambulation - Level of Assistance: Independent                 Base of Support: Widened                 Functional Measure:  10 Meter walk test:  (Specify if any supplemental oxygen is used, the type, pre, during and post sats.)       Trial 1 (Time to Walk 10 Meters): 11.22 Seconds  Trial 2 (Time to Walk 10 Meters): 11.16 Seconds  Trial 3 (Time to Walk 10 Meters): 11.19 Seconds  Average : 11.2 Seconds  Score (Meters/Second): 0.9 Meters/Second             Walking Speed (m/s)  Modifier Scale Age 52-63 Age 61-76 Age 66-77 Age 80-80    Male Female Male Female Male Female Male Female   CH   0% Impaired ? 1.39 ? 1.40 ? 1.36 ? 1.30 ? 1.33 ? 1.27 ? 1.21 ? 1.15   CI   1-19% Impaired 1.11-1.38 1.12-1.39 1.09-1.35 1.04-1.29 1.06-1.32 1.01-1.26 0.96-1.20 0.92-1.14   CJ   20-39% Impaired 0.83-1.10 0.84-1.11 0.82-1.08 0.78-1.03 0.80-1.05 0.76-1.00 0.72-0.95 0.69-0.91   CK   40-59% Impaired 0.56-0.82 0.57-0.83 0.54-0.81 0.52-0.77 0.53-0.79 0.51-0.75 0.48-0.71 0.46-0.68   CL   60-79% Impaired 0.28-0.55 0.28-0.56 0.27-0.53 0.26-0.51 0.27-0.52 0.25-0.50 0.24-0.49 0.23-0.45   CM   80-99% Impaired 0.01-0.28 < 0.01-0.28 < 0.01-0.27 < 0.01-0.26 0.01-0.27 0.01-0.24 0.01-0.23 0.01-0.22   CN   100% Impaired Cannot Perform   Minimal Detectable Change (MDC-90) = 0.1 m/s  Lazaro DUTTON \"Comfortable and maximum walking speed of adults aged 20-79 years: reference values and determinants. \" Age and Agin Volume 26(1):15-9. Iván Marshall. \"Age- and gender-related test performance in community-dwelling elderly people: Six-Minute Walk Test, Antonio Balance Scale, Timed Up & Go Test, and gait speeds. \" Physical Therapy: 2002 Volume 82(2):128-37.   Crissy BORREGO, Gissel Elaine Blanche JONES \"Assessing stability and change of four performance measures: a longitudinal study evaluating outcome following total hip and knee arthroplasty. \" [facility] Musculoskeletal Disorders: 2005 Volume 6(3). José Miguel Butler, PhD; James Saldaña, PhD. Tanner Lopez Paper: \"Walking Speed: the Sixth Vital Sign\" Journal of Geriatric Physical Therapy: 2009 - Volume 32 - Issue 2 - p 2-5 . Pain:  Pain Scale 1: Numeric (0 - 10)  Pain Intensity 1: 9  Pain Location 1: Neck  Pain Orientation 1: Mid  Pain Description 1: Sore; Throbbing; Shooting;Aching       Radicular Pain:   BUE shoulder to hand    Activity Tolerance:   Good  Patient [x]   does  []   does not demonstrate signs/symptoms of shortness of breath/dyspnea on exertion/respiratory distress. COMMUNICATION/EDUCATION:   The patient was educated on:  [x]         Early post operative mobility is imperative to achieve a patient's desired outcomes and to restore biological function. [x]         Post operative spinal precautions may/may not be applicable. These precautions are based on the patient's physician and the procedure(s) performed. []         Spinal precautions including:  ·   No bending forward, sideways, or backwards  ·   No twisting   ·   No lifting more than 5-10 pounds  ·   No sitting longer than 30-60 minutes at a time  ·   Cervical aspen collar should be on at all times    The patients plan of care was discussed as follows:   [x]         The patient verbalized understanding of her plan in preparation for their upcoming surgery  [x]         The patient's  was present for this session  []        The patient reports that he/she does not have a  identified at this time  [x]         The  verbalized understanding of the education regarding the patient's upcoming surgery  [x]         Patient/family agree to work toward stated goals and plan of care.   []         Patient understands intent and goals of therapy, but is neutral about his/her participation. []         Patient is unable to participate in goal setting and plan of care.       Thank you for this referral.  Claudine Brito, PT    Time Calculation: 21 mins

## 2019-05-17 ENCOUNTER — OFFICE VISIT (OUTPATIENT)
Dept: ENDOCRINOLOGY | Age: 74
End: 2019-05-17

## 2019-05-17 ENCOUNTER — TELEPHONE (OUTPATIENT)
Dept: CARDIOLOGY CLINIC | Age: 74
End: 2019-05-17

## 2019-05-17 VITALS
WEIGHT: 221 LBS | HEART RATE: 73 BPM | HEIGHT: 63 IN | DIASTOLIC BLOOD PRESSURE: 88 MMHG | SYSTOLIC BLOOD PRESSURE: 151 MMHG | BODY MASS INDEX: 39.16 KG/M2

## 2019-05-17 DIAGNOSIS — Z79.4 TYPE 2 DIABETES MELLITUS WITH STAGE 3 CHRONIC KIDNEY DISEASE, WITH LONG-TERM CURRENT USE OF INSULIN (HCC): Primary | ICD-10-CM

## 2019-05-17 DIAGNOSIS — N18.30 TYPE 2 DIABETES MELLITUS WITH STAGE 3 CHRONIC KIDNEY DISEASE, WITH LONG-TERM CURRENT USE OF INSULIN (HCC): Primary | ICD-10-CM

## 2019-05-17 DIAGNOSIS — I10 ESSENTIAL HYPERTENSION WITH GOAL BLOOD PRESSURE LESS THAN 130/80: ICD-10-CM

## 2019-05-17 DIAGNOSIS — E11.22 TYPE 2 DIABETES MELLITUS WITH STAGE 3 CHRONIC KIDNEY DISEASE, WITH LONG-TERM CURRENT USE OF INSULIN (HCC): Primary | ICD-10-CM

## 2019-05-17 DIAGNOSIS — G62.9 NEUROPATHY: ICD-10-CM

## 2019-05-17 DIAGNOSIS — E78.5 HYPERLIPIDEMIA, UNSPECIFIED HYPERLIPIDEMIA TYPE: ICD-10-CM

## 2019-05-17 LAB
BACTERIA SPEC CULT: NORMAL
BACTERIA SPEC CULT: NORMAL
SERVICE CMNT-IMP: NORMAL

## 2019-05-17 RX ORDER — PREGABALIN 150 MG/1
150 CAPSULE ORAL ONCE
Status: CANCELLED | OUTPATIENT
Start: 2019-05-21 | End: 2019-05-21

## 2019-05-17 RX ORDER — GLIMEPIRIDE 1 MG/1
1 TABLET ORAL
Qty: 90 TAB | Refills: 3 | Status: SHIPPED | OUTPATIENT
Start: 2019-05-17 | End: 2019-05-20

## 2019-05-17 NOTE — TELEPHONE ENCOUNTER
Pt daughter called Maryjane Schafer  please return her call can be reached at [phone] pt having surgery on 5/21/19 need to stop medication aspirin , fish oil,  Simvastatin       Please advise  thx.

## 2019-05-17 NOTE — TELEPHONE ENCOUNTER
ELIZABETH Crane LPN   Caller: Unspecified (Today,  2:21 PM)             Per last note from Dr. Rosaura Ventura, cleared for cervical spine surgery   May hold ASA 5-7 days prior to surgery   Not sure about needing to hold simvastatin -- this is not generally done/necessary, but if the surgeon feels it's necessary, it's his call. Need to discuss with pre-op about which meds he will need to hold. Spoke with patient's spouse (verified HIPPA)  Verified patient with 2 patient identifiers  Spouse informed,verbalized understanding.

## 2019-05-17 NOTE — PATIENT INSTRUCTIONS
Continue metformin 500mg twice per day,     Restart glimepiride only 1mg each morning before breakfast, ordered. Rose Marie Ward. [address]        Please note our new policy, you must arrive to the clinic 15 minutes before your appointment time to allow enough time for proper check-in, adequate time to spend with your doctor, and also to respect the appointment time of the next patient. Not arriving 15 minutes in advance may result in having your appointment rescheduled for the next available day/time.  ----------------------------------------------------------------------------------------------------------------------    Below you will find a glucose log sheet which you can use to record your blood sugars. Without checking and recording what your home glucose levels are, it will be difficult to make any changes to your medication dose, even when significant changes may be needed. Please feel free to use the log below to record your home glucose levels. At the very least, I would like for you to login the entire 2-3 weeks just before your visit so we can make your visit much more productive and beneficial to you. GLUCOSE LOG SHEET:    Date Breakfast Lunch Dinner Bedtime Comments ? GLUCOSE LOG SHEET:    Date Breakfast Lunch Dinner Bedtime Comments ? GLUCOSE LOG SHEET:    Date Breakfast Lunch Dinner Bedtime Comments ?

## 2019-05-17 NOTE — TELEPHONE ENCOUNTER
Returned patient call, Spoke with Gabriel Nelson (verified HIPPA)    Iva Guillen states patient has surgical procedure scheduled 5/21/2019 and was informed need to stop Simvastatin and ASA 81 mg 1 day prior to procedure. She wanted to ensure this is fine and also wanted to review all cardiac medications to determine if there should be a need to stop any of the others. Also, patient had /90 today. She question if this should be of concern?

## 2019-05-17 NOTE — PERIOP NOTES
Spoke with Radha Burris, patient's allergies reviewed, orders from Dr. William Arrington to give preop Lyrica x 1 dose.

## 2019-05-17 NOTE — PROGRESS NOTES
CHIEF COMPLAINT: f/u evaluation for uncontrolled type 2 diabetes    HISTORY OF PRESENT ILLNESS:   Beth Peter is a 72 y.o. male with a PMHx as noted below who presents to the endocrinology clinic for f/u evaluation of uncontrolled type 2 diabetes. A1c previously 5.3%, today is 7.7% after trial off glimepiride. Planning for cervical spine surgery soon, has a lot of pain.      Currently taking the following meds:  metformin 500mg twice daily,   OFF Glimepiride 2mg once daily    Review of home blood glucose:  No log or meter with him today  Has not checked his sugar in the past month    Review of most recent diabetes-related labs:  Lab Results   Component Value Date    HBA1C 7.7 (H) 05/10/2019    JKR9MHHR 5.3 01/17/2019    UPO1DXWK 5.6 10/09/2018    WMI5AYYI 5.8 07/09/2018    CHOL 116 05/10/2019    LDLC 49 05/10/2019    GFRAA 53 (L) 05/16/2019    GFRNA 44 (L) 05/16/2019    MCACR 1,624.4 (H) 05/10/2019    TSH 0.681 04/23/2018    VITD3 59.9 05/16/2019     Lab Key:  [id number] = IA-2 pancreatic islet cell autoantibody  GADLT = MARIAN-65 autoantibody   MCACR = Urine Microalbumin  INSUL = Insulin level  CPEPL = C-peptide level      PAST MEDICAL/SURGICAL HISTORY:   Past Medical History:   Diagnosis Date    Arthritis     Asthma     CAD (coronary artery disease)     Calculus of kidney     Carotid artery disease (HCC)     Cervical herniated disc     [address]estive heart failure ([address]     Diabetes ([address]     Diabetic shock due to low blood sugar ([address] 2016    Hypercholesterolemia     Hypertension     Morbid obesity ([address]     Rheumatic fever     When he was 17    Sleep apnea      Past Surgical History:   Procedure Laterality Date    HX CAROTID STENT  1990's    HX CHOLECYSTECTOMY      HX CIRCUMCISION      HX HEENT      HX OTHER SURGICAL Right     Right hand reconstruction       ALLERGIES:   Allergies   Allergen Reactions    Morphine Anaphylaxis     Pt states \"morphine gave me heart failure\"    Gabapentin Other (comments)     Made him \"loopy\"    Lyrica [Pregabalin] Other (comments)     Makes him \"loopy\"       MEDICATIONS ON ADMISSION:     Current Outpatient Medications:     CANNABIDIOL, CBD, EXTRACT PO, Take 2 Units by mouth daily. Indications: TOPICAL CBD SALVE, Disp: , Rfl:     simvastatin (ZOCOR) 10 mg tablet, TAKE 1 TABLET BY MOUTH NIGHTLY, Disp: 90 Tab, Rfl: 1    furosemide (LASIX) 40 mg tablet, TAKE 1 TABLET BY MOUTH ONCE DAILY, Disp: 30 Tab, Rfl: 1    metFORMIN ER (GLUCOPHAGE XR) 500 mg tablet, TAKE 1 TABLET BY MOUTH BEFORE BREAKFAST AND  DINNER, Disp: 180 Tab, Rfl: 3    lisinopril (PRINIVIL, ZESTRIL) 2.5 mg tablet, TAKE 1 TABLET BY MOUTH ONCE DAILY, Disp: 90 Tab, Rfl: 0    carvedilol (COREG) 12.5 mg tablet, TAKE 1 TABLET BY MOUTH TWICE DAILY, Disp: 60 Tab, Rfl: 3    DULoxetine (CYMBALTA) 60 mg capsule, Take 1 Cap by mouth daily. For feet pain, Disp: 90 Cap, Rfl: 1    metOLazone (ZAROXOLYN) 5 mg tablet, Take 0.5 Tabs by mouth every Monday, Wednesday, Friday. (Patient taking differently: Take 2.5 mg by mouth every Monday, Wednesday, Friday. 1/2 tab Monday and Friday), Disp: 40 Tab, Rfl: 1    glimepiride (AMARYL) 4 mg tablet, Take 1 Tab by mouth Daily (before breakfast). , Disp: 90 Tab, Rfl: 3    isosorbide mononitrate ER (IMDUR) 30 mg tablet, TAKE 1 TABLET BY MOUTH ONCE DAILY, Disp: 30 Tab, Rfl: 12    cholecalciferol, VITAMIN D3, (VITAMIN D3) 5,000 unit tab tablet, Take 1 Tab by mouth daily. , Disp: 30 Tab, Rfl: 5    fish oil-dha-epa 1,200-144-216 mg cap, Take 1 Cap by mouth two (2) times a day., Disp: , Rfl:     acetaminophen (TYLENOL) 325 mg tablet, Take  by mouth every four (4) hours as needed for Pain., Disp: , Rfl:     aspirin delayed-release 81 mg tablet, Take 81 mg by mouth daily. , Disp: , Rfl:     SOCIAL HISTORY:   Social History     Socioeconomic History    Marital status:      Spouse name: Not on file    Number of children: Not on file    Years of education: Not on file    Highest education level: Not on file   Occupational History    Not on file   Social Needs    Financial resource strain: Not on file    Food insecurity:     Worry: Not on file     Inability: Not on file    Transportation needs:     Medical: Not on file     Non-medical: Not on file   Tobacco Use    Smoking status: Former Smoker     Last attempt to quit: 1963     Years since quittin.1    Smokeless tobacco: Never Used   Substance and Sexual Activity    Alcohol use: No     Alcohol/week: 0.0 oz    Drug use: No    Sexual activity: Yes     Partners: Female     Birth control/protection: None   Lifestyle    Physical activity:     Days per week: Not on file     Minutes per session: Not on file    Stress: Not on file   Relationships    Social connections:     Talks on phone: Not on file     Gets together: Not on file     Attends Sabianist service: Not on file     Active member of club or organization: Not on file     Attends meetings of clubs or organizations: Not on file     Relationship status: Not on file    Intimate partner violence:     Fear of current or ex partner: Not on file     Emotionally abused: Not on file     Physically abused: Not on file     Forced sexual activity: Not on file   Other Topics Concern    Not on file   Social History Narrative    ** Merged History Encounter **            FAMILY HISTORY:  Family History   Problem Relation Age of Onset    Heart Disease Brother     Diabetes Nephew     Hypertension Mother        REVIEW OF SYSTEMS: Complete ROS assessed and noted for that which is described above, all else are negative.   Eyes: normal  ENT: normal  CVS: normal  Resp: normal  GI: normal  : normal  GYN: normal  Endocrine: normal  Integument: normal  Musculoskeletal: normal  Neuro: normal  Psych: normal      PHYSICAL EXAMINATION:    VITAL SIGNS:  Visit Vitals  /88 (BP 1 Location: Left arm, BP Patient Position: Sitting)   Pulse 73   Ht 5' 3\" (1.6 m)   Wt 221 lb (100.2 kg)   BMI 39.15 kg/m²       GENERAL: NCAT, Sitting comfortably, NAD  EYES: EOMI, non-icteric, no proptosis  Ear/Nose/Throat: NCAT, no inflammation, no masses  LYMPH NODES: No LAD  CARDIOVASCULAR: S1 S2, RRR, No murmur, 2+ radial pulses  RESPIRATORY: CTA b/l, no wheeze/rales  GASTROINTESTINAL: ND  MUSCULOSKELETAL: Normal ROM, no atrophy  SKIN: warm, no edema/rash/ or other skin changes  NEUROLOGIC: 5/5 power all extremities, no tremors, AAOx3  PSYCHIATRIC: Normal affect, Normal insight and judgement    Diabetic foot exam:     Left Foot:   Visual Exam: normal  thick nails   Pulse DP: 2+ (normal)   Filament test: normal sensation     Vibratory sensation: Vibratory sensation: normal       Right Foot:   Visual Exam: normal  thick nails   Pulse DP: 2+ (normal)   Filament test: normal sensation    Vibratory sensation: Vibratory sensation: normal    History of advanced diabetic neuropathy requiring medical therapy. Follows with podiatrist. * I am treating the patient under a comprehensive plan of care for diabetes and the patient would benefit from diabetic footwear to protect their feet, and this includes shoes and insoles. REVIEW OF LABORATORY AND RADIOLOGY DATA:   Labs and documentation have been reviewed as described above. ASSESSMENT AND PLAN:   Jalen Lewis is a 72 y.o. male with a PMHx as noted above who presents to the endocrinology clinic for f/u evaluation of uncontrolled type 2 diabetes. DM2 with hypoglycemia  HTN  HLD    Patients A1c was well controlled but he is in pain and anticipating surgery soon. We will restart glimepiride which will help with this. I anticipate his sugars being reasonable for surgery on this regimen. He should reduce some of the sweet creamers he is adding to his coffee, noting he drinks coffee throughout the day.      DM2:  Continue metformin 500mg twice per day, renal function stable,  Restart glimepiride only 1mg each morning before breakfast,  Continue to check glucose 1-2 times daily for now  Provided with new log sheets    HTN: BP stable continue lisinopril/coreg  HLD: stable, simvastatin 10mg    4 month follow up visit,    Luis VILLARREAL  [address] [facility]

## 2019-05-20 RX ORDER — GLIMEPIRIDE 1 MG/1
1 TABLET ORAL
COMMUNITY
End: 2020-03-31

## 2019-05-21 ENCOUNTER — ANESTHESIA (OUTPATIENT)
Dept: SURGERY | Age: 74
DRG: 472 | End: 2019-05-21
Payer: MEDICARE

## 2019-05-21 ENCOUNTER — APPOINTMENT (OUTPATIENT)
Dept: GENERAL RADIOLOGY | Age: 74
DRG: 472 | End: 2019-05-21
Attending: ORTHOPAEDIC SURGERY
Payer: MEDICARE

## 2019-05-21 ENCOUNTER — HOSPITAL ENCOUNTER (INPATIENT)
Age: 74
LOS: 5 days | Discharge: HOME | DRG: 472 | End: 2019-05-26
Attending: ORTHOPAEDIC SURGERY | Admitting: ORTHOPAEDIC SURGERY
Payer: MEDICARE

## 2019-05-21 ENCOUNTER — ANESTHESIA EVENT (OUTPATIENT)
Dept: SURGERY | Age: 74
DRG: 472 | End: 2019-05-21
Payer: MEDICARE

## 2019-05-21 DIAGNOSIS — Z98.1 S/P CERVICAL SPINAL FUSION: Primary | ICD-10-CM

## 2019-05-21 PROBLEM — M48.02 CERVICAL STENOSIS OF SPINAL CANAL: Status: ACTIVE | Noted: 2019-05-21

## 2019-05-21 LAB
GLUCOSE BLD STRIP.AUTO-MCNC: 117 MG/DL (ref 65–100)
GLUCOSE BLD STRIP.AUTO-MCNC: 141 MG/DL (ref 65–100)
GLUCOSE BLD STRIP.AUTO-MCNC: 151 MG/DL (ref 65–100)
GLUCOSE BLD STRIP.AUTO-MCNC: 185 MG/DL (ref 65–100)
GLUCOSE BLD STRIP.AUTO-MCNC: 217 MG/DL (ref 65–100)
SERVICE CMNT-IMP: ABNORMAL

## 2019-05-21 PROCEDURE — 77030020275 HC MISC ORTHOPEDIC: Performed by: ORTHOPAEDIC SURGERY

## 2019-05-21 PROCEDURE — 76010000171 HC OR TIME 2 TO 2.5 HR INTENSV-TIER 1: Performed by: ORTHOPAEDIC SURGERY

## 2019-05-21 PROCEDURE — 77030012407 HC DRN WND BARD -B: Performed by: ORTHOPAEDIC SURGERY

## 2019-05-21 PROCEDURE — 76210000016 HC OR PH I REC 1 TO 1.5 HR: Performed by: ORTHOPAEDIC SURGERY

## 2019-05-21 PROCEDURE — 77030003028 HC SUT VCRL J&J -A: Performed by: ORTHOPAEDIC SURGERY

## 2019-05-21 PROCEDURE — 65270000029 HC RM PRIVATE

## 2019-05-21 PROCEDURE — 74011250636 HC RX REV CODE- 250/636: Performed by: ORTHOPAEDIC SURGERY

## 2019-05-21 PROCEDURE — 74011636637 HC RX REV CODE- 636/637: Performed by: ORTHOPAEDIC SURGERY

## 2019-05-21 PROCEDURE — 74011250636 HC RX REV CODE- 250/636

## 2019-05-21 PROCEDURE — P9045 ALBUMIN (HUMAN), 5%, 250 ML: HCPCS

## 2019-05-21 PROCEDURE — 77030018842 HC SOL IRR SOD CL 9% BAXT -A: Performed by: ORTHOPAEDIC SURGERY

## 2019-05-21 PROCEDURE — 77030037694 HC ALLGRFT BN VIA FORM VIVX -G: Performed by: ORTHOPAEDIC SURGERY

## 2019-05-21 PROCEDURE — 77030033138 HC SUT PGA STRATFX J&J -B: Performed by: ORTHOPAEDIC SURGERY

## 2019-05-21 PROCEDURE — 77030004391 HC BUR FLUT MEDT -C: Performed by: ORTHOPAEDIC SURGERY

## 2019-05-21 PROCEDURE — 77030026438 HC STYL ET INTUB CARD -A: Performed by: NURSE ANESTHETIST, CERTIFIED REGISTERED

## 2019-05-21 PROCEDURE — 72040 X-RAY EXAM NECK SPINE 2-3 VW: CPT

## 2019-05-21 PROCEDURE — 77030011267 HC ELECTRD BLD COVD -A: Performed by: ORTHOPAEDIC SURGERY

## 2019-05-21 PROCEDURE — 77030038933 HC CGE SPN FORTCR NANV -G: Performed by: ORTHOPAEDIC SURGERY

## 2019-05-21 PROCEDURE — 77030002996 HC SUT SLK J&J -A: Performed by: ORTHOPAEDIC SURGERY

## 2019-05-21 PROCEDURE — 77030039267 HC ADH SKN EXOFIN S2SG -B: Performed by: ORTHOPAEDIC SURGERY

## 2019-05-21 PROCEDURE — 77030009868 HC PIN DISTR CASPR AESC -B: Performed by: ORTHOPAEDIC SURGERY

## 2019-05-21 PROCEDURE — 77030003029 HC SUT VCRL J&J -B: Performed by: ORTHOPAEDIC SURGERY

## 2019-05-21 PROCEDURE — 0RT30ZZ RESECTION OF CERVICAL VERTEBRAL DISC, OPEN APPROACH: ICD-10-PCS | Performed by: ORTHOPAEDIC SURGERY

## 2019-05-21 PROCEDURE — 77030012961 HC IRR KT CYSTO/TUR ICUM -A: Performed by: ORTHOPAEDIC SURGERY

## 2019-05-21 PROCEDURE — 77030018836 HC SOL IRR NACL ICUM -A: Performed by: ORTHOPAEDIC SURGERY

## 2019-05-21 PROCEDURE — 97116 GAIT TRAINING THERAPY: CPT | Performed by: PHYSICAL THERAPIST

## 2019-05-21 PROCEDURE — 74011000250 HC RX REV CODE- 250

## 2019-05-21 PROCEDURE — 77030008467 HC STPLR SKN COVD -B: Performed by: ORTHOPAEDIC SURGERY

## 2019-05-21 PROCEDURE — 74011250637 HC RX REV CODE- 250/637: Performed by: ORTHOPAEDIC SURGERY

## 2019-05-21 PROCEDURE — 77030040356 HC CORD BPLR FRCP COVD -A: Performed by: ORTHOPAEDIC SURGERY

## 2019-05-21 PROCEDURE — 77030013567 HC DRN WND RESERV BARD -A: Performed by: ORTHOPAEDIC SURGERY

## 2019-05-21 PROCEDURE — 51798 US URINE CAPACITY MEASURE: CPT

## 2019-05-21 PROCEDURE — 77030018846 HC SOL IRR STRL H20 ICUM -A: Performed by: ORTHOPAEDIC SURGERY

## 2019-05-21 PROCEDURE — 77010033678 HC OXYGEN DAILY

## 2019-05-21 PROCEDURE — 77030014647 HC SEAL FBRN TISSL BAXT -D: Performed by: ORTHOPAEDIC SURGERY

## 2019-05-21 PROCEDURE — 77030020782 HC GWN BAIR PAWS FLX 3M -B

## 2019-05-21 PROCEDURE — 97161 PT EVAL LOW COMPLEX 20 MIN: CPT | Performed by: PHYSICAL THERAPIST

## 2019-05-21 PROCEDURE — C1713 ANCHOR/SCREW BN/BN,TIS/BN: HCPCS | Performed by: ORTHOPAEDIC SURGERY

## 2019-05-21 PROCEDURE — 94760 N-INVAS EAR/PLS OXIMETRY 1: CPT

## 2019-05-21 PROCEDURE — 77030039969 HC GRFT BN SUB PRIME HD MUSC -C: Performed by: ORTHOPAEDIC SURGERY

## 2019-05-21 PROCEDURE — 82962 GLUCOSE BLOOD TEST: CPT

## 2019-05-21 PROCEDURE — 76060000035 HC ANESTHESIA 2 TO 2.5 HR: Performed by: ORTHOPAEDIC SURGERY

## 2019-05-21 PROCEDURE — 76000 FLUOROSCOPY <1 HR PHYS/QHP: CPT

## 2019-05-21 PROCEDURE — 0RG20A0 FUSION OF 2 OR MORE CERVICAL VERTEBRAL JOINTS WITH INTERBODY FUSION DEVICE, ANTERIOR APPROACH, ANTERIOR COLUMN, OPEN APPROACH: ICD-10-PCS | Performed by: ORTHOPAEDIC SURGERY

## 2019-05-21 PROCEDURE — 74011250636 HC RX REV CODE- 250/636: Performed by: ANESTHESIOLOGY

## 2019-05-21 PROCEDURE — 77030034475 HC MISC IMPL SPN: Performed by: ORTHOPAEDIC SURGERY

## 2019-05-21 PROCEDURE — 01N10ZZ RELEASE CERVICAL NERVE, OPEN APPROACH: ICD-10-PCS | Performed by: ORTHOPAEDIC SURGERY

## 2019-05-21 PROCEDURE — 77030029099 HC BN WAX SSPC -A: Performed by: ORTHOPAEDIC SURGERY

## 2019-05-21 PROCEDURE — 77030032490 HC SLV COMPR SCD KNE COVD -B: Performed by: ORTHOPAEDIC SURGERY

## 2019-05-21 DEVICE — GRAFT BONE SUB 1CC DEMIN BONE MTRX PRIM HD: Type: IMPLANTABLE DEVICE | Site: SPINE CERVICAL | Status: FUNCTIONAL

## 2019-05-21 DEVICE — IMPLANTABLE DEVICE: Type: IMPLANTABLE DEVICE | Site: SPINE CERVICAL | Status: FUNCTIONAL

## 2019-05-21 DEVICE — FORTICORE® FLAT CERVICAL SPACER 6X16X14, (6°)
Type: IMPLANTABLE DEVICE | Site: SPINE CERVICAL | Status: FUNCTIONAL
Brand: FORTICORE®

## 2019-05-21 DEVICE — FORTIBRIDGE SELF-TAP FIXED SCREW 4.0MM X 16MM
Type: IMPLANTABLE DEVICE | Site: SPINE CERVICAL | Status: FUNCTIONAL
Brand: FORTIBRIDGETM

## 2019-05-21 RX ORDER — LIDOCAINE HYDROCHLORIDE 20 MG/ML
INJECTION, SOLUTION EPIDURAL; INFILTRATION; INTRACAUDAL; PERINEURAL AS NEEDED
Status: DISCONTINUED | OUTPATIENT
Start: 2019-05-21 | End: 2019-05-21 | Stop reason: HOSPADM

## 2019-05-21 RX ORDER — ONDANSETRON 2 MG/ML
4 INJECTION INTRAMUSCULAR; INTRAVENOUS
Status: ACTIVE | OUTPATIENT
Start: 2019-05-21 | End: 2019-05-22

## 2019-05-21 RX ORDER — SODIUM CHLORIDE 0.9 % (FLUSH) 0.9 %
5-40 SYRINGE (ML) INJECTION EVERY 8 HOURS
Status: DISCONTINUED | OUTPATIENT
Start: 2019-05-21 | End: 2019-05-21 | Stop reason: HOSPADM

## 2019-05-21 RX ORDER — OXYCODONE HYDROCHLORIDE 5 MG/1
10 TABLET ORAL
Status: DISCONTINUED | OUTPATIENT
Start: 2019-05-21 | End: 2019-05-26 | Stop reason: HOSPADM

## 2019-05-21 RX ORDER — DULOXETIN HYDROCHLORIDE 30 MG/1
60 CAPSULE, DELAYED RELEASE ORAL DAILY
Status: DISCONTINUED | OUTPATIENT
Start: 2019-05-21 | End: 2019-05-26 | Stop reason: HOSPADM

## 2019-05-21 RX ORDER — GLIMEPIRIDE 1 MG/1
1 TABLET ORAL
Status: DISCONTINUED | OUTPATIENT
Start: 2019-05-22 | End: 2019-05-26 | Stop reason: HOSPADM

## 2019-05-21 RX ORDER — MAGNESIUM SULFATE 100 %
4 CRYSTALS MISCELLANEOUS AS NEEDED
Status: DISCONTINUED | OUTPATIENT
Start: 2019-05-21 | End: 2019-05-26 | Stop reason: HOSPADM

## 2019-05-21 RX ORDER — ASPIRIN 81 MG/1
81 TABLET ORAL DAILY
Status: DISCONTINUED | OUTPATIENT
Start: 2019-05-22 | End: 2019-05-26 | Stop reason: HOSPADM

## 2019-05-21 RX ORDER — SODIUM CHLORIDE 0.9 % (FLUSH) 0.9 %
5-40 SYRINGE (ML) INJECTION EVERY 8 HOURS
Status: DISCONTINUED | OUTPATIENT
Start: 2019-05-21 | End: 2019-05-26 | Stop reason: HOSPADM

## 2019-05-21 RX ORDER — SODIUM CHLORIDE 9 MG/ML
50 INJECTION, SOLUTION INTRAVENOUS CONTINUOUS
Status: DISCONTINUED | OUTPATIENT
Start: 2019-05-21 | End: 2019-05-21 | Stop reason: HOSPADM

## 2019-05-21 RX ORDER — DEXTROSE 50 % IN WATER (D50W) INTRAVENOUS SYRINGE
12.5-25 AS NEEDED
Status: DISCONTINUED | OUTPATIENT
Start: 2019-05-21 | End: 2019-05-24 | Stop reason: SDUPTHER

## 2019-05-21 RX ORDER — HYDROMORPHONE HYDROCHLORIDE 1 MG/ML
1 INJECTION, SOLUTION INTRAMUSCULAR; INTRAVENOUS; SUBCUTANEOUS
Status: ACTIVE | OUTPATIENT
Start: 2019-05-21 | End: 2019-05-22

## 2019-05-21 RX ORDER — SODIUM CHLORIDE, SODIUM LACTATE, POTASSIUM CHLORIDE, CALCIUM CHLORIDE 600; 310; 30; 20 MG/100ML; MG/100ML; MG/100ML; MG/100ML
25 INJECTION, SOLUTION INTRAVENOUS CONTINUOUS
Status: DISCONTINUED | OUTPATIENT
Start: 2019-05-21 | End: 2019-05-21 | Stop reason: HOSPADM

## 2019-05-21 RX ORDER — SODIUM CHLORIDE 0.9 % (FLUSH) 0.9 %
5-40 SYRINGE (ML) INJECTION AS NEEDED
Status: DISCONTINUED | OUTPATIENT
Start: 2019-05-21 | End: 2019-05-26 | Stop reason: HOSPADM

## 2019-05-21 RX ORDER — MIDAZOLAM HYDROCHLORIDE 1 MG/ML
1 INJECTION, SOLUTION INTRAMUSCULAR; INTRAVENOUS AS NEEDED
Status: DISCONTINUED | OUTPATIENT
Start: 2019-05-21 | End: 2019-05-21 | Stop reason: HOSPADM

## 2019-05-21 RX ORDER — FACIAL-BODY WIPES
10 EACH TOPICAL DAILY PRN
Status: DISCONTINUED | OUTPATIENT
Start: 2019-05-23 | End: 2019-05-26 | Stop reason: HOSPADM

## 2019-05-21 RX ORDER — HYDROMORPHONE HYDROCHLORIDE 2 MG/ML
INJECTION, SOLUTION INTRAMUSCULAR; INTRAVENOUS; SUBCUTANEOUS AS NEEDED
Status: DISCONTINUED | OUTPATIENT
Start: 2019-05-21 | End: 2019-05-21 | Stop reason: HOSPADM

## 2019-05-21 RX ORDER — PHENYLEPHRINE HCL IN 0.9% NACL 0.4MG/10ML
SYRINGE (ML) INTRAVENOUS AS NEEDED
Status: DISCONTINUED | OUTPATIENT
Start: 2019-05-21 | End: 2019-05-21 | Stop reason: HOSPADM

## 2019-05-21 RX ORDER — GLYCOPYRROLATE 0.2 MG/ML
INJECTION INTRAMUSCULAR; INTRAVENOUS AS NEEDED
Status: DISCONTINUED | OUTPATIENT
Start: 2019-05-21 | End: 2019-05-21 | Stop reason: HOSPADM

## 2019-05-21 RX ORDER — FENTANYL CITRATE 50 UG/ML
25 INJECTION, SOLUTION INTRAMUSCULAR; INTRAVENOUS
Status: DISCONTINUED | OUTPATIENT
Start: 2019-05-21 | End: 2019-05-21 | Stop reason: HOSPADM

## 2019-05-21 RX ORDER — LIDOCAINE HYDROCHLORIDE 10 MG/ML
0.1 INJECTION, SOLUTION EPIDURAL; INFILTRATION; INTRACAUDAL; PERINEURAL AS NEEDED
Status: DISCONTINUED | OUTPATIENT
Start: 2019-05-21 | End: 2019-05-21 | Stop reason: HOSPADM

## 2019-05-21 RX ORDER — SODIUM CHLORIDE, SODIUM LACTATE, POTASSIUM CHLORIDE, CALCIUM CHLORIDE 600; 310; 30; 20 MG/100ML; MG/100ML; MG/100ML; MG/100ML
75 INJECTION, SOLUTION INTRAVENOUS CONTINUOUS
Status: DISCONTINUED | OUTPATIENT
Start: 2019-05-21 | End: 2019-05-21 | Stop reason: HOSPADM

## 2019-05-21 RX ORDER — METFORMIN HYDROCHLORIDE 500 MG/1
500 TABLET, EXTENDED RELEASE ORAL
Status: DISCONTINUED | OUTPATIENT
Start: 2019-05-21 | End: 2019-05-26 | Stop reason: HOSPADM

## 2019-05-21 RX ORDER — HYDROMORPHONE HYDROCHLORIDE 1 MG/ML
0.2 INJECTION, SOLUTION INTRAMUSCULAR; INTRAVENOUS; SUBCUTANEOUS
Status: DISCONTINUED | OUTPATIENT
Start: 2019-05-21 | End: 2019-05-21 | Stop reason: HOSPADM

## 2019-05-21 RX ORDER — DIAZEPAM 5 MG/1
5 TABLET ORAL
Status: DISCONTINUED | OUTPATIENT
Start: 2019-05-21 | End: 2019-05-26 | Stop reason: HOSPADM

## 2019-05-21 RX ORDER — FUROSEMIDE 40 MG/1
40 TABLET ORAL DAILY
Status: DISCONTINUED | OUTPATIENT
Start: 2019-05-22 | End: 2019-05-26 | Stop reason: HOSPADM

## 2019-05-21 RX ORDER — CEFAZOLIN SODIUM/WATER 2 G/20 ML
2 SYRINGE (ML) INTRAVENOUS EVERY 8 HOURS
Status: COMPLETED | OUTPATIENT
Start: 2019-05-21 | End: 2019-05-22

## 2019-05-21 RX ORDER — GLUCOSAM/CHONDRO/HERB 149/HYAL 750-100 MG
1 TABLET ORAL DAILY
Status: DISCONTINUED | OUTPATIENT
Start: 2019-05-22 | End: 2019-05-26 | Stop reason: HOSPADM

## 2019-05-21 RX ORDER — METOLAZONE 2.5 MG/1
2.5 TABLET ORAL
Status: DISCONTINUED | OUTPATIENT
Start: 2019-05-21 | End: 2019-05-26 | Stop reason: HOSPADM

## 2019-05-21 RX ORDER — DIPHENHYDRAMINE HYDROCHLORIDE 50 MG/ML
12.5 INJECTION, SOLUTION INTRAMUSCULAR; INTRAVENOUS AS NEEDED
Status: DISCONTINUED | OUTPATIENT
Start: 2019-05-21 | End: 2019-05-21 | Stop reason: HOSPADM

## 2019-05-21 RX ORDER — ROCURONIUM BROMIDE 10 MG/ML
INJECTION, SOLUTION INTRAVENOUS AS NEEDED
Status: DISCONTINUED | OUTPATIENT
Start: 2019-05-21 | End: 2019-05-21 | Stop reason: HOSPADM

## 2019-05-21 RX ORDER — OXYCODONE HYDROCHLORIDE 5 MG/1
5 TABLET ORAL
Status: DISCONTINUED | OUTPATIENT
Start: 2019-05-21 | End: 2019-05-26 | Stop reason: HOSPADM

## 2019-05-21 RX ORDER — INSULIN LISPRO 100 [IU]/ML
INJECTION, SOLUTION INTRAVENOUS; SUBCUTANEOUS
Status: DISCONTINUED | OUTPATIENT
Start: 2019-05-21 | End: 2019-05-26 | Stop reason: HOSPADM

## 2019-05-21 RX ORDER — ALBUMIN HUMAN 50 G/1000ML
SOLUTION INTRAVENOUS AS NEEDED
Status: DISCONTINUED | OUTPATIENT
Start: 2019-05-21 | End: 2019-05-21 | Stop reason: HOSPADM

## 2019-05-21 RX ORDER — ONDANSETRON 2 MG/ML
INJECTION INTRAMUSCULAR; INTRAVENOUS AS NEEDED
Status: DISCONTINUED | OUTPATIENT
Start: 2019-05-21 | End: 2019-05-21 | Stop reason: HOSPADM

## 2019-05-21 RX ORDER — ACETAMINOPHEN 500 MG
1000 TABLET ORAL EVERY 6 HOURS
Status: DISCONTINUED | OUTPATIENT
Start: 2019-05-21 | End: 2019-05-26 | Stop reason: HOSPADM

## 2019-05-21 RX ORDER — MIDAZOLAM HYDROCHLORIDE 1 MG/ML
0.5 INJECTION, SOLUTION INTRAMUSCULAR; INTRAVENOUS
Status: DISCONTINUED | OUTPATIENT
Start: 2019-05-21 | End: 2019-05-21 | Stop reason: HOSPADM

## 2019-05-21 RX ORDER — PREGABALIN 75 MG/1
150 CAPSULE ORAL ONCE
Status: DISCONTINUED | OUTPATIENT
Start: 2019-05-21 | End: 2019-05-21 | Stop reason: HOSPADM

## 2019-05-21 RX ORDER — AMOXICILLIN 250 MG
1 CAPSULE ORAL 2 TIMES DAILY
Status: DISCONTINUED | OUTPATIENT
Start: 2019-05-21 | End: 2019-05-26 | Stop reason: HOSPADM

## 2019-05-21 RX ORDER — ACETAMINOPHEN 10 MG/ML
1000 INJECTION, SOLUTION INTRAVENOUS ONCE
Status: COMPLETED | OUTPATIENT
Start: 2019-05-21 | End: 2019-05-21

## 2019-05-21 RX ORDER — ISOSORBIDE MONONITRATE 30 MG/1
30 TABLET, EXTENDED RELEASE ORAL DAILY
Status: DISCONTINUED | OUTPATIENT
Start: 2019-05-22 | End: 2019-05-26 | Stop reason: HOSPADM

## 2019-05-21 RX ORDER — CEFAZOLIN SODIUM/WATER 2 G/20 ML
2 SYRINGE (ML) INTRAVENOUS ONCE
Status: COMPLETED | OUTPATIENT
Start: 2019-05-21 | End: 2019-05-21

## 2019-05-21 RX ORDER — SODIUM CHLORIDE 0.9 % (FLUSH) 0.9 %
5-40 SYRINGE (ML) INJECTION AS NEEDED
Status: DISCONTINUED | OUTPATIENT
Start: 2019-05-21 | End: 2019-05-21 | Stop reason: HOSPADM

## 2019-05-21 RX ORDER — NALOXONE HYDROCHLORIDE 0.4 MG/ML
0.4 INJECTION, SOLUTION INTRAMUSCULAR; INTRAVENOUS; SUBCUTANEOUS AS NEEDED
Status: DISCONTINUED | OUTPATIENT
Start: 2019-05-21 | End: 2019-05-26 | Stop reason: HOSPADM

## 2019-05-21 RX ORDER — NEOSTIGMINE METHYLSULFATE 1 MG/ML
INJECTION INTRAVENOUS AS NEEDED
Status: DISCONTINUED | OUTPATIENT
Start: 2019-05-21 | End: 2019-05-21 | Stop reason: HOSPADM

## 2019-05-21 RX ORDER — FENTANYL CITRATE 50 UG/ML
50 INJECTION, SOLUTION INTRAMUSCULAR; INTRAVENOUS AS NEEDED
Status: DISCONTINUED | OUTPATIENT
Start: 2019-05-21 | End: 2019-05-21 | Stop reason: HOSPADM

## 2019-05-21 RX ORDER — PRAVASTATIN SODIUM 10 MG/1
20 TABLET ORAL
Status: DISCONTINUED | OUTPATIENT
Start: 2019-05-21 | End: 2019-05-26 | Stop reason: HOSPADM

## 2019-05-21 RX ORDER — MELATONIN
5000 DAILY
Status: DISCONTINUED | OUTPATIENT
Start: 2019-05-22 | End: 2019-05-26 | Stop reason: HOSPADM

## 2019-05-21 RX ORDER — EPHEDRINE SULFATE/0.9% NACL/PF 50 MG/5 ML
SYRINGE (ML) INTRAVENOUS AS NEEDED
Status: DISCONTINUED | OUTPATIENT
Start: 2019-05-21 | End: 2019-05-21 | Stop reason: HOSPADM

## 2019-05-21 RX ORDER — SODIUM CHLORIDE 9 MG/ML
125 INJECTION, SOLUTION INTRAVENOUS CONTINUOUS
Status: DISPENSED | OUTPATIENT
Start: 2019-05-21 | End: 2019-05-22

## 2019-05-21 RX ORDER — FENTANYL CITRATE 50 UG/ML
INJECTION, SOLUTION INTRAMUSCULAR; INTRAVENOUS AS NEEDED
Status: DISCONTINUED | OUTPATIENT
Start: 2019-05-21 | End: 2019-05-21 | Stop reason: HOSPADM

## 2019-05-21 RX ORDER — ONDANSETRON 2 MG/ML
4 INJECTION INTRAMUSCULAR; INTRAVENOUS AS NEEDED
Status: DISCONTINUED | OUTPATIENT
Start: 2019-05-21 | End: 2019-05-21 | Stop reason: HOSPADM

## 2019-05-21 RX ORDER — CARVEDILOL 12.5 MG/1
12.5 TABLET ORAL 2 TIMES DAILY WITH MEALS
Status: DISCONTINUED | OUTPATIENT
Start: 2019-05-21 | End: 2019-05-26 | Stop reason: HOSPADM

## 2019-05-21 RX ORDER — POLYETHYLENE GLYCOL 3350 17 G/17G
17 POWDER, FOR SOLUTION ORAL DAILY
Status: DISCONTINUED | OUTPATIENT
Start: 2019-05-22 | End: 2019-05-26 | Stop reason: HOSPADM

## 2019-05-21 RX ORDER — LISINOPRIL 5 MG/1
2.5 TABLET ORAL DAILY
Status: DISCONTINUED | OUTPATIENT
Start: 2019-05-22 | End: 2019-05-26 | Stop reason: HOSPADM

## 2019-05-21 RX ORDER — HYDROXYZINE HYDROCHLORIDE 10 MG/1
10 TABLET, FILM COATED ORAL
Status: DISCONTINUED | OUTPATIENT
Start: 2019-05-21 | End: 2019-05-26 | Stop reason: HOSPADM

## 2019-05-21 RX ORDER — PROPOFOL 10 MG/ML
INJECTION, EMULSION INTRAVENOUS AS NEEDED
Status: DISCONTINUED | OUTPATIENT
Start: 2019-05-21 | End: 2019-05-21 | Stop reason: HOSPADM

## 2019-05-21 RX ORDER — FAMOTIDINE 20 MG/1
20 TABLET, FILM COATED ORAL 2 TIMES DAILY
Status: DISCONTINUED | OUTPATIENT
Start: 2019-05-21 | End: 2019-05-21 | Stop reason: DRUGHIGH

## 2019-05-21 RX ORDER — FAMOTIDINE 20 MG/1
20 TABLET, FILM COATED ORAL EVERY EVENING
Status: DISCONTINUED | OUTPATIENT
Start: 2019-05-21 | End: 2019-05-26 | Stop reason: HOSPADM

## 2019-05-21 RX ORDER — OXYCODONE AND ACETAMINOPHEN 5; 325 MG/1; MG/1
1 TABLET ORAL AS NEEDED
Status: DISCONTINUED | OUTPATIENT
Start: 2019-05-21 | End: 2019-05-21 | Stop reason: HOSPADM

## 2019-05-21 RX ORDER — CYCLOBENZAPRINE HCL 10 MG
10 TABLET ORAL
Status: DISCONTINUED | OUTPATIENT
Start: 2019-05-21 | End: 2019-05-26 | Stop reason: HOSPADM

## 2019-05-21 RX ADMIN — Medication 80 MCG: at 08:42

## 2019-05-21 RX ADMIN — SODIUM CHLORIDE 125 ML/HR: 900 INJECTION, SOLUTION INTRAVENOUS at 10:05

## 2019-05-21 RX ADMIN — FENTANYL CITRATE 50 MCG: 50 INJECTION, SOLUTION INTRAMUSCULAR; INTRAVENOUS at 07:32

## 2019-05-21 RX ADMIN — ROCURONIUM BROMIDE 10 MG: 10 INJECTION, SOLUTION INTRAVENOUS at 08:54

## 2019-05-21 RX ADMIN — LIDOCAINE HYDROCHLORIDE 100 MG: 20 INJECTION, SOLUTION EPIDURAL; INFILTRATION; INTRACAUDAL; PERINEURAL at 07:32

## 2019-05-21 RX ADMIN — PROPOFOL 200 MG: 10 INJECTION, EMULSION INTRAVENOUS at 07:32

## 2019-05-21 RX ADMIN — Medication 2 G: at 15:08

## 2019-05-21 RX ADMIN — ALBUMIN HUMAN 250 ML: 50 SOLUTION INTRAVENOUS at 08:15

## 2019-05-21 RX ADMIN — ROCURONIUM BROMIDE 5 MG: 10 INJECTION, SOLUTION INTRAVENOUS at 07:32

## 2019-05-21 RX ADMIN — HYDROMORPHONE HYDROCHLORIDE 0.4 MG: 2 INJECTION, SOLUTION INTRAMUSCULAR; INTRAVENOUS; SUBCUTANEOUS at 09:14

## 2019-05-21 RX ADMIN — INSULIN LISPRO 2 UNITS: 100 INJECTION, SOLUTION INTRAVENOUS; SUBCUTANEOUS at 17:25

## 2019-05-21 RX ADMIN — METFORMIN HYDROCHLORIDE 500 MG: 500 TABLET, EXTENDED RELEASE ORAL at 17:19

## 2019-05-21 RX ADMIN — ONDANSETRON 4 MG: 2 INJECTION INTRAMUSCULAR; INTRAVENOUS at 09:23

## 2019-05-21 RX ADMIN — Medication 2 G: at 07:27

## 2019-05-21 RX ADMIN — FENTANYL CITRATE 50 MCG: 50 INJECTION, SOLUTION INTRAMUSCULAR; INTRAVENOUS at 07:27

## 2019-05-21 RX ADMIN — Medication 10 ML: at 15:08

## 2019-05-21 RX ADMIN — GLYCOPYRROLATE 0.6 MG: 0.2 INJECTION INTRAMUSCULAR; INTRAVENOUS at 09:27

## 2019-05-21 RX ADMIN — Medication 20 MG: at 08:26

## 2019-05-21 RX ADMIN — ALBUMIN HUMAN 250 ML: 50 SOLUTION INTRAVENOUS at 08:40

## 2019-05-21 RX ADMIN — ROCURONIUM BROMIDE 35 MG: 10 INJECTION, SOLUTION INTRAVENOUS at 07:39

## 2019-05-21 RX ADMIN — SODIUM CHLORIDE, SODIUM LACTATE, POTASSIUM CHLORIDE, AND CALCIUM CHLORIDE 25 ML/HR: 600; 310; 30; 20 INJECTION, SOLUTION INTRAVENOUS at 07:13

## 2019-05-21 RX ADMIN — ACETAMINOPHEN 1000 MG: 500 TABLET ORAL at 17:28

## 2019-05-21 RX ADMIN — METOLAZONE 2.5 MG: 2.5 TABLET ORAL at 17:25

## 2019-05-21 RX ADMIN — Medication 80 MCG: at 08:01

## 2019-05-21 RX ADMIN — FAMOTIDINE 20 MG: 20 TABLET ORAL at 17:19

## 2019-05-21 RX ADMIN — Medication 120 MCG: at 07:42

## 2019-05-21 RX ADMIN — PRAVASTATIN SODIUM 20 MG: 10 TABLET ORAL at 22:52

## 2019-05-21 RX ADMIN — NEOSTIGMINE METHYLSULFATE 3 MG: 1 INJECTION INTRAVENOUS at 09:30

## 2019-05-21 RX ADMIN — SODIUM CHLORIDE 125 ML/HR: 900 INJECTION, SOLUTION INTRAVENOUS at 19:17

## 2019-05-21 RX ADMIN — Medication 20 MG: at 09:19

## 2019-05-21 RX ADMIN — ACETAMINOPHEN 1000 MG: 500 TABLET ORAL at 13:45

## 2019-05-21 RX ADMIN — CARVEDILOL 12.5 MG: 12.5 TABLET, FILM COATED ORAL at 15:15

## 2019-05-21 RX ADMIN — Medication 80 MCG: at 08:17

## 2019-05-21 RX ADMIN — ACETAMINOPHEN 1000 MG: 10 INJECTION, SOLUTION INTRAVENOUS at 07:13

## 2019-05-21 NOTE — CDMP QUERY
Pt admitted with CERVACALGIA/STENOSIS s/p C3-C6  ANTERIOR CERVICAL DISCECTOMY AND FUSION  and has CHF documented. Please further specify type and acuity of CHF in the medical record. ? Acute on Chronic Systolic CHF ? Acute on Chronic Diastolic CHF ? Acute on Chronic Systolic and Diastolic CHF ? Acute Systolic CHF ? Acute Diastolic CHF ? Acute Systolic and Diastolic CHF ? Chronic Systolic CHF ? Chronic Diastolic CHF ? Chronic Systolic and Diastolic CHF 
? Other, please specify ? Clinically unable to determine The medical record reflects the following: 
  Risk Factors: Htn, DM hx chf 
  Clinical Indicators: Echo 4/24/18 EF 93-31%(GWMFJ 2 diastolic dysfunction) cardiac clearance prior to surgery. Treatment: coreg,Lasix, imdur, lisinopril, zocor Thank You Stacey Alex, [address] [facility] 
   [phone]

## 2019-05-21 NOTE — PERIOP NOTES
Patient: Sherrell Nova MRN: [id number]  SSN: [id number]   YOB: 1945  Age: 72 y.o. Sex: male     Patient is status post Procedure(s):  C3-C6  ANTERIOR CERVICAL DISCECTOMY AND FUSION. Surgeon(s) and Role:     Michelle Westbrook MD - Primary                 Peripheral IV 05/21/19 Left Wrist (Active)   Site Assessment Clean, dry, & intact 5/21/2019  7:12 AM   Phlebitis Assessment 0 5/21/2019  7:12 AM   Infiltration Assessment 0 5/21/2019  7:12 AM   Dressing Status Clean, dry, & intact 5/21/2019  7:12 AM   Dressing Type Tape;Transparent 5/21/2019  7:12 AM   Hub Color/Line Status Green; Infusing;Patent 5/21/2019  7:12 AM          Kalyani Scot #1 05/21/19 Left Neck (Active)   Site Assessment Clean, dry, & intact 5/21/2019  8:14 AM   Dressing Status Clean, dry, & intact 5/21/2019  8:14 AM   Drainage Description Sanguinous 5/21/2019  8:14 AM   Matt Drain Airleak No 5/21/2019  8:14 AM   Status Patent; Charged;Draining 5/21/2019  8:14 AM   Y Connector Used No 5/21/2019  8:14 AM      Airway - Endotracheal Tube 05/21/19 Oral (Active)   Line Bradly Lips 5/21/2019 12:00 AM                   Dressing/Packing:  Wound Neck Left Exofin fusion, biopatch, tegaderm, cervical collar-Dressing Type: Topical skin adhesive/glue(biopatch, tegaderm) (05/21/19 5907)    Splint/Cast: Wound Neck Left Exofin fusion, biopatch, tegaderm, cervical collar-Splint Type/Material: Cervical Collar]

## 2019-05-21 NOTE — PROGRESS NOTES
TRANSFER - OUT REPORT:    Verbal report given to muna (name) on Sagar Allison  being transferred to ortho(unit) for routine post - op       Report consisted of patients Situation, Background, Assessment and   Recommendations(SBAR). Information from the following report(s) SBAR was reviewed with the receiving nurse. Lines:   Peripheral IV 05/21/19 Left Wrist (Active)   Site Assessment Clean, dry, & intact 5/21/2019  9:43 AM   Phlebitis Assessment 0 5/21/2019  9:43 AM   Infiltration Assessment 0 5/21/2019  9:43 AM   Dressing Status Clean, dry, & intact 5/21/2019  9:43 AM   Dressing Type Transparent;Tape 5/21/2019  9:43 AM   Hub Color/Line Status Green; Infusing;Patent 5/21/2019  9:43 AM        Opportunity for questions and clarification was provided.       Patient transported with:   [employer]

## 2019-05-21 NOTE — PROGRESS NOTES
Problem: Mobility Impaired (Adult and Pediatric)  Goal: *Acute Goals and Plan of Care (Insert Text)  Description  Physical Therapy Goals  Initiated 5/21/2019    1. Patient will move from supine to sit and sit to supine  in bed with modified independence within 4 days. 2. Patient will perform sit to stand with modified independence within 4 days. 3. Patient will ambulate with modified independence for 200 feet with the least restrictive device within 4 days. 4. Patient will ascend/descend 6 stairs with 1 handrail(s) with modified independence within 4 days. 5. Patient will verbalize and demonstrate understanding of spinal precautions (No bending, lifting greater than 5 lbs, or twisting; log-roll technique; frequent repositioning as instructed) within 4 days. Outcome: Progressing Towards Goal  PHYSICAL THERAPY EVALUATION  Patient: Silvia Yuan (71 y.o. male)  Date: 5/21/2019  Primary Diagnosis: Cervical stenosis of spinal canal [M48.02]  Procedure(s) (LRB):  C3-C6  ANTERIOR CERVICAL DISCECTOMY AND FUSION (N/A) Day of Surgery   Precautions:   Back, Fall    ASSESSMENT :  Based on the objective data described below, the patient presents with decreased functional mobility from baseline level of function. Prior to admit patient was independent with mobility. Lives with family in a 1 level home with approx 4-6 NATY with rail. Currently needs Blake for bed mobility with HOB elevated. Sit to stand with CGA x 2 with stable vitals. Amb approx 40 feet with RW and CGA-Blake secondary to intermittent path deviations and slow bony. Patient returned to bed with Blake x 1 and cues for technique. [facility] and may do better with RW for support. Will continue to follow. Patient will benefit from skilled intervention to address the above impairments. Patient?s rehabilitation potential is considered to be Good  Factors which may influence rehabilitation potential include:   ? None noted  ? Mental ability/status  ? Medical condition  ? Home/family situation and support systems  ? Safety awareness  ? Pain tolerance/management  ? Other:      PLAN :  Recommendations and Planned Interventions:  ?           Bed Mobility Training             ? Neuromuscular Re-Education  ? Transfer Training                   ? Orthotic/Prosthetic Training  ? Gait Training                         ? Modalities  ? Therapeutic Exercises           ? Edema Management/Control  ? Therapeutic Activities            ? Patient and Family Training/Education  ? Other (comment):    Frequency/Duration: Patient will be followed by physical therapy  twice daily to address goals. Discharge Recommendations: Home Health  Further Equipment Recommendations for Discharge: may need RW pending progress      SUBJECTIVE:   Patient stated ? I'm pretty numb in my arms and feet. ?    OBJECTIVE DATA SUMMARY:   HISTORY:    Past Medical History:   Diagnosis Date    Arthritis     Asthma     CAD (coronary artery disease)     Calculus of kidney     Carotid artery disease (HCC)     Cervical herniated disc     Congestive heart failure ([address]     Diabetes ([address]     Diabetic shock due to low blood sugar ([facility] 2016    Hypercholesterolemia     Hypertension     Morbid obesity ([address]     Rheumatic fever     When he was 17    Sleep apnea      Past Surgical History:   Procedure Laterality Date    HX CAROTID STENT  1990's    HX CHOLECYSTECTOMY      HX CIRCUMCISION      HX HEENT Left     ear torn off and repaired    HX ORTHOPAEDIC Right     repair    HX OTHER SURGICAL Right     Right hand reconstruction     Prior Level of Function/Home Situation: Independent with mobility.   Reports some numbness in arms and feet at baseline  Personal factors and/or comorbidities impacting plan of care:     Home Situation  Home Environment: Private residence  # Steps to Enter: [address] to Enter: Yes  Hand Rails : Bilateral  One/Two Story Residence: One story  # of Interior Steps: 12  Interior Rails: Right  Living Alone: No  Support Systems: Family member(s)  Patient Expects to be Discharged to[de-identified] Private residence  Current DME Used/Available at Home: CPAP, Glucometer, Raised toilet seat  Tub or Shower Type: Tub/Shower combination    EXAMINATION/PRESENTATION/DECISION MAKING:   Critical Behavior:  Neurologic State: Alert  Orientation Level: Oriented X4  Cognition: Appropriate safety awareness, Follows commands       Range Of Motion:  AROM: Generally decreased, functional                       Strength:    Strength: Generally decreased, functional                    Tone & Sensation:                                  Coordination:     Vision:      Functional Mobility:  Bed Mobility:  Rolling: Minimum assistance  Supine to Sit: Minimum assistance  Sit to Supine: Minimum assistance  Scooting: Minimum assistance  Transfers:  Sit to Stand: Contact guard assistance;Assist x2  Stand to Sit: Contact guard assistance;Assist x2                       Balance:   Sitting: Intact  Standing: Impaired  Standing - Static: Fair  Standing - Dynamic : Fair  Ambulation/Gait Training:  Distance (ft): 40 Feet (ft)     Ambulation - Level of Assistance: Contact guard assistance; Additional time;Assist x1        Gait Abnormalities: Decreased step clearance; Path deviations; Shuffling gait        Base of Support: Widened     Speed/Noemi: Pace decreased (<100 feet/min); Shuffled; Slow  Step Length: Left shortened;Right shortened         Pain:  Pain Scale 1: Numeric (0 - 10)  Pain Intensity 1: 0  Pain Location 1: Neck; Incisional  Pain Orientation 1: Anterior; Left  Pain Description 1: Sore;Aching  Pain Intervention(s) 1: Declines  Activity Tolerance:   VSS  Please refer to the flowsheet for vital signs taken during this treatment. After treatment:   ?         Patient left in no apparent distress sitting up in chair  ? Patient left in no apparent distress in bed  ? Call bell left within reach  ? Nursing notified  ? Caregiver present  ? Bed alarm activated    COMMUNICATION/EDUCATION:   The patient?s plan of care was discussed with: Physical Therapist, Occupational Therapist and Registered Nurse. ?         Fall prevention education was provided and the patient/caregiver indicated understanding. ? Patient/family have participated as able in goal setting and plan of care. ?         Patient/family agree to work toward stated goals and plan of care. ?         Patient understands intent and goals of therapy, but is neutral about his/her participation. ? Patient is unable to participate in goal setting and plan of care.     Thank you for this referral.  Stephanie Aburto, PT, DPT   Time Calculation: 15 mins

## 2019-05-21 NOTE — PROGRESS NOTES
TRANSFER - IN REPORT:    Verbal report received from delfino arvizu(name) on Hawa Breath  being received from pacu(unit) for routine post - op      Report consisted of patients Situation, Background, Assessment and   Recommendations(SBAR). Information from the following report(s) SBAR was reviewed with the receiving nurse. Opportunity for questions and clarification was provided. Assessment completed upon patients arrival to unit and care assumed.

## 2019-05-21 NOTE — DISCHARGE INSTRUCTIONS
[address] [facility]    Discharge Instruction Sheet: Anterior Cervical Fusion    DR. Amadou Stallworth    Pain control:  Typically, we will prescribe a narcotic usually 1-2 tabs every four hours is sufficient for the pain. Most patients need this only for the first few weeks. You should discontinue this as the pain decreases. You should not drive while taking any narcotic pain medications. Constipation  Pain medicines and anesthesia can be constipating-this can be prevented by gentle physical activity and drinking plenty of fluid. It should be treated with over-the-counter medications such as Miralax or suppositories, and/or Fleets enema. You should have a bowel movement at least every other day following surgery. Incision care   Keep this area clean and dry. Do not remove the dressing. DO NOT take a tub bath or go swimming until cleared by your doctor. DO NOT apply lotions, oils, or creams to incision. Cover the wound with an impermiable dressing to shower for then next 5 days, then no cover is needed. Wear cervical collar for comfort. If staples are in place, they should be removed about 14-20 days after surgery. To increase and promote healing:   Stop Smoking (or at least cut back on smoking).  Eat a well-balanced diet (high in protein and vitamin C)   If your appetite is poor, consider nutritional supplements like Ensure, Glucerna, or [place] Instant Breakfast.   If you are diabetic, controlling you blood sugars is very important to prevent infection and promote wound healing. Nutrition:   If you were on a supplement such as Ensure or Glucerna) while in the hospital, please continue using them with each meal for the next 30 days.    Eat a well-balanced diet - High in protein, high in vitamins and minerals, especially vitamin C and zinc.     Restrictions:  Limited bending at waist  Lift no more than 10 pounds    Warning signs :   Please call your physician IMMEDIATELY at [phone] if you have:   If you have throat soreness that worsens   If you have difficulty swallowing.  If you have any difficulty breathing   Bleeding from incision that is constant.  Change in mental status (unusual behavior or confusion)   If your incision develops redness or swelling   Change in wound drainage (increase in amount, color, or foul odor)   [place] over 101.5 degrees Fahrenheit    Headache that is not relieved with pain medication   Tenderness or redness in the calf of your leg    Emergency: CALL 911 if you have:   Any Difficulty Breathing or Shortness of breath   Difficulty Swallowing   Chest pain   Localized chest pain when coughing or taking a deep breath    Jenness Clubs Sr    Follow-up  Please call Dr. Vasquez Standing office for a follow up appointment in 2 weeks at [phone]. You can return to work when cleared by a physician. During normal business hours you may reach Dr. Chelsi Coelho' team directly at [phone] if you have concerns or questions.

## 2019-05-21 NOTE — ANESTHESIA PREPROCEDURE EVALUATION
Relevant Problems   No relevant active problems       Anesthetic History   No history of anesthetic complications            Review of Systems / Medical History  Patient summary reviewed, nursing notes reviewed and pertinent labs reviewed    Pulmonary        Sleep apnea: CPAP  Shortness of breath  Asthma     Comments: Former smoker - Quit 1963   Neuro/Psych             Comments: Cervical Spondylosis with myelopathy/Cervicalgia Cardiovascular    Hypertension      CHF: orthopnea, dyspnea on exertion    CAD and hyperlipidemia    Exercise tolerance: <4 METS  Comments: Carotid Stenosis s/p Carotid Stent   GI/Hepatic/Renal         Renal disease: stones       Endo/Other    Diabetes    Obesity and arthritis     Other Findings              Physical Exam    Airway  Mallampati: II  TM Distance: 4 - 6 cm  Neck ROM: normal range of motion, short neck   Mouth opening: Normal     Cardiovascular  Regular rate and rhythm,  S1 and S2 normal,  no murmur, click, rub, or gallop  Rhythm: regular  Rate: normal         Dental  No notable dental hx       Pulmonary  Breath sounds clear to auscultation               Abdominal  GI exam deferred       Other Findings            Anesthetic Plan    ASA: 3  Anesthesia type: general          Induction: Intravenous  Anesthetic plan and risks discussed with: Patient

## 2019-05-21 NOTE — ANESTHESIA POSTPROCEDURE EVALUATION
Procedure(s):  C3-C6  ANTERIOR CERVICAL DISCECTOMY AND FUSION. general    Anesthesia Post Evaluation        Patient location during evaluation: PACU  Patient participation: complete - patient participated  Level of consciousness: awake and alert  Pain management: adequate  Airway patency: patent  Anesthetic complications: no  Cardiovascular status: acceptable  Respiratory status: acceptable  Hydration status: acceptable  Comments: Seen and ready for discharge  Post anesthesia nausea and vomiting:  none      Vitals Value Taken Time   /79 5/21/2019 10:15 AM   Temp 37.1 °C (98.7 °F) 5/21/2019  9:52 AM   Pulse 79 5/21/2019 10:23 AM   Resp 18 5/21/2019 10:23 AM   SpO2 95 % 5/21/2019 10:23 AM   Vitals shown include unvalidated device data.

## 2019-05-21 NOTE — DIABETES MGMT
DTC Post Surgical Education Note    Chart reviewed and initial evaluation complete on Leonie Katelyn Lowe Met with patient, son, and daughter-in-law. Patient is followed by Dr. Simran Doran for his diabetes control. His family states that they know when he is with his other son, which he has been with up until recently, he eats out frequently and his son will enable his poor dietary choices. Discussed the importance of tight glycemic control post-op. Discussed meal planning in detail to daughter in law as she is the one who will be preparing his meals. Current hospital DM medication: Lispro Correctional insulin with normal sensitivity, Metformin  mg acd      Assessed and instructed patient on the following interpretation of lab results, blood sugar goals, complications of diabetes mellitus, hypoglycemia prevention and treatment, insulin adjustments, nutrition, referred to Diabetes Educator and self-injection of insulin, risk of infection/ delayed healing. Provided patient with the following:        [x]        Survival skills education materials       [x]       Post surgery BG management guidelines              [x]        Outpatient DTC contact number                [x]        Glucometer- Contour Next EZ- patient states old meter has dead batteries and is over 8years old        Patient is a 72 y.o. male with known Type 2 diabetes.      A1c:   POC Glucose last 24hrs:   Lab Results   Component Value Date/Time    Glucose (POC) 141 (H) 05/21/2019 04:14 PM    Glucose (POC) 217 (H) 05/21/2019 01:46 PM    Glucose (POC) 151 (H) 05/21/2019 09:49 AM    Glucose (POC) 185 (H) 05/21/2019 07:15 AM    Glucose (POC) 169 (H) 03/28/2019 11:52 AM       Recent Glucose Results:   Lab Results   Component Value Date/Time    GLUCPOC 141 (H) 05/21/2019 04:14 PM    GLUCPOC 217 (H) 05/21/2019 01:46 PM    GLUCPOC 151 (H) 05/21/2019 09:49 AM        Lab Results   Component Value Date/Time    Creatinine 1.56 (H) 05/16/2019 09:56 AM     Estimated Creatinine Clearance: 45.2 mL/min (A) (based on SCr of 1.56 mg/dL (H)). Active Orders   Diet    DIET DIABETIC WITH OPTIONS Consistent Carb 2000kcal; Regular        PO intake: No data found. Will continue to follow as needed. Thank you.   Marck Pearson, RD, [address]  Office:  [phone]    Time spent: 35 minutes

## 2019-05-21 NOTE — H&P
Progress notes        Subjective:      Patient ID: Sadaf Cabezas is a 72 y.o. male.     Chief Complaint: Pain of the Neck        HPI:  Sadaf Cabezas is a 72 y.o. male with complaints of neck pain radiating into the bilateral shoulder down the BUE to the hands with weakness. Previously he had bilateral 1/5 deltoid and 3/5 intrinsics weaknesses, +3 reflexes, positive bilateral haq's, and positive Romberg's. His primary complaint is the BUE pain and weakness. He has tried CBD cream. It is rated 9 out of 10 on the VAS.             Patient Active Problem List     Diagnosis Date Noted    Congestive heart failure 04/23/2018    Pure hypercholesterolemia 04/23/2018    Obesity, morbid 04/20/2018    Shortness of breath 04/20/2018    Hyperuricemia 06/06/2013    Essential hypertension, benign 05/06/2013    Hyperlipidemia 05/06/2013    Cardiomyopathy 05/31/2012    Onychomycosis 05/31/2012    Obstructive sleep apnea 12/03/2010    Non-insulin dependent type 2 diabetes mellitus 01/29/2010            Current Outpatient Medications:     acetaminophen (TYLENOL) 325 MG tablet, Take 325 mg by mouth every 4 (four) hours as needed  , Disp: , Rfl:     aspirin 81 MG tablet, Take 81 mg by mouth daily  , Disp: , Rfl:     carvedilol (COREG) 12.5 MG tablet, TAKE 1 TABLET BY MOUTH TWICE DAILY, Disp: , Rfl:     Cholecalciferol (VITAMIN D3) 5000 units capsule, Take 5,000 Units by mouth daily, Disp: , Rfl:     DOCOSAHEXAENOIC ACID PO, Take 1 capsule by mouth daily, Disp: , Rfl:     DULoxetine (CYMBALTA) 60 MG capsule, Take 60 mg by mouth daily  , Disp: , Rfl:     furosemide (LASIX) 40 MG tablet, Take 40 mg by mouth daily  , Disp: , Rfl:     glimepiride (AMARYL) 4 MG tablet, Take 4 mg by mouth every morning before breakfast  , Disp: , Rfl:     isosorbide mononitrate (IMDUR) 30 MG 24 hr tablet, Take 30 mg by mouth daily  , Disp: , Rfl:     lisinopril (PRINIVIL,ZESTRIL) 2.5 MG tablet, TAKE 1 TABLET BY MOUTH ONCE DAILY *RESTART 6/7/2018*, Disp: , Rfl:     metFORMIN XR (GLUCOPHATE-XR) 500 MG 24 hr tablet, Take 500 mg by mouth daily with breakfast  , Disp: , Rfl:     metolazone (ZAROXOLYN) 5 MG tablet, Take 5 mg by mouth daily  , Disp: , Rfl:     simvastatin (ZOCOR) 10 MG tablet, TAKE 1 TABLET BY MOUTH NIGHTLY, Disp: , Rfl:            Allergies   Allergen Reactions    Morphine Anaphylaxis    Gabapentin         Other reaction(s): Other (comments)  Made him \"loopy\"       Pregabalin         Other reaction(s): Other (comments)  Makes him \"loopy\"            ROS:   No new bowel or bladder incontinence. No fever. No saddle anesthesia.     Objective:          Vitals:     04/26/19 1135   BP: 128/78         Body mass index is 39.5 kg/m². , a BMI over 30 is considered obese and a BMI over 40 has been associated with a higher risk of surgical complications.     Constitutional: No acute distress. Well nourished. HEENT: Normocephalic. Respiratory:  No labored breathing. Cardiovascular:  No marked cyanosis. Skin:  No marked skin ulcers/lesions on bilateral upper or lower extremities. Psychiatric: Alert and oriented x3. Inspection: No gross deformity of bilateral upper or lower extremities. Musculoskeletal/Neurological:   Gait/Balance:  - Normal.  No instability tandem walking.   Neck:  - No tenderness to palpation   - Full range of motion  Right upper extremity:  - No tenderness to palpation  - Full range of motion  - Strength:  - 2 out of 5 to deltoid  - 5 out of 5 to biceps  - 3 out of 5 to wrist extensors  - 5 out of 5 to triceps  - 3 out of 5 to finger flexors  - 3 out of 5 to intrinsics  Left upper extremity:  - No tenderness to palpation  - Full range of motion  - Strength:  - 2 out of 5 to deltoid  - 5 out of 5 to biceps  - 3 out of 5 to wrist extensors  - 5 out of 5 to triceps  - 3 out of 5 to finger flexors  - 3 out of 5 to intrinsics  Sensation:  - Intact to light touch  Reflexes:  - +2 biceps   - +2 brachioradialis  - +2 triceps  Negative Herman's bilaterally.          Radiographs:           No imaging obtained   I independently reviewed the cervical spine MRI done at [facility] in March which shows stenosis from C3-T1 with a C3-C4 myelopathy. Assessment:          ICD-10-CM   1. Cervicalgia M54.2   2. Cervical spondylosis with myelopathy M47.12   3. Cervical radiculopathy M54.12   4. Spinal stenosis in cervical region M48.02   5. Diabetic polyneuropathy associated with type 2 diabetes mellitus E11.42   6. Bilateral carpal tunnel syndrome G56.03         Plan:      I reviewed the findings of the MRI and discussed treatment options with Mr. Jose C Peck today. He has stenosis from C3-T1 with a C3-C4 myelopathy causing his current pain and bilateral 3/5 wrist extensor and  strength and 1/5 deltoid weaknesses. He also has some damage from neuropathy, cubital tunnel and carpal tunnel as evidenced by the BUE EMGs. C3-C6 appear to be the worst levels as per the deltoid weakness and myelomalacia so I scheduled a C3-C6 ACDF. I cannot guarantee resolution of his total symptoms following surgery given the multitude of concurrent symptoms, but we can expect to eliminate the chance for paralysis and the deltoids may. He understands this and wishes to proceed with surgery. He must have cardiac clearance prior to surgery.  He may need to return for a second ACDF at a later date to address the bilateral hand weakness.      I have discussed the procedure in detail with the patient and mentioned complications, including but not limited to: death, permanent disability, heart attack, stroke, lung injury or infection, blindness, ileus, bladder or bowel problems, ureter injury, bleeding, nerve injury (including numbness, pain and weakness), paralysis (which may be permanent), failure to heal, failure to fuse bone together in fusion procedures, failure to relief symptoms, failure to relief pain, increased pain, need for further surgeries, failure or breakage or hardware, malpositioning of hardware, need to fuse or operate on additional levels determined either during or after surgery, destabilization of the spine (which may require fusion or later surgery), infections (which may or may not require additional surgery), dural tears (tears of the sac holding in nerves and spinal fluid), meningitis, voice changes, vocal cord injury, hoarseness, blood clots, pulmonary embolus, Sreedhar syndrome, recurrent disc herniation, diaphragm paralysis, and anesthetic complications. Comorbidities such as obesity, smoking, rheumatoid arthritis, chronic steroid use and diabetes increase these risks. The patient understands and wants to proceed.      The patient has been prescribed a rigid cervical collar pre-operatively for pain relief. The orthosis is medically necessary to reduce pain by restricting mobility of the trunk and to otherwise support weak spinal muscles and/or deformed spine. The patient will meet with our bracing coordinator to be fit for the brace. Follow up 2 weeks after surgery.            Orders Placed This Encounter    Surgical Posting Sheet    BMI >=25 PATIENT INSTRUCTIONS & EDUCATION      Return for Follow up 2 weeks after surgery.         Charting performed by Rhoda Royal in the presence of Pamela Tavarez MD.     IPamela MD, personally performed the services described in this documentation, as recorded by the scribe in my presence and it accurately and completely records my words and actions.     This note has been transcribed electronically using voice recognition and a trained scribe. It is believed to be accurate, but may contain errors secondary to technological limitations and other factors.

## 2019-05-21 NOTE — PERIOP NOTES
Handoff Report from Operating Room to PACU    Report received from Phuc Grant RN and TAYLER Vidales regarding Moy Hamilton.      Surgeon(s):  Cherylene Needy, MD  And Procedure(s) (LRB):  C3-C6  ANTERIOR CERVICAL DISCECTOMY AND FUSION (N/A)  confirmed   with allergies, drains and dressings discussed. Anesthesia type, drugs, patient history, complications, estimated blood loss, vital signs, intake and output, and last pain medication, lines and temperature were reviewed.

## 2019-05-21 NOTE — BRIEF OP NOTE
BRIEF OPERATIVE NOTE    Date of Procedure: 5/21/2019   Preoperative Diagnosis: CERVACALGIA, SPONDYLOSIS, RADICULOPATHY, STENOSIS  Postoperative Diagnosis: CERVACALGIA, SPONDYLOSIS, RADICULOPATHY, STENOSIS    Procedure(s):  C3-C6  ANTERIOR CERVICAL DISCECTOMY AND FUSION  Surgeon(s) and Role:     Emily Urrutia MD - Primary         Surgical Assistant: Fernanda Bojorquez    Surgical Staff:  Circ-1: Sherman Tejada  Physician Assistant: RODGER Chung  Radiology Technician: Julienne Davey  Scrub Tech-1: Demetrice LYNCH  Event Time In Time Out   Incision Start 3499    Incision Close       Anesthesia: General   Estimated Blood Loss: 50cc  Specimens: * No specimens in log *   Findings: stenosis   Complications: none  Implants:   Implant Name Type Inv.  Item Serial No.  Lot No. LRB No. Used Action   PRIME HD DBM 1CC   [id number] [employer] N/A 1 Implanted   GRAFT BNE VIABLE 2.5CC MOLD -- VIA FORM MOLDABLE - [id number]  GRAFT BNE VIABLE 2.5CC MOLD -- VIA FORM MOLDABLE [id number] [employer] N/A N/A 1 Implanted   SPACER CERV FLAT 6D [id number] -- FORTICORE - SN/A  SPACER CERV FLAT 6D [id number] -- FORTICORE N/A [employer] [id number] N/A 2 Implanted   SCR SPNE CERV FIX 4X16MM -- FORTIBRIDGE - SN/A  SCR SPNE CERV FIX 4X16MM -- FORTIBRIDGE N/A [employer] N/A N/A 8 Implanted   SPACER CERV FLAT 6D [id number] -- FORTICORE - SN/A  SPACER CERV FLAT 6D [id number] -- FORTICORE N/A [employer] [id number] N/A 1 Implanted   45MM FORTIBRIDGE PLATE   N/A [employer] N/A N/A 1 Implanted

## 2019-05-21 NOTE — PERIOP NOTES
Patient meets discharge criteria for transfer to Ortho unit which currently has no beds. PACU monitors discontinued & patient transferred to phase 2 area to await bed assignment.

## 2019-05-21 NOTE — PROGRESS NOTES
Ortho/ NeuroSurgery NP Note    POD# 0  s/p C3-C6  ANTERIOR CERVICAL DISCECTOMY AND FUSION     Pt resting in bed. Complaints of sore throat. States that its to early to tell if pain has improved since surgery. VSS Afebrile. Patient has not had something to eat/drink. No nausea. Most Recent Labs:   Lab Results   Component Value Date/Time    HGB 14.1 05/16/2019 09:56 AM    Hemoglobin A1c 7.7 (H) 05/10/2019 01:46 PM       Body mass index is 38.55 kg/m². Reference: BMI greater than 30 is classified as obesity and greater than 40 is classified as morbid obesity. STOP BANG Score: 7    Voiding status: voided  Dressing c.d.i  C-collar in place  JARRETT drain in place to suction draining serosanguinous fluid     Calves soft and supple;  No pain with passive stretch  Strength 4/5 in right bicep   Sensation and motor intact  SCDs for mechanical DVT proph    Plan:  1) PT BID starting today  2) Aliyah-op Antibiotics Ancef  3) Aspirin 81 mg PO daily  for DVT Prophylaxis  4) Pepcid for GI Prophylaxis    5) monitor drain output   6) Discharge plans to home with family pending readiness    Readiness for discharge:     [x] Vital Signs stable    [x] Hgb stable- no signs or symptoms of hemodynamic instability    [x] + Voiding    [x] Incision intact, drainage minimal    [] Tolerating PO intake     [] Cleared by PT (OT if applicable)     [] Stair training completed (if applicable)    [] Independent/Contact Guard ambulation with assistive device (household distance)     [] Bed mobility     [] Car transfers     [] ADLs    [x] Adequate pain control on oral medication alone      Angelia Burns NP

## 2019-05-22 ENCOUNTER — APPOINTMENT (OUTPATIENT)
Dept: GENERAL RADIOLOGY | Age: 74
DRG: 472 | End: 2019-05-22
Attending: ORTHOPAEDIC SURGERY
Payer: MEDICARE

## 2019-05-22 LAB
GLUCOSE BLD STRIP.AUTO-MCNC: 126 MG/DL (ref 65–100)
GLUCOSE BLD STRIP.AUTO-MCNC: 131 MG/DL (ref 65–100)
GLUCOSE BLD STRIP.AUTO-MCNC: 151 MG/DL (ref 65–100)
GLUCOSE BLD STRIP.AUTO-MCNC: 184 MG/DL (ref 65–100)
GLUCOSE BLD STRIP.AUTO-MCNC: 370 MG/DL (ref 65–100)
SERVICE CMNT-IMP: ABNORMAL

## 2019-05-22 PROCEDURE — 97165 OT EVAL LOW COMPLEX 30 MIN: CPT

## 2019-05-22 PROCEDURE — 74011250637 HC RX REV CODE- 250/637: Performed by: NURSE PRACTITIONER

## 2019-05-22 PROCEDURE — 77010033678 HC OXYGEN DAILY

## 2019-05-22 PROCEDURE — 97535 SELF CARE MNGMENT TRAINING: CPT

## 2019-05-22 PROCEDURE — 74011250636 HC RX REV CODE- 250/636: Performed by: ORTHOPAEDIC SURGERY

## 2019-05-22 PROCEDURE — 97110 THERAPEUTIC EXERCISES: CPT

## 2019-05-22 PROCEDURE — 72040 X-RAY EXAM NECK SPINE 2-3 VW: CPT

## 2019-05-22 PROCEDURE — 82962 GLUCOSE BLOOD TEST: CPT

## 2019-05-22 PROCEDURE — 51798 US URINE CAPACITY MEASURE: CPT

## 2019-05-22 PROCEDURE — 77030011943

## 2019-05-22 PROCEDURE — 97116 GAIT TRAINING THERAPY: CPT

## 2019-05-22 PROCEDURE — 94760 N-INVAS EAR/PLS OXIMETRY 1: CPT

## 2019-05-22 PROCEDURE — 97530 THERAPEUTIC ACTIVITIES: CPT

## 2019-05-22 PROCEDURE — 74011250637 HC RX REV CODE- 250/637: Performed by: ORTHOPAEDIC SURGERY

## 2019-05-22 PROCEDURE — 74011636637 HC RX REV CODE- 636/637: Performed by: ORTHOPAEDIC SURGERY

## 2019-05-22 PROCEDURE — 77030005518 HC CATH URETH FOL 2W BARD -B

## 2019-05-22 PROCEDURE — 65270000029 HC RM PRIVATE

## 2019-05-22 RX ORDER — TAMSULOSIN HYDROCHLORIDE 0.4 MG/1
0.4 CAPSULE ORAL DAILY
Status: DISCONTINUED | OUTPATIENT
Start: 2019-05-22 | End: 2019-05-26 | Stop reason: HOSPADM

## 2019-05-22 RX ORDER — CETIRIZINE HCL 10 MG
5 TABLET ORAL
Status: DISCONTINUED | OUTPATIENT
Start: 2019-05-22 | End: 2019-05-26 | Stop reason: HOSPADM

## 2019-05-22 RX ADMIN — INSULIN LISPRO 4 UNITS: 100 INJECTION, SOLUTION INTRAVENOUS; SUBCUTANEOUS at 11:55

## 2019-05-22 RX ADMIN — TAMSULOSIN HYDROCHLORIDE 0.4 MG: 0.4 CAPSULE ORAL at 11:35

## 2019-05-22 RX ADMIN — LISINOPRIL 2.5 MG: 5 TABLET ORAL at 10:02

## 2019-05-22 RX ADMIN — ASPIRIN 81 MG: 81 TABLET ORAL at 10:01

## 2019-05-22 RX ADMIN — METFORMIN HYDROCHLORIDE 500 MG: 500 TABLET, EXTENDED RELEASE ORAL at 17:46

## 2019-05-22 RX ADMIN — Medication 10 ML: at 14:31

## 2019-05-22 RX ADMIN — ACETAMINOPHEN 1000 MG: 500 TABLET ORAL at 17:46

## 2019-05-22 RX ADMIN — CARVEDILOL 12.5 MG: 12.5 TABLET, FILM COATED ORAL at 07:50

## 2019-05-22 RX ADMIN — OXYCODONE HYDROCHLORIDE 10 MG: 5 TABLET ORAL at 19:13

## 2019-05-22 RX ADMIN — FAMOTIDINE 20 MG: 20 TABLET ORAL at 17:45

## 2019-05-22 RX ADMIN — VITAMIN D, TAB 1000IU (100/BT) 5000 UNITS: 25 TAB at 10:00

## 2019-05-22 RX ADMIN — CARVEDILOL 12.5 MG: 12.5 TABLET, FILM COATED ORAL at 17:45

## 2019-05-22 RX ADMIN — ACETAMINOPHEN 1000 MG: 500 TABLET ORAL at 07:10

## 2019-05-22 RX ADMIN — OXYCODONE HYDROCHLORIDE 10 MG: 5 TABLET ORAL at 07:49

## 2019-05-22 RX ADMIN — ACETAMINOPHEN 1000 MG: 500 TABLET ORAL at 01:13

## 2019-05-22 RX ADMIN — SODIUM CHLORIDE 125 ML/HR: 900 INJECTION, SOLUTION INTRAVENOUS at 07:20

## 2019-05-22 RX ADMIN — OXYCODONE HYDROCHLORIDE 10 MG: 5 TABLET ORAL at 01:22

## 2019-05-22 RX ADMIN — INSULIN LISPRO 2 UNITS: 100 INJECTION, SOLUTION INTRAVENOUS; SUBCUTANEOUS at 17:44

## 2019-05-22 RX ADMIN — CETIRIZINE HYDROCHLORIDE 5 MG: 10 TABLET, FILM COATED ORAL at 11:35

## 2019-05-22 RX ADMIN — FUROSEMIDE 40 MG: 40 TABLET ORAL at 10:01

## 2019-05-22 RX ADMIN — Medication 10 ML: at 01:26

## 2019-05-22 RX ADMIN — INSULIN LISPRO 2 UNITS: 100 INJECTION, SOLUTION INTRAVENOUS; SUBCUTANEOUS at 07:49

## 2019-05-22 RX ADMIN — ISOSORBIDE MONONITRATE 30 MG: 30 TABLET, EXTENDED RELEASE ORAL at 10:02

## 2019-05-22 RX ADMIN — Medication 2 G: at 01:13

## 2019-05-22 RX ADMIN — POLYETHYLENE GLYCOL 3350 17 G: 17 POWDER, FOR SOLUTION ORAL at 10:00

## 2019-05-22 RX ADMIN — OMEGA-3 FATTY ACIDS CAP 1000 MG 1 CAPSULE: 1000 CAP at 10:01

## 2019-05-22 RX ADMIN — OXYCODONE HYDROCHLORIDE 5 MG: 5 TABLET ORAL at 14:29

## 2019-05-22 RX ADMIN — GLIMEPIRIDE 1 MG: 1 TABLET ORAL at 07:49

## 2019-05-22 RX ADMIN — PRAVASTATIN SODIUM 20 MG: 10 TABLET ORAL at 21:38

## 2019-05-22 RX ADMIN — SENNOSIDES AND DOCUSATE SODIUM 1 TABLET: 8.6; 5 TABLET ORAL at 17:46

## 2019-05-22 RX ADMIN — DULOXETINE HYDROCHLORIDE 60 MG: 30 CAPSULE, DELAYED RELEASE ORAL at 10:01

## 2019-05-22 RX ADMIN — Medication 10 ML: at 21:38

## 2019-05-22 RX ADMIN — SENNOSIDES AND DOCUSATE SODIUM 1 TABLET: 8.6; 5 TABLET ORAL at 10:00

## 2019-05-22 NOTE — PROGRESS NOTES
Orthopedic End of Shift Note    Bedside shift change report given to Costa Sawyer RN (oncoming nurse) by Santi Huggins (offgoing nurse). Report included the following information SBAR, Kardex, OR Summary, Procedure Summary, Intake/Output, MAR, Accordion, Recent Results and Med Rec Status. POD# 1  Significant issues during shift: voiding    Issues for Physician to address: poss matute?     Activity This Shift  (check all that apply) [x] chair  [x] dangle   [x] bathroom  [] bedside commode [] hallway  [] bedrest   Nausea/Vomiting [] yes [x] no     Voiding Status [] void [] Matute [x] I&O Cath   Bowel Movements [] yes [x] no     Foot Pumps or SCD [] yes [x] no    Ice Pack [] yes    [x] no    Incentive Spirometer [x] yes [] no Volume:   1250   Telemetry Monitoring   [] yes [x] no Rhythm:   Supplemental O2 [] yes [x] no Sat off O2:   97%

## 2019-05-22 NOTE — PROGRESS NOTES
Patient has been up to void several time, but does not feel like he is emptying his bladder. Bladder scan showed >614. Straight cath was performed and 900 mL of clear yellow urine was emptied.

## 2019-05-22 NOTE — PROGRESS NOTES
Orthopedic End of Shift Note    Bedside shift change report given to Ousmane Stallings (oncoming nurse) by Hitesh Mcgill RN (offgoing nurse). Report included the following information SBAR, Kardex, OR Summary, Intake/Output, MAR and Recent Results.      POD# 1  Significant issues during shift: unable to void, patient straight cath  Issues for Physician to address: see above    Activity This Shift  (check all that apply) [x] chair  [] dangle   [x] bathroom  [] bedside commode [] hallway  [] bedrest   Nausea/Vomiting [] yes [x] no     Voiding Status [] void [] Sánchez [x] I&O Cath   Bowel Movements [] yes [x] no     Foot Pumps or SCD [x] yes [] no    Ice Pack [] yes    [x] no    Incentive Spirometer [x] yes [] no Volume:   1200   Telemetry Monitoring   [] yes [x] no Rhythm:   Supplemental O2 [] yes [x] no

## 2019-05-22 NOTE — PROGRESS NOTES
Ortho / Neurosurgery NP Note    POD# 1  s/p C3-C6  ANTERIOR CERVICAL DISCECTOMY AND FUSION     Pt out of bed to chair. Complaints of sore throat, and itching to bilateral legs. VSS Afebrile. Voiding status: POUR- requiring I/O cath    Labs  Lab Results   Component Value Date/Time    HGB 14.1 05/16/2019 09:56 AM      Lab Results   Component Value Date/Time    INR 1.0 05/16/2019 09:56 AM        Body mass index is 38.55 kg/m². : A BMI > 30 is classified as obesity and > 40 is classified as morbid obesity. Dressing c.d.i  C- collar in place   Calves soft and supple;  No pain with passive stretch  Sensation and motor intact  SCDs for mechanical DVT proph while in bed     PLAN:  1) PT BID  2) Aspirin 81 mg PO daily  for DVT Prophylaxis   3) GI Prophylaxis - Pepcid   4) Zyrtec PRN for bilateral leg itching   5) Chronic systolic CHF- continue home Lasix   5) Readniess for discharge:     [x] Vital Signs stable    [x] Hgb stable- no signs or symptoms of hemodynamic instability    [] + Voiding- POUR, requiring I/O cath- started Flomax, will continue to monitor PVR     [x] Wound intact, drainage minimal    [x] Tolerating PO intake     [] Cleared by PT (OT if applicable)     [] Stair training completed (if applicable)    [] Independent / Contact Guard Assist (household distance)     [] Bed mobility     [] Car transfers     [] ADLs    [x] Adequate pain control on oral medication alone     Plan for discharge home pending therapy progression and medical readiness    Robert Espinoza NP  DNP, AGACNP-BC

## 2019-05-22 NOTE — PROGRESS NOTES
ORTHO POST OP SPINE PROGRESS NOTE    May 22, 2019  Admit Date: 2019  Admit Diagnosis: Cervical stenosis of spinal canal [M48.02]  Procedure: Procedure(s):  C3-C6  ANTERIOR CERVICAL DISCECTOMY AND FUSION  Post Op day: 1 Day Post-Op    Subjective:     Alda Nunes is a patient who has complaints Of pain in the neck and bilateral upper extremities status post C3 through C6 ACDF. He states his arms feel slightly worse following surgery although was having weakness in bilateral upper extremities preoperatively. He denies nausea or vomiting although states he has had difficulty voiding. He states he is able to void a small amount but does not feel the empty his bladder. He has been straight cath last night. Review of Systems: Pertinent items are noted in HPI. Objective:     PT/OT:   Distance Ambulated:           Time Ambulated (min):        Ambulation Response: Activity Response: Tolerated well  Assistive Device:              Assistive Device: Fall prevention device, Walker (comment)    Vital Signs:    Blood pressure 147/70, pulse 92, temperature 98.4 °F (36.9 °C), resp. rate 18, height 5' 3\" (1.6 m), weight 98.7 kg (217 lb 9.5 oz), SpO2 92 %. Temp (24hrs), Av.8 °F (36.6 °C), Min:97 °F (36.1 °C), Max:98.7 °F (37.1 °C)      No intake/output data recorded. 1 -  0700  In: 3282.5 [I.V.:3282.5]  Out: 9600 [Urine:2175; Drains:70]    LAB:    No results for input(s): HGB, HGBEXT, WBC, PLT, PLTEXT, HGBEXT, PLTEXT in the last 72 hours.     Wound/Drain Assessment:  Drain:      Dressing:     Physical Exam:  Neurological: Week bilateral upper extremities deltoids bilaterally and intrinsics  Incision clean, dry, and intact  1/5 strength bilateral deltoids and intrinsics 3/5    Assessment:      Patient Active Problem List   Diagnosis Code    Obesity, morbid ([facility]) E66.01    SOB (shortness of breath) R06.02    Uncontrolled type 2 diabetes mellitus with complication, with long-term current use of insulin (HCC) E11.8, E11.65, Z79.4    Pure hypercholesterolemia E78.00    Congestive heart failure (HCC) I50.9    Pain in both hands M79.641, M79.642    Type 2 diabetes with nephropathy ([facility] [address] E11.21    Cervical stenosis of spinal canal M48.02    S/P cervical spinal fusion Z98.1       Plan:     Continue PT/OT/Rehab  Discontinue: IV  Consult: PT  and OT    Discharge To: Home.   Possibly today pending progress

## 2019-05-22 NOTE — OP NOTES
[address]  OPERATIVE REPORT    Name:  Fifi Frias  MR#:  [id number]  :  1945  ACCOUNT #:  [de-identified]  DATE OF SERVICE:  2019      PREOPERATIVE DIAGNOSES:  1. Cervical spinal stenosis with myelopathy. 2.  Cervical spondylosis with myelopathy. 3.  Cervicalgia. 4.  Cervical radiculopathy. 5.  Significant arm weakness bilaterally, right hand is worse than the left. 6.  Myelomalacia. POSTOPERATIVE DIAGNOSES:  1. Cervical spinal stenosis with myelopathy. 2.  Cervical spondylosis with myelopathy. 3.  Cervicalgia. 4.  Cervical radiculopathy. 5.  Significant arm weakness bilaterally, right hand is worse than the left. 6.  Myelomalacia. PROCEDURE PERFORMED:  1. C3 through C6 anterior cervical discectomy and fusion. 2.  C3 through C6 anterior instrumentation. 3.  Application of interbody biomechanical device x3.  4.  Use of operative microscope. 5.  Use of allograft bone for spine fusion. SURGEON:  Garrick Jackson MD    FIRST ASSISTANT:  Jefry Bocanegra    ANESTHESIA:  General.    DRAINS:  x1.    COMPLICATIONS:  None. SPECIMENS REMOVED:  None. IMPLANTS:  Nanovis FortiCore interbody biomechanical device and Nanovis FortiBridge anterior cervical plate. ESTIMATED BLOOD LOSS:  50 mL. INDICATIONS FOR THE PROCEDURE:  The patient is a pleasant 53-year-old gentleman with cervical spinal stenosis, spondylosis, and myelopathy that has failed to improve with nonoperative treatment. He has significant hand weakness and at this point, we decided to proceed with surgical intervention. I have given him warnings about the possible complications including, but not limited to, pain, scar, bleeding, infection, nonunion, damage to surrounding structures, death, paralysis, blindness, stroke. He understands and wants to proceed.     NARRATIVE OF THE PROCEDURE:  After informed consent was obtained and the operative site was properly marked, the patient was moved back to the operating room and underwent general endotracheal anesthesia. He was positioned supine on the operating room table using the [facility] table flat top. His arms were well padded and tucked to the side and the knees were gently bent with pillows. Fluoroscopy was used to mary the level of the incision. We then proceeded to prep and drape in the usual manner. A time-out was obtained verifying that this was the correct patient, the correct surgery, the correct site as well as that he had received IV antibiotics within 30 minutes of the incision, in this case 1 g of IV Ancef. I then proceeded to prep and drape in the usual manner, followed by obtaining a timeout, verifying that is the correct patient, the correct surgery, the correct site, as well as that he had received IV antibiotics within 30 minutes of the incision. I then proceeded to perform standard anterior approach to the cervical spine exposing the area between C3 and C6. Once the area was exposed and hemostasis was obtained, fluoroscopy was used to verify that we were in the correct level. The longus colli muscle was elevated on both the right and left sides, protecting the sympathetic chain and exposing the uncinate processes bilaterally. I then proceeded to place the [place] pins at C3 and C4 and a self-retaining retractor. The operative microscope was brought into the field and kept in place for the remainder of the procedure. A 15-blade was used to perform a box annulotomy and the annulus was removed with a pituitary. I proceeded to use a curette and a pituitary to remove the disk and the cartilaginous endplate, leaving behind the punctate bleeding bone. Once we reached the PLL, the Bartolome Lipoma was used to remove the anterior osteophytes and posterior osteophytes and make the endplates coplanar.   Once the endplates were coplanar, I proceeded to use a trial to determine the size for the implant and while the implant was packed with allograft bone, Kerrison #1, followed by Kerrison #2 were used to remove the PLL. Once the PLL was removed, I proceeded to perform bilateral anterior foraminotomies. With that area fully decompressed, I placed my implant, I moved my retractors to the next level, and repeated the procedure in the exact same manner at the next 2 levels. With all 3 levels completed, I proceeded to irrigate the wound with 3 L of normal saline and placed a plate and drilled four screws bilaterally at C3, C4, C5, and C6. With all the screws in place, I proceeded to place a deep drain, closed the platysma with 2-0 Vicryl, subcutaneous with 3-0 Vicryl, the skin with a 3-0 running Monocryl and Dermabond. Sterile dressing was applied. The patient was then awakened and transferred to PACU in stable condition. POSTOPERATIVE PLAN:  The patient is going to remain here overnight. We are going to give him SCDs and KJ acevedo for DVT prophylaxis and Ancef for infection prophylaxis.       Elvis Ferugson MD      [id number]  D:  05/21/2019 9:34  T:  05/21/2019 10:43  JOB #:  [id number]

## 2019-05-22 NOTE — PROGRESS NOTES
Problem: Self Care Deficits Care Plan (Adult)  Goal: *Acute Goals and Plan of Care (Insert Text)  Description  Occupational Therapy Goals  Initiated 5/22/2019    1. Patient will perform lower body dressing with moderate assistance  using within 7 days. 2.  Patient will perform upper body dressing with moderate assistance  within 7 days. 3.  Patient will standing ADLs 5 mins at minimal assistance/contact guard assist within 7 days. 4.  Patient will don/doff neck brace at moderate assistance  within 7 days. 5.  Patient will verbalize/demonstrate 3/3 cervical precautions during ADL tasks without cues within  7 days. Outcome: Progressing Towards Goal   OCCUPATIONAL THERAPY EVALUATION  Patient: Sherrell Nova (81 y.o. male)  Date: 5/22/2019  Primary Diagnosis: Cervical stenosis of spinal canal [M48.02]  Procedure(s) (LRB):  C3-C6  ANTERIOR CERVICAL DISCECTOMY AND FUSION (N/A) 1 Day Post-Op   Precautions:   Back, Fall    ASSESSMENT :  Based on the objective data described below, the patient presents with generalized weakness, decreased endurance, strength, functional mobility, and ADLs and decreased range in BUE, and decreased coordination in B hands and pt states that his hands are numb. Pt states that he was having difficulty with his hands PTA and shoulders and was able to bathe and dresses self with difficultly PTA. He was cleared to be seen for therapy and all VSS and pt was sitting on EOB and stated that he was tired but did not want to lay down. Pt was educated on precautions and how to take Aspen collar off/on. He was issued hip kit and was not able to use the sock aide due to weakness, in his hands and arms. Worked with pt on [address] and walking fingers up the door and sliding fingers up the door. Issued pt yellow foam squares and handout for hand ex.   Pt was SBA for transfers to toilet and chair and was left on toilet and told to pull the call bell and not to get up with out assist. Recommend that pt have further therapy at discharge for his arms and hands. Pending progress, home care or out pt. Patient will benefit from skilled intervention to address the above impairments. Patient?s rehabilitation potential is considered to be Good  Factors which may influence rehabilitation potential include:   ? None noted  ? Mental ability/status  ? Medical condition: cervical precautions  ? Home/family situation and support systems  ? Safety awareness  ? Pain tolerance/management  ? Other:      PLAN :  Recommendations and Planned Interventions:  ?               Self Care Training                  ? Therapeutic Activities  ? Functional Mobility Training    ? Cognitive Retraining  ? Therapeutic Exercises           ? Endurance Activities  ? Balance Training                   ? Neuromuscular Re-Education  ? Visual/Perceptual Training     ? Home Safety Training  ? Patient Education                 ? Family Training/Education  ? Other (comment):    Frequency/Duration: Patient will be followed by occupational therapy 4 times a week to address goals. Discharge Recommendations: Rehab  Further Equipment Recommendations for Discharge: tbd      SUBJECTIVE:   Patient stated ? I have a lot of pain in my right shoulder. ?    OBJECTIVE DATA SUMMARY:   HISTORY:   Past Medical History:   Diagnosis Date    Arthritis     Asthma     CAD (coronary artery disease)     Calculus of kidney     Carotid artery disease (HCC)     Cervical herniated disc     Chronic systolic (congestive) heart failure ([address]     Diabetes ([address]     Diabetic shock due to low blood sugar ([address] 2016    Hypercholesterolemia     Hypertension     Morbid obesity ([address]     Rheumatic fever     When he was 17    Sleep apnea      Past Surgical History: Procedure Laterality Date    HX CAROTID STENT  1990's    HX CHOLECYSTECTOMY      HX CIRCUMCISION      HX HEENT Left     ear torn off and repaired    HX ORTHOPAEDIC Right     repair    HX OTHER SURGICAL Right     Right hand reconstruction       Prior Level of Function/Environment/Context: pt lives with family and was independent with ADLs and ILS      Expanded or extensive additional review of patient history:     Home Situation  Home Environment: Private residence  # Steps to Enter: 4  Rails to Enter: Yes  Hand Rails : Bilateral  One/Two Story Residence: One story  # of Interior Steps: 12  Interior Rails: Right  Living Alone: No  Support Systems: Family member(s)  Patient Expects to be Discharged to[de-identified] Private residence  Current DME Used/Available at Home: CPAP, Glucometer, Raised toilet seat  Tub or Shower Type: Tub/Shower combination    Hand dominance: Left    EXAMINATION OF PERFORMANCE DEFICITS:  Cognitive/Behavioral Status:  Neurologic State: Alert  Orientation Level: Appropriate for age  Cognition: Appropriate decision making  Perception: Appears intact  Perseveration: No perseveration noted  Safety/Judgement: Awareness of environment    Skin: in good health     Edema: swelling noted in neck at incision           Vision/Perceptual:                         intact            Range of Motion:    AROM: Generally decreased, functional  PROM: Generally decreased, functional                      Strength:    Strength: Generally decreased, functional                Coordination:  Coordination: Generally decreased, functional  Fine Motor Skills-Upper: Left Impaired;Right Impaired    Gross Motor Skills-Upper: Left Impaired;Right Impaired    Tone & Sensation:    Tone: Normal  Sensation: Intact                      Balance:  Sitting: Intact; Without support  Standing: Intact; With support  Standing - Static: Good;Constant support  Standing - Dynamic : Good;Constant support    Functional Mobility and Transfers for ADLs:  Bed Mobility:  Rolling: Supervision  Supine to Sit: Contact guard assistance;Supervision  Scooting: Supervision    Transfers:  Sit to Stand: Stand-by assistance;Supervision  Stand to Sit: Stand-by assistance;Supervision  Bed to Chair: Stand-by assistance;Supervision  Bathroom Mobility: Stand-by assistance  Toilet Transfer : Stand-by assistance    ADL Assessment:  Feeding: Setup    Oral Facial Hygiene/Grooming: Setup    Bathing: Maximum assistance;Minimum assistance    Upper Body Dressing: Maximum assistance;Minimum assistance    Lower Body Dressing: Maximum assistance;Minimum assistance    Toileting: Contact guard assistance;Stand by assistance              Cognitive Retraining  Safety/Judgement: Awareness of environment      Patient instructed and indicated understanding the benefits of maintaining activity tolerance, functional mobility, and independence with self care tasks during acute stay  to ensure safe return home and to baseline. Encouraged patient to increase frequency and duration OOB, not sitting longer than 30 mins without marching/walking with staff, be out of bed for all meals, perform daily ADLs (as approved by RN/MD regarding bathing etc), and performing functional mobility to/from bathroom. Patient instruction and indicated understanding on body mechanics, ergonomics and gravitational force on the spine during different body positions to plan activities in prep for return home to complete basic ADLs, instrumental ADLs and back to work safely. Functional Measure:  Barthel Index:    Bathin  Bladder: 10  Bowels: 10  Groomin  Dressin  Feeding: 10  Mobility: 10  Stairs: 0  Toilet Use: 5  Transfer (Bed to Chair and Back): 10  Total: 60/100        Percentage of impairment   0%   1-19%   20-39%   40-59%   60-79%   80-99%   100%   Barthel Score 0-100 100 99-80 79-60 59-40 20-39 1-19   0     The Barthel ADL Index: Guidelines  1.  The index should be used as a record of what a patient does, not as a record of what a patient could do. 2. The main aim is to establish degree of independence from any help, physical or verbal, however minor and for whatever reason. 3. The need for supervision renders the patient not independent. 4. A patient's performance should be established using the best available evidence. Asking the patient, friends/relatives and nurses are the usual sources, but direct observation and common sense are also important. However direct testing is not needed. 5. Usually the patient's performance over the preceding 24-48 hours is important, but occasionally longer periods will be relevant. 6. Middle categories imply that the patient supplies over 50 per cent of the effort. 7. Use of aids to be independent is allowed. Julissa Orozco., Barthel, D.W. ([id number]). Functional evaluation: the Barthel Index. [facility] (14)2. JEFF Melchor, Marco Ruiz., Kayy Rose., [place], [facility] (1999). Measuring the change indisability after inpatient rehabilitation; comparison of the responsiveness of the Barthel Index and Functional [place] Measure. Journal of Neurology, Neurosurgery, and Psychiatry, 66(4), 779-715. Lu Mccray, N.J.A, ROSA Parker.CRIS, & Kriss Davis, M.A. (2004.) Assessment of post-stroke quality of life in cost-effectiveness studies: The usefulness of the Barthel Index and the EuroQoL-5D.  Quality of Life Research, 15, 512-37         Occupational Therapy Evaluation Charge Determination   History Examination Decision-Making   MEDIUM Complexity : Expanded review of history including physical, cognitive and psychosocial  history  LOW Complexity : 1-3 performance deficits relating to physical, cognitive , or psychosocial skils that result in activity limitations and / or participation restrictions  LOW Complexity : No comorbidities that affect functional and no verbal or physical assistance needed to complete eval tasks       Based on the above components, the patient evaluation is determined to be of the following complexity level: LOW   Pain:  Pain Scale 1: Numeric (0 - 10)  Pain Intensity 1: 6  Pain Location 1: Neck  Pain Orientation 1: Anterior; Left  Pain Description 1: Aching  Pain Intervention(s) 1: Medication (see MAR)  Activity Tolerance:   fair  Please refer to the flowsheet for vital signs taken during this treatment. After treatment:   ? Patient left in no apparent distress sitting up in chair  ? Patient left in no apparent distress in bed  ? Call bell left within reach  ? Nursing notified  ? Caregiver present  ? Bed alarm activated    COMMUNICATION/EDUCATION:   The patient?s plan of care was discussed with: Physical Therapist and Registered Nurse.  ? Home safety education was provided and the patient/caregiver indicated understanding. ? Patient/family have participated as able in goal setting and plan of care. ? Patient/family agree to work toward stated goals and plan of care. ? Patient understands intent and goals of therapy, but is neutral about his/her participation. ? Patient is unable to participate in goal setting and plan of care. This patient?s plan of care is appropriate for delegation to [facility].     Thank you for this referral.  Jen Pemberton OT  Time Calculation: 33 mins

## 2019-05-22 NOTE — PROGRESS NOTES
Problem: Mobility Impaired (Adult and Pediatric)  Goal: *Acute Goals and Plan of Care (Insert Text)  Description  Physical Therapy Goals  Initiated 5/21/2019    1. Patient will move from supine to sit and sit to supine  in bed with modified independence within 4 days. 2. Patient will perform sit to stand with modified independence within 4 days. 3. Patient will ambulate with modified independence for 200 feet with the least restrictive device within 4 days. 4. Patient will ascend/descend 6 stairs with 1 handrail(s) with modified independence within 4 days. 5. Patient will verbalize and demonstrate understanding of spinal precautions (No bending, lifting greater than 5 lbs, or twisting; log-roll technique; frequent repositioning as instructed) within 4 days. Note:   PHYSICAL THERAPY TREATMENT  Patient: Elvia Jorge (10 y.o. male)  Date: 5/22/2019  Diagnosis: Cervical stenosis of spinal canal [M48.02]     Procedure(s) (LRB):  C3-C6  ANTERIOR CERVICAL DISCECTOMY AND FUSION (N/A) 1 Day Post-Op    Precautions: Back, Fall  Chart, physical therapy assessment, plan of care and goals were reviewed. ASSESSMENT: pt tolerated tx well, no LOB or SOB, did well with stair trng and car transfer, good motivation, does well with toilet transfers, vc's for safety and proper RW use. Pt is up add-sanjay in room and does not want a RW. Progression toward goals:  ?      Improving appropriately and progressing toward goals  ? Improving slowly and progressing toward goals  ? Not making progress toward goals and plan of care will be adjusted     PLAN:  Patient continues to benefit from skilled intervention to address the above impairments. Continue treatment per established plan of care.   Discharge Recommendations:  Outpatient when MD prescribes  Further Equipment Recommendations for Discharge:  none      OBJECTIVE DATA SUMMARY:     Critical Behavior:  Neurologic State: Alert  Orientation Level: Oriented X4  Cognition: Follows commands     Functional Mobility Training:  Bed Mobility: Pt sitting in chair on arrival.  Brace donned on arrival      Transfers:  Sit to Stand: Stand-by assistance  Stand to Sit: Stand-by assistance  Interventions: Verbal cues  Level of Assistance: Stand-by assistance    Balance:  Sitting: Intact; Without support  Standing: Intact; With support  Standing - Static: Good;Constant support  Standing - Dynamic : Good;Constant support    Ambulation/Gait Training:  Distance (ft): 200 Feet (ft)  Assistive Device: Walker, rolling;Gait belt  Ambulation - Level of Assistance: Stand-by assistance  Gait Abnormalities: Decreased step clearance  Right Side Weight Bearing: Full  Left Side Weight Bearing: Full  Base of Support: Widened  Stance: (equal)  Speed/Noemi: Pace decreased (<100 feet/min)  Step Length: Left shortened;Right shortened  Interventions: Verbal cues    Stairs:  Number of Stairs Trained: 4  Stairs - Level of Assistance: Stand-by assistance  Rail Use: Both    Therapeutic Exercises:   sitting  EXERCISE   Sets   Reps   Active Active Assist   Passive   Comments   Ankle pumps 1 10 ? ? ? bilat   Heel raises 1 10 ? ? ? \"   Toe tap 1 10 ? ? ? \"   Knee ext 1 10 ? ? ? \"   Hip flex 1 10 ? ? ? \"     Pain:  Pain Scale 1: Numeric (0 - 10)  Pain Intensity 1: 8  Pain Location 1: Neck  Pain Orientation 1: Anterior; Left  Pain Description 1: Aching  Pain Intervention(s) 1: Declines    Activity Tolerance: good    After treatment:   ?  Patient left in no apparent distress sitting up in chair  ? Patient left in no apparent distress in bed  ? Call bell left within reach  ? Nursing notified  ? Caregiver present  ?   Bed alarm activated    COMMUNICATION/COLLABORATION:   The patient?s plan of care was discussed with: Registered Nurse    Lois Arroyo PTA   Time Calculation: 30 mins

## 2019-05-22 NOTE — PROGRESS NOTES
Attempted to see pt this pm and pt declined 2/2 to fatigue and pain. Will continue to follow and tx when pt is able.

## 2019-05-23 LAB
GLUCOSE BLD STRIP.AUTO-MCNC: 119 MG/DL (ref 65–100)
GLUCOSE BLD STRIP.AUTO-MCNC: 154 MG/DL (ref 65–100)
GLUCOSE BLD STRIP.AUTO-MCNC: 160 MG/DL (ref 65–100)
GLUCOSE BLD STRIP.AUTO-MCNC: 161 MG/DL (ref 65–100)
GLUCOSE BLD STRIP.AUTO-MCNC: 198 MG/DL (ref 65–100)
SERVICE CMNT-IMP: ABNORMAL

## 2019-05-23 PROCEDURE — 94760 N-INVAS EAR/PLS OXIMETRY 1: CPT

## 2019-05-23 PROCEDURE — 77030005518 HC CATH URETH FOL 2W BARD -B

## 2019-05-23 PROCEDURE — 74011250637 HC RX REV CODE- 250/637: Performed by: NURSE PRACTITIONER

## 2019-05-23 PROCEDURE — 82962 GLUCOSE BLOOD TEST: CPT

## 2019-05-23 PROCEDURE — 97530 THERAPEUTIC ACTIVITIES: CPT

## 2019-05-23 PROCEDURE — 65270000029 HC RM PRIVATE

## 2019-05-23 PROCEDURE — 77010033678 HC OXYGEN DAILY

## 2019-05-23 PROCEDURE — 74011250637 HC RX REV CODE- 250/637: Performed by: ORTHOPAEDIC SURGERY

## 2019-05-23 PROCEDURE — 74011636637 HC RX REV CODE- 636/637: Performed by: ORTHOPAEDIC SURGERY

## 2019-05-23 PROCEDURE — 97116 GAIT TRAINING THERAPY: CPT

## 2019-05-23 RX ORDER — POLYETHYLENE GLYCOL 3350 17 G/17G
17 POWDER, FOR SOLUTION ORAL DAILY
Qty: 15 PACKET | Refills: 0 | Status: SHIPPED | OUTPATIENT
Start: 2019-05-24 | End: 2019-06-08

## 2019-05-23 RX ORDER — OMEGA-3/DHA/EPA/FISH OIL 1000 MG
1 CAPSULE ORAL 2 TIMES DAILY
COMMUNITY
End: 2019-11-07

## 2019-05-23 RX ORDER — FAMOTIDINE 20 MG/1
20 TABLET, FILM COATED ORAL EVERY EVENING
Qty: 30 TAB | Refills: 0 | Status: SHIPPED | OUTPATIENT
Start: 2019-05-23 | End: 2019-06-22

## 2019-05-23 RX ORDER — AMOXICILLIN 250 MG
1 CAPSULE ORAL 2 TIMES DAILY
Qty: 30 TAB | Refills: 0 | Status: SHIPPED | OUTPATIENT
Start: 2019-05-23 | End: 2019-06-07

## 2019-05-23 RX ORDER — METOLAZONE 5 MG/1
2.5 TABLET ORAL
COMMUNITY
End: 2019-11-07 | Stop reason: ALTCHOICE

## 2019-05-23 RX ORDER — ACETAMINOPHEN 500 MG
1000 TABLET ORAL
COMMUNITY

## 2019-05-23 RX ORDER — ACETAMINOPHEN 325 MG/1
650 TABLET ORAL EVERY 6 HOURS
Qty: 112 TAB | Refills: 0 | Status: SHIPPED | OUTPATIENT
Start: 2019-05-23 | End: 2019-06-06

## 2019-05-23 RX ORDER — OXYCODONE HYDROCHLORIDE 5 MG/1
5 TABLET ORAL
Qty: 40 TAB | Refills: 0 | Status: SHIPPED | OUTPATIENT
Start: 2019-05-23 | End: 2019-06-06

## 2019-05-23 RX ORDER — OXYCODONE HYDROCHLORIDE 5 MG/1
5 TABLET ORAL
Qty: 60 TAB | Refills: 0 | Status: SHIPPED | OUTPATIENT
Start: 2019-05-23 | End: 2019-05-23

## 2019-05-23 RX ADMIN — DULOXETINE HYDROCHLORIDE 60 MG: 30 CAPSULE, DELAYED RELEASE ORAL at 08:27

## 2019-05-23 RX ADMIN — CARVEDILOL 12.5 MG: 12.5 TABLET, FILM COATED ORAL at 08:27

## 2019-05-23 RX ADMIN — Medication 10 ML: at 22:23

## 2019-05-23 RX ADMIN — ASPIRIN 81 MG: 81 TABLET ORAL at 08:27

## 2019-05-23 RX ADMIN — METFORMIN HYDROCHLORIDE 500 MG: 500 TABLET, EXTENDED RELEASE ORAL at 16:56

## 2019-05-23 RX ADMIN — TAMSULOSIN HYDROCHLORIDE 0.4 MG: 0.4 CAPSULE ORAL at 08:27

## 2019-05-23 RX ADMIN — POLYETHYLENE GLYCOL 3350 17 G: 17 POWDER, FOR SOLUTION ORAL at 08:27

## 2019-05-23 RX ADMIN — Medication 10 ML: at 06:05

## 2019-05-23 RX ADMIN — ACETAMINOPHEN 1000 MG: 500 TABLET ORAL at 01:10

## 2019-05-23 RX ADMIN — OMEGA-3 FATTY ACIDS CAP 1000 MG 1 CAPSULE: 1000 CAP at 08:27

## 2019-05-23 RX ADMIN — INSULIN LISPRO 4 UNITS: 100 INJECTION, SOLUTION INTRAVENOUS; SUBCUTANEOUS at 11:36

## 2019-05-23 RX ADMIN — OXYCODONE HYDROCHLORIDE 5 MG: 5 TABLET ORAL at 22:23

## 2019-05-23 RX ADMIN — FAMOTIDINE 20 MG: 20 TABLET ORAL at 17:00

## 2019-05-23 RX ADMIN — ACETAMINOPHEN 1000 MG: 500 TABLET ORAL at 11:36

## 2019-05-23 RX ADMIN — INSULIN LISPRO 4 UNITS: 100 INJECTION, SOLUTION INTRAVENOUS; SUBCUTANEOUS at 08:26

## 2019-05-23 RX ADMIN — PRAVASTATIN SODIUM 20 MG: 10 TABLET ORAL at 22:22

## 2019-05-23 RX ADMIN — OXYCODONE HYDROCHLORIDE 5 MG: 5 TABLET ORAL at 16:58

## 2019-05-23 RX ADMIN — FUROSEMIDE 40 MG: 40 TABLET ORAL at 08:27

## 2019-05-23 RX ADMIN — INSULIN LISPRO 4 UNITS: 100 INJECTION, SOLUTION INTRAVENOUS; SUBCUTANEOUS at 16:55

## 2019-05-23 RX ADMIN — ISOSORBIDE MONONITRATE 30 MG: 30 TABLET, EXTENDED RELEASE ORAL at 08:27

## 2019-05-23 RX ADMIN — LISINOPRIL 2.5 MG: 5 TABLET ORAL at 08:27

## 2019-05-23 RX ADMIN — Medication 10 ML: at 13:35

## 2019-05-23 RX ADMIN — GLIMEPIRIDE 1 MG: 1 TABLET ORAL at 08:27

## 2019-05-23 RX ADMIN — ACETAMINOPHEN 1000 MG: 500 TABLET ORAL at 06:05

## 2019-05-23 RX ADMIN — SENNOSIDES AND DOCUSATE SODIUM 1 TABLET: 8.6; 5 TABLET ORAL at 08:27

## 2019-05-23 RX ADMIN — SENNOSIDES AND DOCUSATE SODIUM 1 TABLET: 8.6; 5 TABLET ORAL at 17:00

## 2019-05-23 RX ADMIN — CARVEDILOL 12.5 MG: 12.5 TABLET, FILM COATED ORAL at 16:56

## 2019-05-23 RX ADMIN — METOLAZONE 2.5 MG: 2.5 TABLET ORAL at 16:54

## 2019-05-23 NOTE — PROGRESS NOTES
Spiritual Care Partner Volunteer visited patient in [address] on may 23, 2019. Documented by  RAFITA Pizano  Paging Service [phone])

## 2019-05-23 NOTE — PROGRESS NOTES
Bedside shift change report given to MODESTA Krishnamurthy (oncoming nurse) by Chuck Tello (offgoing nurse). Report included the following information SBAR, Kardex, Procedure Summary, Intake/Output, MAR and Recent Results.

## 2019-05-23 NOTE — ROUTINE PROCESS
5/23/2019  07:30 AM  Orthopedic End of Shift Note    Bedside and Verbal shift change report given to Roldan [facility] (oncoming nurse) by Costa Sawyer (offgoing nurse). Report included the following information SBAR, Kardex, OR Summary, Procedure Summary, Intake/Output, MAR, Accordion, Recent Results and Med Rec Status. POD# 2  Significant issues during shift: Sánchez Placed    Issues for Physician to address:      Activity This Shift  (check all that apply) [x] chair  [] dangle   [x] bathroom  [] bedside commode [] hallway  [] bedrest   Nausea/Vomiting [] yes [x] no     Voiding Status [] void [x] Sánchez [] I&O Cath   Bowel Movements [] yes [x] no     Foot Pumps or SCD [x] yes [] no    Ice Pack [] yes    [x] no    Incentive Spirometer [x] yes [] no Volume:   1250   Telemetry Monitoring   [] yes [x] no Rhythm:   Supplemental O2 [] yes [x] no Sat off O2:  94%

## 2019-05-23 NOTE — PROGRESS NOTES
ORTHO POST OP SPINE PROGRESS NOTE    May 23, 2019  Admit Date: 2019  Admit Diagnosis: Cervical stenosis of spinal canal [M48.02]  Procedure: Procedure(s):  C3-C6  ANTERIOR CERVICAL DISCECTOMY AND FUSION  Post Op day: 2 Days Post-Op    Subjective:     Sagar Allison is a patient who has complaints Of pain in the neck and bilateral upper extremities postop day 2 status post C3 through C6 ACDF done for cervical spinal stenosis with myelopathy. Needed matute re- insertion last night. Review of Systems: Pertinent items are noted in HPI. Objective:     PT/OT:   Distance Ambulated:           Time Ambulated (min):        Ambulation Response: Activity Response: Tolerated well  Assistive Device:              Assistive Device: Fall prevention device    Vital Signs:    Blood pressure 145/84, pulse 88, temperature 98 °F (36.7 °C), resp. rate 18, height 5' 3\" (1.6 m), weight 98.7 kg (217 lb 9.5 oz), SpO2 95 %. Temp (24hrs), Av.1 °F (36.7 °C), Min:98 °F (36.7 °C), Max:98.4 °F (36.9 °C)      No intake/output data recorded.  1901 -  0700  In: 2632.5 [I.V.:2632.5]  Out: 5020 [Urine:4950; Drains:70]    LAB:    No results for input(s): HGB, HGBEXT, WBC, PLT, PLTEXT, HGBEXT, PLTEXT in the last 72 hours.     Wound/Drain Assessment:  Drain:      Dressing:     Physical Exam:  Neurological: We can bilateral upper extremities most notable in bilateral deltoid and intrinsics  Incision clean, dry, and intact  1/5 strength bilateral deltoids 3/5 strength intrinsics    Assessment:      Patient Active Problem List   Diagnosis Code    Obesity, morbid ([facility]) E66.01    SOB (shortness of breath) R06.02    Uncontrolled type 2 diabetes mellitus with complication, with long-term current use of insulin ([facility]) E11.8, E11.65, Z79.4    Pure hypercholesterolemia E78.00    Congestive heart failure ([facility]) I50.9    Pain in both hands M79.641, M79.642    Type 2 diabetes with nephropathy ([facility]) E11.21    Cervical stenosis of spinal canal M48.02    S/P cervical spinal fusion Z98.1       Plan:     Continue PT/OT/Rehab  Discontinue:  Consult: PT  and OT    Discharge To:  Home versus rehab pending progress

## 2019-05-23 NOTE — PROGRESS NOTES
Pharmacy Clarification of the Prior to Admission Medication Regimen Retrospective to the Admission Medication Reconciliation    The patient was interviewed regarding clarification of the prior to admission medication regimen and was questioned regarding use of any other inhalers, topical products, over the counter medications, herbal medications, vitamin products or ophthalmic/nasal/otic medication use. Information Obtained From: RX Query, Patient    Recommendations/Findings: The following amendments were made to the patient's active medication list on file at [facility]:     1) Additions: None      2) Removals: None      3) Changes:  acetaminophen (TYLENOL) (Old regimen: (strength 325mg) Take  by mouth every four (4) hours as needed for Pain. Marney Tera regimen: (strength 500mg) Take 1,000 mg by mouth every six (6) hours as needed for Pain.)  metOLazone (ZAROXOLYN) 5 mg tablet (Old regimen: 2.5mg 3 x weekly (MWF) Marney Tera regimen: 2.5mg 2 x weekly (T,TH)      4) Pertinent Pharmacy Findings:  acetaminophen (TYLENOL) 325 mg tablet: This agent shows D/C on PTA Med List but still appears down below on the \"corrected PTA Med List\". Patient is only on the 500mg tablets. PTA medication list was corrected to the following:     Prior to Admission Medications   Prescriptions Last Dose Informant Patient Reported? Taking? CANNABIDIOL, CBD, EXTRACT PO 5/14/2019 at Unknown time Self Yes Yes   Sig: Take 2 Units by mouth daily. Indications: TOPICAL CBD SALVE   DULoxetine (CYMBALTA) 60 mg capsule 5/21/2019 at 0425 Self No Yes   Sig: Take 1 Cap by mouth daily. For feet pain   acetaminophen (TYLENOL) 325 mg tablet 5/20/2019 at Unknown time  Yes No   Sig: Take  by mouth every four (4) hours as needed for Pain. acetaminophen (TYLENOL) 500 mg tablet 5/20/2019 at Unknown time Self Yes Yes   Sig: Take 1,000 mg by mouth every six (6) hours as needed for Pain.    aspirin delayed-release 81 mg tablet 5/20/2019 at Unknown Self Yes No   Sig: Take 81 mg by mouth daily. carvedilol (COREG) 12.5 mg tablet 5/21/2019 at 0425 Self No Yes   Sig: TAKE 1 TABLET BY MOUTH TWICE DAILY   cholecalciferol, VITAMIN D3, (VITAMIN D3) 5,000 unit tab tablet 5/19/2019 at Unknown time Self No Yes   Sig: Take 1 Tab by mouth daily. furosemide (LASIX) 40 mg tablet 5/21/2019 at 0425 Self No Yes   Sig: TAKE 1 TABLET BY MOUTH ONCE DAILY   glimepiride (AMARYL) 1 mg tablet 5/21/2019 at Unknown time Self Yes Yes   Sig: Take 1 mg by mouth Daily (before breakfast). isosorbide mononitrate ER (IMDUR) 30 mg tablet 5/21/2019 at 0425 Self No Yes   Sig: TAKE 1 TABLET BY MOUTH ONCE DAILY   lisinopril (PRINIVIL, ZESTRIL) 2.5 mg tablet 5/21/2019 at Unknown time Self No Yes   Sig: TAKE 1 TABLET BY MOUTH ONCE DAILY   metFORMIN ER (GLUCOPHAGE XR) 500 mg tablet 5/19/2019 at Unknown time Self No Yes   Sig: TAKE 1 TABLET BY MOUTH BEFORE BREAKFAST AND  DINNER   metOLazone (ZAROXOLYN) 5 mg tablet 5/21/2019 at Unknown time Self Yes Yes   Sig: Take 2.5 mg by mouth every Tuesday and Thursday. omega 3-DHA-EPA-fish oil (FISH OIL) 1,000 mg (120 mg-180 mg) capsule 5/20/2019 at unknown Self Yes Yes   Sig: Take 1 Cap by mouth two (2) times a day.    simvastatin (ZOCOR) 10 mg tablet 5/20/2019 at Unknown time Self No Yes   Sig: TAKE 1 TABLET BY MOUTH NIGHTLY      Facility-Administered Medications: None          Thank you,  Keeley Gilmore, [address]  Medication History Pharmacy Technician

## 2019-05-23 NOTE — PROGRESS NOTES
Problem: Mobility Impaired (Adult and Pediatric)  Goal: *Acute Goals and Plan of Care (Insert Text)  Description  Physical Therapy Goals  Initiated 5/21/2019    1. Patient will move from supine to sit and sit to supine  in bed with modified independence within 4 days. 2. Patient will perform sit to stand with modified independence within 4 days. 3. Patient will ambulate with modified independence for 200 feet with the least restrictive device within 4 days. 4. Patient will ascend/descend 6 stairs with 1 handrail(s) with modified independence within 4 days. 5. Patient will verbalize and demonstrate understanding of spinal precautions (No bending, lifting greater than 5 lbs, or twisting; log-roll technique; frequent repositioning as instructed) within 4 days. Note:   PHYSICAL THERAPY TREATMENT  Patient: Maikol Ferguson (68 y.o. male)  Date: 5/23/2019  Diagnosis: Cervical stenosis of spinal canal [M48.02] S/P cervical spinal fusion    Procedure(s) (LRB): C3-C6  ANTERIOR CERVICAL DISCECTOMY AND FUSION (N/A) 2 Days Post-Op    Precautions: Back, Fall  Chart, physical therapy assessment, plan of care and goals were reviewed. ASSESSMENT: pt tolerated tx well, no LOB, slight SOB, did well with toilet transfers and ther-ex, good motivation, vc's for safety and proper RW use. Progression toward goals:  ?      Improving appropriately and progressing toward goals  ? Improving slowly and progressing toward goals  ? Not making progress toward goals and plan of care will be adjusted     PLAN:  Patient continues to benefit from skilled intervention to address the above impairments. Continue treatment per established plan of care.   Discharge Recommendations:  Home Health  Further Equipment Recommendations for Discharge:  rolling walker     OBJECTIVE DATA SUMMARY:     Critical Behavior:  Neurologic State: Alert  Orientation Level: Oriented X4  Cognition: Follows commands  Safety/Judgement: Awareness of environment    Functional Mobility Training:  Bed Mobility: Pt sitting in chair on arrival.  Brace donned on arrival    Transfers:  Sit to Stand: Supervision  Stand to Sit: Supervision  Interventions: Verbal cues  Level of Assistance: Supervision    Balance:  Sitting: Intact; Without support  Standing: Intact; With support  Standing - Static: Good;Constant support  Standing - Dynamic : Good;Constant support    Ambulation/Gait Training:  Distance (ft): 200 Feet (ft)  Assistive Device: Walker, rolling;Gait belt  Ambulation - Level of Assistance: Supervision  Gait Abnormalities: Decreased step clearance  Right Side Weight Bearing: Full  Left Side Weight Bearing: Full  Base of Support: Widened  Stance: (equal)  Speed/Noemi: Pace decreased (<100 feet/min)  Step Length: Left shortened;Right shortened  Interventions: Verbal cues    Pain:  Pain Scale 1: Numeric (0 - 10)  Pain Intensity 1: 8  Pain Location 1: Neck  Pain Orientation 1: Anterior  Pain Description 1: Aching     Activity Tolerance: fair    After treatment:   ?  Patient left in no apparent distress sitting up in chair  ? Patient left in no apparent distress in bed  ? Call bell left within reach  ? Nursing notified  ? Caregiver present  ?   Bed alarm activated    COMMUNICATION/COLLABORATION:   The patient?s plan of care was discussed with: Registered Nurse    Arlen Mckeon PTA   Time Calculation: 25 mins

## 2019-05-23 NOTE — PROGRESS NOTES
Ortho / Neurosurgery NP Note    POD# 2  s/p C3-C6  ANTERIOR CERVICAL DISCECTOMY AND FUSION     Pt out of bed to chair. States that his throat feels better today than it did. Discussed removing matute catheter today and voiding trial .     VSS Afebrile. Voiding status: POUR- matute catheter placed overnight    Labs  Lab Results   Component Value Date/Time    HGB 14.1 05/16/2019 09:56 AM      Lab Results   Component Value Date/Time    INR 1.0 05/16/2019 09:56 AM        Body mass index is 38.55 kg/m². : A BMI > 30 is classified as obesity and > 40 is classified as morbid obesity. Dressing c.d.i  C- collar in place   Calves soft and supple;  No pain with passive stretch  Sensation and motor intact  SCDs for mechanical DVT proph while in bed     PLAN:  1) PT BID  2) Aspirin 81 mg PO daily  for DVT Prophylaxis   3) GI Prophylaxis - Pepcid   4) Zyrtec PRN for bilateral leg itching   5) Chronic systolic CHF- continue home Lasix   5) Readniess for discharge:     [x] Vital Signs stable    [x] Hgb stable- no signs or symptoms of hemodynamic instability    [] + Voiding- POUR- matute placed overnight- plan to remove today and do voiding trial    [x] Wound intact, drainage minimal    [x] Tolerating PO intake     [] Cleared by PT (OT if applicable)     [] Stair training completed (if applicable)    [] Independent / Contact Guard Assist (household distance)     [] Bed mobility     [] Car transfers     [] ADLs    [x] Adequate pain control on oral medication alone     Plan for discharge home pending therapy progression and POUR resolution     Be Jamison, NP  DNP, AGACNP-BC

## 2019-05-23 NOTE — PROGRESS NOTES
Problem: Mobility Impaired (Adult and Pediatric)  Goal: *Acute Goals and Plan of Care (Insert Text)  Description  Physical Therapy Goals  Initiated 5/21/2019    1. Patient will move from supine to sit and sit to supine  in bed with modified independence within 4 days. 2. Patient will perform sit to stand with modified independence within 4 days. 3. Patient will ambulate with modified independence for 200 feet with the least restrictive device within 4 days. 4. Patient will ascend/descend 6 stairs with 1 handrail(s) with modified independence within 4 days. 5. Patient will verbalize and demonstrate understanding of spinal precautions (No bending, lifting greater than 5 lbs, or twisting; log-roll technique; frequent repositioning as instructed) within 4 days. 5/23/2019 1635 by Keyshawn Scale, PTA  Outcome: Progressing Towards Goal  Note:   PHYSICAL THERAPY TREATMENT  Patient: Sandrine Quigley (89 y.o. male)  Date: 5/23/2019  Diagnosis: Cervical stenosis of spinal canal [M48.02]     Procedure(s) (LRB):  C3-C6  ANTERIOR CERVICAL DISCECTOMY AND FUSION (N/A) 2 Days Post-Op    Precautions: Back, Fall  Chart, physical therapy assessment, plan of care and goals were reviewed. ASSESSMENT: lpt continues to do well, no LOB or SOB, did well with bed mob and toilet transfers, good motivation, vc's for safety and proper RW use. Progression toward goals:  ?      Improving appropriately and progressing toward goals  ? Improving slowly and progressing toward goals  ? Not making progress toward goals and plan of care will be adjusted     PLAN:  Patient continues to benefit from skilled intervention to address the above impairments. Continue treatment per established plan of care.   Discharge Recommendations:  Outpatient when prescribed by MD  Further Equipment Recommendations for Discharge:  rolling walker     OBJECTIVE DATA SUMMARY:     Critical Behavior:  Neurologic State: Alert  Orientation Level: Oriented X4  Cognition: Follows commands  Safety/Judgement: Awareness of environment    Functional Mobility Training:  Bed Mobility:  Log Rolling: Supervision  Supine to Sit: Supervision  Scooting: Supervision  Level of Assistance: Supervision  Interventions: Verbal cues    Transfers:  Sit to Stand: Supervision  Stand to Sit: Supervision  Interventions: Verbal cues  Level of Assistance: Supervision    Balance:  Sitting: Intact; Without support  Standing: Intact; With support  Standing - Static: Good;Constant support  Standing - Dynamic : Good;Constant support    Ambulation/Gait Training:  Distance (ft): 200 Feet (ft)  Assistive Device: Walker, rolling;Gait belt  Ambulation - Level of Assistance: Supervision  Gait Abnormalities: Decreased step clearance  Right Side Weight Bearing: Full  Left Side Weight Bearing: Full  Base of Support: Widened  Stance: (equal)  Speed/Noemi: Pace decreased (<100 feet/min)  Step Length: Left shortened;Right shortened  Interventions: Verbal cues  Pain:  Pain Scale 1: Numeric (0 - 10)  Pain Intensity 1: 0  Pain Location 1: Neck  Pain Orientation 1: Anterior  Pain Description 1: Aching     Activity Tolerance: fair    After treatment:   ?  Patient left in no apparent distress sitting up in chair  ? Patient left in no apparent distress in bed  ? Call bell left within reach  ? Nursing notified  ? Caregiver present  ?   Bed alarm activated    COMMUNICATION/COLLABORATION:   The patient?s plan of care was discussed with: Registered Nurse    Melanie Jean PTA   Time Calculation: 25 mins                        5/23/2019 1222 by Su Dela Cruz PTA  Note:   PHYSICAL THERAPY TREATMENT  Patient: Elvia Jorge (66 y.o. male)  Date: 5/23/2019  Diagnosis: Cervical stenosis of spinal canal [M48.02] S/P cervical spinal fusion    Procedure(s) (LRB): C3-C6  ANTERIOR CERVICAL DISCECTOMY AND FUSION (N/A) 2 Days Post-Op    Precautions: Back, Fall  Chart, physical therapy assessment, plan of care and goals were reviewed. ASSESSMENT: pt tolerated tx well, no LOB, slight SOB, did well with toilet transfers and ther-ex, good motivation, vc's for safety and proper RW use. Progression toward goals:  ?      Improving appropriately and progressing toward goals  ? Improving slowly and progressing toward goals  ? Not making progress toward goals and plan of care will be adjusted     PLAN:  Patient continues to benefit from skilled intervention to address the above impairments. Continue treatment per established plan of care. Discharge Recommendations:  Home Health  Further Equipment Recommendations for Discharge:  rolling walker     OBJECTIVE DATA SUMMARY:     Critical Behavior:  Neurologic State: Alert  Orientation Level: Oriented X4  Cognition: Follows commands  Safety/Judgement: Awareness of environment    Functional Mobility Training:  Bed Mobility: Pt sitting in chair on arrival.  Brace donned on arrival    Transfers:  Sit to Stand: Supervision  Stand to Sit: Supervision  Interventions: Verbal cues  Level of Assistance: Supervision    Balance:  Sitting: Intact; Without support  Standing: Intact; With support  Standing - Static: Good;Constant support  Standing - Dynamic : Good;Constant support    Ambulation/Gait Training:  Distance (ft): 200 Feet (ft)  Assistive Device: Walker, rolling;Gait belt  Ambulation - Level of Assistance: Supervision  Gait Abnormalities: Decreased step clearance  Right Side Weight Bearing: Full  Left Side Weight Bearing: Full  Base of Support: Widened  Stance: (equal)  Speed/Noemi: Pace decreased (<100 feet/min)  Step Length: Left shortened;Right shortened  Interventions: Verbal cues    Pain:  Pain Scale 1: Numeric (0 - 10)  Pain Intensity 1: 8  Pain Location 1: Neck  Pain Orientation 1: Anterior  Pain Description 1: Aching     Activity Tolerance: fair    After treatment:   ?  Patient left in no apparent distress sitting up in chair  ?   Patient left in no apparent distress in bed  ? Call bell left within reach  ? Nursing notified  ? Caregiver present  ?   Bed alarm activated    COMMUNICATION/COLLABORATION:   The patient?s plan of care was discussed with: Registered Nurse    Lyla Casas PTA   Time Calculation: 25 mins

## 2019-05-23 NOTE — PROGRESS NOTES
5/22/2019  11:05 PM    Reassessment of Blood sugar: 151. No need for order adjustments as originally thought with previous result of 370. Patient reports he had just eaten some chocolate pudding prior to tech checking glucose level. Bladder scan post void x2 - 300 ml output with result of >450 ml present in bladder. MD/PA on call paged at this time. 11:20 PM  Dr. Arriaga Person returned page; order received for matute placement.

## 2019-05-24 LAB
GLUCOSE BLD STRIP.AUTO-MCNC: 197 MG/DL (ref 65–100)
GLUCOSE BLD STRIP.AUTO-MCNC: 259 MG/DL (ref 65–100)
GLUCOSE BLD STRIP.AUTO-MCNC: 285 MG/DL (ref 65–100)
GLUCOSE BLD STRIP.AUTO-MCNC: 91 MG/DL (ref 65–100)
SERVICE CMNT-IMP: ABNORMAL
SERVICE CMNT-IMP: NORMAL

## 2019-05-24 PROCEDURE — 74011636637 HC RX REV CODE- 636/637: Performed by: ORTHOPAEDIC SURGERY

## 2019-05-24 PROCEDURE — 82962 GLUCOSE BLOOD TEST: CPT

## 2019-05-24 PROCEDURE — 74011250637 HC RX REV CODE- 250/637: Performed by: ORTHOPAEDIC SURGERY

## 2019-05-24 PROCEDURE — 97535 SELF CARE MNGMENT TRAINING: CPT

## 2019-05-24 PROCEDURE — 51798 US URINE CAPACITY MEASURE: CPT

## 2019-05-24 PROCEDURE — 97110 THERAPEUTIC EXERCISES: CPT

## 2019-05-24 PROCEDURE — 65270000029 HC RM PRIVATE

## 2019-05-24 PROCEDURE — 74011250637 HC RX REV CODE- 250/637: Performed by: NURSE PRACTITIONER

## 2019-05-24 PROCEDURE — 74011250636 HC RX REV CODE- 250/636: Performed by: NURSE PRACTITIONER

## 2019-05-24 PROCEDURE — 97116 GAIT TRAINING THERAPY: CPT

## 2019-05-24 RX ORDER — DEXTROSE MONOHYDRATE 100 MG/ML
125-250 INJECTION, SOLUTION INTRAVENOUS AS NEEDED
Status: DISCONTINUED | OUTPATIENT
Start: 2019-05-24 | End: 2019-05-26 | Stop reason: HOSPADM

## 2019-05-24 RX ORDER — DEXAMETHASONE SODIUM PHOSPHATE 4 MG/ML
4 INJECTION, SOLUTION INTRA-ARTICULAR; INTRALESIONAL; INTRAMUSCULAR; INTRAVENOUS; SOFT TISSUE ONCE
Status: COMPLETED | OUTPATIENT
Start: 2019-05-24 | End: 2019-05-24

## 2019-05-24 RX ADMIN — METFORMIN HYDROCHLORIDE 500 MG: 500 TABLET, EXTENDED RELEASE ORAL at 17:10

## 2019-05-24 RX ADMIN — PRAVASTATIN SODIUM 20 MG: 10 TABLET ORAL at 22:05

## 2019-05-24 RX ADMIN — FAMOTIDINE 20 MG: 20 TABLET ORAL at 17:10

## 2019-05-24 RX ADMIN — ASPIRIN 81 MG: 81 TABLET ORAL at 08:11

## 2019-05-24 RX ADMIN — DULOXETINE HYDROCHLORIDE 60 MG: 30 CAPSULE, DELAYED RELEASE ORAL at 08:11

## 2019-05-24 RX ADMIN — Medication 10 ML: at 22:04

## 2019-05-24 RX ADMIN — OMEGA-3 FATTY ACIDS CAP 1000 MG 1 CAPSULE: 1000 CAP at 08:11

## 2019-05-24 RX ADMIN — ACETAMINOPHEN 1000 MG: 500 TABLET ORAL at 11:51

## 2019-05-24 RX ADMIN — OXYCODONE HYDROCHLORIDE 10 MG: 5 TABLET ORAL at 11:51

## 2019-05-24 RX ADMIN — TAMSULOSIN HYDROCHLORIDE 0.4 MG: 0.4 CAPSULE ORAL at 08:11

## 2019-05-24 RX ADMIN — CARVEDILOL 12.5 MG: 12.5 TABLET, FILM COATED ORAL at 17:10

## 2019-05-24 RX ADMIN — ISOSORBIDE MONONITRATE 30 MG: 30 TABLET, EXTENDED RELEASE ORAL at 08:10

## 2019-05-24 RX ADMIN — OXYCODONE HYDROCHLORIDE 10 MG: 5 TABLET ORAL at 17:10

## 2019-05-24 RX ADMIN — Medication 10 ML: at 11:51

## 2019-05-24 RX ADMIN — SENNOSIDES AND DOCUSATE SODIUM 1 TABLET: 8.6; 5 TABLET ORAL at 17:10

## 2019-05-24 RX ADMIN — ACETAMINOPHEN 1000 MG: 500 TABLET ORAL at 00:45

## 2019-05-24 RX ADMIN — ACETAMINOPHEN 1000 MG: 500 TABLET ORAL at 17:10

## 2019-05-24 RX ADMIN — DEXAMETHASONE SODIUM PHOSPHATE 4 MG: 4 INJECTION, SOLUTION INTRAMUSCULAR; INTRAVENOUS at 11:51

## 2019-05-24 RX ADMIN — INSULIN LISPRO 4 UNITS: 100 INJECTION, SOLUTION INTRAVENOUS; SUBCUTANEOUS at 11:50

## 2019-05-24 RX ADMIN — OXYCODONE HYDROCHLORIDE 10 MG: 5 TABLET ORAL at 08:10

## 2019-05-24 RX ADMIN — POLYETHYLENE GLYCOL 3350 17 G: 17 POWDER, FOR SOLUTION ORAL at 08:10

## 2019-05-24 RX ADMIN — FUROSEMIDE 40 MG: 40 TABLET ORAL at 08:10

## 2019-05-24 RX ADMIN — VITAMIN D, TAB 1000IU (100/BT) 5000 UNITS: 25 TAB at 08:11

## 2019-05-24 RX ADMIN — LISINOPRIL 2.5 MG: 5 TABLET ORAL at 08:11

## 2019-05-24 RX ADMIN — INSULIN LISPRO 9 UNITS: 100 INJECTION, SOLUTION INTRAVENOUS; SUBCUTANEOUS at 17:10

## 2019-05-24 RX ADMIN — ACETAMINOPHEN 1000 MG: 500 TABLET ORAL at 05:48

## 2019-05-24 RX ADMIN — Medication 10 ML: at 05:48

## 2019-05-24 RX ADMIN — SENNOSIDES AND DOCUSATE SODIUM 1 TABLET: 8.6; 5 TABLET ORAL at 08:11

## 2019-05-24 RX ADMIN — GLIMEPIRIDE 1 MG: 1 TABLET ORAL at 08:11

## 2019-05-24 RX ADMIN — CARVEDILOL 12.5 MG: 12.5 TABLET, FILM COATED ORAL at 08:12

## 2019-05-24 NOTE — PROGRESS NOTES
Spiritual Care Assessment/Progress Note  [facility]      NAME: Silvia Yuan      MRN: [id number]  AGE: 72 y.o.  SEX: male  Sabianism Affiliation: Druze   Language: English     5/24/2019     Total Time (in minutes): 30     Spiritual Assessment begun in [facility] through conversation with:         [x]Patient        [] Family    [] Friend(s)        Reason for Consult: Request by staff     Spiritual beliefs: (Please include comment if needed)     [x] Identifies with a james tradition:    Druze     [] Supported by a james community:            [] Claims no spiritual orientation:           [] Seeking spiritual identity:                [] Adheres to an individual form of spirituality:           [] Not able to assess:                           Identified resources for coping:      [x] Prayer                               [] Music                  [] Guided Imagery     [x] Family/friends                 [] Pet visits     [] Devotional reading                         [] Unknown     [] Other:                                             Interventions offered during this visit: (See comments for more details)    Patient Interventions: Affirmation of james, Affirmation of emotions/emotional suffering, Iconic (affirming the presence of God/Higher Power), Initial/Spiritual assessment, patient floor, Normalization of emotional/spiritual concerns, Prayer (assurance of), Sabianism beliefs/image of God discussed           Plan of Care:     [x] Support spiritual and/or cultural needs    [] Support AMD and/or advance care planning process      [] Support grieving process   [] Coordinate Rites and/or Rituals    [x] Coordination with community clergy   [] No spiritual needs identified at this time   [] Detailed Plan of Care below (See Comments)  [] Make referral to Music Therapy  [] Make referral to Pet Therapy     [] Make referral to Addiction services  [] Make referral to [facility]  [] Make referral to Spiritual Care Partner  [] No future visits requested        [x] Follow up visits as needed     Comments:   Responded to staff request to visit with patient on Ortho unit. Initiated spiritual assessment during visit. No family present. Patient was seated in chair at bedside. He appeared uncomfortable during visit and kept his eyes closed most of the time. He spoke briefly about his recent surgery followed by engaging in a life review. He and his wife have three grown sons; two live locally. He and his wife moved from [place] about a year and a half ago to live with one of their sons after patient has a significant medical event. He identifies as Evangelical, but has not joined a Orthodoxy since moving here. He is receptive to a visit from the [address] to be anointed on Monday, the 27th if he is still here. Will arrange for this visit. Provided pastoral presence, supportive listening and assurance of prayer. Visit ended when  was paged elsewhere.     BRANDON Velazquez, [facility], [address] [place] [address] Avenue    [address] Paging Service  [phone])

## 2019-05-24 NOTE — PROGRESS NOTES
Problem: Self Care Deficits Care Plan (Adult)  Goal: *Acute Goals and Plan of Care (Insert Text)  Description  Occupational Therapy Goals  Initiated 5/22/2019    1. Patient will perform lower body dressing with moderate assistance  using within 7 days. 2.  Patient will perform upper body dressing with moderate assistance  within 7 days. 3.  Patient will standing ADLs 5 mins at minimal assistance/contact guard assist within 7 days. 4.  Patient will don/doff neck brace at moderate assistance  within 7 days. 5.  Patient will verbalize/demonstrate 3/3 cervical precautions during ADL tasks without cues within  7 days. Outcome: Progressing Towards Goal     OCCUPATIONAL THERAPY TREATMENT  Patient: Gabriella Burch (89 y.o. male)  Date: 5/24/2019  Diagnosis: Cervical stenosis of spinal canal [M48.02] S/P cervical spinal fusion  Procedure(s) (LRB):  C3-C6  ANTERIOR CERVICAL DISCECTOMY AND FUSION (N/A) 3 Days Post-Op  Precautions: Back, Fall  Chart, occupational therapy assessment, plan of care, and goals were reviewed. ASSESSMENT:  Patient received sitting up in chair just following PT session, agreeable to participate. He performed lower body dressing tasks using AE . Noted continued difficulty with weakness and decreased coordination in B hands (R more difficult than L), however he offers good efforts and requires mod A for use of sock aid and min A with use of reacher to don pants. Performed toilet transfer with supervision. Instructed on coordination exercises at end of session with patient demonstrating good recall of information. On RA during session, with noted increase in WOB during activity and sats to decrease to 91%. Patient requires cues to take rest breaks during activities. Reports he will have assistance at home during the day. Patient can d/c home if he has 24 hour supervision/assist with follow up [facility].      Progression toward goals:  ?       Improving appropriately and progressing toward goals  ? Improving slowly and progressing toward goals  ? Not making progress toward goals and plan of care will be adjusted     PLAN:  Patient continues to benefit from skilled intervention to address the above impairments. Continue treatment per established plan of care. Discharge Recommendations:  Home health with 24 hour family assist/supervision vs. rehab  Further Equipment Recommendations for Discharge:  anticipate none. SUBJECTIVE:   Patient stated ? this right arm is way harder. ? RN cleared patient for therapy. Patient agreeable to participate. OBJECTIVE DATA SUMMARY:   Cognitive/Behavioral Status:  Neurologic State: Alert  Orientation Level: Oriented X4  Cognition: Follows commands  Perception: Appears intact  Perseveration: No perseveration noted  Safety/Judgement: Awareness of environment; Fall prevention    Functional Mobility and Transfers for ADLs:  Bed Mobility:  Patient received sitting up in chair and ended session up in chair. Transfers:  Sit to Stand: Supervision  Functional Transfers  Bathroom Mobility: Stand-by assistance  Toilet Transfer : Supervision  Bed to Chair: Supervision    Balance:  Sitting: Intact  Standing: Intact; With support(with rolling walker)  Standing - Static: Good  Standing - Dynamic : Good    ADL Intervention:  Lower Body Dressing Assistance  Pants With Elastic Waist: Minimum assistance  Socks: Moderate assistance(due to weakness/decreased coordination in B hands with AE)  Position Performed: Seated in chair  Cues: Doff;Don;Physical assistance; Tactile cues provided;Verbal cues provided;Visual cues provided  Adaptive Equipment Used: Reacher;Sock aid; Walker    Cognitive Retraining  Safety/Judgement: Awareness of environment; Fall prevention    Dressing lower body: Patient instructed to don brace first and on the benefits to remain seated to don all clothing to increase independence with precautions and pain management.    Toileting: Patient instructed on the benefits of using flushable wet wipes and toilet tongs if decreased reach or pain for ramakrishna care. Also, the benefits of a reacher to aid in clothing management. Standing: Patient instructed and indicated understanding to walk up to sink/counter top/surfaces, step into walker, square off while using objects, slide objects along surfaces, to increase adherence to back precautions and fall prevention. Therapeutic Exercises:   Patient educated on coordination task. Instructed patient to place washcloth in lap, use R hand to first fold the towel in half and then roll it into a tight roll, with focus on making it as neat and as tight as possible. Then instructed to unroll and unfold with same hand. Then instructed to repeat the same using L hand. Instructed patient to perform serial opposition (thumb to each finger sequentially) at steady pace, increasing tempo as progression. Reviewed foam and hand exercises from previous session. Patient demonstrated good recall of information. Pain:  Pain Scale 1: Numeric (0 - 10)  Pain Intensity 1: 6  Pain Location 1: Neck  Pain Orientation 1: Anterior  Pain Description 1: Aching  Pain Intervention(s) 1: Patient positioned to his comfort with all needs met at end of session. RN notified. Activity Tolerance:   Patient with increased WOB during session. Noted sats to drop to 91% during activity. Requires prompts for rest breaks. After treatment:   ? Patient left in no apparent distress sitting up in chair  ? Patient left in no apparent distress in bed  ? Call bell left within reach  ? Nursing notified  ? Caregiver present  ?  Bed alarm activated    COMMUNICATION/COLLABORATION:   The patient?s plan of care was discussed with: Physical Therapy Assistant and patient     Lori River OT  Time Calculation: 32 mins

## 2019-05-24 NOTE — PROGRESS NOTES
Problem: Mobility Impaired (Adult and Pediatric)  Goal: *Acute Goals and Plan of Care (Insert Text)  Description  Physical Therapy Goals  Initiated 5/21/2019    1. Patient will move from supine to sit and sit to supine  in bed with modified independence within 4 days. 2. Patient will perform sit to stand with modified independence within 4 days. 3. Patient will ambulate with modified independence for 200 feet with the least restrictive device within 4 days. 4. Patient will ascend/descend 6 stairs with 1 handrail(s) with modified independence within 4 days. 5. Patient will verbalize and demonstrate understanding of spinal precautions (No bending, lifting greater than 5 lbs, or twisting; log-roll technique; frequent repositioning as instructed) within 4 days. Outcome: Progressing Towards Goal  Note:   PHYSICAL THERAPY TREATMENT  Patient: Angela Sanderson (89 y.o. male)  Date: 5/24/2019  Diagnosis: Cervical stenosis of spinal canal [M48.02]     Procedure(s) (LRB):  C3-C6  ANTERIOR CERVICAL DISCECTOMY AND FUSION (N/A) 3 Days Post-Op    Precautions: Back, Fall  Chart, physical therapy assessment, plan of care and goals were reviewed. ASSESSMENT: pt tolerated tx well, no LOB or SOB, does well with transfers and ther-ex, good motivation, vc's for safety and proper RW use. Progression toward goals:  ?      Improving appropriately and progressing toward goals  ? Improving slowly and progressing toward goals  ? Not making progress toward goals and plan of care will be adjusted     PLAN:  Patient continues to benefit from skilled intervention to address the above impairments. Continue treatment per established plan of care.   Discharge Recommendations:  Outpatient when MD prescribes   Further Equipment Recommendations for Discharge:  rolling walker     OBJECTIVE DATA SUMMARY:     Critical Behavior:  Neurologic State: Alert, Eyes open spontaneously  Orientation Level: Oriented X4  Cognition: Follows commands  Safety/Judgement: Awareness of environment    Functional Mobility Training:  Bed Mobility:  Brace donned on arrival    Transfers:  Sit to Stand: Supervision  Stand to Sit: Supervision  Stand Pivot Transfers: Supervision  Bed to Chair: Supervision  Interventions: Verbal cues  Level of Assistance: Supervision    Balance:  Sitting: Intact; Without support  Standing: Intact; With support  Standing - Static: Good;Constant support  Standing - Dynamic : Good;Constant support    Ambulation/Gait Training:  Distance (ft): 250 Feet (ft)  Assistive Device: Walker, rolling;Gait belt  Ambulation - Level of Assistance: Supervision  Gait Abnormalities: Decreased step clearance  Right Side Weight Bearing: Full  Left Side Weight Bearing: Full  Base of Support: Widened  Stance: (equal)  Speed/Noemi: Pace decreased (<100 feet/min)  Step Length: Left shortened;Right shortened  Interventions: Verbal cues     Therapeutic Exercises:  sitting  EXERCISE   Sets   Reps   Active Active Assist   Passive   Comments   Ankle pumps 1 10 ? ? ? bilat   Heel raises 1 10 ? ? ? \"   Toe tap 1 10 ? ? ? \"   Knee ext 1 10 ? ? ? \"   Hip flex 1 10 ? ? ? \"     Pain:  Pain Scale 1: Numeric (0 - 10)  Pain Intensity 1: 6  Pain Location 1: Neck  Pain Orientation 1: Anterior  Pain Description 1: Aching  Pain Intervention(s) 1: Medication (see MAR)    Activity Tolerance: fair    After treatment:   ?  Patient left in no apparent distress sitting up in chair  ? Patient left in no apparent distress in bed  ? Call bell left within reach  ? Nursing notified  ? Caregiver present  ?   Bed alarm activated    COMMUNICATION/COLLABORATION:   The patient?s plan of care was discussed with: Registered Nurse    Young Carver PTA   Time Calculation: 25 mins

## 2019-05-24 NOTE — PROGRESS NOTES
Problem: Mobility Impaired (Adult and Pediatric)  Goal: *Acute Goals and Plan of Care (Insert Text)  Description  Physical Therapy Goals  Initiated 5/21/2019    1. Patient will move from supine to sit and sit to supine  in bed with modified independence within 4 days. 2. Patient will perform sit to stand with modified independence within 4 days. 3. Patient will ambulate with modified independence for 200 feet with the least restrictive device within 4 days. 4. Patient will ascend/descend 6 stairs with 1 handrail(s) with modified independence within 4 days. 5. Patient will verbalize and demonstrate understanding of spinal precautions (No bending, lifting greater than 5 lbs, or twisting; log-roll technique; frequent repositioning as instructed) within 4 days. 5/24/2019 1601 by Harsha Blackmon PTA  Outcome: Progressing Towards Goal  Note:   PHYSICAL THERAPY TREATMENT  Patient: Jamee Escalante (19 y.o. male)  Date: 5/24/2019  Diagnosis: Cervical stenosis of spinal canal [M48.02]     Procedure(s) (LRB):  C3-C6  ANTERIOR CERVICAL DISCECTOMY AND FUSION (N/A) 3 Days Post-Op    Precautions: Back, Fall  Chart, physical therapy assessment, plan of care and goals were reviewed. ASSESSMENT: pt continues to do well, no LOB or SOB, does well with transfers and ther-ex, vc's for safety and proper RW use. Progression toward goals:  ?      Improving appropriately and progressing toward goals  ? Improving slowly and progressing toward goals  ? Not making progress toward goals and plan of care will be adjusted     PLAN:  Patient continues to benefit from skilled intervention to address the above impairments. Continue treatment per established plan of care.   Discharge Recommendations:  Outpatient  Further Equipment Recommendations for Discharge:  rolling walker     OBJECTIVE DATA SUMMARY:     Critical Behavior:  Neurologic State: Alert, Eyes open spontaneously  Orientation Level: Oriented X4  Cognition: Follows commands  Safety/Judgement: Awareness of environment    Functional Mobility Training:  Bed Mobility:  Brace donned on arrival    Transfers:  Sit to Stand: Supervision  Stand to Sit: Supervision  Stand Pivot Transfers: Supervision  Bed to Chair: Supervision  Interventions: Verbal cues  Level of Assistance: Supervision    Balance:  Sitting: Intact; Without support  Standing: Intact; With support  Standing - Static: Good;Constant support  Standing - Dynamic : Good;Constant support    Ambulation/Gait Training:  Distance (ft): 200 Feet (ft)  Assistive Device: Walker, rolling;Gait belt  Ambulation - Level of Assistance: Supervision  Gait Abnormalities: Decreased step clearance  Right Side Weight Bearing: Full  Left Side Weight Bearing: Full  Base of Support: Widened  Stance: (equal)  Speed/Noemi: Pace decreased (<100 feet/min)  Step Length: Left shortened;Right shortened  Interventions: Verbal cues    Therapeutic Exercises:   sitting  EXERCISE   Sets   Reps   Active Active Assist   Passive   Comments   Ankle pumps 1 10 ? ? ? bilat   Heel raises 1 10 ? ? ? \"   Toe tap 1 10 ? ? ? \"   Knee ext 1 10 ? ? ? \"   Hip flex 1 10 ? ? ? \"     Pain:  Pain Scale 1: Numeric (0 - 10)  Pain Intensity 1: 6  Pain Location 1: Neck  Pain Orientation 1: Anterior  Pain Description 1: Aching  Pain Intervention(s) 1: Medication (see MAR)    Activity Tolerance: good    After treatment:   ?  Patient left in no apparent distress sitting up in chair  ? Patient left in no apparent distress in bed  ? Call bell left within reach  ? Nursing notified  ? Caregiver present  ?   Bed alarm activated    COMMUNICATION/COLLABORATION:   The patient?s plan of care was discussed with: Registered Nurse    Rosalia Whittington PTA   Time Calculation: 25 mins                        5/24/2019 1420 by [place] PTA  Outcome: Progressing Towards Goal  Note:   PHYSICAL THERAPY TREATMENT  Patient: Maricel Ricci (28 y.o. male)  Date: 5/24/2019  Diagnosis: Cervical stenosis of spinal canal [M48.02]     Procedure(s) (LRB):  C3-C6  ANTERIOR CERVICAL DISCECTOMY AND FUSION (N/A) 3 Days Post-Op    Precautions: Back, Fall  Chart, physical therapy assessment, plan of care and goals were reviewed. ASSESSMENT: pt tolerated tx well, no LOB or SOB, does well with transfers and ther-ex, good motivation, vc's for safety and proper RW use. Progression toward goals:  ?      Improving appropriately and progressing toward goals  ? Improving slowly and progressing toward goals  ? Not making progress toward goals and plan of care will be adjusted     PLAN:  Patient continues to benefit from skilled intervention to address the above impairments. Continue treatment per established plan of care. Discharge Recommendations:  Outpatient when MD prescribes   Further Equipment Recommendations for Discharge:  rolling walker     OBJECTIVE DATA SUMMARY:     Critical Behavior:  Neurologic State: Alert, Eyes open spontaneously  Orientation Level: Oriented X4  Cognition: Follows commands  Safety/Judgement: Awareness of environment    Functional Mobility Training:  Bed Mobility:  Brace donned on arrival    Transfers:  Sit to Stand: Supervision  Stand to Sit: Supervision  Stand Pivot Transfers: Supervision  Bed to Chair: Supervision  Interventions: Verbal cues  Level of Assistance: Supervision    Balance:  Sitting: Intact; Without support  Standing: Intact; With support  Standing - Static: Good;Constant support  Standing - Dynamic : Good;Constant support    Ambulation/Gait Training:  Distance (ft): 250 Feet (ft)  Assistive Device: Walker, rolling;Gait belt  Ambulation - Level of Assistance: Supervision  Gait Abnormalities: Decreased step clearance  Right Side Weight Bearing: Full  Left Side Weight Bearing: Full  Base of Support: Widened  Stance: (equal)  Speed/Noemi: Pace decreased (<100 feet/min)  Step Length: Left shortened;Right shortened  Interventions: Verbal cues     Therapeutic Exercises:  sitting  EXERCISE   Sets   Reps   Active Active Assist   Passive   Comments   Ankle pumps 1 10 ? ? ? bilat   Heel raises 1 10 ? ? ? \"   Toe tap 1 10 ? ? ? \"   Knee ext 1 10 ? ? ? \"   Hip flex 1 10 ? ? ? \"     Pain:  Pain Scale 1: Numeric (0 - 10)  Pain Intensity 1: 6  Pain Location 1: Neck  Pain Orientation 1: Anterior  Pain Description 1: Aching  Pain Intervention(s) 1: Medication (see MAR)    Activity Tolerance: fair    After treatment:   ?  Patient left in no apparent distress sitting up in chair  ? Patient left in no apparent distress in bed  ? Call bell left within reach  ? Nursing notified  ? Caregiver present  ?   Bed alarm activated    COMMUNICATION/COLLABORATION:   The patient?s plan of care was discussed with: Registered Nurse    Lyla Casas PTA   Time Calculation: 25 mins

## 2019-05-24 NOTE — PROGRESS NOTES
Orthopedic End of Shift Note    Bedside and Verbal shift change report given to Neli (oncoming nurse) by Deborah Boyle (offgoing nurse). Report included the following information SBAR, Kardex, Intake/Output, MAR and Recent Results.      POD# 3    Significant issues during shift: pain    Issues for Physician to address: pain control    Activity This Shift  (check all that apply) [x] chair  [] dangle   [x] bathroom  [] bedside commode [] hallway  [] bedrest   Nausea/Vomiting [] yes [x] no     Voiding Status [x] void [] Sánchez [] I&O Cath   Bowel Movements [x] yes [] no     Foot Pumps or SCD [] yes [x] no    Ice Pack [] yes    [x] no    Incentive Spirometer [x] yes [] no Volume:   1200   Telemetry Monitoring   [] yes [x] no Rhythm:   Supplemental O2 [] yes [x] no Sat off O2:   95%

## 2019-05-24 NOTE — PROGRESS NOTES
ORTHO POST OP SPINE PROGRESS NOTE    May 24, 2019  Admit Date: 2019  Admit Diagnosis: Cervical stenosis of spinal canal [M48.02]  Procedure: Procedure(s):  C3-C6  ANTERIOR CERVICAL DISCECTOMY AND FUSION  Post Op day: 3 Days Post-Op    Subjective:     Vlad Goodman is a patient who has complaints Of pain in the neck and bilateral upper extremities as well as weakness bilateral upper extremities 3 days status post C3 through C6 ACDF. He feels he is able to lift his left arm up slightly higher but still unable to lift his right arm. Weakness present preoperatively. Has been progressing well with therapy. Sánchez catheter reinserted earlier this week and removed yesterday. Patient states he has been able to void. He notes some difficulty swallowing. He denies nausea or vomiting. Review of Systems: Pertinent items are noted in HPI. Objective:     PT/OT:   Distance Ambulated:           Time Ambulated (min):        Ambulation Response: Activity Response: Tolerated well  Assistive Device:              Assistive Device: Walker (comment), Fall prevention device    Vital Signs:    Blood pressure 137/63, pulse 88, temperature 97.7 °F (36.5 °C), resp. rate 18, height 5' 3\" (1.6 m), weight 101.2 kg (223 lb), SpO2 98 %. Temp (24hrs), Av °F (36.7 °C), Min:97.7 °F (36.5 °C), Max:98.3 °F (36.8 °C)      No intake/output data recorded.  1901 -  0700  In: -   Out: 1600 [Urine:1600]    LAB:    No results for input(s): HGB, HGBEXT, WBC, PLT, PLTEXT, HGBEXT, PLTEXT in the last 72 hours. Wound/Drain Assessment:  Drain:      Dressing:     Physical Exam:  Neurological: Weak in bilateral upper extremities  Incision clean, dry, and intact  3/5 left deltoid 1/5 right deltoid 3/5 bilateral intrinsics  I have reviewed x-rays cervical spine which show hardware in good alignment from C3 through C6. There is no signs of component loosening or rejection.   There is no fracture, dislocation or instability  Assessment:      Patient Active Problem List   Diagnosis Code    Obesity, morbid ([facility]) E66.01    SOB (shortness of breath) R06.02    Uncontrolled type 2 diabetes mellitus with complication, with long-term current use of insulin ([facility]) E11.8, E11.65, Z79.4    Pure hypercholesterolemia E78.00    Congestive heart failure ([facility]) I50.9    Pain in both hands M79.641, M79.642    Type 2 diabetes with nephropathy ([facility] [address] E11.21    Cervical stenosis of spinal canal M48.02    S/P cervical spinal fusion Z98.1       Plan:     Continue PT/OT/Rehab  Discontinue: IV  Consult: PT  and OT    Discharge To:  Home. Today    Making slow improvements following ACDF.   May consider EMG 3 months postop

## 2019-05-24 NOTE — PROGRESS NOTES
Problem: Diabetes Self-Management  Goal: *Disease process and treatment process  Description  Define diabetes and identify own type of diabetes; list 3 options for treating diabetes. Outcome: Progressing Towards Goal  Goal: *Incorporating nutritional management into lifestyle  Description  Describe effect of type, amount and timing of food on blood glucose; list 3 methods for planning meals. Outcome: Progressing Towards Goal  Goal: *Incorporating physical activity into lifestyle  Description  State effect of exercise on blood glucose levels. Outcome: Progressing Towards Goal  Goal: *Developing strategies to promote health/change behavior  Description  Define the ABC's of diabetes; identify appropriate screenings, schedule and personal plan for screenings. Outcome: Progressing Towards Goal  Goal: *Using medications safely  Description  State effect of diabetes medications on diabetes; name diabetes medication taking, action and side effects. Outcome: Progressing Towards Goal  Goal: *Monitoring blood glucose, interpreting and using results  Description  Identify recommended blood glucose targets  and personal targets. Outcome: Progressing Towards Goal  Goal: *Prevention, detection, treatment of acute complications  Description  List symptoms of hyper- and hypoglycemia; describe how to treat low blood sugar and actions for lowering  high blood glucose level. Outcome: Progressing Towards Goal  Goal: *Prevention, detection and treatment of chronic complications  Description  Define the natural course of diabetes and describe the relationship of blood glucose levels to long term complications of diabetes.   Outcome: Progressing Towards Goal  Goal: *Developing strategies to address psychosocial issues  Description  Describe feelings about living with diabetes; identify support needed and support network  Outcome: Progressing Towards Goal  Goal: *Insulin pump training  Outcome: Progressing Towards Goal  Goal: *Sick day guidelines  Outcome: Progressing Towards Goal  Goal: *Patient Specific Goal (EDIT GOAL, INSERT TEXT)  Outcome: Progressing Towards Goal     Problem: Patient Education: Go to Patient Education Activity  Goal: Patient/Family Education  Outcome: Progressing Towards Goal     Problem: Falls - Risk of  Goal: *Absence of Falls  Description  Document Michelle Galaviz Fall Risk and appropriate interventions in the flowsheet.   Outcome: Progressing Towards Goal     Problem: Patient Education: Go to Patient Education Activity  Goal: Patient/Family Education  Outcome: Progressing Towards Goal     Problem: Patient Education: Go to Patient Education Activity  Goal: Patient/Family Education  Outcome: Progressing Towards Goal     Problem: Pain  Goal: *Control of Pain  Outcome: Progressing Towards Goal  Goal: *PALLIATIVE CARE:  Alleviation of Pain  Outcome: Progressing Towards Goal     Problem: Patient Education: Go to Patient Education Activity  Goal: Patient/Family Education  Outcome: Progressing Towards Goal     Problem: Patient Education: Go to Patient Education Activity  Goal: Patient/Family Education  Outcome: Progressing Towards Goal     Problem: Surgical Pathway Post-Op Day 1  Goal: Off Pathway (Use only if patient is Off Pathway)  Outcome: Progressing Towards Goal  Goal: Activity/Safety  Outcome: Progressing Towards Goal  Goal: Diagnostic Test/Procedures  Outcome: Progressing Towards Goal  Goal: Nutrition/Diet  Outcome: Progressing Towards Goal  Goal: Discharge Planning  Outcome: Progressing Towards Goal  Goal: Medications  Outcome: Progressing Towards Goal  Goal: Respiratory  Outcome: Progressing Towards Goal  Goal: Treatments/Interventions/Procedures  Outcome: Progressing Towards Goal  Goal: Psychosocial  Outcome: Progressing Towards Goal  Goal: *No signs and symptoms of infection or wound complications  Outcome: Progressing Towards Goal  Goal: *Optimal pain control at patient's stated goal  Outcome: Progressing Towards Goal  Goal: *Adequate urinary output (equal to or greater than 30 milliliters/hour)  Outcome: Progressing Towards Goal  Goal: *Hemodynamically stable  Outcome: Progressing Towards Goal  Goal: *Tolerating diet  Outcome: Progressing Towards Goal  Goal: *Demonstrates progressive activity  Outcome: Progressing Towards Goal  Goal: *Lungs clear or at baseline  Outcome: Progressing Towards Goal     Problem: Surgical Pathway Post-Op Day 2 through Discharge  Goal: Off Pathway (Use only if patient is Off Pathway)  Outcome: Progressing Towards Goal  Goal: Activity/Safety  Outcome: Progressing Towards Goal  Goal: Nutrition/Diet  Outcome: Progressing Towards Goal  Goal: Discharge Planning  Outcome: Progressing Towards Goal  Goal: Medications  Outcome: Progressing Towards Goal  Goal: Respiratory  Outcome: Progressing Towards Goal  Goal: Treatments/Interventions/Procedures  Outcome: Progressing Towards Goal  Goal: Psychosocial  Outcome: Progressing Towards Goal  Goal: *No signs and symptoms of infection or wound complications  Outcome: Progressing Towards Goal  Goal: *Optimal pain control at patient's stated goal  Outcome: Progressing Towards Goal  Goal: *Adequate urinary output (equal to or greater than 30 milliliters/hour)  Outcome: Progressing Towards Goal  Goal: *Hemodynamically stable  Outcome: Progressing Towards Goal  Goal: *Tolerating diet  Outcome: Progressing Towards Goal  Goal: *Demonstrates progressive activity  Outcome: Progressing Towards Goal  Goal: *Lungs clear or at baseline  Outcome: Progressing Towards Goal     Problem: Surgical Pathway: Discharge Outcomes  Goal: *Hemodynamically stable  Outcome: Progressing Towards Goal  Goal: *Lungs clear or at baseline  Outcome: Progressing Towards Goal  Goal: *Demonstrates independent activity or return to baseline  Outcome: Progressing Towards Goal  Goal: *Optimal pain control at patient's stated goal  Outcome: Progressing Towards Goal  Goal: *Verbalizes understanding and describes prescribed diet  Outcome: Progressing Towards Goal  Goal: *Tolerating diet  Outcome: Progressing Towards Goal  Goal: *Verbalizes name, dosage, time, side effects, and number of days to continue medications  Outcome: Progressing Towards Goal  Goal: *No signs and symptoms of infection or wound complications  Outcome: Progressing Towards Goal  Goal: *Anxiety reduced or absent  Outcome: Progressing Towards Goal  Goal: *Understands and describes signs and symptoms to report to providers(Stroke Metric)  Outcome: Progressing Towards Goal  Goal: *Describes follow-up/return visits to physicians  Outcome: Progressing Towards Goal  Goal: *Describes available resources and support systems  Outcome: Progressing Towards Goal     Problem: Patient Education: Go to Patient Education Activity  Goal: Patient/Family Education  Outcome: Progressing Towards Goal

## 2019-05-24 NOTE — PROGRESS NOTES
Ortho / Neurosurgery NP Note    POD# 1  s/p C3-C6  ANTERIOR CERVICAL DISCECTOMY AND FUSION     Pt resting in chair  Complaints of difficulty swallowing and Right arm weakness  Pt reports weakness is worse than pre-op. VSS Afebrile. Voiding status: +void, output was 200, 150, then 300 cc. Then voided large amount (unmeasured) and post void scan 200 cc    Labs  Lab Results   Component Value Date/Time    HGB 14.1 05/16/2019 09:56 AM      Lab Results   Component Value Date/Time    INR 1.0 05/16/2019 09:56 AM        Body mass index is 39.5 kg/m². : A BMI > 30 is classified as obesity and > 40 is classified as morbid obesity. Pt is talking with froggy voice. Clearing secretions and BBS clear  Prineo Dressing c.d.i  Cryotherapy in place over incision    Sensation intact  To bilateral LE  Right arm weak in shoulder, bicep triceps and wrist/hand. 4/5  Unable to extend or raise right arm which is limiting his self care and ability to feed himself. PLAN:  1) Pt ambulating in room well. 2) POUR - seems to be resolving and getting Flomax. Will continue to monitor post void scan if output less than 200 cc.  3) Neck /phayngeal swelling - seems stable and no respiratory compromise. Likely the cause of sore throat, swallowing difficulty and froggy voice. Rk give dose of IV Decadron. Anticipate this will further elevate blood sugars. He has Lispro SSI coverage available. Discussed with [facility] PA and Dr. Latisha Roman. 4) Readniess for discharge:     [x] Vital Signs stable    [x] Hgb stable    [] + Voiding - see above   [x] Wound intact, drainage minimal    [] Tolerating PO intake  - see above   [] Cleared by PT (OT if applicable)     [] Stair training completed (if applicable)    [] Independent / Contact Guard Assist (household distance)     [] Bed mobility     [] Car transfers     [] ADLs    [x] Adequate pain control on oral medication alone     Home saturday if swallowing improved and POUR resolved.     Raghavendra Mancia, NP  DNP, ACNP-BC, ONP-C

## 2019-05-25 LAB
GLUCOSE BLD STRIP.AUTO-MCNC: 167 MG/DL (ref 65–100)
GLUCOSE BLD STRIP.AUTO-MCNC: 188 MG/DL (ref 65–100)
GLUCOSE BLD STRIP.AUTO-MCNC: 206 MG/DL (ref 65–100)
GLUCOSE BLD STRIP.AUTO-MCNC: 209 MG/DL (ref 65–100)
SERVICE CMNT-IMP: ABNORMAL

## 2019-05-25 PROCEDURE — 74011250637 HC RX REV CODE- 250/637: Performed by: ORTHOPAEDIC SURGERY

## 2019-05-25 PROCEDURE — 74011250637 HC RX REV CODE- 250/637: Performed by: NURSE PRACTITIONER

## 2019-05-25 PROCEDURE — 97116 GAIT TRAINING THERAPY: CPT | Performed by: PHYSICAL THERAPIST

## 2019-05-25 PROCEDURE — 74011636637 HC RX REV CODE- 636/637: Performed by: ORTHOPAEDIC SURGERY

## 2019-05-25 PROCEDURE — 94760 N-INVAS EAR/PLS OXIMETRY 1: CPT

## 2019-05-25 PROCEDURE — 97530 THERAPEUTIC ACTIVITIES: CPT | Performed by: PHYSICAL THERAPIST

## 2019-05-25 PROCEDURE — 65270000029 HC RM PRIVATE

## 2019-05-25 PROCEDURE — 82962 GLUCOSE BLOOD TEST: CPT

## 2019-05-25 RX ADMIN — CARVEDILOL 12.5 MG: 12.5 TABLET, FILM COATED ORAL at 09:03

## 2019-05-25 RX ADMIN — INSULIN LISPRO 6 UNITS: 100 INJECTION, SOLUTION INTRAVENOUS; SUBCUTANEOUS at 12:15

## 2019-05-25 RX ADMIN — Medication 10 ML: at 20:50

## 2019-05-25 RX ADMIN — TAMSULOSIN HYDROCHLORIDE 0.4 MG: 0.4 CAPSULE ORAL at 09:03

## 2019-05-25 RX ADMIN — POLYETHYLENE GLYCOL 3350 17 G: 17 POWDER, FOR SOLUTION ORAL at 09:00

## 2019-05-25 RX ADMIN — Medication 10 ML: at 16:55

## 2019-05-25 RX ADMIN — PRAVASTATIN SODIUM 20 MG: 10 TABLET ORAL at 20:50

## 2019-05-25 RX ADMIN — CARVEDILOL 12.5 MG: 12.5 TABLET, FILM COATED ORAL at 17:02

## 2019-05-25 RX ADMIN — INSULIN LISPRO 4 UNITS: 100 INJECTION, SOLUTION INTRAVENOUS; SUBCUTANEOUS at 16:55

## 2019-05-25 RX ADMIN — Medication 10 ML: at 06:05

## 2019-05-25 RX ADMIN — ACETAMINOPHEN 1000 MG: 500 TABLET ORAL at 06:05

## 2019-05-25 RX ADMIN — ASPIRIN 81 MG: 81 TABLET ORAL at 09:03

## 2019-05-25 RX ADMIN — ISOSORBIDE MONONITRATE 30 MG: 30 TABLET, EXTENDED RELEASE ORAL at 09:03

## 2019-05-25 RX ADMIN — SENNOSIDES AND DOCUSATE SODIUM 1 TABLET: 8.6; 5 TABLET ORAL at 09:02

## 2019-05-25 RX ADMIN — INSULIN LISPRO 4 UNITS: 100 INJECTION, SOLUTION INTRAVENOUS; SUBCUTANEOUS at 09:05

## 2019-05-25 RX ADMIN — ACETAMINOPHEN 1000 MG: 500 TABLET ORAL at 12:15

## 2019-05-25 RX ADMIN — METFORMIN HYDROCHLORIDE 500 MG: 500 TABLET, EXTENDED RELEASE ORAL at 16:55

## 2019-05-25 RX ADMIN — VITAMIN D, TAB 1000IU (100/BT) 5000 UNITS: 25 TAB at 09:04

## 2019-05-25 RX ADMIN — FAMOTIDINE 20 MG: 20 TABLET ORAL at 17:02

## 2019-05-25 RX ADMIN — FUROSEMIDE 40 MG: 40 TABLET ORAL at 09:04

## 2019-05-25 RX ADMIN — CYCLOBENZAPRINE HYDROCHLORIDE 10 MG: 10 TABLET, FILM COATED ORAL at 09:01

## 2019-05-25 RX ADMIN — GLIMEPIRIDE 1 MG: 1 TABLET ORAL at 09:02

## 2019-05-25 RX ADMIN — DULOXETINE HYDROCHLORIDE 60 MG: 30 CAPSULE, DELAYED RELEASE ORAL at 09:02

## 2019-05-25 RX ADMIN — OMEGA-3 FATTY ACIDS CAP 1000 MG 1 CAPSULE: 1000 CAP at 09:04

## 2019-05-25 RX ADMIN — LISINOPRIL 2.5 MG: 5 TABLET ORAL at 09:02

## 2019-05-25 RX ADMIN — ACETAMINOPHEN 1000 MG: 500 TABLET ORAL at 17:02

## 2019-05-25 RX ADMIN — OXYCODONE HYDROCHLORIDE 5 MG: 5 TABLET ORAL at 20:56

## 2019-05-25 NOTE — PROGRESS NOTES
ORTHO POST OP SPINE PROGRESS NOTE    May 25, 2019  Admit Date: 2019  Admit Diagnosis: Cervical stenosis of spinal canal [M48.02]  Procedure: Procedure(s):  C3-C6  ANTERIOR CERVICAL DISCECTOMY AND FUSION  Post Op day: 4 Days Post-Op    Subjective:     Josh Beaver is a patient who has complaints of BUE weakness and difficulty swallowing s/p C3-6 ACDF. Review of Systems: Pertinent items are noted in HPI. Objective:     PT/OT:   Distance Ambulated:           Time Ambulated (min):        Ambulation Response: Activity Response: Tolerated well  Assistive Device:              Assistive Device: Fall prevention device    Vital Signs:    Blood pressure 140/69, pulse 85, temperature 97.3 °F (36.3 °C), resp. rate 20, height 5' 3\" (1.6 m), weight 101.2 kg (223 lb), SpO2 96 %. Temp (24hrs), Av.7 °F (36.5 °C), Min:97.3 °F (36.3 °C), Max:98 °F (36.7 °C)      No intake/output data recorded.  1901 -  0700  In: -   Out: 2810 [Urine:2550]    LAB:    No results for input(s): HGB, HGBEXT, WBC, PLT, PLTEXT, HGBEXT, PLTEXT in the last 72 hours. Wound/Drain Assessment:  Drain:      Dressing:     Physical Exam:  Neurological: weak in BUE  Incision clean, dry, and intact  1/5 R deltoid. 3/5 left. 3/5 intrinsics    Assessment:      Patient Active Problem List   Diagnosis Code    Obesity, morbid ([facility]) E66.01    SOB (shortness of breath) R06.02    Uncontrolled type 2 diabetes mellitus with complication, with long-term current use of insulin ([facility]) E11.8, E11.65, Z79.4    Pure hypercholesterolemia E78.00    Congestive heart failure ([facility]) I50.9    Pain in both hands M79.641, M79.642    Type 2 diabetes with nephropathy [facility] E11.21    Cervical stenosis of spinal canal M48.02    S/P cervical spinal fusion Z98.1       Plan:     Continue PT/OT/Rehab  Discontinue:  Consult: PT  and OT    Discharge To: home.  possibly tomorrow pending progress

## 2019-05-25 NOTE — ROUTINE PROCESS
5/25/2019  07:30 AM    Orthopedic End of Shift Note    Bedside and Verbal shift change report given to Chaim Ta RN (oncoming nurse) by Zach Johnson RN (offgoing nurse). Report included the following information SBAR, Kardex, OR Summary, Procedure Summary, Intake/Output, MAR, Accordion, Recent Results and Med Rec Status. POD# 4    Significant issues during shift:   Issues for Physician to address:      Activity This Shift  (check all that apply) [x] chair  [] dangle   [x] bathroom  [] bedside commode [] hallway  [] bedrest   Nausea/Vomiting [] yes [x] no     Voiding Status [x] void [] Sánchez [] I&O Cath   Bowel Movements [] yes [x] no     Foot Pumps or SCD [x] yes [] no    Ice Pack [] yes    [x] no    Incentive Spirometer [x] yes [] no Volume:   1250   Telemetry Monitoring   [] yes [x] no Rhythm:   Supplemental O2 [x] yes [] no Sat off O2:   89% while sleeping;

## 2019-05-25 NOTE — PROGRESS NOTES
Bedside and Verbal shift change report given to DAVID Domingo RN (oncoming nurse) by Briseyda Price RN (offgoing nurse). Report included the following information SBAR, Kardex, Intake/Output, MAR, Recent Results and Med Rec Status.

## 2019-05-25 NOTE — PROGRESS NOTES
Problem: Mobility Impaired (Adult and Pediatric)  Goal: *Acute Goals and Plan of Care (Insert Text)  Description  Physical Therapy Goals  Initiated 5/21/2019    1. Patient will move from supine to sit and sit to supine  in bed with modified independence within 4 days. 2. Patient will perform sit to stand with modified independence within 4 days. 3. Patient will ambulate with modified independence for 200 feet with the least restrictive device within 4 days. 4. Patient will ascend/descend 6 stairs with 1 handrail(s) with modified independence within 4 days. 5. Patient will verbalize and demonstrate understanding of spinal precautions (No bending, lifting greater than 5 lbs, or twisting; log-roll technique; frequent repositioning as instructed) within 4 days. Outcome: Progressing Towards Goal   PHYSICAL THERAPY TREATMENT  Patient: Jaimee Mclaughlin (44 y.o. male)  Date: 5/25/2019  Diagnosis: Cervical stenosis of spinal canal [M48.02] S/P cervical spinal fusion  Procedure(s) (LRB):  C3-C6  ANTERIOR CERVICAL DISCECTOMY AND FUSION (N/A) 4 Days Post-Op  Precautions: Back, Fall  Chart, physical therapy assessment, plan of care and goals were reviewed. ASSESSMENT: Patient demonstrating poor compliance with neck precautions requiring frequent VC not to turn head or extend neck. Cervical collar adjusted but continues to move neck especially when talking. Patient also demonstrating poor recall of precautions. All reviewed. Patient able to complete tranfers and ambulation with supervision. Gait is slow but steady overall with no LOB noted. Patient able to complete stair training with CGA. Continue to recommend HHPT following discharge. Patient will return to son's home and will have 24 hour supervision. Patient remains in hospital for medical management due to POUR. Will decrease frequency to daily. Encourage patient to ambulate in halls with nursing.     Progression toward goals:  ?      Improving appropriately and progressing toward goals  ? Improving slowly and progressing toward goals  ? Not making progress toward goals and plan of care will be adjusted     PLAN:  Patient continues to benefit from skilled intervention to address the above impairments. Continue treatment per established plan of care. Discharge Recommendations:  Home Health  Further Equipment Recommendations for Discharge:  RW      SUBJECTIVE:   Patient stated ? My left arm is better than my right? The patient stated 1/3 back precautions. Reviewed all 3 with patient.     OBJECTIVE DATA SUMMARY:   Critical Behavior:  Neurologic State: Alert  Orientation Level: Oriented X4  Cognition: Appropriate safety awareness, Appropriate for age attention/concentration, Appropriate decision making, Follows commands  Safety/Judgement: Awareness of environment, Fall prevention  Functional Mobility Training:  Bed Mobility:  Log        Deferred; patient sitting in chair upon arrival            Transfers:  Sit to Stand: Supervision  Stand to Sit: Supervision                             Balance:  Sitting: Impaired  Sitting - Static: Good (unsupported)  Sitting - Dynamic: Fair (occasional)  Standing: Impaired  Standing - Static: Good;Constant support  Standing - Dynamic : Good;Constant support(using RW for support)  Ambulation/Gait Training:  Distance (ft): (300 feet x 2)  Assistive Device: Walker, rolling;Gait belt  Ambulation - Level of Assistance: Supervision        Gait Abnormalities: Decreased step clearance        Base of Support: Widened     Speed/Noemi: Pace decreased (<100 feet/min)  Step Length: Left shortened;Right shortened      Gait is slow but steady with no LOB noted          Stairs:  Number of Stairs Trained: 4  Stairs - Level of Assistance: Contact guard assistance  Rail Use: Both    Pain:  Pain Scale 1: Numeric (0 - 10)  Pain Intensity 1: 8  Pain Location 1: Shoulder  Pain Orientation 1: Left;Right  Pain Description 1: Aching  Pain Intervention(s) 1: Medication (see MAR)    After treatment:   ?  Patient left in no apparent distress sitting up in chair  ? Patient left in no apparent distress in bed  ? Call bell left within reach  ? Nursing notified  ? Caregiver present  ?   Bed alarm activated    COMMUNICATION/COLLABORATION:   The patient?s plan of care was discussed with: Registered Nurse    Desmond Delgado, PT   Time Calculation: 28 mins

## 2019-05-25 NOTE — ROUTINE PROCESS
Bedside and Verbal shift change report given to Allison Ott (oncoming nurse) by Nancy Cunningham (offgoing nurse). Report included the following information SBAR, Kardex, Intake/Output, MAR and Recent Results.

## 2019-05-26 VITALS
OXYGEN SATURATION: 93 % | RESPIRATION RATE: 18 BRPM | TEMPERATURE: 98 F | DIASTOLIC BLOOD PRESSURE: 69 MMHG | WEIGHT: 216.49 LBS | SYSTOLIC BLOOD PRESSURE: 129 MMHG | HEIGHT: 63 IN | BODY MASS INDEX: 38.36 KG/M2 | HEART RATE: 92 BPM

## 2019-05-26 LAB
GLUCOSE BLD STRIP.AUTO-MCNC: 116 MG/DL (ref 65–100)
GLUCOSE BLD STRIP.AUTO-MCNC: 127 MG/DL (ref 65–100)
GLUCOSE BLD STRIP.AUTO-MCNC: 206 MG/DL (ref 65–100)
SERVICE CMNT-IMP: ABNORMAL

## 2019-05-26 PROCEDURE — 74011636637 HC RX REV CODE- 636/637: Performed by: ORTHOPAEDIC SURGERY

## 2019-05-26 PROCEDURE — 74011250637 HC RX REV CODE- 250/637: Performed by: NURSE PRACTITIONER

## 2019-05-26 PROCEDURE — 82962 GLUCOSE BLOOD TEST: CPT

## 2019-05-26 PROCEDURE — 74011250637 HC RX REV CODE- 250/637: Performed by: ORTHOPAEDIC SURGERY

## 2019-05-26 PROCEDURE — 97116 GAIT TRAINING THERAPY: CPT | Performed by: PHYSICAL THERAPIST

## 2019-05-26 RX ADMIN — ACETAMINOPHEN 1000 MG: 500 TABLET ORAL at 17:42

## 2019-05-26 RX ADMIN — CARVEDILOL 12.5 MG: 12.5 TABLET, FILM COATED ORAL at 17:43

## 2019-05-26 RX ADMIN — LISINOPRIL 2.5 MG: 5 TABLET ORAL at 09:20

## 2019-05-26 RX ADMIN — ACETAMINOPHEN 1000 MG: 500 TABLET ORAL at 01:33

## 2019-05-26 RX ADMIN — Medication 10 ML: at 14:00

## 2019-05-26 RX ADMIN — SENNOSIDES AND DOCUSATE SODIUM 1 TABLET: 8.6; 5 TABLET ORAL at 17:43

## 2019-05-26 RX ADMIN — ACETAMINOPHEN 1000 MG: 500 TABLET ORAL at 09:21

## 2019-05-26 RX ADMIN — OMEGA-3 FATTY ACIDS CAP 1000 MG 1 CAPSULE: 1000 CAP at 09:19

## 2019-05-26 RX ADMIN — DULOXETINE HYDROCHLORIDE 60 MG: 30 CAPSULE, DELAYED RELEASE ORAL at 09:19

## 2019-05-26 RX ADMIN — SENNOSIDES AND DOCUSATE SODIUM 1 TABLET: 8.6; 5 TABLET ORAL at 09:19

## 2019-05-26 RX ADMIN — CYCLOBENZAPRINE HYDROCHLORIDE 10 MG: 10 TABLET, FILM COATED ORAL at 09:17

## 2019-05-26 RX ADMIN — POLYETHYLENE GLYCOL 3350 17 G: 17 POWDER, FOR SOLUTION ORAL at 09:17

## 2019-05-26 RX ADMIN — ISOSORBIDE MONONITRATE 30 MG: 30 TABLET, EXTENDED RELEASE ORAL at 09:18

## 2019-05-26 RX ADMIN — CARVEDILOL 12.5 MG: 12.5 TABLET, FILM COATED ORAL at 09:18

## 2019-05-26 RX ADMIN — TAMSULOSIN HYDROCHLORIDE 0.4 MG: 0.4 CAPSULE ORAL at 09:19

## 2019-05-26 RX ADMIN — VITAMIN D, TAB 1000IU (100/BT) 5000 UNITS: 25 TAB at 09:17

## 2019-05-26 RX ADMIN — FAMOTIDINE 20 MG: 20 TABLET ORAL at 17:43

## 2019-05-26 RX ADMIN — METFORMIN HYDROCHLORIDE 500 MG: 500 TABLET, EXTENDED RELEASE ORAL at 17:43

## 2019-05-26 RX ADMIN — GLIMEPIRIDE 1 MG: 1 TABLET ORAL at 09:18

## 2019-05-26 RX ADMIN — INSULIN LISPRO 2 UNITS: 100 INJECTION, SOLUTION INTRAVENOUS; SUBCUTANEOUS at 09:19

## 2019-05-26 RX ADMIN — INSULIN LISPRO 6 UNITS: 100 INJECTION, SOLUTION INTRAVENOUS; SUBCUTANEOUS at 12:42

## 2019-05-26 RX ADMIN — FUROSEMIDE 40 MG: 40 TABLET ORAL at 09:18

## 2019-05-26 RX ADMIN — ACETAMINOPHEN 1000 MG: 500 TABLET ORAL at 12:42

## 2019-05-26 RX ADMIN — ASPIRIN 81 MG: 81 TABLET ORAL at 09:18

## 2019-05-26 NOTE — ROUTINE PROCESS
5/26/2019  07:30 AM    Orthopedic End of Shift Note    Bedside and Verbal shift change report given to Paige RN (oncoming nurse) by Margie Barber RN (offgoing nurse). Report included the following information SBAR, Kardex, OR Summary, Procedure Summary, Intake/Output, MAR, Accordion, Recent Results and Med Rec Status. POD# 5  Significant issues during shift:     Issues for Physician to address:      Activity This Shift  (check all that apply) [x] chair  [x] dangle   [x] bathroom  [] bedside commode [] hallway  [] bedrest   Nausea/Vomiting [] yes [x] no     Voiding Status [x] void [] Sánchez [] I&O Cath   Bowel Movements [] yes [x] no     Foot Pumps or SCD [x] yes [] no    Ice Pack [] yes    [x] no    Incentive Spirometer [x] yes [] no Volume:  1250   Telemetry Monitoring   [] yes [x] no Rhythm:   Supplemental O2 [] yes [x] no Sat off O2:   96

## 2019-05-26 NOTE — DISCHARGE SUMMARY
Discharge Summary      Patient ID:  Jalen Lewis  [id number]  12 y.o.  1945    Allergies: Morphine; Gabapentin; and Lyrica [pregabalin]    Admit date: 5/21/2019    Discharge date and time: No discharge date for patient encounter. Admitting Physician: Uri Tavarez MD     Discharge Physician: Mary Lou Silva    * Admission Diagnoses: Cervical stenosis of spinal canal [M48.02]    * Discharge Diagnoses:   Hospital Problems as of 5/26/2019 Date Reviewed: 5/17/2019          Codes Class Noted - Resolved POA    Cervical stenosis of spinal canal ICD-10-CM: M48.02  ICD-9-CM: 723.0  5/21/2019 - Present Unknown        * (Principal) S/P cervical spinal fusion ICD-10-CM: Z98.1  ICD-9-CM: V45.4  5/21/2019 - Present Unknown              Surgeon: Mary Lou Silva    Preoperative Medical Clearance: PCP    * Procedure: Procedure(s):  C3-C6  ANTERIOR CERVICAL DISCECTOMY AND FUSION           Perioperative Antibiotics: Ancef       Postoperative Pain Management:  Oxycodone    DVT Prophylaxis: KJ Hose                                  Plexi-Pulse    Postoperative transfusions:     none Banked PRBCs     Post Op complications: none    * Discharge Condition: good  Wound appears to be healing without any evidence of infection. Physical Therapy started on the day following surgery and progressed to independent ambulation with the aid of a walker. At the time of discharge, able to go up and down stairs and had understanding of precautions needed following surgery.       * Discharged to: Home    * Follow-up Care/Discharge instructions:  - Resume pre hospital diet            - Resume home medications per medical continuation form     - Follow up in office as scheduled       Signed:  Uri Tavarez MD  5/26/2019  10:14 AM

## 2019-05-26 NOTE — PROGRESS NOTES
Spoke with patient's daugher-in-law who he will be staying with and they would like out patient PT/OT as well as an aide to come in the home to help with bathing etc.    Daughter-in-law and son are anxious to get patient discharged because they want to go to a cook out at [address] [facility] with CM and ELIZABETH Nowak about getting these services set up. As far as a home aide, insurance probably will not cover. Left a message about changing from [facility] to out patient as well as getting an aide. Awaiting call back. [facility] with Dayolandaer in law again who will come after their cook out, around 6pm to  the patient.

## 2019-05-26 NOTE — PROGRESS NOTES
Mr. Mir Humphreys is a 72 YOM, admitted for dx cervical stenosis of spinal canal. CM reviewed medical record, met with Patient, introduced self/explained role. CM completed initial assessment with Patient (bedside visit x2), and multiple phone calls with family including spouse, son Salazar Dykes and daughter-in-law Ruben Rose. CM provided education and resource information re: discharge recommendations, including HH PT/OT and DME rolling walker. Patient/Family (spouse, son, daughter-in-law)  declined recommendations for [facility] PT/OT, instead requested MD order for OP PT/OT. CM notified Provider/RN. Patient declined recommended DME rolling walker x2; CM attempted to provide education, DME declined. Patient's stated transitional care goal remains to return home w/ family when medically cleared for discharge, son and daughter-in-law to provide transport. CM remains available to further assist with any additional d/c needs as indicated. Reason for Admission:   dx cervical stenosis of spinal canal               RRAT Score:  26                Resources/supports as identified by patient/family:   Patient has supportive family, including spouse and adult children                Top Challenges facing patient (as identified by patient/family and CM): Finances/Medication cost?   Assessed, not questions/concerns reported at this time                 Transportation? Assessed, not questions/concerns reported at this time; family to assist              Support system or lack thereof? Assessed, not questions/concerns reported at this time                     Living arrangements? Assessed, not questions/concerns reported at this time; lives with family              Self-care/ADLs/Cognition?   Previously independent with ADLs/IADLs, declined recommendations for [facility] PT/OT, instead requested script for OP PT/OT          Current Advanced Directive/Advance Care Plan:  None on file                          Plan for utilizing home health:    Patient/Family (spouse, son, daughter-in-law)  declined recommendations for [place] PT/OT, instead requested script for OP PT/OT                      Likelihood of readmission: Moderate                 Transition of Care Plan:   Discharge home with family support/transport and OP PT/OT           Care Management Interventions  PCP Verified by CM: Yes  Mode of Transport at Discharge:  Other (see comment)(family)  Transition of Care Consult (CM Consult): Home Health, Discharge Planning, Other(discharge home, [place] vs. OP PT)  [address]: Yes  Discharge Durable Medical Equipment: Yes  Physical Therapy Consult: Yes  Occupational Therapy Consult: Yes  Speech Therapy Consult: No  Current Support Network: Family Lives Nearby, Lives with Spouse, Lives with Caregiver, Relative's Home  Confirm Follow Up Transport: Family  Plan discussed with Pt/Family/Caregiver: Yes  Freedom of Choice Offered: Yes  Discharge Location  Discharge Placement: Home    Leola Gracia, [address] [place] [address]

## 2019-05-26 NOTE — PROGRESS NOTES
Problem: Mobility Impaired (Adult and Pediatric)  Goal: *Acute Goals and Plan of Care (Insert Text)  Description  Physical Therapy Goals  Initiated 5/21/2019    1. Patient will move from supine to sit and sit to supine  in bed with modified independence within 4 days. 2. Patient will perform sit to stand with modified independence within 4 days. 3. Patient will ambulate with modified independence for 200 feet with the least restrictive device within 4 days. 4. Patient will ascend/descend 6 stairs with 1 handrail(s) with modified independence within 4 days. 5. Patient will verbalize and demonstrate understanding of spinal precautions (No bending, lifting greater than 5 lbs, or twisting; log-roll technique; frequent repositioning as instructed) within 4 days. Note:   PHYSICAL THERAPY TREATMENT  Patient: Gerard Lozano (5 y.o. male)  Date: 5/26/2019  Diagnosis: Cervical stenosis of spinal canal [M48.02] S/P cervical spinal fusion  Procedure(s) (LRB):  C3-C6  ANTERIOR CERVICAL DISCECTOMY AND FUSION (N/A) 5 Days Post-Op  Precautions: Back, Fall  Chart, physical therapy assessment, plan of care and goals were reviewed. ASSESSMENT:  Patient continues to improve with mobility. Up in chair upon arrival.  Reviewed cervical precautions; patient able to recall 2/3. Sit to stand with supervision. Patient ambulated 250' with rolling walker and SBA. Patient with decreased pace and decreased step clearance but is steady with no LOB. Up in chair after ambulating with all needs met. Recommend HHPT and additional supervision at discharge. Progression toward goals:  ?    Improving appropriately and progressing toward goals  ? Improving slowly and progressing toward goals  ? Not making progress toward goals and plan of care will be adjusted     PLAN:  Patient continues to benefit from skilled intervention to address the above impairments. Continue treatment per established plan of care.   Discharge Recommendations:  Home Health  Further Equipment Recommendations for Discharge:  rolling walker     SUBJECTIVE:   Patient stated ? I might go home today. ?    OBJECTIVE DATA SUMMARY:   Critical Behavior:  Neurologic State: Alert  Orientation Level: Oriented X4  Cognition: Appropriate decision making  Safety/Judgement: Awareness of environment, Fall prevention  Functional Mobility Training:  Bed Mobility:      Up in chair upon arrival.              Transfers:  Sit to Stand: Supervision  Stand to Sit: Supervision                             Balance:  Sitting: Intact  Sitting - Static: Good (unsupported)  Sitting - Dynamic: Good (unsupported)  Standing: Intact; With support  Standing - Static: Good;Constant support  Standing - Dynamic : Good;Constant support  Ambulation/Gait Training:  Distance (ft): 250 Feet (ft)  Assistive Device: Brace/Splint;Gait belt;Walker, rolling  Ambulation - Level of Assistance: Stand-by assistance        Gait Abnormalities: Decreased step clearance        Base of Support: Widened     Speed/Noemi: Pace decreased (<100 feet/min)  Step Length: Left shortened;Right shortened                    Stairs:            Pain:  Pain Scale 1: Numeric (0 - 10)  Pain Intensity 1: 7  Pain Location 1: Neck; Shoulder  Pain Orientation 1: Anterior  Pain Description 1: Aching  Pain Intervention(s) 1: Declines  Activity Tolerance:   good  Please refer to the flowsheet for vital signs taken during this treatment. After treatment:   ?    Patient left in no apparent distress sitting up in chair  ? Patient left in no apparent distress in bed  ? Call bell left within reach  ? Nursing notified  ? Caregiver present  ?     Bed alarm activated    COMMUNICATION/COLLABORATION:   The patient?s plan of care was discussed with: Registered Nurse    Candice Gonzalez, PT   Time Calculation: 12 mins

## 2019-05-26 NOTE — PROGRESS NOTES
"Physical Therapy  Treatment    Otis Salcido   MRN: [id number]   Admitting Diagnosis: Nontraumatic intracerebral hemorrhage of cerebellum    PT Received On: 10/17/17  Total Time (min): 41       Billable Minutes:41    Gait [id number] and Therapeutic Exercise 26    Treatment Type: Treatment  PT/PTA: PTA     PTA Visit Number: 2       General Precautions: Standard, aspiration, fall  Orthopedic Precautions: N/A   Braces: N/A    Do you have any cultural, spiritual, Baptism conflicts, given your current situation?: no    Subjective:  "I knew you were gonna get me"      Pain/Comfort  Pain Rating 1: other (see comments) (did not rate)  Location - Orientation 1: lower  Location 1: back  Pain Addressed 1:  (decline needing meds)  Pain Rating Post-Intervention 1:  (no further mention)    Objective:   Patient found with:  (in wc)       Functional Status:  MDS G  Transfer Functional Status: S-SBA  Walk in Room Functional Status: S-SBA  Walk in Corridor Functional Status: S-SBA        Transfers:  Sit<>Stand: with/without RW S  Stand Pivot Transfer: close S ~ 5 ft WC>BSchair, S with RW back to WC      Gait:  Amb with RW S no LOB ~ 285 ft and 150 ft no AD CG/close SBA unsteady but no major LOB    Therex:  Standing therex with BUE support 2x10 reps heel raises,MIP,abd/add,mini squats    Additional Treatment:  LBE x 15 minutes    Patient left up in chair with call button in reach and belongings in reach.    Assessment:  Otis Salcido is a 71 y.o. male with a medical diagnosis of Nontraumatic intracerebral hemorrhage of cerebellum.  Pt tolerated well, pt would continue to benefit from skilled PT services to improve overall functional mobility, strength and endurance.  .    Rehab identified problem list/impairments: weakness, impaired endurance, impaired self care skills, impaired functional mobilty, gait instability, decreased coordination, decreased upper extremity function, decreased lower extremity function, decreased safety " I have reviewed discharge instructions with the patient. The patient verbalized understanding. awareness, impaired fine motor    Rehab potential is good.    Activity tolerance: Fair    Discharge recommendations: home health PT     Barriers to discharge: Decreased caregiver support (lives alone)    Equipment recommendations: none     GOALS:    Physical Therapy Goals        Problem: Physical Therapy Goal    Goal Priority Disciplines Outcome Goal Variances Interventions   Physical Therapy Goal     PT/OT, PT Ongoing (interventions implemented as appropriate)     Description:  Goals to be met by: 2 weeks     Patient will increase functional independence with mobility by performin. Supine to sit with [place]. met  2. Sit to supine with [place]. met  3. Sit to stand transfer with [place].  4. Bed to chair transfer with [place].  5. Gait  x 300 feet with Modified [place] with/without an assistive device.  6. Ascend/descend 1 flight of stairs with right Handrails modified independence.  7. Stand for 5 minutes with modified independence while performing a task (playing ladder ball, throwing beach ball). Met (10/5/2017)  8. Lower extremity exercise program x 20 standing exercises with unilateral UE support (hip flexion, hip abduction, heel raises). met  9. Pt will be able to ambulate in all directions x 20 feet to display the ability to change direction of ambulation without loss of balance (supervision).                       PLAN:    Patient to be seen  (5-6x/wk)  to address the above listed problems via gait training, therapeutic activities, therapeutic exercises, neuromuscular re-education  Plan of Care expires: 17  Plan of Care reviewed with:      Amira Salazar PTA  10/17/2017

## 2019-05-28 ENCOUNTER — PATIENT OUTREACH (OUTPATIENT)
Dept: INTERNAL MEDICINE CLINIC | Age: 74
End: 2019-05-28
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
## 2019-06-04 ENCOUNTER — TELEPHONE (OUTPATIENT)
Dept: ENDOCRINOLOGY | Age: 74
End: 2019-06-04

## 2019-06-04 NOTE — TELEPHONE ENCOUNTER
I returned this call and spoke with Ligia Perry. She wanted to know if Miguel A Jackson would need any labs done before his next appointment. After looking in his chart I told her that he was good on labs, that we would do a A1c here in the office when he came in. She understood this information.   Kimberly Sanchez

## 2019-06-04 NOTE — TELEPHONE ENCOUNTER
Patient's daugher in-law, Ana Davidson, called to ask if Dr. Elva Spurling would like Mr. Pagancarlos Short to have his labs drawn every month before he comes back to see him in September? Ana Davidson can be reached at:  [phone].

## 2019-06-06 ENCOUNTER — OFFICE VISIT (OUTPATIENT)
Dept: INTERNAL MEDICINE CLINIC | Age: 74
End: 2019-06-06

## 2019-06-06 VITALS
RESPIRATION RATE: 18 BRPM | DIASTOLIC BLOOD PRESSURE: 70 MMHG | HEART RATE: 76 BPM | WEIGHT: 213 LBS | BODY MASS INDEX: 37.74 KG/M2 | SYSTOLIC BLOOD PRESSURE: 113 MMHG | HEIGHT: 63 IN | TEMPERATURE: 97.5 F | OXYGEN SATURATION: 95 %

## 2019-06-06 DIAGNOSIS — M79.2 NEUROPATHIC PAIN: Primary | ICD-10-CM

## 2019-06-06 DIAGNOSIS — L91.8 MULTIPLE ACQUIRED SKIN TAGS: ICD-10-CM

## 2019-06-06 DIAGNOSIS — I50.9 CONGESTIVE HEART FAILURE, UNSPECIFIED HF CHRONICITY, UNSPECIFIED HEART FAILURE TYPE (HCC): ICD-10-CM

## 2019-06-06 DIAGNOSIS — Z00.00 MEDICARE ANNUAL WELLNESS VISIT, SUBSEQUENT: ICD-10-CM

## 2019-06-06 DIAGNOSIS — M54.12 C7 RADICULOPATHY: ICD-10-CM

## 2019-06-06 DIAGNOSIS — Z98.1 S/P CERVICAL SPINAL FUSION: ICD-10-CM

## 2019-06-06 DIAGNOSIS — E11.21 TYPE 2 DIABETES WITH NEPHROPATHY (HCC): ICD-10-CM

## 2019-06-06 RX ORDER — GABAPENTIN 100 MG/1
100 CAPSULE ORAL 3 TIMES DAILY
Qty: 30 CAP | Refills: 1 | Status: SHIPPED | OUTPATIENT
Start: 2019-06-06 | End: 2019-09-11 | Stop reason: SDUPTHER

## 2019-06-06 NOTE — PATIENT INSTRUCTIONS
RESUME GABAPENTIN 100MG AT BEDTIME FOR 3-4 DAYS, THEN  INCREASE TO TWICE A DAY  FOR 3-4 DAYS AND THEN THREE TIMES A DAY. WE CAN CONTINUE TO INCREASE GRADUALLY TO REDUCE NERVE PAIN. Can stop the over the counter fish oil. SCHEDULE CARDIOLOGY FOLLOW UP. Body Mass Index: Care Instructions  Your Care Instructions    Body mass index (BMI) can help you see if your weight is raising your risk for health problems. It uses a formula to compare how much you weigh with how tall you are. · A BMI lower than 18.5 is considered underweight. · A BMI between 18.5 and 24.9 is considered healthy. · A BMI between 25 and 29.9 is considered overweight. A BMI of 30 or higher is considered obese. If your BMI is in the normal range, it means that you have a lower risk for weight-related health problems. If your BMI is in the overweight or obese range, you may be at increased risk for weight-related health problems, such as high blood pressure, heart disease, stroke, arthritis or joint pain, and diabetes. If your BMI is in the underweight range, you may be at increased risk for health problems such as fatigue, lower protection (immunity) against illness, muscle loss, bone loss, hair loss, and hormone problems. BMI is just one measure of your risk for weight-related health problems. You may be at higher risk for health problems if you are not active, you eat an unhealthy diet, or you drink too much alcohol or use tobacco products. Follow-up care is a key part of your treatment and safety. Be sure to make and go to all appointments, and call your doctor if you are having problems. It's also a good idea to know your test results and keep a list of the medicines you take. How can you care for yourself at home? · Practice healthy eating habits. This includes eating plenty of fruits, vegetables, whole grains, lean protein, and low-fat dairy. · If your doctor recommends it, get more exercise. Walking is a good choice.  Bit by bit, increase the amount you walk every day. Try for at least 30 minutes on most days of the week. · Do not smoke. Smoking can increase your risk for health problems. If you need help quitting, talk to your doctor about stop-smoking programs and medicines. These can increase your chances of quitting for good. · Limit alcohol to 2 drinks a day for men and 1 drink a day for women. Too much alcohol can cause health problems. If you have a BMI higher than 25  · Your doctor may do other tests to check your risk for weight-related health problems. This may include measuring the distance around your waist. A waist measurement of more than 40 inches in men or 35 inches in women can increase the risk of weight-related health problems. · Talk with your doctor about steps you can take to stay healthy or improve your health. You may need to make lifestyle changes to lose weight and stay healthy, such as changing your diet and getting regular exercise. If you have a BMI lower than 18.5  · Your doctor may do other tests to check your risk for health problems. · Talk with your doctor about steps you can take to stay healthy or improve your health. You may need to make lifestyle changes to gain or maintain weight and stay healthy, such as getting more healthy foods in your diet and doing exercises to build muscle. Where can you learn more? Go to http://lalo-ashley.info/. Enter S176 in the search box to learn more about \"Body Mass Index: Care Instructions. \"  Current as of: October 13, 2016  Content Version: 11.4  © 3583-7681 [employer]. Care instructions adapted under license by [employer] (which disclaims liability or warranty for this information). If you have questions about a medical condition or this instruction, always ask your healthcare professional. [place] [address] any warranty or liability for your use of this information.     Medicare Wellness Visit, Male    The best way to live healthy is to have a lifestyle where you eat a well-balanced diet, exercise regularly, limit alcohol use, and quit all forms of tobacco/nicotine, if applicable. Regular preventive services are another way to keep healthy. Preventive services (vaccines, screening tests, monitoring & exams) can help personalize your care plan, which helps you manage your own care. Screening tests can find health problems at the earliest stages, when they are easiest to treat. [facility] follows the current, evidence-based guidelines published by the [facility] Kenneth Tulio ([facility]) when recommending preventive services for our patients. Because we follow these guidelines, sometimes recommendations change over time as research supports it. (For example, a prostate screening blood test is no longer routinely recommended for men with no symptoms.)  Of course, you and your doctor may decide to screen more often for some diseases, based on your risk and co-morbidities (chronic disease you are already diagnosed with). Preventive services for you include:  - Medicare offers their members a free annual wellness visit, which is time for you and your primary care provider to discuss and plan for your preventive service needs. Take advantage of this benefit every year!  -All adults over age 72 should receive the recommended pneumonia vaccines. Current USPSTF guidelines recommend a series of two vaccines for the best pneumonia protection.   -All adults should have a flu vaccine yearly and an ECG.  All adults age 61 and older should receive a shingles vaccine once in their lifetime.    -All adults age 38-68 who are overweight should have a diabetes screening test once every three years.   -Other screening tests & preventive services for persons with diabetes include: an eye exam to screen for diabetic retinopathy, a kidney function test, a foot exam, and stricter control over your cholesterol.   -Cardiovascular screening for adults with routine risk involves an electrocardiogram (ECG) at intervals determined by the provider.   -Colorectal cancer screening should be done for adults age 54-65 with no increased risk factors for colorectal cancer. There are a number of acceptable methods of screening for this type of cancer. Each test has its own benefits and drawbacks. Discuss with your provider what is most appropriate for you during your annual wellness visit. The different tests include: colonoscopy (considered the best screening method), a fecal occult blood test, a fecal DNA test, and sigmoidoscopy.  -All adults born between [facility] should be screened once for Hepatitis C.  -An Abdominal Aortic Aneurysm (AAA) Screening is recommended for men age 73-68 who has ever smoked in their lifetime.

## 2019-06-06 NOTE — PROGRESS NOTES
Beth Peter is a 72 y.o. male who presents for follow up. Underwent  C3-C6 discectomy and fusion. Dr. Evon Rosales, on May 21. Saw ortho yesterday in followup. Has some nerve involvement C7. Limited ability to lift arms up. To have PT in one month. Reports pain in bilateral arm pain to hands. Feels like numbness. The pain is all day. Taking oxycodone 5mg twice a day per ortho. He has been taking CBD oil. Reviewed medication. Taking over the counter fish oil and wants to stop some medications. Taking vitamin D 5K, level 60. Followed by Dr. Flip Flores. For diabetes. HbA1c 7.7%. LDL 49. Creatinine 1.54.          Past Medical History:   Diagnosis Date    Arthritis     Asthma     CAD (coronary artery disease)     Calculus of kidney     Carotid artery disease (HCC)     Cervical herniated disc     Chronic systolic (congestive) heart failure (HCC)     Diabetes ([facility]     Diabetic shock due to low blood sugar ([facility] 2016    Hypercholesterolemia     Hypertension     Morbid obesity (HCC)     Rheumatic fever     When he was 15    Sleep apnea        Family History   Problem Relation Age of Onset    Heart Disease Brother     Diabetes Nephew     Hypertension Mother        Social History     Socioeconomic History    Marital status:      Spouse name: Not on file    Number of children: Not on file    Years of education: Not on file    Highest education level: Not on file   Occupational History    Not on file   Social Needs    Financial resource strain: Not on file    Food insecurity:     Worry: Not on file     Inability: Not on file    Transportation needs:     Medical: Not on file     Non-medical: Not on file   Tobacco Use    Smoking status: Former Smoker     Last attempt to quit: 1963     Years since quittin.1    Smokeless tobacco: Never Used   Substance and Sexual Activity    Alcohol use: No     Alcohol/week: 0.0 oz    Drug use: No    Sexual activity: Yes     Partners: Female     Birth control/protection: None   Lifestyle    Physical activity:     Days per week: Not on file     Minutes per session: Not on file    Stress: Not on file   Relationships    Social connections:     Talks on phone: Not on file     Gets together: Not on file     Attends Worship service: Not on file     Active member of club or organization: Not on file     Attends meetings of clubs or organizations: Not on file     Relationship status: Not on file    Intimate partner violence:     Fear of current or ex partner: Not on file     Emotionally abused: Not on file     Physically abused: Not on file     Forced sexual activity: Not on file   Other Topics Concern    Not on file   Social History Narrative    ** Merged History Encounter **            Current Outpatient Medications on File Prior to Visit   Medication Sig Dispense Refill    acetaminophen (TYLENOL) 325 mg tablet Take 2 Tabs by mouth every six (6) hours for 14 days. 112 Tab 0    famotidine (PEPCID) 20 mg tablet Take 1 Tab by mouth every evening for 30 days. 30 Tab 0    polyethylene glycol (MIRALAX) 17 gram packet Take 1 Packet by mouth daily for 15 days. 15 Packet 0    senna-docusate (PERICOLACE) 8.6-50 mg per tablet Take 1 Tab by mouth two (2) times a day for 15 days. 30 Tab 0    oxyCODONE IR (ROXICODONE) 5 mg immediate release tablet Take 1 Tab by mouth every four (4) hours as needed for Pain for up to 14 days. Max Daily Amount: 30 mg. 40 Tab 0    acetaminophen (TYLENOL) 500 mg tablet Take 1,000 mg by mouth every six (6) hours as needed for Pain.  omega 3-DHA-EPA-fish oil (FISH OIL) 1,000 mg (120 mg-180 mg) capsule Take 1 Cap by mouth two (2) times a day.  metOLazone (ZAROXOLYN) 5 mg tablet Take 2.5 mg by mouth every Tuesday and Thursday.  glimepiride (AMARYL) 1 mg tablet Take 1 mg by mouth Daily (before breakfast).       simvastatin (ZOCOR) 10 mg tablet TAKE 1 TABLET BY MOUTH NIGHTLY 90 Tab 1    furosemide (LASIX) 40 mg tablet TAKE 1 TABLET BY MOUTH ONCE DAILY 30 Tab 1    metFORMIN ER (GLUCOPHAGE XR) 500 mg tablet TAKE 1 TABLET BY MOUTH BEFORE BREAKFAST AND  DINNER 180 Tab 3    lisinopril (PRINIVIL, ZESTRIL) 2.5 mg tablet TAKE 1 TABLET BY MOUTH ONCE DAILY 90 Tab 0    carvedilol (COREG) 12.5 mg tablet TAKE 1 TABLET BY MOUTH TWICE DAILY 60 Tab 3    DULoxetine (CYMBALTA) 60 mg capsule Take 1 Cap by mouth daily. For feet pain 90 Cap 1    isosorbide mononitrate ER (IMDUR) 30 mg tablet TAKE 1 TABLET BY MOUTH ONCE DAILY 30 Tab 12    cholecalciferol, VITAMIN D3, (VITAMIN D3) 5,000 unit tab tablet Take 1 Tab by mouth daily. 30 Tab 5    aspirin delayed-release 81 mg tablet Take 81 mg by mouth daily.  CANNABIDIOL, CBD, EXTRACT PO Take 2 Units by mouth daily. Indications: TOPICAL CBD SALVE       No current facility-administered medications on file prior to visit. Review of Systems  Pertinent items are noted in HPI. Objective:     Visit Vitals  /70 (BP 1 Location: Left arm, BP Patient Position: Sitting)   Pulse 76   Temp 97.5 °F (36.4 °C) (Oral)   Resp 18   Ht 5' 3\" (1.6 m)   Wt 213 lb (96.6 kg)   SpO2 95%   BMI 37.73 kg/m²     Gen: well appearing male  Resp:  No wheezing, no rhonchi, no rales. CV:  RRR, normal S1S2  Extrem:  +2 pulses, no edema, warm distally, UE muscle atrophy. Neuro: alert, normal gait,  Unable to abduct either arm due to weakness. 4/5 . Assessment/Plan:       ICD-10-CM ICD-9-CM    1. Neuropathic pain M79.2 729.2 gabapentin (NEURONTIN) 100 mg capsule   2. Type 2 diabetes with nephropathy (HCC) E11.21 250.40      583.81    3. Congestive heart failure, unspecified HF chronicity, unspecified heart failure type (HCC) I50.9 428.0    4. Multiple acquired skin tags L91.8 701.9 REFERRAL TO DERMATOLOGY   5. Medicare annual wellness visit, subsequent Z00.00 V70.0    6. S/P cervical spinal fusion Z98.1 V45.4    7. C7 radiculopathy M54.12 723.4    recommend ROM exercises.  To call regarding gabapentin efficacy for neuropathic pain. Follow up with ortho. Schedule follow up with cardiology. Follow-up and Dispositions    · Return in about 3 months (around 9/6/2019) for FOLLOW UP ON MEDICATIONS. Sravani Meyers MD    Discussed the patient's BMI with him. The BMI follow up plan is as follows:     dietary management education, guidance, and counseling  encourage exercise  monitor weight  prescribed dietary intake    An After Visit Summary was printed and given to the patient.

## 2019-06-06 NOTE — PROGRESS NOTES
This is the Subsequent Medicare Annual Wellness Exam, performed 12 months or more after the Initial AWV or the last Subsequent AWV    I have reviewed the patient's medical history in detail and updated the computerized patient record. History     Past Medical History:   Diagnosis Date    Arthritis     Asthma     CAD (coronary artery disease)     Calculus of kidney     Carotid artery disease (HCC)     Cervical herniated disc     Chronic systolic (congestive) heart failure (HCC)     Diabetes ([facility]     Diabetic shock due to low blood sugar ([facility] 2016    Hypercholesterolemia     Hypertension     Morbid obesity ([facility]     Rheumatic fever     When he was 17    Sleep apnea       Past Surgical History:   Procedure Laterality Date    HX CAROTID STENT  1990's    HX CHOLECYSTECTOMY      HX CIRCUMCISION      HX HEENT Left     ear torn off and repaired    HX ORTHOPAEDIC Right     repair    HX OTHER SURGICAL Right     Right hand reconstruction     Current Outpatient Medications   Medication Sig Dispense Refill    gabapentin (NEURONTIN) 100 mg capsule Take 1 Cap by mouth three (3) times daily. AS DIRECTED. 30 Cap 1    acetaminophen (TYLENOL) 325 mg tablet Take 2 Tabs by mouth every six (6) hours for 14 days. 112 Tab 0    famotidine (PEPCID) 20 mg tablet Take 1 Tab by mouth every evening for 30 days. 30 Tab 0    polyethylene glycol (MIRALAX) 17 gram packet Take 1 Packet by mouth daily for 15 days. 15 Packet 0    senna-docusate (PERICOLACE) 8.6-50 mg per tablet Take 1 Tab by mouth two (2) times a day for 15 days. 30 Tab 0    oxyCODONE IR (ROXICODONE) 5 mg immediate release tablet Take 1 Tab by mouth every four (4) hours as needed for Pain for up to 14 days. Max Daily Amount: 30 mg. 40 Tab 0    acetaminophen (TYLENOL) 500 mg tablet Take 1,000 mg by mouth every six (6) hours as needed for Pain.       omega 3-DHA-EPA-fish oil (FISH OIL) 1,000 mg (120 mg-180 mg) capsule Take 1 Cap by mouth two (2) times a day.      metOLazone (ZAROXOLYN) 5 mg tablet Take 2.5 mg by mouth every Tuesday and Thursday.  glimepiride (AMARYL) 1 mg tablet Take 1 mg by mouth Daily (before breakfast).  simvastatin (ZOCOR) 10 mg tablet TAKE 1 TABLET BY MOUTH NIGHTLY 90 Tab 1    furosemide (LASIX) 40 mg tablet TAKE 1 TABLET BY MOUTH ONCE DAILY 30 Tab 1    metFORMIN ER (GLUCOPHAGE XR) 500 mg tablet TAKE 1 TABLET BY MOUTH BEFORE BREAKFAST AND  DINNER 180 Tab 3    lisinopril (PRINIVIL, ZESTRIL) 2.5 mg tablet TAKE 1 TABLET BY MOUTH ONCE DAILY 90 Tab 0    carvedilol (COREG) 12.5 mg tablet TAKE 1 TABLET BY MOUTH TWICE DAILY 60 Tab 3    DULoxetine (CYMBALTA) 60 mg capsule Take 1 Cap by mouth daily. For feet pain 90 Cap 1    isosorbide mononitrate ER (IMDUR) 30 mg tablet TAKE 1 TABLET BY MOUTH ONCE DAILY 30 Tab 12    cholecalciferol, VITAMIN D3, (VITAMIN D3) 5,000 unit tab tablet Take 1 Tab by mouth daily. 30 Tab 5    aspirin delayed-release 81 mg tablet Take 81 mg by mouth daily.  CANNABIDIOL, CBD, EXTRACT PO Take 2 Units by mouth daily.  Indications: TOPICAL CBD SALVE       Allergies   Allergen Reactions    Morphine Anaphylaxis     Pt states \"morphine gave me heart failure\"    Gabapentin Other (comments)     Made him \"loopy\"    Lyrica [Pregabalin] Other (comments)     Makes him \"loopy\"     Family History   Problem Relation Age of Onset    Heart Disease Brother     Diabetes Nephew     Hypertension Mother      Social History     Tobacco Use    Smoking status: Former Smoker     Last attempt to quit: 1963     Years since quittin.2    Smokeless tobacco: Never Used   Substance Use Topics    Alcohol use: No     Alcohol/week: 0.0 oz     Patient Active Problem List   Diagnosis Code    Obesity, morbid ([facility] E66.01    SOB (shortness of breath) R06.02    Uncontrolled type 2 diabetes mellitus with complication, with long-term current use of insulin (HCC) E11.8, E11.65, Z79.4    Pure hypercholesterolemia E78.00    Congestive heart failure (HCC) I50.9    Pain in both hands M79.641, M79.642    Type 2 diabetes with nephropathy (HCC) E11.21    Cervical stenosis of spinal canal M48.02    S/P cervical spinal fusion Z98.1       Depression Risk Factor Screening:     3 most recent PHQ Screens 6/6/2019   Little interest or pleasure in doing things Not at all   Feeling down, depressed, irritable, or hopeless Not at all   Total Score PHQ 2 0     Alcohol Risk Factor Screening: You do not drink alcohol or very rarely. Functional Ability and Level of Safety:   Hearing Loss  The patient needs further evaluation. To go to [employer] [facility]  The home contains: no safety equipment. Patient does total self care    Fall Risk  Fall Risk Assessment, last 12 mths 6/6/2019   Able to walk? Yes   Fall in past 12 months? No       Abuse Screen  Patient is not abused    Cognitive Screening   Evaluation of Cognitive Function:  Has your family/caregiver stated any concerns about your memory: no  Normal    Patient Care Team   Patient Care Team:  James Russo MD as PCP - General (Internal Medicine)  Jae, MD Lynnette Gallo MD as Consulting Provider (Cardiology)  Aston Gold MD as Consulting Provider (Endocrinology)  Suresh Concepcion MD as Physician (Sleep Medicine)  Froylan Mendoza NP (Nurse Practitioner)  Jhonny Bianchi RN as Ambulatory Care Navigator    Assessment/Plan   Education and counseling provided:  Are appropriate based on today's review and evaluation    Diagnoses and all orders for this visit:    1. Neuropathic pain  -     gabapentin (NEURONTIN) 100 mg capsule; Take 1 Cap by mouth three (3) times daily. AS DIRECTED. 2. Type 2 diabetes with nephropathy ([address]    3. Congestive heart failure, unspecified HF chronicity, unspecified heart failure type ([address]    4.  Multiple acquired skin tags  -     REFERRAL TO DERMATOLOGY    5. Medicare annual wellness visit, subsequent        Health Maintenance Due   Topic Date Due    Hepatitis C Screening  1945    EYE EXAM RETINAL OR DILATED  12/10/1957    DTaP/Tdap/Td series (1 - Tdap) 12/10/1968    Shingrix Vaccine Age 50> (1 of 2) 12/10/1997    FOBT Q 1 YEAR AGE 50-75  12/10/1997    GLAUCOMA SCREENING Q2Y  12/10/2012    Pneumococcal 65+ years (1 of 2 - PCV13) 12/10/2012    MEDICARE YEARLY EXAM  04/20/2018

## 2019-06-10 ENCOUNTER — PATIENT OUTREACH (OUTPATIENT)
Dept: INTERNAL MEDICINE CLINIC | Age: 74
End: 2019-06-10

## 2019-06-27 NOTE — PROGRESS NOTES
Goals Addressed                 This Visit's Progress       Post Hospitalization     Prevent complications post hospitalization. 5/28/2019   - s/p anterior cervical fusion  - spouse and daughter-in-law assist with medication management. NN spent >25 minutes reviewing current medications with spouse; spouse verbalizes understanding and denies additional questions/concerns regarding patient's current medication regimen. - denies constipation. Last bowel movement was today. Taking Pericolace twice daily; also has miralax. - spouse will contact Dr. Jesus Castellano to further advise regarding resumption of fish oil post-op  - taking tylenol 325 mg tablet - Two tablets every 6 hours for 14 days, oxycodone IR 5 mg tablet every 4 hours as needed for pain management  - wearing cervical collar  - reviewed post-op activity restrictions-limit bending at waist, lift no more than 10 pounds  - to f/u with Dr. Jesus Castellano in two weeks for post-op f/u - has appt scheduled 6/5/19  - message routed to LPN  to request assistance with scheduling of JUAN appt due to provider appt availability; declined appt that was offered for 5/30/19 due to son and daughter-in-law are out of town through the weekend    6/3/2019  - scheduled for [facility] appointment with PCP on 6/6/19; daughter-in-law notified of appointment information    6/10/2019  - attended office visit with Ortho on 6/5/19  - attended JUAN appt with PCP on 6/6/19; to RESUME GABAPENTIN 100MG AT BEDTIME FOR 3-4 DAYS, THEN  INCREASE TO TWICE A DAY  FOR 3-4 DAYS AND THEN THREE TIMES A DAY, plan to CONTINUE TO INCREASE GRADUALLY TO REDUCE NERVE PAIN. Pt to schedule follow-up with Cardiology, referred to Dermatology for multiple acquired skin tags, f/u with PCP in 3 months.

## 2019-06-28 RX ORDER — CARVEDILOL 12.5 MG/1
TABLET ORAL
Qty: 60 TAB | Refills: 3 | Status: SHIPPED | OUTPATIENT
Start: 2019-06-28 | End: 2019-10-17 | Stop reason: SDUPTHER

## 2019-07-01 ENCOUNTER — PATIENT OUTREACH (OUTPATIENT)
Dept: INTERNAL MEDICINE CLINIC | Age: 74
End: 2019-07-01

## 2019-07-01 NOTE — PROGRESS NOTES
NN attempted patient outreach today in order to perform JUAN follow-up; lvm requesting a return phone call to this NN. Goals Addressed                 This Visit's Progress       Post Hospitalization     Prevent complications post hospitalization. 5/28/2019   - s/p anterior cervical fusion  - spouse and daughter-in-law assist with medication management. NN spent >25 minutes reviewing current medications with spouse; spouse verbalizes understanding and denies additional questions/concerns regarding patient's current medication regimen. - denies constipation. Last bowel movement was today. Taking Pericolace twice daily; also has miralax. - spouse will contact Dr. Adriel Randall to further advise regarding resumption of fish oil post-op  - taking tylenol 325 mg tablet - Two tablets every 6 hours for 14 days, oxycodone IR 5 mg tablet every 4 hours as needed for pain management  - wearing cervical collar  - reviewed post-op activity restrictions-limit bending at waist, lift no more than 10 pounds  - to f/u with Dr. Adriel Randall in two weeks for post-op f/u - has appt scheduled 6/5/19  - message routed to LPN  to request assistance with scheduling of JUAN appt due to provider appt availability; declined appt that was offered for 5/30/19 due to son and daughter-in-law are out of town through the weekend    6/3/2019  - scheduled for [facility] appointment with PCP on 6/6/19; daughter-in-law notified of appointment information    6/10/2019  - attended office visit with Ortho on 6/5/19  - attended JUAN appt with PCP on 6/6/19; to RESUME GABAPENTIN 100MG AT BEDTIME FOR 3-4 DAYS, THEN  INCREASE TO TWICE A DAY  FOR 3-4 DAYS AND THEN THREE TIMES A DAY, plan to CONTINUE TO INCREASE GRADUALLY TO REDUCE NERVE PAIN. Pt to schedule follow-up with Cardiology, referred to Dermatology for multiple acquired skin tags, f/u with PCP in 3 months.     7/1/2019   - To the best of this NN's knowledge, this patient had no additional Inpatient Hospital Admissions during 30 day JUAN period following admission to ED [facility] 5/21/19-5/26/19.   - attempted patient outreach today re: scheduling of cardiology appointment and dermatology appointment; lvm requesting return phone call to this NN          Future Appointments:  Future Appointments   Date Time Provider Mgada Graf   9/12/2019  4:10 PM Charmain Epley, MD RDE NATY [address]   3/26/2020  2:00 PM [facility] Appointment With Me:  Visit date not found     Last Appointment My Department:  6/6/2019

## 2019-07-09 RX ORDER — FUROSEMIDE 40 MG/1
TABLET ORAL
Qty: 30 TAB | Refills: 1 | Status: SHIPPED | OUTPATIENT
Start: 2019-07-09 | End: 2019-09-06 | Stop reason: SDUPTHER

## 2019-07-09 RX ORDER — LISINOPRIL 2.5 MG/1
TABLET ORAL
Qty: 90 TAB | Refills: 0 | Status: SHIPPED | OUTPATIENT
Start: 2019-07-09 | End: 2019-10-14 | Stop reason: SDUPTHER

## 2019-07-17 ENCOUNTER — PATIENT OUTREACH (OUTPATIENT)
Dept: INTERNAL MEDICINE CLINIC | Age: 74
End: 2019-07-17
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
## 2019-07-29 ENCOUNTER — HOSPITAL ENCOUNTER (OUTPATIENT)
Dept: PHYSICAL THERAPY | Age: 74
Discharge: HOME | End: 2019-07-29
Payer: MEDICARE

## 2019-07-29 PROCEDURE — 97162 PT EVAL MOD COMPLEX 30 MIN: CPT | Performed by: PHYSICAL THERAPIST

## 2019-07-29 PROCEDURE — 97110 THERAPEUTIC EXERCISES: CPT | Performed by: PHYSICAL THERAPIST
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
## 2019-07-31 ENCOUNTER — HOSPITAL ENCOUNTER (OUTPATIENT)
Dept: PHYSICAL THERAPY | Age: 74
Discharge: HOME | End: 2019-07-31
Payer: MEDICARE

## 2019-07-31 PROCEDURE — 97110 THERAPEUTIC EXERCISES: CPT | Performed by: PHYSICAL THERAPIST

## 2019-07-31 NOTE — PROGRESS NOTES
PT DAILY TREATMENT NOTE - [facility] 2-15    Patient Name: Luis E Phillip  Date:2019  : 1945  [x]  Patient  Verified  Payor: Qing Campbell / Plan: Sanjeev Odonnell / Product Type: Managed Care Medicare /    In time:2:33pm  Out time: 3:20pm  Total Treatment Time (min): 47  Total Timed Codes (min): 47  1:1 Treatment Time ( only): 40   Visit #:  2    Treatment Area: Neck pain [M54.2]  Bilateral arm pain [M79.601, M79.602]    SUBJECTIVE  Pain Level (0-10 scale): R/L:   Any medication changes, allergies to medications, adverse drug reactions, diagnosis change, or new procedure performed?: [x] No    [] Yes (see summary sheet for update)  Subjective functional status/changes:   [] No changes reported  The patient reports that he's doing well. OBJECTIVE    47 min Therapeutic Exercise:  [x] See flow sheet :   Rationale: increase ROM, increase strength and improve coordination to improve the patients ability to perform daily activities. With   [x] TE   [] TA   [] neuro   [] other: Patient Education: [x] Review HEP    [x] Progressed/Changed HEP based on:   [x] positioning   [x] body mechanics   [] transfers   [] heat/ice application    [] other:      Other Objective/Functional Measures: Pt. Tolerated therex well     Pain Level (0-10 scale) post treatment: R/L:     ASSESSMENT/Changes in Function:   The patient progressed with therex as tolerated. Patient will continue to benefit from skilled PT services to modify and progress therapeutic interventions, address functional mobility deficits, address ROM deficits, address strength deficits, analyze and address soft tissue restrictions, analyze and cue movement patterns, analyze and modify body mechanics/ergonomics, assess and modify postural abnormalities, address imbalance/dizziness and instruct in home and community integration to attain remaining goals.      [x]  See Plan of Care  []  See progress note/recertification  []  See Discharge Summary         Progress towards goals / Updated goals: The patient is progressing towards goals.      PLAN  [x]  Upgrade activities as tolerated     [x]  Continue plan of care  [x]  Update interventions per flow sheet       []  Discharge due to:_  []  Other:_      Oanh Martinez, PT 7/31/2019

## 2019-08-06 ENCOUNTER — HOSPITAL ENCOUNTER (OUTPATIENT)
Dept: PHYSICAL THERAPY | Age: 74
Discharge: HOME | End: 2019-08-06
Payer: MEDICARE

## 2019-08-06 PROCEDURE — 97110 THERAPEUTIC EXERCISES: CPT | Performed by: PHYSICAL THERAPIST

## 2019-08-06 NOTE — PROGRESS NOTES
PT DAILY TREATMENT NOTE - [facility] 2-15    Patient Name: Wayne Mahoney  Date:2019  : 1945  [x]  Patient  Verified  Payor: Paras Hernandez / Plan: Tuan Dyer / Product Type: Managed Care Medicare /    In time: 12:36pm Out time:  1:26pm  Total Treatment Time (min): 50  Total Timed Codes (min): 30  1:1 Treatment Time ( only): 30   Visit #:  3    Treatment Area: Neck pain [M54.2]  Bilateral arm pain [M79.601, M79.602]    SUBJECTIVE  Pain Level (0-10 scale): R/L: 5/5  Any medication changes, allergies to medications, adverse drug reactions, diagnosis change, or new procedure performed?: [x] No    [] Yes (see summary sheet for update)  Subjective functional status/changes:   [] No changes reported  The patient reports that he feels like he's getting a little stronger and better overall. OBJECTIVE    50 min Therapeutic Exercise:  [x] See flow sheet :   Rationale: increase ROM, increase strength and improve coordination to improve the patients ability to perform daily activities. With   [x] TE   [] TA   [] neuro   [] other: Patient Education: [x] Review HEP    [x] Progressed/Changed HEP based on:   [x] positioning   [x] body mechanics   [] transfers   [] heat/ice application    [] other:      Other Objective/Functional Measures: Pt. Tolerated therex well     Pain Level (0-10 scale) post treatment: R/L: 5/6    ASSESSMENT/Changes in Function:   The patient progressed with strengthening and mobility as tolerated.    Patient will continue to benefit from skilled PT services to modify and progress therapeutic interventions, address functional mobility deficits, address ROM deficits, address strength deficits, analyze and address soft tissue restrictions, analyze and cue movement patterns, analyze and modify body mechanics/ergonomics, assess and modify postural abnormalities, address imbalance/dizziness and instruct in home and community integration to attain remaining goals. [x]  See Plan of Care  []  See progress note/recertification  []  See Discharge Summary         Progress towards goals / Updated goals: The patient is progressing towards goals.      PLAN  [x]  Upgrade activities as tolerated     [x]  Continue plan of care  [x]  Update interventions per flow sheet       []  Discharge due to:_  []  Other:_      Burgess Diaz, PT 8/6/2019

## 2019-08-08 ENCOUNTER — PATIENT OUTREACH (OUTPATIENT)
Dept: INTERNAL MEDICINE CLINIC | Age: 74
End: 2019-08-08

## 2019-08-08 ENCOUNTER — HOSPITAL ENCOUNTER (OUTPATIENT)
Dept: PHYSICAL THERAPY | Age: 74
Discharge: HOME | End: 2019-08-08
Payer: MEDICARE

## 2019-08-08 ENCOUNTER — TELEPHONE (OUTPATIENT)
Dept: ENDOCRINOLOGY | Age: 74
End: 2019-08-08

## 2019-08-08 PROCEDURE — 97110 THERAPEUTIC EXERCISES: CPT | Performed by: PHYSICAL THERAPIST

## 2019-08-08 NOTE — PROGRESS NOTES
PT DAILY TREATMENT NOTE - [facility] 2-15    Patient Name: Lien Juan  Date:2019  : 1945  [x]  Patient  Verified  Payor: Senthil Laguerre / Plan: Gordy Houston / Product Type: Managed Care Medicare /    In time: 9:01am Out time: 9:50am  Total Treatment Time (min): 49  Total Timed Codes (min): 49  1:1 Treatment Time ( only): 35   Visit #:  4    Treatment Area: Neck pain [M54.2]  Bilateral arm pain [M79.601, M79.602]    SUBJECTIVE  Pain Level (0-10 scale): R/L: 4/0  Any medication changes, allergies to medications, adverse drug reactions, diagnosis change, or new procedure performed?: [x] No    [] Yes (see summary sheet for update)  Subjective functional status/changes:   [] No changes reported  The patient reports that he's doing okay, just numbness on the left side but no pain. OBJECTIVE    49 min Therapeutic Exercise:  [x] See flow sheet :   Rationale: increase ROM, increase strength and improve coordination to improve the patients ability to perform daily activities. With   [x] TE   [] TA   [] neuro   [] other: Patient Education: [x] Review HEP    [x] Progressed/Changed HEP based on:   [x] positioning   [x] body mechanics   [] transfers   [] heat/ice application    [] other:      Other Objective/Functional Measures: Pt. Tolerated therex well     Pain Level (0-10 scale) post treatment: R/L: 4/3    ASSESSMENT/Changes in Function:   The patient continues to progress with strengthening and mobility exercises.    Patient will continue to benefit from skilled PT services to modify and progress therapeutic interventions, address functional mobility deficits, address ROM deficits, address strength deficits, analyze and address soft tissue restrictions, analyze and cue movement patterns, analyze and modify body mechanics/ergonomics, assess and modify postural abnormalities, address imbalance/dizziness and instruct in home and community integration to attain remaining goals. [x]  See Plan of Care  []  See progress note/recertification  []  See Discharge Summary         Progress towards goals / Updated goals: The patient is progressing towards goals.      PLAN  [x]  Upgrade activities as tolerated     [x]  Continue plan of care  [x]  Update interventions per flow sheet       []  Discharge due to:_  []  Other:_      Rikki Agarwal, PT 8/8/2019

## 2019-08-08 NOTE — TELEPHONE ENCOUNTER
If he his still taking the glimepiride 1mg tab each morning,   Have him cut his glimepiride in half and take only 1/2 tab glimepiride each morning. Thanks ! Clark Hernandez.  [address]

## 2019-08-08 NOTE — TELEPHONE ENCOUNTER
Mr. Ignacio Walton stated that his blood sugars have been ranging from  the past 3 weeks. He stated that the numbers in the AM for each day last week were 98, 89, 70,98, 89, and 102. He stated that he is use to seeing numbers ranging from 120-160. He is currently taking his metformin (1 tablet before breakfast and 1 tablet before dinner).  Please advise

## 2019-08-08 NOTE — PROGRESS NOTES
NN attempted patient outreach today in order to follow-up; lvm requesting a return phone call to this NN.

## 2019-08-08 NOTE — TELEPHONE ENCOUNTER
Patient called wanted to talk to someone about his blood sugar has been running low for the last 3 weeks. Please call back to discuss.  Thanks

## 2019-08-13 ENCOUNTER — HOSPITAL ENCOUNTER (OUTPATIENT)
Dept: PHYSICAL THERAPY | Age: 74
Discharge: HOME | End: 2019-08-13
Payer: MEDICARE

## 2019-08-13 ENCOUNTER — PATIENT OUTREACH (OUTPATIENT)
Dept: INTERNAL MEDICINE CLINIC | Age: 74
End: 2019-08-13

## 2019-08-13 PROCEDURE — 97110 THERAPEUTIC EXERCISES: CPT | Performed by: PHYSICAL THERAPIST

## 2019-08-13 RX ORDER — DULOXETIN HYDROCHLORIDE 60 MG/1
CAPSULE, DELAYED RELEASE ORAL
Qty: 90 CAP | Refills: 1 | Status: SHIPPED | OUTPATIENT
Start: 2019-08-13 | End: 2020-01-31

## 2019-08-13 NOTE — PROGRESS NOTES
PT DAILY TREATMENT NOTE - [facility] 2-15    Patient Name: Clarke Pearson Sr  Date:2019  : 1945  [x]  Patient  Verified  Payor: Magalys Paez / Plan: Patricia Fenton / Product Type: Managed Care Medicare /    In time: 2:40pm Out time: 3:42pm  Total Treatment Time (min): 62  Total Timed Codes (min): 62  1:1 Treatment Time ( [address] only): 40   Visit #:  5    Treatment Area: Neck pain [M54.2]  Bilateral arm pain [M79.601, M79.602]    SUBJECTIVE  Pain Level (0-10 scale): R/L:   Any medication changes, allergies to medications, adverse drug reactions, diagnosis change, or new procedure performed?: [x] No    [] Yes (see summary sheet for update)  Subjective functional status/changes:   [] No changes reported  The patient reports that he had an EMG done at the end of last week and his shoulders and arms have been flared up and irritated since then. OBJECTIVE    62 min Therapeutic Exercise:  [x] See flow sheet :   Rationale: increase ROM, increase strength and improve coordination to improve the patients ability to perform daily activities. With   [x] TE   [] TA   [] neuro   [] other: Patient Education: [x] Review HEP    [x] Progressed/Changed HEP based on:   [x] positioning   [x] body mechanics   [] transfers   [] heat/ice application    [] other:      Other Objective/Functional Measures: Pt. Tolerated therex well     Pain Level (0-10 scale) post treatment: R/L:     ASSESSMENT/Changes in Function:   The patient progressed with increased resistance as tolerated.    Patient will continue to benefit from skilled PT services to modify and progress therapeutic interventions, address functional mobility deficits, address ROM deficits, address strength deficits, analyze and address soft tissue restrictions, analyze and cue movement patterns, analyze and modify body mechanics/ergonomics, assess and modify postural abnormalities, address imbalance/dizziness and instruct in home and community integration to attain remaining goals. [x]  See Plan of Care  []  See progress note/recertification  []  See Discharge Summary         Progress towards goals / Updated goals: The patient is progressing towards goals.      PLAN  [x]  Upgrade activities as tolerated     [x]  Continue plan of care  [x]  Update interventions per flow sheet       []  Discharge due to:_  []  Other:_      Krzysztof Zepeda, PT 8/13/2019

## 2019-08-15 ENCOUNTER — HOSPITAL ENCOUNTER (OUTPATIENT)
Dept: PHYSICAL THERAPY | Age: 74
Discharge: HOME | End: 2019-08-15
Payer: MEDICARE

## 2019-08-15 PROCEDURE — 97110 THERAPEUTIC EXERCISES: CPT | Performed by: PHYSICAL THERAPIST

## 2019-08-15 NOTE — PROGRESS NOTES
PT DAILY TREATMENT NOTE - [facility] 2-15    Patient Name: Kateryna Mccollum Sr  Date:8/15/2019  : 1945  [x]  Patient  Verified  Payor: Qing Campbell / Plan: Sanjeev Odonnell / Product Type: Managed Care Medicare /    In time:  10:00am Out time: 11:00am  Total Treatment Time (min): 60  Total Timed Codes (min): 60  1:1 Treatment Time ( only): 43   Visit #:  6    Treatment Area: Neck pain [M54.2]  Bilateral arm pain [M79.601, M79.602]    SUBJECTIVE  Pain Level (0-10 scale): R/L: /5  Any medication changes, allergies to medications, adverse drug reactions, diagnosis change, or new procedure performed?: [x] No    [] Yes (see summary sheet for update)  Subjective functional status/changes:   [] No changes reported  The patient reports that he's feeling better than the other day. OBJECTIVE    60 min Therapeutic Exercise:  [x] See flow sheet :   Rationale: increase ROM, increase strength and improve coordination to improve the patients ability to perform daily activities. With   [x] TE   [] TA   [] neuro   [] other: Patient Education: [x] Review HEP    [x] Progressed/Changed HEP based on:   [x] positioning   [x] body mechanics   [] transfers   [] heat/ice application    [] other:      Other Objective/Functional Measures: Pt. Tolerated therex well     Pain Level (0-10 scale) post treatment: R/L: /6    ASSESSMENT/Changes in Function:   The patient progressed with AAROM therex as tolerated with smoother movements with incline exercises.     Patient will continue to benefit from skilled PT services to modify and progress therapeutic interventions, address functional mobility deficits, address ROM deficits, address strength deficits, analyze and address soft tissue restrictions, analyze and cue movement patterns, analyze and modify body mechanics/ergonomics, assess and modify postural abnormalities, address imbalance/dizziness and instruct in home and community integration to attain remaining goals. [x]  See Plan of Care  []  See progress note/recertification  []  See Discharge Summary         Progress towards goals / Updated goals: The patient is progressing towards goals.      PLAN  [x]  Upgrade activities as tolerated     [x]  Continue plan of care  [x]  Update interventions per flow sheet       []  Discharge due to:_  []  Other:_      Mitch De La Cruz, PT 8/15/2019

## 2019-08-20 ENCOUNTER — HOSPITAL ENCOUNTER (OUTPATIENT)
Dept: PHYSICAL THERAPY | Age: 74
Discharge: HOME | End: 2019-08-20
Payer: MEDICARE

## 2019-08-20 PROCEDURE — 97110 THERAPEUTIC EXERCISES: CPT | Performed by: PHYSICAL THERAPIST

## 2019-08-20 NOTE — PROGRESS NOTES
[facility]  [address] ([facility] [address] [place] [address] [address]  Phone: [phone] Fax: [phone]    Progress Note    Name: Linda Tello   : 1945   MD: Anisa Escalona MD       Treatment Diagnosis: Neck pain [M54.2]  Bilateral arm pain [M79.601, K45.932]  Start of Care: 19    Visits from Start of Care: 7  Missed Visits: 0    Summary of Care: Therapy has included therex for bilateral upper extremity strength and mobility s/p C3-C6 ACDF May 2019. Assessment / Recommendations: The patient continues to have bilateral arm numbness, but no longer has \"pain\". He is more concerned about getting back his functional strength and ROM. He has improved his right shoulder flexion from 45 to 65 degrees, but essentially has minimal strength in the C5/C6 myotomal musculature. He is slowly progressing with AAROM, but it will be a long process to get back to his prior level of function (which was being able to raise his right arm over shoulder height). His left [facility] as far as AROM. As of note, he is in the process of getting his car fixed and would like to try and drive it, which is an ongoing discussion. Short Term Goals: To be accomplished in 2 treatments:                         6.) The patient will be independent with their HEP consistently for at least one week. - MET  Long Term Goals: To be accomplished in 16 treatments:                         2.) The patient will improve his right shoulder flexion from 45 degrees to at least 70 degrees to assist with daily activities. - Progressing slowly                         1.) The patient will improve his  strength from 25lbs to at least 30lbs to assist with daily activities. - Progressing                         1.) The patient will improve their FOTO score from 55 to at least 58 to show improvements in functional mobility.- MET (58 today)    Other: Continue 2x/wk for at least 8 more treatments        Miko Krishnamurthy Nilesh, PT 8/20/2019     ________________________________________________________________________  NOTE TO PHYSICIAN:  Please complete the following and fax to: [facility]: Fax: [phone]  . Retain this original for your records. If you are unable to process this request in 24 hours, please contact our office.        ____ I have read the above report and request that my patient continue therapy with the following changes/special instructions:  ____ I have read the above report and request that my patient be discharged from therapy    Physician's Signature:_________________ Date:___________Time:__________

## 2019-08-20 NOTE — PROGRESS NOTES
PT DAILY TREATMENT NOTE - [facility] 2-15    Patient Name: Gordy Burris  Date:2019  : 1945  [x]  Patient  Verified  Payor: Andrew Check / Plan: Mago Aparicio / Product Type: Managed Care Medicare /    In time:  2:32pm Out time: 3:35pm  Total Treatment Time (min): 63  Total Timed Codes (min): 63  1:1 Treatment Time ( [address] only): 55   Visit #:  7    Treatment Area: Neck pain [M54.2]  Bilateral arm pain [M79.601, M79.602]    SUBJECTIVE  Pain Level (0-10 scale): R/L: 5/5  Any medication changes, allergies to medications, adverse drug reactions, diagnosis change, or new procedure performed?: [x] No    [] Yes (see summary sheet for update)  Subjective functional status/changes:   [] No changes reported  The patient reports that he's doing well, but his arms are still numb. OBJECTIVE    63 min Therapeutic Exercise:  [x] See flow sheet :   Rationale: increase ROM, increase strength and improve coordination to improve the patients ability to perform daily activities. With   [x] TE   [] TA   [] neuro   [] other: Patient Education: [x] Review HEP    [x] Progressed/Changed HEP based on:   [x] positioning   [x] body mechanics   [] transfers   [] heat/ice application    [] other:      Other Objective/Functional Measures: FOTO= 58 (55 at eval); AROM: Flex= 65 (45 at eval)     Pain Level (0-10 scale) post treatment: R/L: 6/6    ASSESSMENT/Changes in Function:   The patient progressed with AAROM as tolerated. Patient will continue to benefit from skilled PT services to modify and progress therapeutic interventions, address functional mobility deficits, address ROM deficits, address strength deficits, analyze and address soft tissue restrictions, analyze and cue movement patterns, analyze and modify body mechanics/ergonomics, assess and modify postural abnormalities, address imbalance/dizziness and instruct in home and community integration to attain remaining goals. [x]  See Plan of Care  [x]  See progress note/recertification  []  See Discharge Summary         Progress towards goals / Updated goals: The patient is progressing towards goals.      PLAN  [x]  Upgrade activities as tolerated     [x]  Continue plan of care  [x]  Update interventions per flow sheet       []  Discharge due to:_  []  Other:_      Indiana Huddleston, PT 8/20/2019

## 2019-08-22 ENCOUNTER — HOSPITAL ENCOUNTER (OUTPATIENT)
Dept: PHYSICAL THERAPY | Age: 74
Discharge: HOME | End: 2019-08-22
Payer: MEDICARE

## 2019-08-22 PROCEDURE — 97110 THERAPEUTIC EXERCISES: CPT | Performed by: PHYSICAL THERAPIST

## 2019-08-22 NOTE — PROGRESS NOTES
PT DAILY TREATMENT NOTE - [facility] 2-15    Patient Name: Maribell Zhang Sr  Date:2019  : 1945  [x]  Patient  Verified  Payor: Senthil Laguerre / Plan: Gordy Houston / Product Type: Managed Care Medicare /    In time:  2:30pm Out time: 3:20pm  Total Treatment Time (min): 50  Total Timed Codes (min): 50  1:1 Treatment Time ( [address] only): 50   Visit #:  8    Treatment Area: Neck pain [M54.2]  Bilateral arm pain [M79.601, M79.602]    SUBJECTIVE  Pain Level (0-10 scale): R/L: 5/5  Any medication changes, allergies to medications, adverse drug reactions, diagnosis change, or new procedure performed?: [x] No    [] Yes (see summary sheet for update)  Subjective functional status/changes:   [] No changes reported  The patient reports that he saw the doctor who told him that some of the numbness in his hands are carpal tunnel and peripheral neuropathy related. OBJECTIVE    50 min Therapeutic Exercise:  [x] See flow sheet :   Rationale: increase ROM, increase strength and improve coordination to improve the patients ability to perform daily activities. With   [x] TE   [] TA   [] neuro   [] other: Patient Education: [x] Review HEP    [x] Progressed/Changed HEP based on:   [x] positioning   [x] body mechanics   [] transfers   [] heat/ice application    [] other:      Other Objective/Functional Measures: Pt.  Tolerated therex well     Pain Level (0-10 scale) post treatment: R/L: 5/5    ASSESSMENT/Changes in Function:   The patient progressed with strengthening     Patient will continue to benefit from skilled PT services to modify and progress therapeutic interventions, address functional mobility deficits, address ROM deficits, address strength deficits, analyze and address soft tissue restrictions, analyze and cue movement patterns, analyze and modify body mechanics/ergonomics, assess and modify postural abnormalities, address imbalance/dizziness and instruct in home and community integration to attain remaining goals. [x]  See Plan of Care  []  See progress note/recertification  []  See Discharge Summary         Progress towards goals / Updated goals: The patient is progressing towards goals.      PLAN  [x]  Upgrade activities as tolerated     [x]  Continue plan of care  [x]  Update interventions per flow sheet       []  Discharge due to:_  []  Other:_      Nilton Loja, PT 8/22/2019

## 2019-08-30 ENCOUNTER — HOSPITAL ENCOUNTER (OUTPATIENT)
Dept: PHYSICAL THERAPY | Age: 74
Discharge: HOME | End: 2019-08-30
Payer: MEDICARE

## 2019-08-30 PROCEDURE — 97110 THERAPEUTIC EXERCISES: CPT | Performed by: PHYSICAL THERAPIST

## 2019-08-30 NOTE — PROGRESS NOTES
PT DAILY TREATMENT NOTE - [facility] 2-15    Patient Name: Lisa Gibson  Date:2019  : 1945  [x]  Patient  Verified  Payor: Chata Delgado / Plan: Macy Wahl / Product Type: Managed Care Medicare /    In time:  9:30am Out time: 10:33am  Total Treatment Time (min): 63  Total Timed Codes (min): 55  1:1 Treatment Time ( [address] only): 55   Visit #:  9    Treatment Area: Neck pain [M54.2]  Bilateral arm pain [M79.601, M79.602]  Lower back pain [M54.5]    SUBJECTIVE  Pain Level (0-10 scale): R/L:   Any medication changes, allergies to medications, adverse drug reactions, diagnosis change, or new procedure performed?: [x] No    [] Yes (see summary sheet for update)  Subjective functional status/changes:   [] No changes reported  The patient reports that he got shots in his hands for carpal tunnel which has seemed to help his  strength. OBJECTIVE    63 min Therapeutic Exercise:  [x] See flow sheet :   Rationale: increase ROM, increase strength and improve coordination to improve the patients ability to perform daily activities. With   [x] TE   [] TA   [] neuro   [] other: Patient Education: [x] Review HEP    [x] Progressed/Changed HEP based on:   [x] positioning   [x] body mechanics   [] transfers   [] heat/ice application    [] other:      Other Objective/Functional Measures: Pt. Tolerated therex well; Flexion= 75     Pain Level (0-10 scale) post treatment: R/L:     ASSESSMENT/Changes in Function:   The patient progressed with exercises as tolerated with slightly improved AROM flexion.     Patient will continue to benefit from skilled PT services to modify and progress therapeutic interventions, address functional mobility deficits, address ROM deficits, address strength deficits, analyze and address soft tissue restrictions, analyze and cue movement patterns, analyze and modify body mechanics/ergonomics, assess and modify postural abnormalities, address imbalance/dizziness and instruct in home and community integration to attain remaining goals. [x]  See Plan of Care  []  See progress note/recertification  []  See Discharge Summary         Progress towards goals / Updated goals: The patient is progressing towards goals.      PLAN  [x]  Upgrade activities as tolerated     [x]  Continue plan of care  [x]  Update interventions per flow sheet       []  Discharge due to:_  []  Other:_      Lalit Arce, PT 8/30/2019

## 2019-09-04 ENCOUNTER — HOSPITAL ENCOUNTER (OUTPATIENT)
Dept: PHYSICAL THERAPY | Age: 74
Discharge: HOME | End: 2019-09-04
Payer: MEDICARE

## 2019-09-04 PROCEDURE — 97110 THERAPEUTIC EXERCISES: CPT | Performed by: PHYSICAL THERAPIST

## 2019-09-04 NOTE — PROGRESS NOTES
PT DAILY TREATMENT NOTE - [facility] 2-15    Patient Name: Porter Parra  Date:2019  : 1945  [x]  Patient  Verified  Payor: Renetta Goldberg / Plan: Waleska Blanco / Product Type: Managed Care Medicare /    In time:  3:00pm Out time: 3:54pm  Total Treatment Time (min): 54  Total Timed Codes (min): 54  1:1 Treatment Time ( only): 54   Visit #:  10    Treatment Area: Neck pain [M54.2]  Bilateral arm pain [M79.601, M79.602]  Lower back pain [M54.5]    SUBJECTIVE  Pain Level (0-10 scale): R/L:   Any medication changes, allergies to medications, adverse drug reactions, diagnosis change, or new procedure performed?: [x] No    [] Yes (see summary sheet for update)  Subjective functional status/changes:   [] No changes reported  The patient reports that he went grocery shopping and tried to bring in the freezer bags by himself, one at a time, about 50lbs each, so he's been flared up since. OBJECTIVE    54 min Therapeutic Exercise:  [x] See flow sheet :   Rationale: increase ROM, increase strength and improve coordination to improve the patients ability to perform daily activities. With   [x] TE   [] TA   [] neuro   [] other: Patient Education: [x] Review HEP    [x] Progressed/Changed HEP based on:   [x] positioning   [x] body mechanics   [] transfers   [] heat/ice application    [] other:      Other Objective/Functional Measures: Pt. Tolerated therex well; Flexion= 70     Pain Level (0-10 scale) post treatment: R/L:     ASSESSMENT/Changes in Function:   The patient progressed with strengthening as tolerated.    Patient will continue to benefit from skilled PT services to modify and progress therapeutic interventions, address functional mobility deficits, address ROM deficits, address strength deficits, analyze and address soft tissue restrictions, analyze and cue movement patterns, analyze and modify body mechanics/ergonomics, assess and modify postural abnormalities, address imbalance/dizziness and instruct in home and community integration to attain remaining goals. [x]  See Plan of Care  []  See progress note/recertification  []  See Discharge Summary         Progress towards goals / Updated goals: The patient is progressing towards goals.      PLAN  [x]  Upgrade activities as tolerated     [x]  Continue plan of care  [x]  Update interventions per flow sheet       []  Discharge due to:_  []  Other:_      Kate Tan, PT 9/4/2019

## 2019-09-06 ENCOUNTER — ANESTHESIA EVENT (OUTPATIENT)
Dept: SURGERY | Age: 74
End: 2019-09-06
Payer: MEDICARE

## 2019-09-06 ENCOUNTER — HOSPITAL ENCOUNTER (OUTPATIENT)
Dept: PHYSICAL THERAPY | Age: 74
Discharge: HOME | End: 2019-09-06
Payer: MEDICARE

## 2019-09-06 PROCEDURE — 97110 THERAPEUTIC EXERCISES: CPT | Performed by: PHYSICAL THERAPIST

## 2019-09-06 RX ORDER — LATANOPROST 50 UG/ML
1 SOLUTION/ DROPS OPHTHALMIC
COMMUNITY
End: 2019-10-18

## 2019-09-06 RX ORDER — FUROSEMIDE 40 MG/1
TABLET ORAL
Qty: 30 TAB | Refills: 1 | Status: SHIPPED | OUTPATIENT
Start: 2019-09-06 | End: 2019-10-17 | Stop reason: SDUPTHER

## 2019-09-06 NOTE — PROGRESS NOTES
PT DAILY TREATMENT NOTE - [facility] 2-15    Patient Name: Skyler Pool  Date:2019  : 1945  [x]  Patient  Verified  Payor: Eda Hussein / Plan: Lily Dean / Product Type: Managed Care Medicare /    In time:  9:00am Out time: 9:06am   Total Treatment Time (min): 66   Total Timed Codes (min): 66   1:1 Treatment Time ( [address] only): 55    Visit #:  11    Treatment Area: Neck pain [M54.2]  Bilateral arm pain [M79.601, M79.602]    SUBJECTIVE  Pain Level (0-10 scale): R/L:    Any medication changes, allergies to medications, adverse drug reactions, diagnosis change, or new procedure performed?: [x] No    [] Yes (see summary sheet for update)  Subjective functional status/changes:   [] No changes reported  The patient reports that he's doing okay, driving is still getting easier. OBJECTIVE    66  min Therapeutic Exercise:  [x] See flow sheet :   Rationale: increase ROM, increase strength and improve coordination to improve the patients ability to perform daily activities. With   [x] TE   [] TA   [] neuro   [] other: Patient Education: [x] Review HEP    [x] Progressed/Changed HEP based on:   [x] positioning   [x] body mechanics   [] transfers   [] heat/ice application    [] other:      Other Objective/Functional Measures: Pt. Tolerated therex well      Pain Level (0-10 scale) post treatment: R/L:      ASSESSMENT/Changes in Function:   The patient is progressing with increased resistance as tolerated.      Patient will continue to benefit from skilled PT services to modify and progress therapeutic interventions, address functional mobility deficits, address ROM deficits, address strength deficits, analyze and address soft tissue restrictions, analyze and cue movement patterns, analyze and modify body mechanics/ergonomics, assess and modify postural abnormalities, address imbalance/dizziness and instruct in home and community integration to attain remaining goals. [x]  See Plan of Care  []  See progress note/recertification  []  See Discharge Summary         Progress towards goals / Updated goals: The patient is progressing towards goals.      PLAN  [x]  Upgrade activities as tolerated     [x]  Continue plan of care  [x]  Update interventions per flow sheet       []  Discharge due to:_  []  Other:_      Maria Isabel Luke, PT 9/6/2019

## 2019-09-09 ENCOUNTER — HOSPITAL ENCOUNTER (OUTPATIENT)
Age: 74
Setting detail: OUTPATIENT SURGERY
Discharge: HOME | End: 2019-09-09
Attending: OPHTHALMOLOGY | Admitting: OPHTHALMOLOGY
Payer: MEDICARE

## 2019-09-09 ENCOUNTER — ANESTHESIA (OUTPATIENT)
Dept: SURGERY | Age: 74
End: 2019-09-09
Payer: MEDICARE

## 2019-09-09 VITALS
HEART RATE: 88 BPM | DIASTOLIC BLOOD PRESSURE: 92 MMHG | WEIGHT: 207 LBS | TEMPERATURE: 97.9 F | RESPIRATION RATE: 16 BRPM | HEIGHT: 63 IN | BODY MASS INDEX: 36.68 KG/M2 | SYSTOLIC BLOOD PRESSURE: 131 MMHG | OXYGEN SATURATION: 96 %

## 2019-09-09 LAB
GLUCOSE BLD STRIP.AUTO-MCNC: 112 MG/DL (ref 65–100)
GLUCOSE BLD STRIP.AUTO-MCNC: 119 MG/DL (ref 65–100)
SERVICE CMNT-IMP: ABNORMAL
SERVICE CMNT-IMP: ABNORMAL

## 2019-09-09 PROCEDURE — 77030018846 HC SOL IRR STRL H20 ICUM -A: Performed by: OPHTHALMOLOGY

## 2019-09-09 PROCEDURE — 77030021352 HC CBL LD SYS DISP COVD -B: Performed by: OPHTHALMOLOGY

## 2019-09-09 PROCEDURE — 82962 GLUCOSE BLOOD TEST: CPT

## 2019-09-09 PROCEDURE — 76030000000 HC AMB SURG OR TIME 0.5 TO 1: Performed by: OPHTHALMOLOGY

## 2019-09-09 PROCEDURE — 77030038831 HC SEAL SYNTH RESURE OCCULR OCEL -G: Performed by: OPHTHALMOLOGY

## 2019-09-09 PROCEDURE — V2632 POST CHMBR INTRAOCULAR LENS: HCPCS | Performed by: OPHTHALMOLOGY

## 2019-09-09 PROCEDURE — 76210000040 HC AMBSU PH I REC FIRST 0.5 HR: Performed by: OPHTHALMOLOGY

## 2019-09-09 PROCEDURE — 76060000061 HC AMB SURG ANES 0.5 TO 1 HR: Performed by: OPHTHALMOLOGY

## 2019-09-09 PROCEDURE — 74011000250 HC RX REV CODE- 250: Performed by: OPHTHALMOLOGY

## 2019-09-09 PROCEDURE — 74011250636 HC RX REV CODE- 250/636: Performed by: OPHTHALMOLOGY

## 2019-09-09 PROCEDURE — 76210000046 HC AMBSU PH II REC FIRST 0.5 HR: Performed by: OPHTHALMOLOGY

## 2019-09-09 DEVICE — ACRYSOF(R) IQ ASPHERIC NATURAL IOL, SINGLE-PIECE ACRYLIC FOLDABLE PCL, UV WITH BLUE LIGHTFILTER, 13.0MM LENGTH, 6.0MM ANTERIORASYMMETRIC BICONVEX OPTIC, PLANAR HAPTICS.
Type: IMPLANTABLE DEVICE | Site: EYE | Status: FUNCTIONAL
Brand: ACRYSOF®

## 2019-09-09 RX ORDER — NEOMYCIN SULFATE, POLYMYXIN B SULFATE, AND DEXAMETHASONE 3.5; 10000; 1 MG/G; [USP'U]/G; MG/G
OINTMENT OPHTHALMIC AS NEEDED
Status: DISCONTINUED | OUTPATIENT
Start: 2019-09-09 | End: 2019-09-09 | Stop reason: HOSPADM

## 2019-09-09 RX ORDER — SODIUM CHLORIDE, SODIUM LACTATE, POTASSIUM CHLORIDE, CALCIUM CHLORIDE 600; 310; 30; 20 MG/100ML; MG/100ML; MG/100ML; MG/100ML
25 INJECTION, SOLUTION INTRAVENOUS CONTINUOUS
Status: DISCONTINUED | OUTPATIENT
Start: 2019-09-09 | End: 2019-09-09 | Stop reason: HOSPADM

## 2019-09-09 RX ORDER — CIPROFLOXACIN HYDROCHLORIDE 3.5 MG/ML
1 SOLUTION/ DROPS TOPICAL
Status: COMPLETED | OUTPATIENT
Start: 2019-09-09 | End: 2019-09-09

## 2019-09-09 RX ORDER — SODIUM CHLORIDE 0.9 % (FLUSH) 0.9 %
5-40 SYRINGE (ML) INJECTION EVERY 8 HOURS
Status: DISCONTINUED | OUTPATIENT
Start: 2019-09-09 | End: 2019-09-09 | Stop reason: HOSPADM

## 2019-09-09 RX ORDER — SODIUM CHLORIDE 0.9 % (FLUSH) 0.9 %
5-40 SYRINGE (ML) INJECTION AS NEEDED
Status: DISCONTINUED | OUTPATIENT
Start: 2019-09-09 | End: 2019-09-09 | Stop reason: HOSPADM

## 2019-09-09 RX ORDER — TETRACAINE HYDROCHLORIDE 5 MG/ML
SOLUTION OPHTHALMIC AS NEEDED
Status: DISCONTINUED | OUTPATIENT
Start: 2019-09-09 | End: 2019-09-09 | Stop reason: HOSPADM

## 2019-09-09 RX ORDER — SODIUM CHLORIDE 9 MG/ML
25 INJECTION, SOLUTION INTRAVENOUS CONTINUOUS
Status: DISCONTINUED | OUTPATIENT
Start: 2019-09-09 | End: 2019-09-09 | Stop reason: HOSPADM

## 2019-09-09 RX ORDER — TROPICAMIDE 10 MG/ML
1 SOLUTION/ DROPS OPHTHALMIC
Status: COMPLETED | OUTPATIENT
Start: 2019-09-09 | End: 2019-09-09

## 2019-09-09 RX ORDER — FENTANYL CITRATE 50 UG/ML
25 INJECTION, SOLUTION INTRAMUSCULAR; INTRAVENOUS
Status: DISCONTINUED | OUTPATIENT
Start: 2019-09-09 | End: 2019-09-09 | Stop reason: HOSPADM

## 2019-09-09 RX ORDER — TIMOLOL MALEATE 5 MG/ML
SOLUTION/ DROPS OPHTHALMIC AS NEEDED
Status: DISCONTINUED | OUTPATIENT
Start: 2019-09-09 | End: 2019-09-09 | Stop reason: HOSPADM

## 2019-09-09 RX ORDER — ONDANSETRON 2 MG/ML
4 INJECTION INTRAMUSCULAR; INTRAVENOUS AS NEEDED
Status: DISCONTINUED | OUTPATIENT
Start: 2019-09-09 | End: 2019-09-09 | Stop reason: HOSPADM

## 2019-09-09 RX ORDER — DIPHENHYDRAMINE HYDROCHLORIDE 50 MG/ML
12.5 INJECTION, SOLUTION INTRAMUSCULAR; INTRAVENOUS AS NEEDED
Status: DISCONTINUED | OUTPATIENT
Start: 2019-09-09 | End: 2019-09-09 | Stop reason: HOSPADM

## 2019-09-09 RX ORDER — LIDOCAINE HYDROCHLORIDE 10 MG/ML
0.1 INJECTION, SOLUTION EPIDURAL; INFILTRATION; INTRACAUDAL; PERINEURAL AS NEEDED
Status: DISCONTINUED | OUTPATIENT
Start: 2019-09-09 | End: 2019-09-09 | Stop reason: HOSPADM

## 2019-09-09 RX ORDER — MIDAZOLAM HYDROCHLORIDE 1 MG/ML
INJECTION, SOLUTION INTRAMUSCULAR; INTRAVENOUS AS NEEDED
Status: DISCONTINUED | OUTPATIENT
Start: 2019-09-09 | End: 2019-09-09 | Stop reason: HOSPADM

## 2019-09-09 RX ADMIN — MIDAZOLAM HYDROCHLORIDE 1 MG: 1 INJECTION, SOLUTION INTRAMUSCULAR; INTRAVENOUS at 10:55

## 2019-09-09 RX ADMIN — CIPROFLOXACIN 1 DROP: 3 SOLUTION OPHTHALMIC at 10:09

## 2019-09-09 RX ADMIN — TROPICAMIDE 1 DROP: 10 SOLUTION/ DROPS OPHTHALMIC at 10:09

## 2019-09-09 RX ADMIN — CIPROFLOXACIN 1 DROP: 3 SOLUTION OPHTHALMIC at 09:50

## 2019-09-09 RX ADMIN — TROPICAMIDE 1 DROP: 10 SOLUTION/ DROPS OPHTHALMIC at 10:05

## 2019-09-09 RX ADMIN — SODIUM CHLORIDE 25 ML/HR: 900 INJECTION, SOLUTION INTRAVENOUS at 10:00

## 2019-09-09 RX ADMIN — TROPICAMIDE 1 DROP: 10 SOLUTION/ DROPS OPHTHALMIC at 09:50

## 2019-09-09 RX ADMIN — CIPROFLOXACIN 1 DROP: 3 SOLUTION OPHTHALMIC at 10:05

## 2019-09-09 RX ADMIN — MIDAZOLAM HYDROCHLORIDE 1 MG: 1 INJECTION, SOLUTION INTRAMUSCULAR; INTRAVENOUS at 10:47

## 2019-09-09 NOTE — H&P
Surgery History and Physcial    Subjective:      Ovidio Miranda is a 72 y.o. male with visually significant cataract left eye for cataract extraction, lens implant left eye.     Patient Active Problem List    Diagnosis Date Noted    Cervical stenosis of spinal canal 2019    S/P cervical spinal fusion 2019    Type 2 diabetes with nephropathy ([address] 2018    Uncontrolled type 2 diabetes mellitus with complication, with long-term current use of insulin ([address] 2018    Pure hypercholesterolemia 2018    Congestive heart failure ([address] 2018    Pain in both hands 2018    Obesity, morbid ([address] 2018    SOB (shortness of breath) 2018     Past Medical History:   Diagnosis Date    Arthritis     Asthma     CAD (coronary artery disease)     Calculus of kidney     Carotid artery disease (HCC)     Cervical herniated disc     Chronic systolic (congestive) heart failure (HCC)     Diabetes ([address]     Diabetic shock due to low blood sugar ([address]     Glaucoma     pt is on drops unsure of what the names are    Hypercholesterolemia     Hypertension     Morbid obesity ([address]     Rheumatic fever     When he was 17    Sleep apnea     compliant with cpap as stated 2019      Past Surgical History:   Procedure Laterality Date    HX CAROTID STENT      with cath    HX CERVICAL FUSION  2019    C3- C6 ACFD     HX CHOLECYSTECTOMY      HX CIRCUMCISION      HX HEART CATHETERIZATION      x 5 total, 4 in heart, 1 in carotid    HX HEENT Left     ear torn off and repaired    HX ORTHOPAEDIC Right     repair    HX OTHER SURGICAL Right     Right hand reconstruction    HX SHOULDER ARTHROSCOPY Right     dislocated with fall, came out, he stated surgery put back in place and sowed him up      Social History     Tobacco Use    Smoking status: Former Smoker     Packs/day: 5.00     Last attempt to quit: 1963     Years since quittin.3    Smokeless tobacco: Never Used   Substance Use Topics    Alcohol use: No     Alcohol/week: 0.0 standard drinks      Family History   Problem Relation Age of Onset    Heart Disease Brother     Diabetes Nephew     Hypertension Mother       Prior to Admission medications    Medication Sig Start Date End Date Taking? Authorizing Provider   furosemide (LASIX) 40 mg tablet TAKE 1 TABLET BY MOUTH ONCE DAILY 9/6/19  Yes Sue Santizo NP   latanoprost (XALATAN) 0.005 % ophthalmic solution Administer 1 Drop to both eyes nightly. Yes Provider, Historical   dextran 70/hypromellose/PF (NATURAL TEARS, PF, OP) Apply 1 Drop to eye as needed. Yes Provider, Historical   DULoxetine (CYMBALTA) 60 mg capsule TAKE 1 CAPSULE BY MOUTH ONCE DAILY FOR  FEET  PAIN 8/13/19  Yes Zach Ford MD   lisinopril (PRINIVIL, ZESTRIL) 2.5 mg tablet TAKE 1 TABLET BY MOUTH ONCE DAILY 7/9/19  Yes Sue Santizo NP   carvedilol (COREG) 12.5 mg tablet TAKE 1 TABLET BY MOUTH TWICE DAILY 6/28/19  Yes Sue Santizo NP   gabapentin (NEURONTIN) 100 mg capsule Take 1 Cap by mouth three (3) times daily. AS DIRECTED. 6/6/19  Yes Elena Banks MD   omega 3-DHA-EPA-fish oil (FISH OIL) 1,000 mg (120 mg-180 mg) capsule Take 1 Cap by mouth two (2) times a day. Yes Provider, Historical   metOLazone (ZAROXOLYN) 5 mg tablet Take 2.5 mg by mouth every Tuesday and Thursday. Yes Provider, Historical   glimepiride (AMARYL) 1 mg tablet Take 1 mg by mouth Daily (before breakfast). Yes Provider, Historical   simvastatin (ZOCOR) 10 mg tablet TAKE 1 TABLET BY MOUTH NIGHTLY 5/15/19  Yes Sue Santizo NP   metFORMIN ER (GLUCOPHAGE XR) 500 mg tablet TAKE 1 TABLET BY MOUTH BEFORE BREAKFAST AND  DINNER 5/15/19  Yes Zach Ford MD   isosorbide mononitrate ER (IMDUR) 30 mg tablet TAKE 1 TABLET BY MOUTH ONCE DAILY 9/13/18  Yes Sue Santizo NP   cholecalciferol, VITAMIN D3, (VITAMIN D3) 5,000 unit tab tablet Take 1 Tab by mouth daily.  6/6/18  Yes Elena Banks MD aspirin delayed-release 81 mg tablet Take 81 mg by mouth daily. Yes Other, MD Cleo   acetaminophen (TYLENOL) 500 mg tablet Take 1,000 mg by mouth every six (6) hours as needed for Pain. Provider, Historical   CANNABIDIOL, CBD, EXTRACT PO Take 2 Units by mouth daily. Indications: TOPICAL CBD SALVE    Provider, Historical     Allergies   Allergen Reactions    Morphine Anaphylaxis     Pt states \"morphine gave me heart failure\"    Gabapentin Other (comments)     Made him \"loopy\"    Lyrica [Pregabalin] Other (comments)     Makes him \"loopy\"         Review of Systems   All other systems reviewed and are negative. Objective:     Visit Vitals  Ht 5' 3\" (1.6 m)   Wt 100.2 kg (221 lb)   BMI 39.15 kg/m²       Physical Exam   Constitutional: He is oriented to person, place, and time. He appears well-developed and well-nourished. HENT:   Head: Normocephalic and atraumatic. Cardiovascular: Normal heart sounds. Pulmonary/Chest: Breath sounds normal.   Abdominal: Bowel sounds are normal.   Musculoskeletal: Normal range of motion. Neurological: He is alert and oriented to person, place, and time. Psychiatric: He has a normal mood and affect. Imaging:      Lab Review:  No results found for this or any previous visit (from the past 24 hour(s)). Assessment:     Visually significant cataract left eye for cataract extraction, lens implant left eye. Plan:     Visually significant cataract left eye for cataract extraction, lens implant left eye.

## 2019-09-09 NOTE — ANESTHESIA POSTPROCEDURE EVALUATION
Procedure(s):  LEFT CATARACT EXTRACTION WITH INTRA OCULAR LENS IMPLANT (VISCOAT).     MAC    Anesthesia Post Evaluation      Multimodal analgesia: multimodal analgesia not used between 6 hours prior to anesthesia start to PACU discharge  Patient location during evaluation: bedside  Patient participation: complete - patient participated  Level of consciousness: awake and alert  Pain score: 0  Airway patency: patent  Anesthetic complications: no  Cardiovascular status: acceptable  Respiratory status: acceptable  Hydration status: acceptable  Post anesthesia nausea and vomiting:  none      Vitals Value Taken Time   /74 9/9/2019 11:24 AM   Temp 36.6 °C (97.9 °F) 9/9/2019 11:22 AM   Pulse 88 9/9/2019 11:24 AM   Resp 20 9/9/2019 11:24 AM   SpO2 95 % 9/9/2019 11:24 AM

## 2019-09-09 NOTE — PERIOP NOTES
Received to PACU, draowsy but answers question after stimulating. 1125 BS [id number]. Pt given regular ginger ale to try and wake up some. 1132 Daughter to bedside. Pt sipping ginger ale, eyes ope, awake/alert  1145 D/C instructions reviewd with pt/daughter and she signed for them. Pt ready for discharge. 1155 Discharged to home via/wc,accompanied to car per RN. Skin warm and dry, awake and alert. Respirations even, unlabored. Pt and family members questions and concerns addressed prior to discharge. All belongings with pt.

## 2019-09-09 NOTE — BRIEF OP NOTE
BRIEF OPERATIVE NOTE    Date of Procedure: 9/9/2019   Preoperative Diagnosis: Nuclear Sclerotic cataract left eye H25.12  Postoperative Diagnosis: Nuclear Sclerotic cataract left eye H25.12    Procedure(s):  LEFT CATARACT EXTRACTION WITH INTRA OCULAR LENS IMPLANT (VISCOAT)  Surgeon(s) and Role:     Sebastian Nix MD - Primary         Surgical Assistant: none    Surgical Staff:  Circ-1: Vincent Tierney  Scrub Tech-1: Canary Neither  Event Time In Time Out   Incision Start 1101    Incision Close 1118      Anesthesia: MAC   Estimated Blood Loss: none  Specimens: * No specimens in log *   Findings: cataract left eye  Complications: none  Implants:   Implant Name Type Inv.  Item Serial No.  Lot No. LRB No. Used Action   LENS IOL POST 1-PC 6X13 20.5 -- [employer] - [id number]  LENS IOL POST 1-PC 6X13 20.5 -- [employer] [id number] [employer]  Left 1 Implanted

## 2019-09-09 NOTE — PERIOP NOTES
Floresita Buchanan   1945  [id number]    Situation:  Verbal report given from: LAMAR Arriola RN and Kennedi Fernando cRNA  Procedure: Procedure(s):  LEFT CATARACT EXTRACTION WITH INTRA OCULAR LENS IMPLANT (VISCOAT)    Background:    Preoperative diagnosis: CATARACT LEFT EYE    Postoperative diagnosis: CATARACT LEFT EYE    :  Dr. Eliud Rowe    Assistant(s): Circ-1: Carissa SHEN  Scrub Tech-1: Sri ALFARO    Specimens: * No specimens in log *    Assessment:  Intra-procedure medications         Anesthesia gave intra-procedure sedation and medications, see anesthesia flow sheet     Intravenous fluids: LR@ KVO     Vital signs stable       Recommendation:    Permission to share finding with daughter n law : yes

## 2019-09-09 NOTE — OP NOTES
Date of Procedure: 9/9/19  Preoperative Diagnosis: Nuclear Sclerotic cataract left eye H25.12  Postoperative Diagnosis: Nuclear Sclerotic cataract left eye H25.12  Procedure: Extracapsular cataract extraction with lens implant left eye  Surgeon:  EVANGELINA Ojeda MD  Assistants: None  Anesthesia: MAC with local  Estimated Blood Loss: None  Findings: Cataract left eye  Complications: None  Specimens: None  Prosthetic Devices: Intraocular lens implant    The patient's left eye was dilated with mydriacyl 1% and ciprofloxacin 0.3% for 3 doses preoperatively. The patient was taken to the operating room and was given sedation. Tetracaine was given topically to the left eye, and the eye was prepped and draped in the usual manner for sterile eye surgery, including Betadine solution being dropped onto the conjunctiva at the beginning of the prep. The eyelashes were isolated with a plastic drape. A lid speculum was placed. A #15 blade was used to make a paracentesis at the 5:00 location. The eye was flushed with a lidocaine / epinephrine mixture (\"Shugarcaine\"). The eye was filled with Viscoat (Duovisc), and a crescent blade was used to make a 2.5 mm incision at the limbus temporally. This was dissected 2 mm into clear cornea, and the eye was entered with a 2.4 mm keratome. A 0.12 forceps was used for fixation during the procedure. A capsulorhexis flap was started with a cystotome, and this was completed 360 degrees with Utrata forceps. The capsular piece was removed. [place] dissection was performed with the \"Shugarcaine\" mixture on a cannula. The lens nucleus was removed using phacoemulsification with a total phaco time of 2:38 minutes at 15.8%. The lens was cracked and manipulated with a Sinsky hook. Residual cortex was removed using irrigation / aspiration. The capsule remained intact. The capsule was refilled with Provisc (Duovisc).     An Danie Intraocular lens model [id number] power 20.50 was placed in a lens folding cartridge with Provisc. The lens was unfolded into the capsular bag. The lens centered well. Residual Viscoat and Provisc were removed using I / A. The Resure wound sealant was used to close the incision. The eye was flushed with BSS through the paracentesis. Betadine solution was placed on the conjunctival surface at the end of the case. The eye was left soft and formed at the end of the case. The incision site was water tight. The speculum was removed, and a drop of timolol 0.5% and ramon/poly/dex ointment was placed on the eye followed by a shield. The patient tolerated the procedure well and is to follow-up in one day.

## 2019-09-09 NOTE — ANESTHESIA PREPROCEDURE EVALUATION
Relevant Problems   No relevant active problems       Anesthetic History   No history of anesthetic complications            Review of Systems / Medical History  Patient summary reviewed, nursing notes reviewed and pertinent labs reviewed    Pulmonary        Sleep apnea: CPAP    Asthma     Comments: Former smoker - [address]   Neuro/Psych   Within defined limits           Cardiovascular    Hypertension      CHF: orthopnea, dyspnea on exertion    CAD and hyperlipidemia    Exercise tolerance: <4 METS  Comments: 05/18 Cath with minimal CAD    04/18 ECHO= EF 45-50%, grade 2 DD    Carotid Stenosis s/p Carotid Stent   GI/Hepatic/Renal  Within defined limits              Endo/Other    Diabetes: type 2    Obesity and arthritis     Other Findings   Comments: S/p ACF 05/19         Physical Exam    Airway  Mallampati: III  TM Distance: 4 - 6 cm  Neck ROM: normal range of motion, short neck   Mouth opening: Normal     Cardiovascular  Regular rate and rhythm,  S1 and S2 normal,  no murmur, click, rub, or gallop  Rhythm: regular  Rate: normal         Dental  No notable dental hx       Pulmonary  Breath sounds clear to auscultation               Abdominal  GI exam deferred       Other Findings            Anesthetic Plan    ASA: 3  Anesthesia type: MAC          Induction: Intravenous  Anesthetic plan and risks discussed with: Patient      Took BB at 730am.  preop glucose 119

## 2019-09-09 NOTE — DISCHARGE INSTRUCTIONS
Brendon Van MD  [facility]  [address]  [place], [address]  Phone: [phone]       Fax: [phone]  If you are unable to keep appointment, kindly give 24 hours notice please. REMOVE PATCH  START DROPS WHEN YOU GET HOME  PUT PATCH BACK ON AT BEDTIME    1. DO NOT RUB the eye that was operated on. 2. Do not strain excessively. It is all right to bend as long as you do not strain. 3. It is safe to take a shower, wash your face, and wash your hair. Just keep the eye closed. 4. Do not swim for 1 week after surgery. 5. If you have any problems or questions, do not hesitate to call. There is always a physician on call at [phone] ext. [phone].   6. Follow instructions on eye drops from office. 7. You may take Tylenol or Advil for discomfort. If it pressure not relieved by Tylenol or Advil, please call Dr. Coleman Saucedo office. TO PREVENT AN INFECTION      1. [address] YOUR HANDS     To prevent infection, good handwashing is the most important thing you or your caregiver can do.  Wash your hands with soap and water or use the hand  we gave you before you touch any wounds. 2.  USE CLEAN SHEETS     Use freshly cleaned sheets on your bed after surgery.  To keep the surgery site clean, do not allow pets to sleep with you while your wound is still healing. 3. STOP SMOKING    Stop smoking, or at least cut back on smoking     Smoking slows your healing. 4.  CONTROL YOUR BLOOD SUGAR     High blood sugars slow wound healing.  If you are diabetic, control your blood sugar levels before and after your surgery. If you were given prescriptions, please review the written information on the prescribed medications. DO NOT DRIVE WHILE TAKING NARCOTIC PAIN MEDICATIONS.     DISCHARGE SUMMARY from Nurse    The following personal items collected during your admission are returned to you:   Dental Appliance: Dental Appliances: None  Vision: Visual Aid: None  Hearing Aid:    Jewelry: Jewelry: Ring, With patient(wedding band on)  Clothing: Clothing: With patient, Shirt, Undergarments, Footwear, Pants, Socks, At bedside  Other Valuables: Other Valuables: None  Valuables sent to safe:      PATIENT INSTRUCTIONS:    After general anesthesia or intravenous sedation, for 24 hours or while taking prescription Narcotics:  · Someone should be with you for the next 24 hours. · For your own safety, a responsible adult must drive you home. · Limit your activities  · Recommended activity: Rest today, Do not climb stairs or shower unattended for the next 24 hours. · Do not drive and operate hazardous machinery  · Do not make important personal or business decisions  · Do  not drink alcoholic beverages  · If you have not urinated within 8 hours after discharge, please contact your surgeon on call. Report the following to your surgeon:  · Excessive pain, swelling, redness or odor of or around the surgical area  · Temperature over 100.5  · Nausea and vomiting lasting longer than 4 hours or if unable to take medications  · Any signs of decreased circulation or nerve impairment to extremity: change in color, persistent  numbness, tingling, coldness or increase pain  ·   ·   · You will receive a Post Operative Call from one of the Recovery Room Nurses on the day after your surgery to check on you. It is very important for us to know how you are recovering after your surgery. · You may receive an e-mail or letter in the mail from [employer] regarding your experience with us in the Ambulatory Surgery Unit. Your feedback is valuable to us and we appreciate your participation in the survey. · We wish youre a speedy recovery ? What to do at Home:      *  Please give a list of your current medications to your Primary Care Provider.     *  Please update this list whenever your medications are discontinued, doses are      changed, or new medications (including over-the-counter products) are added. *  Please carry medication information at all times in case of emergency situations. These are general instructions for a healthy lifestyle:    No smoking/ No tobacco products/ Avoid exposure to second hand smoke    Surgeon General's Warning:  Quitting smoking now greatly reduces serious risk to your health. Obesity, smoking, and sedentary lifestyle greatly increases your risk for illness    A healthy diet, regular physical exercise & weight monitoring are important for maintaining a healthy lifestyle    You may be retaining fluid if you have a history of heart failure or if you experience any of the following symptoms:  Weight gain of 3 pounds or more overnight or 5 pounds in a week, increased swelling in our hands or feet or shortness of breath while lying flat in bed. Please call your doctor as soon as you notice any of these symptoms; do not wait until your next office visit. Recognize signs and symptoms of STROKE:    B - Balance  E - Eyes    F-face looks uneven    A-arms unable to move or move even    S-speech slurred or non-existent    T-time-call 911 as soon as signs and symptoms begin-DO NOT go       Back to bed or wait to see if you get better-TIME IS BRAIN. If you have not received your influenza and/or pneumococcal vaccine, please follow up with your primary care physician. The discharge information has been reviewed with the patient and caregiver. The patient and caregiver verbalized understanding.

## 2019-09-10 ENCOUNTER — HOSPITAL ENCOUNTER (OUTPATIENT)
Dept: PHYSICAL THERAPY | Age: 74
End: 2019-09-10
Payer: MEDICARE

## 2019-09-11 ENCOUNTER — OFFICE VISIT (OUTPATIENT)
Dept: INTERNAL MEDICINE CLINIC | Age: 74
End: 2019-09-11

## 2019-09-11 VITALS
BODY MASS INDEX: 37.7 KG/M2 | HEIGHT: 63 IN | HEART RATE: 99 BPM | RESPIRATION RATE: 16 BRPM | TEMPERATURE: 98 F | WEIGHT: 212.8 LBS | OXYGEN SATURATION: 96 % | DIASTOLIC BLOOD PRESSURE: 78 MMHG | SYSTOLIC BLOOD PRESSURE: 129 MMHG

## 2019-09-11 DIAGNOSIS — M79.641 PAIN IN BOTH HANDS: ICD-10-CM

## 2019-09-11 DIAGNOSIS — E11.21 TYPE 2 DIABETES WITH NEPHROPATHY (HCC): ICD-10-CM

## 2019-09-11 DIAGNOSIS — M79.2 NEUROPATHIC PAIN: Primary | ICD-10-CM

## 2019-09-11 DIAGNOSIS — M79.642 PAIN IN BOTH HANDS: ICD-10-CM

## 2019-09-11 DIAGNOSIS — I50.9 CONGESTIVE HEART FAILURE, UNSPECIFIED HF CHRONICITY, UNSPECIFIED HEART FAILURE TYPE (HCC): ICD-10-CM

## 2019-09-11 DIAGNOSIS — E66.01 OBESITY, MORBID (HCC): ICD-10-CM

## 2019-09-11 DIAGNOSIS — E78.00 PURE HYPERCHOLESTEROLEMIA: ICD-10-CM

## 2019-09-11 DIAGNOSIS — Z23 ENCOUNTER FOR IMMUNIZATION: ICD-10-CM

## 2019-09-11 DIAGNOSIS — Z98.1 S/P CERVICAL SPINAL FUSION: ICD-10-CM

## 2019-09-11 RX ORDER — DUREZOL 0.5 MG/ML
EMULSION OPHTHALMIC
COMMUNITY
Start: 2019-09-03 | End: 2019-11-07

## 2019-09-11 RX ORDER — GABAPENTIN 100 MG/1
100 CAPSULE ORAL 3 TIMES DAILY
Qty: 30 CAP | Refills: 1 | Status: SHIPPED | OUTPATIENT
Start: 2019-09-11 | End: 2019-10-23 | Stop reason: SDUPTHER

## 2019-09-11 RX ORDER — OFLOXACIN 3 MG/ML
SOLUTION/ DROPS OPHTHALMIC
COMMUNITY
Start: 2019-09-03 | End: 2019-10-18

## 2019-09-11 RX ORDER — NEPAFENAC 3 MG/ML
SUSPENSION OPHTHALMIC
COMMUNITY
Start: 2019-09-06 | End: 2019-11-07

## 2019-09-11 NOTE — PROGRESS NOTES
Gordy Burris is a 72 y.o. male who presents for followup. In with daughter. S/p left cataract surgery on 9/9. Dr. Olena Murphy. No problems. Sees Dr. Sabrina Odom, s/p cervical fusion May 21. In PT now. It is helping. Bilateral carpal tunnel. Had steroid shots 8/23 with Dr. Juanita Flaherty. Has some hand neuropathy at baseline. Able to use hands more now. No change in numbness. Tried gabapentin 100mg TID, some reduction in symptoms, but not like taking pills. Reports hands will feel cold at night. No pain right now. Has tingling and numbness in right arm. Burning foot pain. On cymbalta once a day. Denies edema or worsening ROLLE. Sees DR. Britton Mccartney. Sees pasquale Potts. HbA1C 7.7%. Checking glucose. On metformin XR 500mg BID and amaryl 1mg daily. Following diabetic diet. Up to date eye exam. Has glaucoma. Creatinine 1.56, increased May 16. Had urinary retention.      Past Medical History:   Diagnosis Date    Arthritis     Asthma     CAD (coronary artery disease)     Calculus of kidney     Carotid artery disease (HCC)     Cervical herniated disc     Chronic systolic (congestive) heart failure (HCC)     Diabetes ([address]     Diabetic shock due to low blood sugar ([address] 2016    Glaucoma     pt is on drops unsure of what the names are    Hypercholesterolemia     Hypertension     Morbid obesity ([address]     Rheumatic fever     When he was 17    Sleep apnea     compliant with cpap as stated 9/6/2019       Family History   Problem Relation Age of Onset    Heart Disease Brother     Diabetes Nephew     Hypertension Mother        Social History     Socioeconomic History    Marital status:      Spouse name: Not on file    Number of children: Not on file    Years of education: Not on file    Highest education level: Not on file   Occupational History    Not on file   Social Needs    Financial resource strain: Not on file    Food insecurity:     Worry: Not on file     Inability: Not on file    Transportation needs:     Medical: Not on file     Non-medical: Not on file   Tobacco Use    Smoking status: Former Smoker     Packs/day: 5.00     Last attempt to quit: 1963     Years since quittin.3    Smokeless tobacco: Never Used   Substance and Sexual Activity    Alcohol use: No     Alcohol/week: 0.0 standard drinks    Drug use: Never    Sexual activity: Yes     Partners: Female     Birth control/protection: None   Lifestyle    Physical activity:     Days per week: Not on file     Minutes per session: Not on file    Stress: Not on file   Relationships    Social connections:     Talks on phone: Not on file     Gets together: Not on file     Attends Restorationism service: Not on file     Active member of club or organization: Not on file     Attends meetings of clubs or organizations: Not on file     Relationship status: Not on file    Intimate partner violence:     Fear of current or ex partner: Not on file     Emotionally abused: Not on file     Physically abused: Not on file     Forced sexual activity: Not on file   Other Topics Concern    Not on file   Social History Narrative    ** Merged History Encounter **            Current Outpatient Medications on File Prior to Visit   Medication Sig Dispense Refill    DUREZOL 0.05 % ophthalmic emulsion       ILEVRO 0.3 % drps       ofloxacin (FLOXIN) 0.3 % ophthalmic solution       furosemide (LASIX) 40 mg tablet TAKE 1 TABLET BY MOUTH ONCE DAILY 30 Tab 1    latanoprost (XALATAN) 0.005 % ophthalmic solution Administer 1 Drop to both eyes nightly.  dextran 70/hypromellose/PF (NATURAL TEARS, PF, OP) Apply 1 Drop to eye as needed.       DULoxetine (CYMBALTA) 60 mg capsule TAKE 1 CAPSULE BY MOUTH ONCE DAILY FOR  FEET  PAIN 90 Cap 1    lisinopril (PRINIVIL, ZESTRIL) 2.5 mg tablet TAKE 1 TABLET BY MOUTH ONCE DAILY 90 Tab 0    carvedilol (COREG) 12.5 mg tablet TAKE 1 TABLET BY MOUTH TWICE DAILY 60 Tab 3    acetaminophen (TYLENOL) 500 mg tablet Take 1,000 mg by mouth every six (6) hours as needed for Pain.  omega 3-DHA-EPA-fish oil (FISH OIL) 1,000 mg (120 mg-180 mg) capsule Take 1 Cap by mouth two (2) times a day.  metOLazone (ZAROXOLYN) 5 mg tablet Take 2.5 mg by mouth every Tuesday and Thursday.  glimepiride (AMARYL) 1 mg tablet Take 1 mg by mouth Daily (before breakfast).  simvastatin (ZOCOR) 10 mg tablet TAKE 1 TABLET BY MOUTH NIGHTLY 90 Tab 1    metFORMIN ER (GLUCOPHAGE XR) 500 mg tablet TAKE 1 TABLET BY MOUTH BEFORE BREAKFAST AND  DINNER 180 Tab 3    isosorbide mononitrate ER (IMDUR) 30 mg tablet TAKE 1 TABLET BY MOUTH ONCE DAILY 30 Tab 12    cholecalciferol, VITAMIN D3, (VITAMIN D3) 5,000 unit tab tablet Take 1 Tab by mouth daily. 30 Tab 5    aspirin delayed-release 81 mg tablet Take 81 mg by mouth daily. No current facility-administered medications on file prior to visit. Review of Systems  Pertinent items are noted in HPI. Objective:     Visit Vitals  /78 (BP 1 Location: Left arm, BP Patient Position: Sitting)   Pulse 99   Temp 98 °F (36.7 °C) (Oral)   Resp 16   Ht 5' 3\" (1.6 m)   Wt 212 lb 12.8 oz (96.5 kg)   SpO2 96%   BMI 37.70 kg/m²     Gen: well appearing male  HEENT:   PERRL,normal conjunctiva. External ear and canals normal, TMs no opacification or erythema,  OP no erythema, no exudates, MMM  Neck:  Supple. Thyroid normal size, nontender, without nodules. No masses or LAD  Resp:  No wheezing, no rhonchi, no rales. CV:  RRR, normal S1S2, no murmur. GI: soft, nontender, without masses. No hepatosplenomegaly. Extrem:  +2 pulses, no edema, warm distally      Assessment/Plan:       ICD-10-CM ICD-9-CM    1. Neuropathic pain M79.2 729.2 gabapentin (NEURONTIN) 100 mg capsule   2. Obesity, morbid ([facility] [address] [facility] E66.01 278.01    3.  Encounter for immunization Z23 V03.89 INFLUENZA VIRUS VACCINE, HIGH DOSE SEASONAL, PRESERVATIVE FREE      ADMIN PNEUMOCOCCAL VACCINE      PNEUMOCOCCAL POLYSACCHARIDE VACCINE, 23-VALENT, ADULT OR IMMUNOSUPPRESSED PT DOSE,   4. Type 2 diabetes with nephropathy (HCC) G97.76 117.98 METABOLIC PANEL, BASIC     583.81    5. S/P cervical spinal fusion Z98.1 V45.4    6. Pain in both hands M79.641 729.5     M79.642     7. Pure hypercholesterolemia E78.00 272.0    8. Congestive heart failure, unspecified HF chronicity, unspecified heart failure type ([facility] [address] I50.9 428.0      Schedule follow up with ortho hand, Dr. Durga Bolanos to discuss how hands are after shots. Schedule routine follow up with cardiology, Dr. Lennox Stone          Follow-up and Dispositions    · Return in about 6 months (around 3/11/2020) for follow up on medications.  José Miguel Burr MD

## 2019-09-11 NOTE — PATIENT INSTRUCTIONS
Office Policies    Phone calls/patient messages:            Please allow up to 24 hours for someone in the office to contact you about your call or message. Be mindful your provider may be out of the office or your message may require further review. We encourage you to use [employer] for your messages as this is a faster, more efficient way to communicate with our office                         Medication Refills:            Prescription medications require 48-72 business hours to process. We encourage you to use [employer] for your refills. For controlled medications: Please allow 72 business hours to process. Certain medications may require you to  a written prescription at our office. NO narcotic/controlled medications will be prescribed after 4pm Monday through Friday or on weekends              Form/Paperwork Completion:            Please note a $25 fee may incur for all paperwork for completed by our providers. We ask that you allow 7-10 business days. Pre-payment is due prior to picking up/faxing the completed form. You may also download your forms to [employer] to have your doctor print off. Schedule follow up with ortho hand, Dr. Durga Bolanos to discuss how hands are after shots.      Schedule routine follow up with cardiology, Dr. Lennox Stone

## 2019-09-12 ENCOUNTER — HOSPITAL ENCOUNTER (OUTPATIENT)
Dept: PHYSICAL THERAPY | Age: 74
Discharge: HOME | End: 2019-09-12
Payer: MEDICARE

## 2019-09-12 ENCOUNTER — OFFICE VISIT (OUTPATIENT)
Dept: ENDOCRINOLOGY | Age: 74
End: 2019-09-12

## 2019-09-12 VITALS
WEIGHT: 209 LBS | BODY MASS INDEX: 37.03 KG/M2 | SYSTOLIC BLOOD PRESSURE: 124 MMHG | DIASTOLIC BLOOD PRESSURE: 77 MMHG | HEART RATE: 108 BPM | HEIGHT: 63 IN

## 2019-09-12 DIAGNOSIS — E11.22 TYPE 2 DIABETES MELLITUS WITH STAGE 3 CHRONIC KIDNEY DISEASE, WITH LONG-TERM CURRENT USE OF INSULIN (HCC): Primary | ICD-10-CM

## 2019-09-12 DIAGNOSIS — N18.30 TYPE 2 DIABETES MELLITUS WITH STAGE 3 CHRONIC KIDNEY DISEASE, WITH LONG-TERM CURRENT USE OF INSULIN (HCC): Primary | ICD-10-CM

## 2019-09-12 DIAGNOSIS — Z79.4 TYPE 2 DIABETES MELLITUS WITH STAGE 3 CHRONIC KIDNEY DISEASE, WITH LONG-TERM CURRENT USE OF INSULIN (HCC): Primary | ICD-10-CM

## 2019-09-12 DIAGNOSIS — I10 ESSENTIAL HYPERTENSION WITH GOAL BLOOD PRESSURE LESS THAN 130/80: ICD-10-CM

## 2019-09-12 DIAGNOSIS — E78.5 HYPERLIPIDEMIA, UNSPECIFIED HYPERLIPIDEMIA TYPE: ICD-10-CM

## 2019-09-12 PROCEDURE — 97110 THERAPEUTIC EXERCISES: CPT | Performed by: PHYSICAL THERAPIST

## 2019-09-12 NOTE — PROGRESS NOTES
CHIEF COMPLAINT: f/u evaluation for uncontrolled type 2 diabetes    HISTORY OF PRESENT ILLNESS:   Chasity Gross is a 72 y.o. male with a PMHx as noted below who presents to the endocrinology clinic for f/u evaluation of uncontrolled type 2 diabetes. A1c previously 7.7% off glimepiride, we had restated on 1mg daily before breakfast,  Had surgery on his back since last visit, stable,   Noted also for a cataract extraction recently,  Renal function noted, will repeat today with his A1c,     Currently taking the following meds:  metformin 500mg twice daily,   Glimepiride 1mg once daily before breakfast:    * He confused this dosing with his lasix, noting he is taking 1/2 tab T/R based on foot/ankle swelling, discussed.      Review of home blood glucose:  Log reviewed  AM:   PM:     Review of most recent diabetes-related labs:  Lab Results   Component Value Date    HBA1C 7.7 (H) 05/10/2019    NQX5YVIP 5.3 01/17/2019    WNM6YJQS 5.6 10/09/2018    MDN4XXWY 5.8 07/09/2018    CHOL 116 05/10/2019    LDLC 49 05/10/2019    GFRAA 53 (L) 05/16/2019    GFRNA 44 (L) 05/16/2019    MCACR 1,624.4 (H) 05/10/2019    TSH 0.681 04/23/2018    VITD3 59.9 05/16/2019     Lab Key:  [id number] = IA-2 pancreatic islet cell autoantibody  GADLT = MARIAN-65 autoantibody   MCACR = Urine Microalbumin  INSUL = Insulin level  CPEPL = C-peptide level      PAST MEDICAL/SURGICAL HISTORY:   Past Medical History:   Diagnosis Date    Arthritis     Asthma     CAD (coronary artery disease)     Calculus of kidney     Carotid artery disease (HCC)     Cervical herniated disc     Chronic systolic (congestive) heart failure (HCC)     Diabetes ([address]     Diabetic shock due to low blood sugar ([address] 2016    Glaucoma     pt is on drops unsure of what the names are    Hypercholesterolemia     Hypertension     Morbid obesity ([address]     Rheumatic fever     When he was 17    Sleep apnea     compliant with cpap as stated 9/6/2019     Past Surgical History:   Procedure Laterality Date    HX CAROTID STENT  1990's    with cath    HX CERVICAL FUSION  05/21/2019    C3- C6 ACFD     HX CHOLECYSTECTOMY      HX CIRCUMCISION      HX HEART CATHETERIZATION      x 5 total, 4 in heart, 1 in carotid    HX HEENT Left     ear torn off and repaired    HX ORTHOPAEDIC Right     repair    HX OTHER SURGICAL Right     Right hand reconstruction    HX SHOULDER ARTHROSCOPY Right     dislocated with fall, came out, he stated surgery put back in place and sowed him up       ALLERGIES:   Allergies   Allergen Reactions    Morphine Anaphylaxis     Pt states \"morphine gave me heart failure\"    Gabapentin Other (comments)     Made him \"loopy\"    Lyrica [Pregabalin] Other (comments)     Makes him \"loopy\"       MEDICATIONS ON ADMISSION:     Current Outpatient Medications:     DUREZOL 0.05 % ophthalmic emulsion, , Disp: , Rfl:     ILEVRO 0.3 % drps, , Disp: , Rfl:     ofloxacin (FLOXIN) 0.3 % ophthalmic solution, , Disp: , Rfl:     furosemide (LASIX) 40 mg tablet, TAKE 1 TABLET BY MOUTH ONCE DAILY, Disp: 30 Tab, Rfl: 1    latanoprost (XALATAN) 0.005 % ophthalmic solution, Administer 1 Drop to both eyes nightly., Disp: , Rfl:     dextran 70/hypromellose/PF (NATURAL TEARS, PF, OP), Apply 1 Drop to eye as needed. , Disp: , Rfl:     DULoxetine (CYMBALTA) 60 mg capsule, TAKE 1 CAPSULE BY MOUTH ONCE DAILY FOR  FEET  PAIN, Disp: 90 Cap, Rfl: 1    lisinopril (PRINIVIL, ZESTRIL) 2.5 mg tablet, TAKE 1 TABLET BY MOUTH ONCE DAILY, Disp: 90 Tab, Rfl: 0    carvedilol (COREG) 12.5 mg tablet, TAKE 1 TABLET BY MOUTH TWICE DAILY, Disp: 60 Tab, Rfl: 3    acetaminophen (TYLENOL) 500 mg tablet, Take 1,000 mg by mouth every six (6) hours as needed for Pain., Disp: , Rfl:     omega 3-DHA-EPA-fish oil (FISH OIL) 1,000 mg (120 mg-180 mg) capsule, Take 1 Cap by mouth two (2) times a day., Disp: , Rfl:     metOLazone (ZAROXOLYN) 5 mg tablet, Take 2.5 mg by mouth every Tuesday and Thursday. , Disp: , Rfl:     glimepiride (AMARYL) 1 mg tablet, Take 1 mg by mouth Daily (before breakfast). , Disp: , Rfl:     simvastatin (ZOCOR) 10 mg tablet, TAKE 1 TABLET BY MOUTH NIGHTLY, Disp: 90 Tab, Rfl: 1    metFORMIN ER (GLUCOPHAGE XR) 500 mg tablet, TAKE 1 TABLET BY MOUTH BEFORE BREAKFAST AND  DINNER, Disp: 180 Tab, Rfl: 3    isosorbide mononitrate ER (IMDUR) 30 mg tablet, TAKE 1 TABLET BY MOUTH ONCE DAILY, Disp: 30 Tab, Rfl: 12    cholecalciferol, VITAMIN D3, (VITAMIN D3) 5,000 unit tab tablet, Take 1 Tab by mouth daily. , Disp: 30 Tab, Rfl: 5    aspirin delayed-release 81 mg tablet, Take 81 mg by mouth daily. , Disp: , Rfl:     gabapentin (NEURONTIN) 100 mg capsule, Take 1 Cap by mouth three (3) times daily.  AS DIRECTED., Disp: 30 Cap, Rfl: 1    SOCIAL HISTORY:   Social History     Socioeconomic History    Marital status:      Spouse name: Not on file    Number of children: Not on file    Years of education: Not on file    Highest education level: Not on file   Occupational History    Not on file   Social Needs    Financial resource strain: Not on file    Food insecurity:     Worry: Not on file     Inability: Not on file    Transportation needs:     Medical: Not on file     Non-medical: Not on file   Tobacco Use    Smoking status: Former Smoker     Packs/day: 5.00     Last attempt to quit: 1963     Years since quittin.3    Smokeless tobacco: Never Used   Substance and Sexual Activity    Alcohol use: No     Alcohol/week: 0.0 standard drinks    Drug use: Never    Sexual activity: Yes     Partners: Female     Birth control/protection: None   Lifestyle    Physical activity:     Days per week: Not on file     Minutes per session: Not on file    Stress: Not on file   Relationships    Social connections:     Talks on phone: Not on file     Gets together: Not on file     Attends Buddhism service: Not on file     Active member of club or organization: Not on file     Attends meetings of clubs or organizations: Not on file     Relationship status: Not on file    Intimate partner violence:     Fear of current or ex partner: Not on file     Emotionally abused: Not on file     Physically abused: Not on file     Forced sexual activity: Not on file   Other Topics Concern    Not on file   Social History Narrative    ** Merged History Encounter **            FAMILY HISTORY:  Family History   Problem Relation Age of Onset    Heart Disease Brother     Diabetes Nephew     Hypertension Mother        REVIEW OF SYSTEMS: Complete ROS assessed and noted for that which is described above, all else are negative. Eyes: normal  ENT: normal  CVS: normal  Resp: normal  GI: normal  : normal  GYN: normal  Endocrine: normal  Integument: normal  Musculoskeletal: normal  Neuro: normal  Psych: normal      PHYSICAL EXAMINATION:    VITAL SIGNS:  Visit Vitals  /77 (BP 1 Location: Left arm, BP Patient Position: Sitting)   Pulse (!) 108   Ht 5' 3\" (1.6 m)   Wt 209 lb (94.8 kg)   BMI 37.02 kg/m²       GENERAL: NCAT, Sitting comfortably, NAD  EYES: EOMI, non-icteric, no proptosis  Ear/Nose/Throat: NCAT, no inflammation, no masses  LYMPH NODES: No LAD  CARDIOVASCULAR: S1 S2, RRR, No murmur, 2+ radial pulses  RESPIRATORY: CTA b/l, no wheeze/rales  GASTROINTESTINAL: ND  MUSCULOSKELETAL: Normal ROM, no atrophy  SKIN: warm, no edema/rash/ or other skin changes  NEUROLOGIC: 5/5 power all extremities, no tremors, AAOx3  PSYCHIATRIC: Normal affect, Normal insight and judgement    REVIEW OF LABORATORY AND RADIOLOGY DATA:   Labs and documentation have been reviewed as described above. ASSESSMENT AND PLAN:   Alfredo Nazario is a 72 y.o. male with a PMHx as noted above who presents to the endocrinology clinic for f/u evaluation of uncontrolled type 2 diabetes.      DM2 with hypoglycemia  HTN  HLD    Patients sugars are lower than desired on 1mg glimepiride, though did rise a good bit off this med, therefor I will have him take 0.5mg daily (1/2 tab). Overall I am pleased with his diabetes control but will relax his therapy a bit to reduce risk of hypoglycemia. Recent renal function is not a contraindication to use his metformin, rather his metformin is protective by keeping his diabetes in order. I will repeat his renal function today with his A1c. Note that he seemed to be dosing his glimepiride based on the day of the week and the degree of swelling in his lower extremities, and I advised him that likely he is confusing it with his diuretic. His legs did not look appreciably swollen today, and noting he follows with a cardiologist, I asked him to confirm his diuresis plan with them. DM2:  Continue metformin 500mg twice per day, REPEATING RENAL FUNCTION   REDUCE glimepiride only 0.5 mg (1/2 tab each morning before breakfast,  Continue to check glucose 1-2 times daily for now  Provided with new log sheets    HTN: BP stable on current meds  HLD: stable on statin  Neuropathy: reports that Dr. Pedro Cunha wrote him a script for gabapentin, report Lupillo Banuelos was too expensive while daughter in law notes it makes him confused. 3 month follow up visit,    Kim Modi.  [address] [facility]

## 2019-09-12 NOTE — PATIENT INSTRUCTIONS
Continue metformin 500mg twice per day, REPEATING RENAL FUNCTION     REDUCE glimepiride only 0.5 mg (1/2 tab each morning before breakfast,      See you back in 3 months,         Please note our new policy, you must arrive to the clinic 15 minutes before your appointment time to allow enough time for proper check-in, adequate time to spend with your doctor, and also to respect the appointment time of the next patient. Not arriving 15 minutes in advance may result in having your appointment rescheduled for the next available day/time.  ----------------------------------------------------------------------------------------------------------------------    Below you will find a glucose log sheet which you can use to record your blood sugars. Without checking and recording what your home glucose levels are, it will be difficult to make any changes to your medication dose, even when significant changes may be needed. Please feel free to use the log below to record your home glucose levels. At the very least, I would like for you to login the entire 2-3 weeks just before your visit so we can make your visit much more productive and beneficial to you. GLUCOSE LOG SHEET:    Date Breakfast Lunch Dinner Bedtime Comments ? GLUCOSE LOG SHEET:    Date Breakfast Lunch Dinner Bedtime Comments ? GLUCOSE LOG SHEET:    Date Breakfast Lunch Dinner Bedtime Comments ?

## 2019-09-12 NOTE — PROGRESS NOTES
PT DAILY TREATMENT NOTE - [facility] 2-15    Patient Name: Robinson Mcfadden  Date:2019  : 1945  [x]  Patient  Verified  Payor: BLUE CROSS MEDICARE / [place]: [place] / Product Type: Managed Care Medicare /    In time: 2:04pm Out time: 2:54pm  Total Treatment Time (min): 50   Total Timed Codes (min): 50  1:1 Treatment Time ( only): 50    Visit #:  12    Treatment Area: Neck pain [M54.2]  Bilateral arm pain [M79.601, M79.602]    SUBJECTIVE  Pain Level (0-10 scale): R/L: 5/5  Any medication changes, allergies to medications, adverse drug reactions, diagnosis change, or new procedure performed?: [x] No    [] Yes (see summary sheet for update)  Subjective functional status/changes:   [] No changes reported  The patient reports that he had cataracts and wasn't feeling well after and wasn't able to do much exercises. OBJECTIVE    50  min Therapeutic Exercise:  [x] See flow sheet :   Rationale: increase ROM, increase strength and improve coordination to improve the patients ability to perform daily activities. With   [x] TE   [] TA   [] neuro   [] other: Patient Education: [x] Review HEP    [x] Progressed/Changed HEP based on:   [x] positioning   [x] body mechanics   [] transfers   [] heat/ice application    [] other:      Other Objective/Functional Measures: Pt. Tolerated therex well; AROM Flex= 70    Pain Level (0-10 scale) post treatment: R/L: 5/5     ASSESSMENT/Changes in Function:   The patient is progressing with strengthening as tolerated, but was pretty fatigued today.     Patient will continue to benefit from skilled PT services to modify and progress therapeutic interventions, address functional mobility deficits, address ROM deficits, address strength deficits, analyze and address soft tissue restrictions, analyze and cue movement patterns, analyze and modify body mechanics/ergonomics, assess and modify postural abnormalities, address imbalance/dizziness and instruct in home and community integration to attain remaining goals. [x]  See Plan of Care  []  See progress note/recertification  []  See Discharge Summary         Progress towards goals / Updated goals: The patient is progressing towards goals.      PLAN  [x]  Upgrade activities as tolerated     [x]  Continue plan of care  [x]  Update interventions per flow sheet       []  Discharge due to:_  []  Other:_      Yoshi Finch, PT 9/12/2019

## 2019-09-13 LAB
ALBUMIN SERPL-MCNC: 3.8 G/DL (ref 3.5–4.8)
ALBUMIN/GLOB SERPL: 1.5 {RATIO} (ref 1.2–2.2)
ALP SERPL-CCNC: 70 IU/L (ref 39–117)
ALT SERPL-CCNC: 17 IU/L (ref 0–44)
AST SERPL-CCNC: 11 IU/L (ref 0–40)
BILIRUB SERPL-MCNC: 0.2 MG/DL (ref 0–1.2)
BUN SERPL-MCNC: 38 MG/DL (ref 8–27)
BUN SERPL-MCNC: 39 MG/DL (ref 8–27)
BUN/CREAT SERPL: 26 (ref 10–24)
BUN/CREAT SERPL: 27 (ref 10–24)
CALCIUM SERPL-MCNC: 9.3 MG/DL (ref 8.6–10.2)
CALCIUM SERPL-MCNC: 9.5 MG/DL (ref 8.6–10.2)
CHLORIDE SERPL-SCNC: 101 MMOL/L (ref 96–106)
CHLORIDE SERPL-SCNC: 101 MMOL/L (ref 96–106)
CO2 SERPL-SCNC: 26 MMOL/L (ref 20–29)
CO2 SERPL-SCNC: 27 MMOL/L (ref 20–29)
CREAT SERPL-MCNC: 1.41 MG/DL (ref 0.76–1.27)
CREAT SERPL-MCNC: 1.5 MG/DL (ref 0.76–1.27)
EST. AVERAGE GLUCOSE BLD GHB EST-MCNC: 123 MG/DL
GLOBULIN SER CALC-MCNC: 2.6 G/DL (ref 1.5–4.5)
GLUCOSE SERPL-MCNC: 122 MG/DL (ref 65–99)
GLUCOSE SERPL-MCNC: 152 MG/DL (ref 65–99)
HBA1C MFR BLD: 5.9 % (ref 4.8–5.6)
INTERPRETATION: NORMAL
Lab: NORMAL
POTASSIUM SERPL-SCNC: 4.4 MMOL/L (ref 3.5–5.2)
POTASSIUM SERPL-SCNC: 4.5 MMOL/L (ref 3.5–5.2)
PROT SERPL-MCNC: 6.4 G/DL (ref 6–8.5)
SODIUM SERPL-SCNC: 144 MMOL/L (ref 134–144)
SODIUM SERPL-SCNC: 144 MMOL/L (ref 134–144)

## 2019-09-15 NOTE — PROGRESS NOTES
Notify patient kidney test is improved from four months ago. Increase water intake. Avoid anti-inflammatories over-the-counter. Continue present medications.  Follow-up six months

## 2019-09-17 ENCOUNTER — HOSPITAL ENCOUNTER (OUTPATIENT)
Dept: PHYSICAL THERAPY | Age: 74
Discharge: HOME | End: 2019-09-17
Payer: MEDICARE

## 2019-09-17 PROCEDURE — 97110 THERAPEUTIC EXERCISES: CPT | Performed by: PHYSICAL THERAPIST

## 2019-09-17 NOTE — PROGRESS NOTES
PT DAILY TREATMENT NOTE - [facility] 2-15    Patient Name: Miranda Robledo Sr  Date:2019  : 1945  [x]  Patient  Verified  Payor: BLUE CROSS MEDICARE / Plan: [place] / Product Type: Managed Care Medicare /    In time: 2:34pm Out time: 3:05pm  Total Treatment Time (min): 31   Total Timed Codes (min): 31  1:1 Treatment Time ( only): 25    Visit #:  13    Treatment Area: Neck pain [M54.2]  Bilateral arm pain [M79.601, M79.602]    SUBJECTIVE  Pain Level (0-10 scale): R/L:   Any medication changes, allergies to medications, adverse drug reactions, diagnosis change, or new procedure performed?: [x] No    [] Yes (see summary sheet for update)  Subjective functional status/changes:   [] No changes reported  The patient reports that he was feeling fine all weekend until he woke up on Monday and felt like both of his arms were dead weight, completely numb (more than usual), and he can't lift his left arm anymore. OBJECTIVE    31 min Therapeutic Exercise:  [x] See flow sheet :   Rationale: increase ROM, increase strength and improve coordination to improve the patients ability to perform daily activities. With   [x] TE   [] TA   [] neuro   [] other: Patient Education: [x] Review HEP    [x] Progressed/Changed HEP based on:   [x] positioning   [x] body mechanics   [] transfers   [] heat/ice application    [] other:      Other Objective/Functional Measures: Pt. Tolerated therex well; AROM Flex=R: 60; L: 70    Pain Level (0-10 scale) post treatment: R/L:      ASSESSMENT/Changes in Function:   The patient was limited with therex due to significant flare up of symptoms. Dr. Jill Fitch office was called and left a message with the medical assistant to see if he could get a follow up soon. He is also walking up to the second floor to see if he can can an appointment soon.  Therapy will be held this week in hopes he can get a follow up before another therapy session in order to make sure his x-rays look good. Patient will continue to benefit from skilled PT services to modify and progress therapeutic interventions, address functional mobility deficits, address ROM deficits, address strength deficits, analyze and address soft tissue restrictions, analyze and cue movement patterns, analyze and modify body mechanics/ergonomics, assess and modify postural abnormalities, address imbalance/dizziness and instruct in home and community integration to attain remaining goals. [x]  See Plan of Care  []  See progress note/recertification  []  See Discharge Summary         Progress towards goals / Updated goals: The patient is progressing towards goals.      PLAN  [x]  Upgrade activities as tolerated     [x]  Continue plan of care  [x]  Update interventions per flow sheet       []  Discharge due to:_  []  Other:_      Kb Martinez, PT 9/17/2019

## 2019-09-19 ENCOUNTER — PATIENT OUTREACH (OUTPATIENT)
Dept: INTERNAL MEDICINE CLINIC | Age: 74
End: 2019-09-19

## 2019-09-19 ENCOUNTER — APPOINTMENT (OUTPATIENT)
Dept: PHYSICAL THERAPY | Age: 74
End: 2019-09-19
Payer: MEDICARE
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
## 2019-09-24 ENCOUNTER — HOSPITAL ENCOUNTER (OUTPATIENT)
Dept: PHYSICAL THERAPY | Age: 74
Discharge: HOME | End: 2019-09-24
Payer: MEDICARE

## 2019-09-24 PROCEDURE — 97110 THERAPEUTIC EXERCISES: CPT | Performed by: PHYSICAL THERAPIST

## 2019-09-24 NOTE — PROGRESS NOTES
PT DAILY TREATMENT NOTE - [facility] 2-15    Patient Name: Roni Quiroga Sr  Date:2019  : 1945  [x]  Patient  Verified  Payor: BLUE CROSS MEDICARE / Plan: [place] [employer] [place] PPO / Product Type: Managed Care Medicare /    In time: 2:38pm Out time: 3:45pm  Total Treatment Time (min): 67   Total Timed Codes (min): 67  1:1 Treatment Time ( only): 55    Visit #:  14    Treatment Area: Neck pain [M54.2]  Bilateral arm pain [M79.601, M79.602]    SUBJECTIVE  Pain Level (0-10 scale): R/L:   Any medication changes, allergies to medications, adverse drug reactions, diagnosis change, or new procedure performed?: [x] No    [] Yes (see summary sheet for update)  Subjective functional status/changes:   [] No changes reported  The patient reports that his arms were numb because of a pneumonia shot; X-rays the doctor took looked good. OBJECTIVE    67 min Therapeutic Exercise:  [x] See flow sheet :   Rationale: increase ROM, increase strength and improve coordination to improve the patients ability to perform daily activities. With   [x] TE   [] TA   [] neuro   [] other: Patient Education: [x] Review HEP    [x] Progressed/Changed HEP based on:   [x] positioning   [x] body mechanics   [] transfers   [] heat/ice application    [] other:      Other Objective/Functional Measures: Pt. Tolerated therex well; AROM Flex=R: 80; L: 150    Pain Level (0-10 scale) post treatment: R/L: 6/6     ASSESSMENT/Changes in Function:   The patient progressed with advanced exercises as tolerated.   Patient will continue to benefit from skilled PT services to modify and progress therapeutic interventions, address functional mobility deficits, address ROM deficits, address strength deficits, analyze and address soft tissue restrictions, analyze and cue movement patterns, analyze and modify body mechanics/ergonomics, assess and modify postural abnormalities, address imbalance/dizziness and instruct in home and community integration to attain remaining goals. [x]  See Plan of Care  []  See progress note/recertification  []  See Discharge Summary         Progress towards goals / Updated goals: The patient is progressing towards goals.      PLAN  [x]  Upgrade activities as tolerated     [x]  Continue plan of care  [x]  Update interventions per flow sheet       []  Discharge due to:_  []  Other:_      Veto Fitzpatrick, PT 9/24/2019

## 2019-09-26 ENCOUNTER — HOSPITAL ENCOUNTER (OUTPATIENT)
Dept: PHYSICAL THERAPY | Age: 74
Discharge: HOME | End: 2019-09-26
Payer: MEDICARE

## 2019-09-26 ENCOUNTER — PATIENT OUTREACH (OUTPATIENT)
Dept: INTERNAL MEDICINE CLINIC | Age: 74
End: 2019-09-26

## 2019-09-26 ENCOUNTER — TELEPHONE (OUTPATIENT)
Dept: INTERNAL MEDICINE CLINIC | Age: 74
End: 2019-09-26

## 2019-09-26 PROCEDURE — 97032 APPL MODALITY 1+ESTIM EA 15: CPT | Performed by: PHYSICAL THERAPIST

## 2019-09-26 PROCEDURE — 97110 THERAPEUTIC EXERCISES: CPT | Performed by: PHYSICAL THERAPIST

## 2019-09-26 NOTE — PROGRESS NOTES
NN attempted patient outreach today; lvm requesting a return phone call to this NN. Goals Addressed                 This Visit's Progress       Chronic Disease     Maintains appointments        7/17/2019  - maintaining follow-up with Endocrinologist, Dr. Fred Marie; most recent office visit 5/17/19, next scheduled appt 9/12/19  - to schedule f/u with Cardiology; daughter-in-law will contact office today to schedule this appointment  - was referred to Dermatology by PCP for multiple acquired skin tags. Daughter-in-law reports that patient does not intend to f/u with Dermatologist at this time as skin tag removal will be classified as cosmetic procedure and patient is on a fixed income. - routine 3 month follow-up with PCP scheduled for 9/11/19  - daughter-in-law reports patient attended office visit with Dr. Stephane Fischer last week; next scheduled appt 10/17  - MRI Cervical Spine w/o contrast scheduled for 10/16 (ordered by Dr. Stephane Fischer)  - daughter-in-law to schedule \"nerve study\" as ordered by Dr. Stephane Fischer    9/19/2019   - attended [address] with Ortho 8/21/19, 8/23/19  - most recent OV with PCP 9/11/19; bmp collected - kidney function improved from 4 months ago, increase water intake, avoid OTC anti-inflammatories, f/u with PCP 6 months  - attended [address] with Dr. Fred Marie 9/12/19  - still has not scheduled/attended Outpatient f/u with Cardiology; most recent OV 5/3/19 with Dr. Alexandre Hussein for pre-op clearance, most recent routine f/u was 11/14/18 and pt to f/u in 6 months  - pt notified of need to schedule routine f/u with Cardiology; office contact information provided. pt will contact Cardiology this week to schedule routine f/u appt.       9/26/2019  - no appt scheduled with Cardiology            Future Appointments:  Future Appointments   Date Time Provider Magda Graf   9/26/2019  2:30 PM Frederick Galloway, PT [facility]   10/16/2019 12:45 PM [id number] MRI 1 [facility]   12/17/2019  4:10 PM Mitchael Bloch, MD RDE NATY [address]   3/11/2020  2:00 PM Mateus Sands MD [address]   3/26/2020  2:00 PM MD Crystal Valverde [facility] 1995 Appointment With Me:  Visit date not found     Last Appointment My Department:  9/11/2019

## 2019-09-26 NOTE — PROGRESS NOTES
PT DAILY TREATMENT NOTE - [facility] 2-15    Patient Name: Demarcus Miner Sr  Date:2019  : 1945  [x]  Patient  Verified  Payor: BLUE CROSS MEDICARE / Plan: [place] [employer] [place] MEDICARE PPO / Product Type: Managed Care Medicare /    In time: 2:30pm Out time: 3:30pm  Total Treatment Time (min): 60   Total Timed Codes (min): 60  1:1 Treatment Time ( only): 42    Visit #:  15    Treatment Area: Neck pain [M54.2]  Bilateral arm pain [M79.601, M79.602]    SUBJECTIVE  Pain Level (0-10 scale): R/L: 5/5  Any medication changes, allergies to medications, adverse drug reactions, diagnosis change, or new procedure performed?: [x] No    [] Yes (see summary sheet for update)  Subjective functional status/changes:   [] No changes reported  The patient reports that he's doing well overall. OBJECTIVE    Modality rationale: increase muscle contraction/control to improve the patients ability to perform daily activities.     Min Type Additional Details      15 [x] Estim: [x]Att   []Unatt    []TENS instruct                  []IFC  []Premod   [x]NMES                     []Other:  []w/US   []w/ice   []w/heat  Position: sidelying  Location: Deltoid and infraspinatus (Russian; 50Hz; 10s on/off)       []  Traction: [] Cervical       []Lumbar                       [] Prone          []Supine                       []Intermittent   []Continuous Lbs:  [] before manual  [] after manual  []w/heat    []  Ultrasound: []Continuous   [] Pulsed                       at: []1MHz   []3MHz Location:  W/cm2:    [] Paraffin         Location:   []w/heat    []  Ice     []  Heat  []  Ice massage Position:  Location:    []  Laser  []  Other: Position:  Location:      []  Vasopneumatic Device Pressure:       [] lo [] med [] hi   Temperature:      [x] Skin assessment post-treatment:  [x]intact []redness- no adverse reaction    []redness - adverse reaction:     45 min Therapeutic Exercise:  [x] See flow sheet :   Rationale: increase ROM, increase strength and improve coordination to improve the patients ability to perform daily activities. With   [x] TE   [] TA   [] neuro   [] other: Patient Education: [x] Review HEP    [x] Progressed/Changed HEP based on:   [x] positioning   [x] body mechanics   [] transfers   [] heat/ice application    [] other:      Other Objective/Functional Measures: FOTO= 59 (55 at eval);  strength: R= 35, 30, 25 (30lbs avg./25lbs avg at eval); L= 52, 45, 44 (47lbs avg./38.67lbs avg at eval) Pt. Tolerated therex well; AROM Flex=R: 80 (45 at eval); L: 150    Pain Level (0-10 scale) post treatment: R/L: 5/5     ASSESSMENT/Changes in Function:   The patient is progressing well and responded well to NMES today. Patient will continue to benefit from skilled PT services to modify and progress therapeutic interventions, address functional mobility deficits, address ROM deficits, address strength deficits, analyze and address soft tissue restrictions, analyze and cue movement patterns, analyze and modify body mechanics/ergonomics, assess and modify postural abnormalities, address imbalance/dizziness and instruct in home and community integration to attain remaining goals. [x]  See Plan of Care  [x]  See progress note/recertification  []  See Discharge Summary         Progress towards goals / Updated goals: The patient is progressing towards goals.      PLAN  [x]  Upgrade activities as tolerated     [x]  Continue plan of care  [x]  Update interventions per flow sheet       []  Discharge due to:_  []  Other:_      Nilton Loja, PT 9/26/2019

## 2019-09-26 NOTE — PROGRESS NOTES
[facility]  [address] [place]  Phone: [phone] Fax: [phone]    Progress Note    Name: Luis E Phillip   : 1945   MD: Fidelia Gong MD       Treatment Diagnosis: Neck pain [M54.2]  Bilateral arm pain [M79.601, S50.290]  Start of Care: 19    Visits from Start of Care: 15  Missed Visits: 1    Summary of Care: Therapy has included therex for bilateral upper extremity strength and mobility s/p C3-C6 ACDF May 2019.     Assessment / Recommendations: The patient has progressed with the following AROM in his Right shoulder: Flex= 80 (45 at eval) with his left shoulder WFL, but continues to have have bilateral arm numbness (no longer has \"pain\"). Last week he had a significant change in symptoms, with complete numbness bilaterally and loss of flexion AROM bilaterally (60 degrees on the right and 70 degrees on the left), was sent to the Ortho and was told x-rays of fusion were fine. Apparently his pneumonia shot caused these symptoms, as it resolved on its own. As of note, since the initial evaluation, his  strength has improved from 25lbs to 30lbs average on the right, and from 39lbs to 47lbs average on the left. He has full PROM bilaterally and there is no concern of a frozen shoulder developing. He is very aggressive with his HEP, which was an issues initially as he was doing too much, resulting in pain and over fatigued muscles. EMG studies showed a right C5 nerve issue, which is consistent clinically as he has poor deltoid and rotator cuff strength. He has been able to safely drive for a few weeks and is becoming more independent. He is starting to plateau, but will attempt NMES to try to stimulate his deltoids and rotator cuff further while performing exercise.  He will benefit from continued therapy to progress in this manner with hopes to improve shoulder AROM to at least 90 degrees on the right.       Short Term Goals: To be accomplished in 2 treatments:                         8.) The patient will be independent with their HEP consistently for at least one week. - MET  Long Term Goals: To be accomplished in 16 treatments:                         1.) The patient will improve his right shoulder flexion from 45 degrees to at least 70 degrees to assist with daily activities. - MET     New Goal: Right shoulder AROM flexion to 90 degrees                         2.) The patient will improve his  strength from 25lbs to at least 30lbs to assist with daily activities. - MET                         8.) The patient will improve their FOTO score from 55 to at least 58 to show improvements in functional mobility.- MET (59 today)    Other: Continue 2x/wk for 8 more treatments        Swati Horacio, PT 9/26/2019     ________________________________________________________________________  NOTE TO PHYSICIAN:  Please complete the following and fax to: [facility]: Fax: [phone]  . Retain this original for your records. If you are unable to process this request in 24 hours, please contact our office.        ____ I have read the above report and request that my patient continue therapy with the following changes/special instructions:  ____ I have read the above report and request that my patient be discharged from therapy    Physician's Signature:_________________ Date:___________Time:__________

## 2019-09-26 NOTE — TELEPHONE ENCOUNTER
Patient requests a call back today after 4 pm as he has Physical Therapy now & is returning your call from Earlier today.  Thank you

## 2019-09-27 ENCOUNTER — PATIENT OUTREACH (OUTPATIENT)
Dept: INTERNAL MEDICINE CLINIC | Age: 74
End: 2019-09-27
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
## 2019-10-01 ENCOUNTER — HOSPITAL ENCOUNTER (OUTPATIENT)
Dept: PHYSICAL THERAPY | Age: 74
Discharge: HOME | End: 2019-10-01
Payer: MEDICARE

## 2019-10-01 PROCEDURE — 97110 THERAPEUTIC EXERCISES: CPT | Performed by: PHYSICAL THERAPIST

## 2019-10-01 PROCEDURE — 97032 APPL MODALITY 1+ESTIM EA 15: CPT | Performed by: PHYSICAL THERAPIST

## 2019-10-01 NOTE — PROGRESS NOTES
PT DAILY TREATMENT NOTE - [facility] 2-15    Patient Name: Cachorro Hernandez  Date:10/1/2019  : 1945  [x]  Patient  Verified  Payor: BLUE CROSS MEDICARE / Plan: [place] / Product Type: Managed Care Medicare /    In time: 2:31pm Out time: 3:35pm  Total Treatment Time (min): 64   Total Timed Codes (min): 20  1:1 Treatment Time ( only): 59    Visit #:  16    Treatment Area: Neck pain [M54.2]  Bilateral arm pain [M79.601, M79.602]    SUBJECTIVE  Pain Level (0-10 scale): R/L: 8/8  Any medication changes, allergies to medications, adverse drug reactions, diagnosis change, or new procedure performed?: [x] No    [] Yes (see summary sheet for update)  Subjective functional status/changes:   [] No changes reported  The patient reports that he felt good after last time, but he used the miryam yesterday and is extra sore. OBJECTIVE    Modality rationale: increase muscle contraction/control to improve the patients ability to perform daily activities.     Min Type Additional Details      44 [x] Estim: [x]Att   []Unatt    []TENS instruct                  []IFC  []Premod   [x]NMES                     []Other:  []w/US   []w/ice   []w/heat  Position: sidelying, sitting, and standing  Location: Deltoid and infraspinatus (Russian; 50Hz; 10s on/off)       []  Traction: [] Cervical       []Lumbar                       [] Prone          []Supine                       []Intermittent   []Continuous Lbs:  [] before manual  [] after manual  []w/heat    []  Ultrasound: []Continuous   [] Pulsed                       at: []1MHz   []3MHz Location:  W/cm2:    [] Paraffin         Location:   []w/heat    []  Ice     []  Heat  []  Ice massage Position:  Location:    []  Laser  []  Other: Position:  Location:      []  Vasopneumatic Device Pressure:       [] lo [] med [] hi   Temperature:      [x] Skin assessment post-treatment:  [x]intact []redness- no adverse reaction    []redness - adverse reaction:     20 min Therapeutic Exercise:  [x] See flow sheet :   Rationale: increase ROM, increase strength and improve coordination to improve the patients ability to perform daily activities. With   [x] TE   [] TA   [] neuro   [] other: Patient Education: [x] Review HEP    [x] Progressed/Changed HEP based on:   [x] positioning   [x] body mechanics   [] transfers   [] heat/ice application    [] other:      Other Objective/Functional Measures: Pt. Tolerated therex well    Pain Level (0-10 scale) post treatment: R/L: 5/5     ASSESSMENT/Changes in Function:   The patient responded well to NMES but fatigued by the end. Patient will continue to benefit from skilled PT services to modify and progress therapeutic interventions, address functional mobility deficits, address ROM deficits, address strength deficits, analyze and address soft tissue restrictions, analyze and cue movement patterns, analyze and modify body mechanics/ergonomics, assess and modify postural abnormalities, address imbalance/dizziness and instruct in home and community integration to attain remaining goals. [x]  See Plan of Care  []  See progress note/recertification  []  See Discharge Summary         Progress towards goals / Updated goals: The patient is progressing towards goals.      PLAN  [x]  Upgrade activities as tolerated     [x]  Continue plan of care  [x]  Update interventions per flow sheet       []  Discharge due to:_  []  Other:_      Melinda Jacobo, PT 10/1/2019

## 2019-10-02 ENCOUNTER — PATIENT OUTREACH (OUTPATIENT)
Dept: INTERNAL MEDICINE CLINIC | Age: 74
End: 2019-10-02

## 2019-10-03 ENCOUNTER — HOSPITAL ENCOUNTER (OUTPATIENT)
Dept: PHYSICAL THERAPY | Age: 74
Discharge: HOME | End: 2019-10-03
Payer: MEDICARE

## 2019-10-03 PROCEDURE — 97032 APPL MODALITY 1+ESTIM EA 15: CPT | Performed by: PHYSICAL THERAPIST

## 2019-10-03 PROCEDURE — 97110 THERAPEUTIC EXERCISES: CPT | Performed by: PHYSICAL THERAPIST

## 2019-10-03 NOTE — PROGRESS NOTES
PT DAILY TREATMENT NOTE - [facility] 2-15    Patient Name: Sal Norman  Date:10/3/2019  : 1945  [x]  Patient  Verified  Payor: BLUE CROSS MEDICARE / Plan: [place] / Product Type: Managed Care Medicare /    In time: 2:00pm Out time: 3:00pm  Total Treatment Time (min): 60   Total Timed Codes (min): 60  1:1 Treatment Time ( only): 60    Visit #:  17    Treatment Area: Neck pain [M54.2]  Bilateral arm pain [M79.601, M79.602]    SUBJECTIVE  Pain Level (0-10 scale): R/L: 6/6  Any medication changes, allergies to medications, adverse drug reactions, diagnosis change, or new procedure performed?: [x] No    [] Yes (see summary sheet for update)  Subjective functional status/changes:   [] No changes reported  The patient reports that he felt okay after the other day. OBJECTIVE    Modality rationale: increase muscle contraction/control to improve the patients ability to perform daily activities.     Min Type Additional Details      40 [x] Estim: [x]Att   []Unatt    []TENS instruct                  []IFC  []Premod   [x]NMES                     []Other:  []w/US   []w/ice   []w/heat  Position: sidelying, sitting, and standing  Location: Deltoid and infraspinatus (Russian; 50Hz; 10s on/off)       []  Traction: [] Cervical       []Lumbar                       [] Prone          []Supine                       []Intermittent   []Continuous Lbs:  [] before manual  [] after manual  []w/heat    []  Ultrasound: []Continuous   [] Pulsed                       at: []1MHz   []3MHz Location:  W/cm2:    [] Paraffin         Location:   []w/heat    []  Ice     []  Heat  []  Ice massage Position:  Location:    []  Laser  []  Other: Position:  Location:      []  Vasopneumatic Device Pressure:       [] lo [] med [] hi   Temperature:      [x] Skin assessment post-treatment:  [x]intact []redness- no adverse reaction    []redness - adverse reaction:     20 min Therapeutic Exercise:  [x] See flow sheet :   Rationale: increase ROM, increase strength and improve coordination to improve the patients ability to perform daily activities. With   [x] TE   [] TA   [] neuro   [] other: Patient Education: [x] Review HEP    [x] Progressed/Changed HEP based on:   [x] positioning   [x] body mechanics   [] transfers   [] heat/ice application    [] other:      Other Objective/Functional Measures: Pt. Tolerated therex well    Pain Level (0-10 scale) post treatment: R/L: 6/6     ASSESSMENT/Changes in Function:   The patient progressed with tolerance to NMES during therex. Patient will continue to benefit from skilled PT services to modify and progress therapeutic interventions, address functional mobility deficits, address ROM deficits, address strength deficits, analyze and address soft tissue restrictions, analyze and cue movement patterns, analyze and modify body mechanics/ergonomics, assess and modify postural abnormalities, address imbalance/dizziness and instruct in home and community integration to attain remaining goals. [x]  See Plan of Care  []  See progress note/recertification  []  See Discharge Summary         Progress towards goals / Updated goals: The patient is progressing towards goals.      PLAN  [x]  Upgrade activities as tolerated     [x]  Continue plan of care  [x]  Update interventions per flow sheet       []  Discharge due to:_  []  Other:_      Rosanna Sadler, PT 10/3/2019

## 2019-10-08 ENCOUNTER — HOSPITAL ENCOUNTER (OUTPATIENT)
Dept: PHYSICAL THERAPY | Age: 74
Discharge: HOME | End: 2019-10-08
Payer: MEDICARE

## 2019-10-08 PROCEDURE — 97032 APPL MODALITY 1+ESTIM EA 15: CPT | Performed by: PHYSICAL THERAPIST

## 2019-10-08 PROCEDURE — 97110 THERAPEUTIC EXERCISES: CPT | Performed by: PHYSICAL THERAPIST

## 2019-10-08 NOTE — PROGRESS NOTES
PT DAILY TREATMENT NOTE - [facility] 2-15    Patient Name: Cachorro Hernandez  Date:10/8/2019  : 1945  [x]  Patient  Verified  Payor: BLUE CROSS MEDICARE / Plan: [place] / Product Type: Managed Care Medicare /    In time: 2:30pm Out time: 3:40pm  Total Treatment Time (min): 70   Total Timed Codes (min): 70  1:1 Treatment Time ( [address] only): 55    Visit #:  18    Treatment Area: Neck pain [M54.2]  Bilateral arm pain [M79.601, M79.602]    SUBJECTIVE  Pain Level (0-10 scale): R/L: 5/5  Any medication changes, allergies to medications, adverse drug reactions, diagnosis change, or new procedure performed?: [x] No    [] Yes (see summary sheet for update)  Subjective functional status/changes:   [] No changes reported  The patient reports that he's feeling good. OBJECTIVE    Modality rationale: increase muscle contraction/control to improve the patients ability to perform daily activities.     Min Type Additional Details      40 [x] Estim: [x]Att   []Unatt    []TENS instruct                  []IFC  []Premod   [x]NMES                     []Other:  []w/US   []w/ice   []w/heat  Position: sidelying, sitting, and standing  Location: Deltoid and infraspinatus (Russian; 50Hz; 10s on/off)       []  Traction: [] Cervical       []Lumbar                       [] Prone          []Supine                       []Intermittent   []Continuous Lbs:  [] before manual  [] after manual  []w/heat    []  Ultrasound: []Continuous   [] Pulsed                       at: []1MHz   []3MHz Location:  W/cm2:    [] Paraffin         Location:   []w/heat    []  Ice     []  Heat  []  Ice massage Position:  Location:    []  Laser  []  Other: Position:  Location:      []  Vasopneumatic Device Pressure:       [] lo [] med [] hi   Temperature:      [x] Skin assessment post-treatment:  [x]intact []redness- no adverse reaction    []redness - adverse reaction:     30 min Therapeutic Exercise:  [x] See flow sheet :   Rationale: increase ROM, increase strength and improve coordination to improve the patients ability to perform daily activities. With   [x] TE   [] TA   [] neuro   [] other: Patient Education: [x] Review HEP    [x] Progressed/Changed HEP based on:   [x] positioning   [x] body mechanics   [] transfers   [] heat/ice application    [] other:      Other Objective/Functional Measures: Pt. Tolerated therex well    Pain Level (0-10 scale) post treatment: R/L: 4/4     ASSESSMENT/Changes in Function:   The patient progressed very well with improved muscle facilitation with SL ER and abduction. Patient will continue to benefit from skilled PT services to modify and progress therapeutic interventions, address functional mobility deficits, address ROM deficits, address strength deficits, analyze and address soft tissue restrictions, analyze and cue movement patterns, analyze and modify body mechanics/ergonomics, assess and modify postural abnormalities, address imbalance/dizziness and instruct in home and community integration to attain remaining goals. [x]  See Plan of Care  []  See progress note/recertification  []  See Discharge Summary         Progress towards goals / Updated goals: The patient is progressing towards goals.      PLAN  [x]  Upgrade activities as tolerated     [x]  Continue plan of care  [x]  Update interventions per flow sheet       []  Discharge due to:_  []  Other:_      Xiomy Nunes, PT 10/8/2019

## 2019-10-10 ENCOUNTER — TELEPHONE (OUTPATIENT)
Dept: ENDOCRINOLOGY | Age: 74
End: 2019-10-10

## 2019-10-10 ENCOUNTER — HOSPITAL ENCOUNTER (OUTPATIENT)
Dept: PHYSICAL THERAPY | Age: 74
Discharge: HOME | End: 2019-10-10
Payer: MEDICARE

## 2019-10-10 PROCEDURE — 97110 THERAPEUTIC EXERCISES: CPT | Performed by: PHYSICAL THERAPIST

## 2019-10-10 PROCEDURE — 97032 APPL MODALITY 1+ESTIM EA 15: CPT | Performed by: PHYSICAL THERAPIST

## 2019-10-10 NOTE — TELEPHONE ENCOUNTER
----- Message from Excell Palm sent at 10/10/2019  1:18 PM EDT -----  Regarding: Dr Rogers Galeas  General Message/Vendor Calls    Caller's first and last name: Tricia Santana, pt's daughter n law      Reason for call: to find out what his \"H1C\" number is  She may have ment A1C    Callback required yes/no and why:      Best contact number(s):[phone], can leave a detailed message with the answer       Details to clarify the request:  Her father n law needs to get his toe nails clipped soon at his [address] office  and they will need to know what his\" H1C\" numbers are , she may have meant \"A1C    Excell Palm

## 2019-10-10 NOTE — TELEPHONE ENCOUNTER
I returned this call and let Archana Nathaly know that the A1c was 5.9 on 9/12/2019. She understood this information.   Deborah Salinas

## 2019-10-10 NOTE — PROGRESS NOTES
PT DAILY TREATMENT NOTE - [facility] 2-15    Patient Name: Mirna Hutson  Date:10/10/2019  : 1945  [x]  Patient  Verified  Payor: BLUE CROSS MEDICARE / Plan: [place] [place]IC[place] PPO / Product Type: Managed Care Medicare /    In time: 2:30pm Out time: 3:42pm  Total Treatment Time (min): 72   Total Timed Codes (min): 72  1:1 Treatment Time ( only): 55    Visit #:  19    Treatment Area: Neck pain [M54.2]  Bilateral arm pain [M79.601, M79.602]    SUBJECTIVE  Pain Level (0-10 scale): R/L: 5/5  Any medication changes, allergies to medications, adverse drug reactions, diagnosis change, or new procedure performed?: [x] No    [] Yes (see summary sheet for update)  Subjective functional status/changes:   [] No changes reported  The patient reports that he felt good after last time. OBJECTIVE    Modality rationale: increase muscle contraction/control to improve the patients ability to perform daily activities.     Min Type Additional Details      40 [x] Estim: [x]Att   []Unatt    []TENS instruct                  []IFC  []Premod   [x]NMES                     []Other:  []w/US   []w/ice   []w/heat  Position: sidelying, sitting, and standing  Location: Deltoid and infraspinatus (Russian; 50Hz; 10s on/off)       []  Traction: [] Cervical       []Lumbar                       [] Prone          []Supine                       []Intermittent   []Continuous Lbs:  [] before manual  [] after manual  []w/heat    []  Ultrasound: []Continuous   [] Pulsed                       at: []1MHz   []3MHz Location:  W/cm2:    [] Paraffin         Location:   []w/heat    []  Ice     []  Heat  []  Ice massage Position:  Location:    []  Laser  []  Other: Position:  Location:      []  Vasopneumatic Device Pressure:       [] lo [] med [] hi   Temperature:      [x] Skin assessment post-treatment:  [x]intact []redness- no adverse reaction    []redness - adverse reaction:     32 min Therapeutic Exercise:  [x] See flow sheet :   Rationale: increase ROM, increase strength and improve coordination to improve the patients ability to perform daily activities. With   [x] TE   [] TA   [] neuro   [] other: Patient Education: [x] Review HEP    [x] Progressed/Changed HEP based on:   [x] positioning   [x] body mechanics   [] transfers   [] heat/ice application    [] other:      Other Objective/Functional Measures: Pt. Tolerated therex well    Pain Level (0-10 scale) post treatment: R/L: 4/4    ASSESSMENT/Changes in Function:   The patient is progressing with deltoid and rotator cuff activation. Patient will continue to benefit from skilled PT services to modify and progress therapeutic interventions, address functional mobility deficits, address ROM deficits, address strength deficits, analyze and address soft tissue restrictions, analyze and cue movement patterns, analyze and modify body mechanics/ergonomics, assess and modify postural abnormalities, address imbalance/dizziness and instruct in home and community integration to attain remaining goals. [x]  See Plan of Care  []  See progress note/recertification  []  See Discharge Summary         Progress towards goals / Updated goals: The patient is progressing towards goals.      PLAN  [x]  Upgrade activities as tolerated     [x]  Continue plan of care  [x]  Update interventions per flow sheet       []  Discharge due to:_  []  Other:_      Maged Dowling, PT 10/10/2019

## 2019-10-10 NOTE — PROGRESS NOTES
10/10/2019  4:35 PM    NN attempted patient outreach today in order to f/u, remind of need to schedule routine f/u appt with Cardiology (overdue); unable to reach patient, line busy. Goals Addressed                 This Visit's Progress       Chronic Disease     Maintains appointments        7/17/2019  - maintaining follow-up with Endocrinologist, Dr. Rufina Kaminski; most recent office visit 5/17/19, next scheduled appt 9/12/19  - to schedule f/u with Cardiology; daughter-in-law will contact office today to schedule this appointment  - was referred to Dermatology by PCP for multiple acquired skin tags. Daughter-in-law reports that patient does not intend to f/u with Dermatologist at this time as skin tag removal will be classified as cosmetic procedure and patient is on a fixed income. - routine 3 month follow-up with PCP scheduled for 9/11/19  - daughter-in-law reports patient attended office visit with Dr. Tiffanie Morse last week; next scheduled appt 10/17  - MRI Cervical Spine w/o contrast scheduled for 10/16 (ordered by Dr. Tiffanie Morse)  - daughter-in-law to schedule \"nerve study\" as ordered by Dr. Tiffanie Morse    9/19/2019   - attended [address] with Ortho 8/21/19, 8/23/19  - most recent OV with PCP 9/11/19; bmp collected - kidney function improved from 4 months ago, increase water intake, avoid OTC anti-inflammatories, f/u with PCP 6 months  - attended [address] with Dr. Rufina Kaminski 9/12/19  - still has not scheduled/attended Outpatient f/u with Cardiology; most recent OV 5/3/19 with Dr. Elkin Lopez for pre-op clearance, most recent routine f/u was 11/14/18 and pt to f/u in 6 months  - pt notified of need to schedule routine f/u with Cardiology; office contact information provided. pt will contact Cardiology this week to schedule routine f/u appt.       9/26/2019  - no appt scheduled with Cardiology    9/27/2019  - notified spouse that patient is overdue for routine f/u with Cardiology; spouse will have daughter-in-law contact Cardiology office to schedule this appointment and scheduling is dependent upon her availability to provide patient transportation  - NN f/u 2 weeks    10/10/2019  - no future appointments with Cardiology are scheduled at this time          Future Appointments:  Future Appointments   Date Time Provider Magda Graf   10/15/2019  2:30 PM Mirela Galloway, PT [facility]   10/16/2019 12:45 PM [id number] MRI [address] [facility]   10/17/2019  2:30 PM Mirela Galloway PT [facility]   10/21/2019  9:30 AM Mirela Galloway PT [facility]   10/23/2019 12:30 PM Mirela Galloway PT [facility]   10/29/2019  2:30 PM Mirela Galloway PT [facility]   10/31/2019  2:30 PM Mirela Galloway PT [facility]   12/17/2019  4:10 PM Ana Abbasi MD [address]   3/11/2020  2:00 PM Gisel Leblanc MD [address]   3/26/2020  2:00 PM MD Crystal Oneil 1995 Appointment With Me:  Visit date not found     Last Appointment My Department:  9/11/2019

## 2019-10-14 RX ORDER — ISOSORBIDE MONONITRATE 30 MG/1
TABLET, EXTENDED RELEASE ORAL
Qty: 30 TAB | Refills: 12 | Status: SHIPPED | OUTPATIENT
Start: 2019-10-14 | End: 2019-10-17

## 2019-10-14 RX ORDER — LISINOPRIL 2.5 MG/1
TABLET ORAL
Qty: 90 TAB | Refills: 0 | Status: SHIPPED | OUTPATIENT
Start: 2019-10-14 | End: 2019-10-17 | Stop reason: SDUPTHER

## 2019-10-15 ENCOUNTER — HOSPITAL ENCOUNTER (OUTPATIENT)
Dept: PHYSICAL THERAPY | Age: 74
Discharge: HOME | End: 2019-10-15
Payer: MEDICARE

## 2019-10-15 PROCEDURE — 97032 APPL MODALITY 1+ESTIM EA 15: CPT | Performed by: PHYSICAL THERAPIST

## 2019-10-15 PROCEDURE — 97110 THERAPEUTIC EXERCISES: CPT | Performed by: PHYSICAL THERAPIST

## 2019-10-15 NOTE — PROGRESS NOTES
PT DAILY TREATMENT NOTE - [facility] 2-15    Patient Name: Maria D Gu  Date:10/15/2019  : 1945  [x]  Patient  Verified  Payor: AMISHA FRED / Plan: [address] [place] [address] / Product Type: PPO /    In time: 2:30pm Out time: 3:30pm  Total Treatment Time (min): 60   Total Timed Codes (min): 60  1:1 Treatment Time ( only): 53   Visit #:  20    Treatment Area: Neck pain [M54.2]  Bilateral arm pain [M79.601, M79.602]    SUBJECTIVE  Pain Level (0-10 scale): R/L: 5/5  Any medication changes, allergies to medications, adverse drug reactions, diagnosis change, or new procedure performed?: [x] No    [] Yes (see summary sheet for update)  Subjective functional status/changes:   [] No changes reported  The patient reports that he walked around the grocery store with a full cart and full arm extension for two days, so maybe that's why it's a little tired. OBJECTIVE    Modality rationale: increase muscle contraction/control to improve the patients ability to perform daily activities.     Min Type Additional Details      40 [x] Estim: [x]Att   []Unatt    []TENS instruct                  []IFC  []Premod   [x]NMES                     []Other:  []w/US   []w/ice   []w/heat  Position: sidelying, sitting, and standing  Location: Deltoid and infraspinatus (Russian; 50Hz; 10s on/off)       []  Traction: [] Cervical       []Lumbar                       [] Prone          []Supine                       []Intermittent   []Continuous Lbs:  [] before manual  [] after manual  []w/heat    []  Ultrasound: []Continuous   [] Pulsed                       at: []1MHz   []3MHz Location:  W/cm2:    [] Paraffin         Location:   []w/heat    []  Ice     []  Heat  []  Ice massage Position:  Location:    []  Laser  []  Other: Position:  Location:      []  Vasopneumatic Device Pressure:       [] lo [] med [] hi   Temperature:      [x] Skin assessment post-treatment:  [x]intact []redness- no adverse reaction    []redness - adverse reaction: 20 min Therapeutic Exercise:  [x] See flow sheet :   Rationale: increase ROM, increase strength and improve coordination to improve the patients ability to perform daily activities. With   [x] TE   [] TA   [] neuro   [] other: Patient Education: [x] Review HEP    [x] Progressed/Changed HEP based on:   [x] positioning   [x] body mechanics   [] transfers   [] heat/ice application    [] other:      Other Objective/Functional Measures: Pt. Tolerated therex well    Pain Level (0-10 scale) post treatment: R/L: 5/5    ASSESSMENT/Changes in Function:   The patient progress with improved form with steering wheel exercise and muscle activation. Patient will continue to benefit from skilled PT services to modify and progress therapeutic interventions, address functional mobility deficits, address ROM deficits, address strength deficits, analyze and address soft tissue restrictions, analyze and cue movement patterns, analyze and modify body mechanics/ergonomics, assess and modify postural abnormalities, address imbalance/dizziness and instruct in home and community integration to attain remaining goals. [x]  See Plan of Care  []  See progress note/recertification  []  See Discharge Summary         Progress towards goals / Updated goals: The patient is progressing towards goals.      PLAN  [x]  Upgrade activities as tolerated     [x]  Continue plan of care  [x]  Update interventions per flow sheet       []  Discharge due to:_  []  Other:_      Kiki Blanchard, PT 10/15/2019

## 2019-10-16 ENCOUNTER — ANESTHESIA EVENT (OUTPATIENT)
Dept: MRI IMAGING | Age: 74
End: 2019-10-16
Payer: MEDICARE

## 2019-10-16 ENCOUNTER — HOSPITAL ENCOUNTER (OUTPATIENT)
Dept: MRI IMAGING | Age: 74
Discharge: HOME | End: 2019-10-16
Attending: ORTHOPAEDIC SURGERY
Payer: MEDICARE

## 2019-10-16 ENCOUNTER — ANESTHESIA (OUTPATIENT)
Dept: MRI IMAGING | Age: 74
End: 2019-10-16
Payer: MEDICARE

## 2019-10-16 VITALS
DIASTOLIC BLOOD PRESSURE: 91 MMHG | TEMPERATURE: 98.2 F | OXYGEN SATURATION: 94 % | SYSTOLIC BLOOD PRESSURE: 147 MMHG | WEIGHT: 215 LBS | HEART RATE: 91 BPM | RESPIRATION RATE: 22 BRPM | BODY MASS INDEX: 38.09 KG/M2 | HEIGHT: 63 IN

## 2019-10-16 DIAGNOSIS — M54.2 CERVICALGIA: ICD-10-CM

## 2019-10-16 DIAGNOSIS — Z98.1 S/P SPINAL FUSION: ICD-10-CM

## 2019-10-16 LAB
GLUCOSE BLD STRIP.AUTO-MCNC: 149 MG/DL (ref 65–100)
SERVICE CMNT-IMP: ABNORMAL

## 2019-10-16 PROCEDURE — 82962 GLUCOSE BLOOD TEST: CPT

## 2019-10-16 RX ORDER — LIDOCAINE HYDROCHLORIDE 10 MG/ML
0.1 INJECTION, SOLUTION EPIDURAL; INFILTRATION; INTRACAUDAL; PERINEURAL AS NEEDED
Status: CANCELLED | OUTPATIENT
Start: 2019-10-16

## 2019-10-16 RX ORDER — SODIUM CHLORIDE, SODIUM LACTATE, POTASSIUM CHLORIDE, CALCIUM CHLORIDE 600; 310; 30; 20 MG/100ML; MG/100ML; MG/100ML; MG/100ML
25 INJECTION, SOLUTION INTRAVENOUS CONTINUOUS
Status: CANCELLED | OUTPATIENT
Start: 2019-10-16

## 2019-10-16 RX ORDER — MIDAZOLAM HYDROCHLORIDE 1 MG/ML
1 INJECTION, SOLUTION INTRAMUSCULAR; INTRAVENOUS AS NEEDED
Status: CANCELLED | OUTPATIENT
Start: 2019-10-16

## 2019-10-16 RX ORDER — FENTANYL CITRATE 50 UG/ML
50 INJECTION, SOLUTION INTRAMUSCULAR; INTRAVENOUS AS NEEDED
Status: CANCELLED | OUTPATIENT
Start: 2019-10-16

## 2019-10-16 RX ORDER — FENTANYL CITRATE 50 UG/ML
25 INJECTION, SOLUTION INTRAMUSCULAR; INTRAVENOUS
Status: CANCELLED | OUTPATIENT
Start: 2019-10-16

## 2019-10-16 RX ORDER — ONDANSETRON 2 MG/ML
4 INJECTION INTRAMUSCULAR; INTRAVENOUS AS NEEDED
Status: CANCELLED | OUTPATIENT
Start: 2019-10-16

## 2019-10-16 NOTE — ROUTINE PROCESS
1250-Received care of pt from [facility] to radiology recovery area for MRI with sedation. Upon bringing pt back for procedure pt stated that he needs general anesthesia due to his sleep apnea. MRI was scheduled with just sedation. Anesthesia notified of safety concern regarding airway and prior study done with general back in March 2019. Will try to get patient on for today with anesthesia. 1322-Dr. Shasha Omer into speak with pt regarding MRI with anesthesia. 1340-Unable to perform MRI with anesthesia today due to improper scheuldling. Pt made aware and appointment set up for November 8, 2019 at 0800 with anesthesia for general.  Pt voices understanding and pt educated on the need to notify MD when scheduling future MRI's that he needs to be scheduled with general anesthesia, pt states understanding. 1405-Pt walked out ambulatory and instructions given for future appointment to daughter-in-law and pt. Dr. Karri Newton office called and made aware of rescheduling MRI.

## 2019-10-16 NOTE — PERIOP NOTES
Pt returned call verified name  and procedure   METS >4  Gave permission to complete call with wife Serina Yanez       PT is on the way out for MRI today and cardiology appointment tomorrow   Wife will call back on Friday to complete PAT call

## 2019-10-16 NOTE — ANESTHESIA PREPROCEDURE EVALUATION
Relevant Problems   No relevant active problems       Anesthetic History   No history of anesthetic complications            Review of Systems / Medical History  Patient summary reviewed, nursing notes reviewed and pertinent labs reviewed    Pulmonary        Sleep apnea: CPAP  Shortness of breath  Asthma     Comments: Former smoker - Quit 1963   Neuro/Psych             Comments: Cervical Spondylosis with myelopathy/Cervicalgia Cardiovascular    Hypertension      CHF: orthopnea, dyspnea on exertion    CAD and hyperlipidemia    Exercise tolerance: <4 METS  Comments: Carotid Stenosis s/p Carotid Stent   GI/Hepatic/Renal         Renal disease: stones       Endo/Other    Diabetes: type 2    Morbid obesity and arthritis     Other Findings              Physical Exam    Airway  Mallampati: II  TM Distance: 4 - 6 cm  Neck ROM: normal range of motion, short neck   Mouth opening: Normal     Cardiovascular  Regular rate and rhythm,  S1 and S2 normal,  no murmur, click, rub, or gallop  Rhythm: regular  Rate: normal         Dental  No notable dental hx       Pulmonary  Breath sounds clear to auscultation               Abdominal  GI exam deferred       Other Findings            Anesthetic Plan    ASA: 3  Anesthesia type: general          Induction: Intravenous  Anesthetic plan and risks discussed with: Patient

## 2019-10-17 ENCOUNTER — HOSPITAL ENCOUNTER (OUTPATIENT)
Dept: PHYSICAL THERAPY | Age: 74
Discharge: HOME | End: 2019-10-17
Payer: MEDICARE

## 2019-10-17 ENCOUNTER — OFFICE VISIT (OUTPATIENT)
Dept: CARDIOLOGY CLINIC | Age: 74
End: 2019-10-17

## 2019-10-17 VITALS
DIASTOLIC BLOOD PRESSURE: 90 MMHG | OXYGEN SATURATION: 96 % | WEIGHT: 214.9 LBS | HEIGHT: 63 IN | BODY MASS INDEX: 38.08 KG/M2 | SYSTOLIC BLOOD PRESSURE: 144 MMHG | RESPIRATION RATE: 20 BRPM | HEART RATE: 86 BPM

## 2019-10-17 DIAGNOSIS — E78.00 PURE HYPERCHOLESTEROLEMIA: ICD-10-CM

## 2019-10-17 DIAGNOSIS — I25.10 ASHD (ARTERIOSCLEROTIC HEART DISEASE): Chronic | ICD-10-CM

## 2019-10-17 DIAGNOSIS — I50.22 CHRONIC SYSTOLIC CONGESTIVE HEART FAILURE (HCC): Primary | ICD-10-CM

## 2019-10-17 PROCEDURE — 97140 MANUAL THERAPY 1/> REGIONS: CPT | Performed by: PHYSICAL THERAPIST

## 2019-10-17 PROCEDURE — 97032 APPL MODALITY 1+ESTIM EA 15: CPT | Performed by: PHYSICAL THERAPIST

## 2019-10-17 RX ORDER — ISOSORBIDE MONONITRATE 60 MG/1
60 TABLET, EXTENDED RELEASE ORAL DAILY
Qty: 90 TAB | Refills: 3 | Status: SHIPPED | OUTPATIENT
Start: 2019-10-17 | End: 2020-09-14

## 2019-10-17 RX ORDER — CARVEDILOL 12.5 MG/1
12.5 TABLET ORAL 2 TIMES DAILY
Qty: 180 TAB | Refills: 3 | Status: SHIPPED | OUTPATIENT
Start: 2019-10-17 | End: 2020-07-07

## 2019-10-17 RX ORDER — OMEGA-3 FATTY ACIDS 1000 MG
1 CAPSULE ORAL 2 TIMES DAILY
COMMUNITY
End: 2022-07-05

## 2019-10-17 RX ORDER — FUROSEMIDE 40 MG/1
40 TABLET ORAL DAILY
Qty: 30 TAB | Refills: 1 | Status: SHIPPED | OUTPATIENT
Start: 2019-10-17 | End: 2019-10-24

## 2019-10-17 RX ORDER — LISINOPRIL 2.5 MG/1
2.5 TABLET ORAL DAILY
Qty: 90 TAB | Refills: 3 | Status: SHIPPED | OUTPATIENT
Start: 2019-10-17 | End: 2020-12-14 | Stop reason: SDUPTHER

## 2019-10-17 NOTE — PROGRESS NOTES
1. Have you been to the ER, urgent care clinic since your last visit? Hospitalized since your last visit? Yes, cataract surgery 10/2019    2. Have you seen or consulted any other health care providers outside of the [address] [place] [address] since your last visit? Include any pap smears or colon screening.  Yes, Dr. Ranjit Ornelas    Chief Complaint   Patient presents with    Surgical Clearance     for carpal tunnel surgery, right hand; Dr. Jhonny Mcgarry with [facility]

## 2019-10-17 NOTE — PROGRESS NOTES
PT DAILY TREATMENT NOTE - [facility] 2-15    Patient Name: Hellen Oconnor  Date:10/17/2019  : 1945  [x]  Patient  Verified  Payor: BLUE CROSS / Plan: [address] [place] [address] / Product Type: PPO /    In time: 2:30pm Out time: 3:35pm  Total Treatment Time (min): 65   Total Timed Codes (min): 65  1:1 Treatment Time ( only): 55   Visit #:  21    Treatment Area: Neck pain [M54.2]  Bilateral arm pain [M79.601, M79.602]    SUBJECTIVE  Pain Level (0-10 scale): R/L: 5/5  Any medication changes, allergies to medications, adverse drug reactions, diagnosis change, or new procedure performed?: [x] No    [] Yes (see summary sheet for update)  Subjective functional status/changes:   [] No changes reported  The patient reports that he took it easy yesterday, but was unable to get an MRI due to issues with the monitor. OBJECTIVE    Modality rationale: increase muscle contraction/control to improve the patients ability to perform daily activities.     Min Type Additional Details      45 [x] Estim: [x]Att   []Unatt    []TENS instruct                  []IFC  []Premod   [x]NMES                     []Other:  []w/US   []w/ice   []w/heat  Position: sidelying, sitting, and standing  Location: Deltoid and infraspinatus (Russian; 50Hz; 10s on/off)       []  Traction: [] Cervical       []Lumbar                       [] Prone          []Supine                       []Intermittent   []Continuous Lbs:  [] before manual  [] after manual  []w/heat    []  Ultrasound: []Continuous   [] Pulsed                       at: []1MHz   []3MHz Location:  W/cm2:    [] Paraffin         Location:   []w/heat    []  Ice     []  Heat  []  Ice massage Position:  Location:    []  Laser  []  Other: Position:  Location:      []  Vasopneumatic Device Pressure:       [] lo [] med [] hi   Temperature:      [x] Skin assessment post-treatment:  [x]intact []redness- no adverse reaction    []redness - adverse reaction:      10 min Therapeutic Exercise:  [x] See flow sheet :   Rationale: increase ROM, increase strength and improve coordination to improve the patients ability to perform daily activities. 10 min Manual Therapy: Manual resisted Sidelying ER and sidelying abduction with palpation for muscle recruitment with NMES    Rationale: decrease pain, increase ROM, increase tissue extensibility, increase postural awareness and muscle fascilitation to improve the patients ability to perform daily activities. With   [x] TE   [] TA   [] neuro   [] other: Patient Education: [x] Review HEP    [x] Progressed/Changed HEP based on:   [x] positioning   [x] body mechanics   [] transfers   [] heat/ice application    [] other:      Other Objective/Functional Measures: Pt. Tolerated therex well    Pain Level (0-10 scale) post treatment: R/L: 5/5    ASSESSMENT/Changes in Function:   The patient responded well to manual resisted sidelying exercises with improved muscle activation. Patient will continue to benefit from skilled PT services to modify and progress therapeutic interventions, address functional mobility deficits, address ROM deficits, address strength deficits, analyze and address soft tissue restrictions, analyze and cue movement patterns, analyze and modify body mechanics/ergonomics, assess and modify postural abnormalities, address imbalance/dizziness and instruct in home and community integration to attain remaining goals. [x]  See Plan of Care  []  See progress note/recertification  []  See Discharge Summary         Progress towards goals / Updated goals: The patient is progressing towards goals.      PLAN  [x]  Upgrade activities as tolerated     [x]  Continue plan of care  [x]  Update interventions per flow sheet       []  Discharge due to:_  []  Other:_      Les Madison, PT 10/17/2019

## 2019-10-17 NOTE — PROGRESS NOTES
Viviane Pompa DNP, ANP-BC  Subjective/HPI:     Estrella Boyce is a 72 y.o. male is here for routine f/u. The patient denies chest pain/ shortness of breath, orthopnea, PND, LE edema, palpitations, syncope, presyncope or fatigue. Patient is preop for right carpal tunnel surgery, he is also pending MRI with general anesthesia for cervical pain with diffuse radiculopathy. He may be requiring cervical surgery in the future. To recall this gentleman has a history of nonobstructive atherosclerotic heart disease, type 2 diabetes, hypertension hyperlipidemia systolic heart failure ejection fraction 45-50%. He states he has been feeling well in his usual state of health with the exception of hand pain neck pain and radiculopathy. Discussed his elevated blood pressure today and he reports similar readings and other providers offices yet his home blood pressure readings are \"normal\". ECHO:  Left ventricle: Systolic function was mildly reduced by EF (biplane method  of disks). Ejection fraction was estimated in the range of 45 % to 50 %. There was mild diffuse hypokinesis. Wall thickness was mildly increased. Features were consistent with a pseudonormal left ventricular filling  pattern, with concomitant abnormal relaxation and increased filling  pressure (grade 2 diastolic dysfunction). Left atrium: The atrium was mildly dilated. Aortic valve: The valve was probably trileaflet. Leaflets exhibited mildly  increased thickness and moderate calcification. Not well visualized. Cardiac Cath 5/2018  SUMMARY:    --  CORONARY CIRCULATION:  --  Proximal LAD: There was a 30 % stenosis. --  Distal LAD: There was a 40 % stenosis. --  1st diagonal: There was a 30 % stenosis. --  Mid circumflex: There was a discrete 25 % stenosis.   PCP Provider  Jeff Ortez MD  Past Medical History:   Diagnosis Date    Arthritis     Asthma     CAD (coronary artery disease)     Calculus of kidney     Carotid artery disease ([facility]     Cervical herniated disc     Chronic systolic (congestive) heart failure (HCC)     Diabetes ([facility]     Diabetic shock due to low blood sugar ([facility] 2016    Glaucoma     pt is on drops unsure of what the names are    Hypercholesterolemia     Hypertension     Morbid obesity ([facility]     Rheumatic fever     When he was 17    Sleep apnea     compliant with cpap as stated 9/6/2019      Past Surgical History:   Procedure Laterality Date    HX CAROTID STENT  1990's    with cath    HX CERVICAL FUSION  05/21/2019    C3- C6 ACFD     HX CHOLECYSTECTOMY      HX CIRCUMCISION      HX HEART CATHETERIZATION      x 5 total, 4 in heart, 1 in carotid    HX HEENT Left     ear torn off and repaired    HX ORTHOPAEDIC Right     repair    HX OTHER SURGICAL Right     Right hand reconstruction    HX SHOULDER ARTHROSCOPY Right     dislocated with fall, came out, he stated surgery put back in place and sowed him up     Allergies   Allergen Reactions    Morphine Anaphylaxis     Pt states \"morphine gave me heart failure\"    Gabapentin Other (comments)     Made him \"loopy\"    Lyrica [Pregabalin] Other (comments)     Makes him \"loopy\"      Family History   Problem Relation Age of Onset    Heart Disease Brother     Diabetes Nephew     Hypertension Mother       Current Outpatient Medications   Medication Sig    isosorbide mononitrate ER (IMDUR) 30 mg tablet TAKE 1 TABLET BY MOUTH ONCE DAILY    lisinopril (PRINIVIL, ZESTRIL) 2.5 mg tablet TAKE 1 TABLET BY MOUTH ONCE DAILY    DUREZOL 0.05 % ophthalmic emulsion     gabapentin (NEURONTIN) 100 mg capsule Take 1 Cap by mouth three (3) times daily. AS DIRECTED.  furosemide (LASIX) 40 mg tablet TAKE 1 TABLET BY MOUTH ONCE DAILY    dextran 70/hypromellose/PF (NATURAL TEARS, PF, OP) Apply 1 Drop to eye as needed.     DULoxetine (CYMBALTA) 60 mg capsule TAKE 1 CAPSULE BY MOUTH ONCE DAILY FOR  FEET  PAIN    carvedilol (COREG) 12.5 mg tablet TAKE 1 TABLET BY MOUTH TWICE DAILY    acetaminophen (TYLENOL) 500 mg tablet Take 1,000 mg by mouth every six (6) hours as needed for Pain.  omega 3-DHA-EPA-fish oil (FISH OIL) 1,000 mg (120 mg-180 mg) capsule Take 1 Cap by mouth two (2) times a day.  metOLazone (ZAROXOLYN) 5 mg tablet Take 2.5 mg by mouth every Tuesday and Thursday.  glimepiride (AMARYL) 1 mg tablet Take 1 mg by mouth Daily (before breakfast).  simvastatin (ZOCOR) 10 mg tablet TAKE 1 TABLET BY MOUTH NIGHTLY    metFORMIN ER (GLUCOPHAGE XR) 500 mg tablet TAKE 1 TABLET BY MOUTH BEFORE BREAKFAST AND  DINNER    cholecalciferol, VITAMIN D3, (VITAMIN D3) 5,000 unit tab tablet Take 1 Tab by mouth daily.  aspirin delayed-release 81 mg tablet Take 81 mg by mouth daily.  omega-3 fatty acids 1,000 mg cap Take 1 Cap by mouth.  ILEVRO 0.3 % drps     ofloxacin (FLOXIN) 0.3 % ophthalmic solution     latanoprost (XALATAN) 0.005 % ophthalmic solution Administer 1 Drop to both eyes nightly. No current facility-administered medications for this visit.        Vitals:    10/17/19 1038   BP: 144/90   Pulse: 86   Resp: 20   SpO2: 96%   Weight: 214 lb 14.4 oz (97.5 kg)   Height: 5' 3\" (1.6 m)     Social History     Socioeconomic History    Marital status:      Spouse name: Not on file    Number of children: Not on file    Years of education: Not on file    Highest education level: Not on file   Occupational History    Not on file   Social Needs    Financial resource strain: Not on file    Food insecurity:     Worry: Not on file     Inability: Not on file    Transportation needs:     Medical: Not on file     Non-medical: Not on file   Tobacco Use    Smoking status: Former Smoker     Packs/day: 5.00     Last attempt to quit: 1963     Years since quittin.5    Smokeless tobacco: Never Used   Substance and Sexual Activity    Alcohol use: No     Alcohol/week: 0.0 standard drinks    Drug use: Never    Sexual activity: Yes     Partners: Female Birth control/protection: None   Lifestyle    Physical activity:     Days per week: Not on file     Minutes per session: Not on file    Stress: Not on file   Relationships    Social connections:     Talks on phone: Not on file     Gets together: Not on file     Attends Latter day service: Not on file     Active member of club or organization: Not on file     Attends meetings of clubs or organizations: Not on file     Relationship status: Not on file    Intimate partner violence:     Fear of current or ex partner: Not on file     Emotionally abused: Not on file     Physically abused: Not on file     Forced sexual activity: Not on file   Other Topics Concern    Not on file   Social History Narrative    ** Merged History Encounter **            I have reviewed the nurses notes, vitals, problem list, allergy list, medical history, family, social history and medications. Review of Symptoms:    General: Pt denies excessive weight gain or loss. Pt is able to conduct ADL's  HEENT: Denies blurred vision, headaches, epistaxis and difficulty swallowing. Respiratory: Denies shortness of breath, ROLLE, wheezing or stridor. Cardiovascular: Denies precordial pain, palpitations, edema or PND  Gastrointestinal: Denies poor appetite, indigestion, abdominal pain or blood in stool  Musculoskeletal: Right hand pain, diffuse cervical pain with radiculopathy type discomfort and decreased range of motion of his arms. Neurologic: Denies tremor, paresthesias, or sensory motor disturbance  Skin: Denies rash, itching or texture change. Physical Exam:      General: Well developed, in no acute distress, cooperative and alert  HEENT: No carotid bruits, no JVD, trach is midline. Neck Supple, PERRL, EOM intact. Patient is hard of hearing   heart:  Normal S1/S2 negative S3 or S4. Regular, no murmur, gallop or rub.    Respiratory: Clear bilaterally x 4, no wheezing or rales  Abdomen:   Soft, non-tender, no masses, bowel sounds are active. Extremities:  No edema, normal cap refill, no cyanosis, atraumatic. Neuro: A&Ox3, speech clear, gait stable. Skin: Skin color is normal. No rashes or lesions. Non diaphoretic  Vascular: 2+ pulses symmetric in all extremities    Cardiographics    ECG: Sinus rhythm  No results found for this or any previous visit. Cardiology Labs:  Lab Results   Component Value Date/Time    Cholesterol, total 116 05/10/2019 01:46 PM    HDL Cholesterol 43 05/10/2019 01:46 PM    LDL, calculated 49 05/10/2019 01:46 PM    Triglyceride 118 05/10/2019 01:46 PM       Lab Results   Component Value Date/Time    Sodium 144 09/12/2019 04:32 PM    Potassium 4.5 09/12/2019 04:32 PM    Chloride 101 09/12/2019 04:32 PM    CO2 26 09/12/2019 04:32 PM    Anion gap 7 05/16/2019 09:56 AM    Glucose 152 (H) 09/12/2019 04:32 PM    BUN 39 (H) 09/12/2019 04:32 PM    Creatinine 1.50 (H) 09/12/2019 04:32 PM    BUN/Creatinine ratio 26 (H) 09/12/2019 04:32 PM    GFR est AA 53 (L) 09/12/2019 04:32 PM    GFR est non-AA 46 (L) 09/12/2019 04:32 PM    Calcium 9.3 09/12/2019 04:32 PM    Bilirubin, total 0.2 09/12/2019 04:32 PM    AST (SGOT) 11 09/12/2019 04:32 PM    Alk. phosphatase 70 09/12/2019 04:32 PM    Protein, total 6.4 09/12/2019 04:32 PM    Albumin 3.8 09/12/2019 04:32 PM    Globulin 3.4 05/16/2019 09:56 AM    A-G Ratio 1.5 09/12/2019 04:32 PM    ALT (SGPT) 17 09/12/2019 04:32 PM           Assessment:     Assessment:     Diagnoses and all orders for this visit:    1. Congestive heart failure, unspecified HF chronicity, unspecified heart failure type ([facility]    2. Pure hypercholesterolemia  -     AMB POC EKG ROUTINE W/ 12 LEADS, INTER & REP        ICD-10-CM ICD-9-CM    1. Congestive heart failure, unspecified HF chronicity, unspecified heart failure type ([address] I50.9 428.0    2.  Pure hypercholesterolemia E78.00 272.0 AMB POC EKG ROUTINE W/ 12 LEADS, INTER & REP     Orders Placed This Encounter    AMB POC EKG ROUTINE W/ 12 LEADS, INTER & REP Order Specific Question:   Reason for Exam:     Answer:   Routine    omega-3 fatty acids 1,000 mg cap     Sig: Take 1 Cap by mouth. Plan:     1. Systolic heart failure: Ejection fraction 50%, [place] heart class I, continue carvedilol and low-dose of ACE inhibitor, continue furosemide. 2.  Atherosclerotic heart disease: Nonobstructive via catheterization 2018. Continue medical management. 3.  Hyperlipidemia: On statin therapy LDL 49 at target  4. Hypertension: Repeat blood pressure at end of visit 142/82 will uptitrate isosorbide mononitrate from 30 mg to 60 mg. Follow-up in 1 year, sooner if additional cardiac clearance is needed beyond 90 days. Patient is cleared from cardiology for carpal tunnel surgery, patient is cleared from cardiology for general anesthesia for upcoming MRI. Stacey Zepeda NP      Please note that this dictation was completed with [employer], the computer voice recognition software. Quite often unanticipated grammatical, syntax, homophones, and other interpretive errors are inadvertently transcribed by the computer software. Please disregard these errors. Please excuse any errors that have escaped final proofreading. Thank you.

## 2019-10-18 ENCOUNTER — TELEPHONE (OUTPATIENT)
Dept: ENDOCRINOLOGY | Age: 74
End: 2019-10-18

## 2019-10-18 NOTE — TELEPHONE ENCOUNTER
Called the patient to inform him that his forms are ready for , but he did not answer. Left message for a return phone call.

## 2019-10-18 NOTE — TELEPHONE ENCOUNTER
----- Message from Jeannette Hernandez sent at 10/18/2019  3:52 PM EDT -----  Regarding: Dr. Moni Saha  Pt wife Theresia Mortimer requesting a call back in regards to order for diabetic shoes for pt. Please fax a Rx \"1 pair diabetic shoes and 3 custom inserts\" to  [employer]. Best contact [phone].

## 2019-10-21 ENCOUNTER — HOSPITAL ENCOUNTER (OUTPATIENT)
Dept: PHYSICAL THERAPY | Age: 74
Discharge: HOME | End: 2019-10-21
Payer: MEDICARE

## 2019-10-21 ENCOUNTER — HOSPITAL ENCOUNTER (OUTPATIENT)
Dept: SURGERY | Age: 74
Setting detail: OUTPATIENT SURGERY
Discharge: HOME | End: 2019-10-21
Payer: MEDICARE

## 2019-10-21 VITALS
RESPIRATION RATE: 17 BRPM | TEMPERATURE: 98.2 F | HEART RATE: 97 BPM | SYSTOLIC BLOOD PRESSURE: 145 MMHG | OXYGEN SATURATION: 95 % | DIASTOLIC BLOOD PRESSURE: 84 MMHG

## 2019-10-21 LAB
ANION GAP SERPL CALC-SCNC: 4 MMOL/L (ref 5–15)
BUN SERPL-MCNC: 39 MG/DL (ref 6–20)
BUN/CREAT SERPL: 32 (ref 12–20)
CALCIUM SERPL-MCNC: 9 MG/DL (ref 8.5–10.1)
CHLORIDE SERPL-SCNC: 107 MMOL/L (ref 97–108)
CO2 SERPL-SCNC: 31 MMOL/L (ref 21–32)
CREAT SERPL-MCNC: 1.22 MG/DL (ref 0.7–1.3)
GLUCOSE SERPL-MCNC: 112 MG/DL (ref 65–100)
POTASSIUM SERPL-SCNC: 4.3 MMOL/L (ref 3.5–5.1)
SODIUM SERPL-SCNC: 142 MMOL/L (ref 136–145)

## 2019-10-21 PROCEDURE — 97032 APPL MODALITY 1+ESTIM EA 15: CPT | Performed by: PHYSICAL THERAPIST

## 2019-10-21 PROCEDURE — 80048 BASIC METABOLIC PNL TOTAL CA: CPT

## 2019-10-21 PROCEDURE — 36415 COLL VENOUS BLD VENIPUNCTURE: CPT

## 2019-10-21 PROCEDURE — 97140 MANUAL THERAPY 1/> REGIONS: CPT | Performed by: PHYSICAL THERAPIST

## 2019-10-21 NOTE — PROGRESS NOTES
PT DAILY TREATMENT NOTE - [facility] 2-15    Patient Name: Rigo Raman  Date:10/21/2019  : 1945  [x]  Patient  Verified  Payor: AMISHA FRED / Plan: [address] [place] [address] / Product Type: PPO /    In time: 9:28am Out time: 10:15am  Total Treatment Time (min): 47   Total Timed Codes (min): 47  1:1 Treatment Time ( only): 45   Visit #:  22    Treatment Area: Neck pain [M54.2]  Bilateral arm pain [M79.601, M79.602]    SUBJECTIVE  Pain Level (0-10 scale): R/L:   Any medication changes, allergies to medications, adverse drug reactions, diagnosis change, or new procedure performed?: [x] No    [] Yes (see summary sheet for update)  Subjective functional status/changes:   [] No changes reported  The patient reports that he's been flared up since Saturday and thinks it's from the weather. OBJECTIVE    Modality rationale: increase muscle contraction/control to improve the patients ability to perform daily activities.     Min Type Additional Details      30 [x] Estim: [x]Att   []Unatt    []TENS instruct                  []IFC  []Premod   [x]NMES                     []Other:  []w/US   []w/ice   []w/heat  Position: sidelying, sitting, and standing  Location: Deltoid and infraspinatus (Russian; 50Hz; 10s on/off)       []  Traction: [] Cervical       []Lumbar                       [] Prone          []Supine                       []Intermittent   []Continuous Lbs:  [] before manual  [] after manual  []w/heat    []  Ultrasound: []Continuous   [] Pulsed                       at: []1MHz   []3MHz Location:  W/cm2:    [] Paraffin         Location:   []w/heat    []  Ice     []  Heat  []  Ice massage Position:  Location:    []  Laser  []  Other: Position:  Location:      []  Vasopneumatic Device Pressure:       [] lo [] med [] hi   Temperature:      [x] Skin assessment post-treatment:  [x]intact []redness- no adverse reaction    []redness - adverse reaction:      7 min Therapeutic Exercise:  [x] See flow sheet : Rationale: increase ROM, increase strength and improve coordination to improve the patients ability to perform daily activities. 10 min Manual Therapy: Manual resisted Sidelying abduction and eccentric emphasis with sitting 90/90 with palpation for muscle recruitment with NMES    Rationale: decrease pain, increase ROM, increase tissue extensibility, increase postural awareness and muscle fascilitation to improve the patients ability to perform daily activities. With   [x] TE   [] TA   [] neuro   [] other: Patient Education: [x] Review HEP    [x] Progressed/Changed HEP based on:   [x] positioning   [x] body mechanics   [] transfers   [] heat/ice application    [] other:      Other Objective/Functional Measures: Pt. Tolerated therex well    Pain Level (0-10 scale) post treatment: R/L: 7/7    ASSESSMENT/Changes in Function:   The patient progressed with NMES but was limited by the clinician due to fasting for blood work today. Patient will continue to benefit from skilled PT services to modify and progress therapeutic interventions, address functional mobility deficits, address ROM deficits, address strength deficits, analyze and address soft tissue restrictions, analyze and cue movement patterns, analyze and modify body mechanics/ergonomics, assess and modify postural abnormalities, address imbalance/dizziness and instruct in home and community integration to attain remaining goals. [x]  See Plan of Care  []  See progress note/recertification  []  See Discharge Summary         Progress towards goals / Updated goals: The patient is progressing towards goals.      PLAN  [x]  Upgrade activities as tolerated     [x]  Continue plan of care  [x]  Update interventions per flow sheet       []  Discharge due to:_  []  Other:_      Rosaura Rios, PT 10/21/2019

## 2019-10-23 ENCOUNTER — HOSPITAL ENCOUNTER (OUTPATIENT)
Dept: PHYSICAL THERAPY | Age: 74
Discharge: HOME | End: 2019-10-23
Payer: MEDICARE

## 2019-10-23 ENCOUNTER — ANESTHESIA EVENT (OUTPATIENT)
Dept: SURGERY | Age: 74
End: 2019-10-23
Payer: MEDICARE

## 2019-10-23 DIAGNOSIS — M79.2 NEUROPATHIC PAIN: ICD-10-CM

## 2019-10-23 PROCEDURE — 97032 APPL MODALITY 1+ESTIM EA 15: CPT | Performed by: PHYSICAL THERAPIST

## 2019-10-23 PROCEDURE — 97140 MANUAL THERAPY 1/> REGIONS: CPT | Performed by: PHYSICAL THERAPIST

## 2019-10-23 PROCEDURE — 97110 THERAPEUTIC EXERCISES: CPT | Performed by: PHYSICAL THERAPIST

## 2019-10-23 NOTE — ANESTHESIA PREPROCEDURE EVALUATION
Relevant Problems   No relevant active problems       Anesthetic History   No history of anesthetic complications            Review of Systems / Medical History  Patient summary reviewed, nursing notes reviewed and pertinent labs reviewed    Pulmonary    COPD    Sleep apnea: CPAP  Shortness of breath  Asthma     Comments: Former smoker - Quit 1963   Neuro/Psych             Comments: Cervical Spondylosis with myelopathy/Cervicalgia  Glaucoma Cardiovascular    Hypertension      CHF: orthopnea, dyspnea on exertion    CAD, PAD and hyperlipidemia    Exercise tolerance: <4 METS  Comments: Carotid Stenosis s/p Carotid Stent   GI/Hepatic/Renal         Renal disease: stones       Endo/Other    Diabetes: type 2    Obesity and blood dyscrasia  Pertinent negatives: No arthritis   Other Findings   Comments: Bilateral carpal tunnel syndrome     Labored breathing while sitting upright    Bilateral 3+ ankle edema          Physical Exam    Airway  Mallampati: II  TM Distance: 4 - 6 cm  Neck ROM: normal range of motion, short neck   Mouth opening: Normal     Cardiovascular  Regular rate and rhythm,  S1 and S2 normal,  no murmur, click, rub, or gallop  Rhythm: regular  Rate: normal         Dental  No notable dental hx       Pulmonary  Breath sounds clear to auscultation               Abdominal  GI exam deferred       Other Findings            Anesthetic Plan    ASA: 4  Anesthesia type: MAC and total IV anesthesia          Induction: Intravenous  Anesthetic plan and risks discussed with: Patient      Local only.

## 2019-10-23 NOTE — TELEPHONE ENCOUNTER
Clarify with patient how he is taking the gabapentin. It was written 300mg three times a day as a trial. How often is he taking it? It does not look like he uses the MyChart.

## 2019-10-23 NOTE — ANCILLARY DISCHARGE INSTRUCTIONS
[facility] [employer] [facility]  [address] [facility] [facility], [place], operated by [address] ([facility]), [address]  Phone: [phone] Fax: [phone]    Discharge Summary 2-15    Patient name: Philip Hurley  : 1945  Provider#: [id number]  Referral source: Danw Cunningham MD      Medical/Treatment Diagnosis: Neck pain [M54.2]  Bilateral arm pain [M79.601, M79.602]     Prior Hospitalization: see medical history     Comorbidities: See Plan of Care  Prior Level of Function: See Plan of Care  Medications: Verified on Patient Summary List    Start of Care: 19      Onset Date: s/p C3-C6 ACDF May 2019   Visits from Start of Care: 23     Missed Visits: 1  Reporting Period : 19 to 10/23/19    Assessment/Summary of care: The patient has progressed with the following AROM in his Right shoulder: Flex= 80 at most; 75 today (45 at eval) with his left shoulder being [facility]. He reports being able to drive more comfortably and feels like he is stronger. As of note, since the initial evaluation, his  strength has improved from 25lbs to 30lbs average on the right, and from 39lbs to 40lbs (at most 47lbs) average on the left. He plans on getting carpal tunnel surgery on the right side tomorrow (10/24/19).      NMES was attempted on the deltoids and rotator cuff over the past few weeks to attempt to surpass 80 degrees of shoulder flexion, but the patient has essentially plateaued with his AROM on the left side. His deficits are consistent with the EMG findings of C5 nerve issues. He has full PROM bilaterally and there is no concern of a frozen shoulder developing, as he is very diligent with his advanced HEP, which he will continue independently to maintain improvements made thus far.         Short Term Goals: To be accomplished in 2 treatments:                         5.) The patient will be independent with their HEP consistently for at least one week. - MET  Long Term Goals: To be accomplished in 16 treatments:                         6.) The patient will improve his right shoulder flexion from 45 degrees to at least 70 degrees to assist with daily activities. - MET                                      New Goal: Right shoulder AROM flexion to 90 degrees- Not MET                         2.) The patient will improve his  strength from 25lbs to at least 30lbs to assist with daily activities. - MET                         2.) The patient will improve their FOTO score from 55 to at least 58 to show improvements in functional mobility.- MET (60 today)        RECOMMENDATIONS:  [x]Discontinue therapy: [x]Patient has reached or is progressing toward set goals     []Patient is non-compliant or has abdicated     [x]Due to lack of appreciable progress towards set goals     []Other  Tato Mckeon, PT 10/23/2019

## 2019-10-23 NOTE — PROGRESS NOTES
PT DAILY TREATMENT NOTE - [facility] 2-15    Patient Name: Danyel Ugalde  Date:10/23/2019  : 1945  [x]  Patient  Verified  Payor: BLUE CROSS MEDICARE / Plan: [place] [employer]IC[employer] PPO / Product Type: Managed Care Medicare /    In time: 1:10pm Out time: 2:10pm  Total Treatment Time (min): 60   Total Timed Codes (min): 60  1:1 Treatment Time ( only): 54   Visit #:  23    Treatment Area: Neck pain [M54.2]  Bilateral arm pain [M79.601, M79.602]    SUBJECTIVE  Pain Level (0-10 scale): R/L: 8/8  Any medication changes, allergies to medications, adverse drug reactions, diagnosis change, or new procedure performed?: [x] No    [] Yes (see summary sheet for update)  Subjective functional status/changes:   [] No changes reported  The patient reports that his arms and shoulders have been extra irritated with the recent weather. OBJECTIVE    Modality rationale: increase muscle contraction/control to improve the patients ability to perform daily activities.     Min Type Additional Details      30 [x] Estim: [x]Att   []Unatt    []TENS instruct                  []IFC  []Premod   [x]NMES                     []Other:  []w/US   []w/ice   []w/heat  Position: sidelying, sitting, and standing  Location: Deltoid and infraspinatus (Russian; 50Hz; 10s on/off)       []  Traction: [] Cervical       []Lumbar                       [] Prone          []Supine                       []Intermittent   []Continuous Lbs:  [] before manual  [] after manual  []w/heat    []  Ultrasound: []Continuous   [] Pulsed                       at: []1MHz   []3MHz Location:  W/cm2:    [] Paraffin         Location:   []w/heat    []  Ice     []  Heat  []  Ice massage Position:  Location:    []  Laser  []  Other: Position:  Location:      []  Vasopneumatic Device Pressure:       [] lo [] med [] hi   Temperature:      [x] Skin assessment post-treatment:  [x]intact []redness- no adverse reaction    []redness - adverse reaction:      20 min Therapeutic Exercise:  [x] See flow sheet :   Rationale: increase ROM, increase strength and improve coordination to improve the patients ability to perform daily activities. 10 min Manual Therapy: Manual resisted Sidelying abduction and eccentric emphasis with sitting 90/90 with palpation for muscle recruitment with NMES    Rationale: decrease pain, increase ROM, increase tissue extensibility, increase postural awareness and muscle fascilitation to improve the patients ability to perform daily activities. With   [x] TE   [] TA   [] neuro   [] other: Patient Education: [x] Review HEP    [x] Progressed/Changed HEP based on:   [x] positioning   [x] body mechanics   [] transfers   [] heat/ice application    [] other:      Other Objective/Functional Measures: FOTO= 60 (55 at eval)    Pain Level (0-10 scale) post treatment: R/L: 5/5    ASSESSMENT/Changes in Function:   The patient has progressed overall but has plateaued and will be discharged from therapy. []  See Plan of Care  []  See progress note/recertification  [x]  See Discharge Summary         Progress towards goals / Updated goals: The patient has MET some goals but has plateaued. PLAN  []  Upgrade activities as tolerated     []  Continue plan of care  []  Update interventions per flow sheet       [x]  Discharge due to: Pt.  Has plateaued  []  Other:_      Rosanna Sadler, PT 10/23/2019

## 2019-10-24 ENCOUNTER — APPOINTMENT (OUTPATIENT)
Dept: PHYSICAL THERAPY | Age: 74
End: 2019-10-24
Payer: MEDICARE

## 2019-10-24 ENCOUNTER — PATIENT OUTREACH (OUTPATIENT)
Dept: INTERNAL MEDICINE CLINIC | Age: 74
End: 2019-10-24

## 2019-10-24 ENCOUNTER — ANESTHESIA (OUTPATIENT)
Dept: SURGERY | Age: 74
End: 2019-10-24
Payer: MEDICARE

## 2019-10-24 ENCOUNTER — TELEPHONE (OUTPATIENT)
Dept: ENDOCRINOLOGY | Age: 74
End: 2019-10-24

## 2019-10-24 ENCOUNTER — OFFICE VISIT (OUTPATIENT)
Dept: CARDIOLOGY CLINIC | Age: 74
End: 2019-10-24

## 2019-10-24 ENCOUNTER — HOSPITAL ENCOUNTER (OUTPATIENT)
Age: 74
Setting detail: OUTPATIENT SURGERY
Discharge: HOME | End: 2019-10-24
Attending: ORTHOPAEDIC SURGERY | Admitting: ORTHOPAEDIC SURGERY
Payer: MEDICARE

## 2019-10-24 VITALS
HEIGHT: 63 IN | OXYGEN SATURATION: 94 % | HEART RATE: 89 BPM | BODY MASS INDEX: 38.45 KG/M2 | RESPIRATION RATE: 18 BRPM | SYSTOLIC BLOOD PRESSURE: 120 MMHG | WEIGHT: 217 LBS | DIASTOLIC BLOOD PRESSURE: 70 MMHG

## 2019-10-24 VITALS
RESPIRATION RATE: 24 BRPM | SYSTOLIC BLOOD PRESSURE: 145 MMHG | DIASTOLIC BLOOD PRESSURE: 93 MMHG | TEMPERATURE: 98.3 F | OXYGEN SATURATION: 95 % | HEART RATE: 84 BPM

## 2019-10-24 DIAGNOSIS — E78.00 PURE HYPERCHOLESTEROLEMIA: ICD-10-CM

## 2019-10-24 DIAGNOSIS — R60.9 DEPENDENT EDEMA: Primary | ICD-10-CM

## 2019-10-24 DIAGNOSIS — I50.22 CHRONIC SYSTOLIC CONGESTIVE HEART FAILURE (HCC): ICD-10-CM

## 2019-10-24 LAB
GLUCOSE BLD STRIP.AUTO-MCNC: 119 MG/DL (ref 65–100)
GLUCOSE BLD STRIP.AUTO-MCNC: 145 MG/DL (ref 65–100)
SERVICE CMNT-IMP: ABNORMAL
SERVICE CMNT-IMP: ABNORMAL

## 2019-10-24 PROCEDURE — 74011250636 HC RX REV CODE- 250/636: Performed by: ANESTHESIOLOGY

## 2019-10-24 PROCEDURE — 76210000040 HC AMBSU PH I REC FIRST 0.5 HR: Performed by: ORTHOPAEDIC SURGERY

## 2019-10-24 PROCEDURE — 82962 GLUCOSE BLOOD TEST: CPT

## 2019-10-24 PROCEDURE — 76060000061 HC AMB SURG ANES 0.5 TO 1 HR: Performed by: ORTHOPAEDIC SURGERY

## 2019-10-24 PROCEDURE — 77030018836 HC SOL IRR NACL ICUM -A: Performed by: ORTHOPAEDIC SURGERY

## 2019-10-24 PROCEDURE — 77030020255 HC SOL INJ LR 1000ML BG: Performed by: ORTHOPAEDIC SURGERY

## 2019-10-24 PROCEDURE — 74011000250 HC RX REV CODE- 250: Performed by: ORTHOPAEDIC SURGERY

## 2019-10-24 PROCEDURE — A4565 SLINGS: HCPCS | Performed by: ORTHOPAEDIC SURGERY

## 2019-10-24 PROCEDURE — 74011250636 HC RX REV CODE- 250/636: Performed by: ORTHOPAEDIC SURGERY

## 2019-10-24 PROCEDURE — 77030040356 HC CORD BPLR FRCP COVD -A: Performed by: ORTHOPAEDIC SURGERY

## 2019-10-24 PROCEDURE — 77030021352 HC CBL LD SYS DISP COVD -B: Performed by: ORTHOPAEDIC SURGERY

## 2019-10-24 PROCEDURE — 77030000032 HC CUF TRNQT ZIMM -B: Performed by: ORTHOPAEDIC SURGERY

## 2019-10-24 PROCEDURE — 76030000000 HC AMB SURG OR TIME 0.5 TO 1: Performed by: ORTHOPAEDIC SURGERY

## 2019-10-24 PROCEDURE — 77030002916 HC SUT ETHLN J&J -A: Performed by: ORTHOPAEDIC SURGERY

## 2019-10-24 PROCEDURE — 76210000046 HC AMBSU PH II REC FIRST 0.5 HR: Performed by: ORTHOPAEDIC SURGERY

## 2019-10-24 RX ORDER — CEFAZOLIN SODIUM/WATER 2 G/20 ML
2 SYRINGE (ML) INTRAVENOUS ONCE
Status: COMPLETED | OUTPATIENT
Start: 2019-10-24 | End: 2019-10-24

## 2019-10-24 RX ORDER — LIDOCAINE HYDROCHLORIDE 10 MG/ML
0.1 INJECTION, SOLUTION EPIDURAL; INFILTRATION; INTRACAUDAL; PERINEURAL AS NEEDED
Status: DISCONTINUED | OUTPATIENT
Start: 2019-10-24 | End: 2019-10-24 | Stop reason: HOSPADM

## 2019-10-24 RX ORDER — SODIUM CHLORIDE 0.9 % (FLUSH) 0.9 %
5-40 SYRINGE (ML) INJECTION EVERY 8 HOURS
Status: DISCONTINUED | OUTPATIENT
Start: 2019-10-24 | End: 2019-10-24 | Stop reason: HOSPADM

## 2019-10-24 RX ORDER — FUROSEMIDE 40 MG/1
40 TABLET ORAL 2 TIMES DAILY
Qty: 60 TAB | Refills: 1 | Status: SHIPPED | OUTPATIENT
Start: 2019-10-24 | End: 2020-01-03

## 2019-10-24 RX ORDER — ONDANSETRON 2 MG/ML
4 INJECTION INTRAMUSCULAR; INTRAVENOUS AS NEEDED
Status: DISCONTINUED | OUTPATIENT
Start: 2019-10-24 | End: 2019-10-24 | Stop reason: HOSPADM

## 2019-10-24 RX ORDER — OMEGA-3-ACID ETHYL ESTERS 1 G/1
1 CAPSULE, LIQUID FILLED ORAL
COMMUNITY
End: 2019-11-07

## 2019-10-24 RX ORDER — SODIUM CHLORIDE, SODIUM LACTATE, POTASSIUM CHLORIDE, CALCIUM CHLORIDE 600; 310; 30; 20 MG/100ML; MG/100ML; MG/100ML; MG/100ML
25 INJECTION, SOLUTION INTRAVENOUS CONTINUOUS
Status: DISCONTINUED | OUTPATIENT
Start: 2019-10-24 | End: 2019-10-24 | Stop reason: HOSPADM

## 2019-10-24 RX ORDER — SODIUM CHLORIDE 0.9 % (FLUSH) 0.9 %
5-40 SYRINGE (ML) INJECTION AS NEEDED
Status: DISCONTINUED | OUTPATIENT
Start: 2019-10-24 | End: 2019-10-24 | Stop reason: HOSPADM

## 2019-10-24 RX ORDER — FENTANYL CITRATE 50 UG/ML
25 INJECTION, SOLUTION INTRAMUSCULAR; INTRAVENOUS
Status: DISCONTINUED | OUTPATIENT
Start: 2019-10-24 | End: 2019-10-24 | Stop reason: HOSPADM

## 2019-10-24 RX ORDER — DIPHENHYDRAMINE HYDROCHLORIDE 50 MG/ML
12.5 INJECTION, SOLUTION INTRAMUSCULAR; INTRAVENOUS
Status: DISCONTINUED | OUTPATIENT
Start: 2019-10-24 | End: 2019-10-24 | Stop reason: HOSPADM

## 2019-10-24 RX ORDER — MORPHINE SULFATE 10 MG/ML
2 INJECTION, SOLUTION INTRAMUSCULAR; INTRAVENOUS
Status: DISCONTINUED | OUTPATIENT
Start: 2019-10-24 | End: 2019-10-24 | Stop reason: HOSPADM

## 2019-10-24 RX ADMIN — Medication 2 G: at 08:27

## 2019-10-24 RX ADMIN — SODIUM CHLORIDE, SODIUM LACTATE, POTASSIUM CHLORIDE, AND CALCIUM CHLORIDE 25 ML/HR: 600; 310; 30; 20 INJECTION, SOLUTION INTRAVENOUS at 07:44

## 2019-10-24 NOTE — TELEPHONE ENCOUNTER
I attempted to return this call and reached a voice mail. I left a message asking her to give me a call back.   Liz Rogel

## 2019-10-24 NOTE — TELEPHONE ENCOUNTER
Jose Danielson called, she wants to know if can give her a call back when you gt a chance. She stated pt's blood sugard dropped down to 50 last Tuesday. Jose Danielson can be reach @ [phone].

## 2019-10-24 NOTE — PERIOP NOTES
Sterling Grullon   1945  [id number]    Situation:  Verbal report given from: Liliya Johnson RN, Tiffanie Perez CRNA  Procedure: Procedure(s):  RIGHT OPEN CARPAL TUNNEL RELEASE (ANES LOCAL WITH MAC)    Background:    Preoperative diagnosis: BILATERAL CARPAL TUNNEL SYNDROME    Postoperative diagnosis: BILATERAL CARPAL TUNNEL SYNDROME    :  Dr. Lavell Jackson    Assistant(s): Circ-1: Eduardo Olmedo RN  Scrub Tech-1: Jung ALFARO    Specimens: * No specimens in log *    Assessment:  Intra-procedure medications         Intravenous fluids: LR@ KVO     Vital signs stable       Recommendation:    Permission to share finding with daughter in law Sharlene : yes

## 2019-10-24 NOTE — PROGRESS NOTES
1. Have you been to the ER, urgent care clinic since your last visit? Hospitalized since your last visit? No    2. Have you seen or consulted any other health care providers outside of the [address] [place] [address] since your last visit? Include any pap smears or colon screening. No    Chief Complaint   Patient presents with    Shortness of Breath     x 2 days ago    Weight Gain    Ankle swelling     left ankle        SOB:  Exertional   Pt c/o sob x 2 days with difficulty breathing and some tightness; denies pressure, wheezing. Denies chest pain/palpitations/flutters. Weight gain:  Gained 6 lbs over the past 3 days    Blood sugar this morning - 148. Blood sugar 10/21/19 - 50.

## 2019-10-24 NOTE — OP NOTES
PATIENT NAME:  Morna Meckel Sr    SURGEON:  Ligia Arteaga MD    DATE OF SURGERY:  10/24/2019    LOCATION: [facility]    PREOPERATIVE DIAGNOSIS:   right carpal tunnel syndrome    POSTOPERATIVE DIAGNOSIS:  Same    PROCEDURE:  right' Open carpal tunnel decompression             ANESTHESIA:  Local (1% lidocaine with 0.25% bupivicaine in a 1:1 mixture)    BLOOD LOSS:  Minimal    TOURNIQUET TIME:  15 min    OPERATIVE INDICATIONS: The patient is a 72 y.o. old male who has developed progressive right carpal tunnel syndrome, unresponsive to all conservative treatment. Symptoms have failed to respond consistently to conservative treatment such that patient has elected to undergo carpal tunnel decompression. He understands the alternatives to surgery, the nature of this elective procedure, the usual recovery, possible variations in healing, and the potential for shortcomings and complications (including but not exclusive to bleeding, infection, scar tenderness,  weakness, residual numbness, or thenar muscle weakness). DESCRIPTION  OF PROCEDURE: Patient identified correctly in the pre-operative holding area and correct extremity marked. Was then taken stable to the operating room and placed supine with the operative extremity on a hand table. After sedation was administered by the anesthesia team, the right hand and forearm were prepped and draped in a sterile field. A timeout was taken and the operative site was confirmed. Local anesthesia was instilled into the wound. The extremity was then elevated and exsanguinated and an upper arm tourniquet was inflated to 250 mm of mercury. An incision was made in the proximal palm in line with the radial side of the ring finger from the distal wrist crease to Kaplans line. Dissection was carried through the subcutaneous tissue and the transverse carpal ligament was visualized.   This was released under direct visualization first distally followed by proximally and carried up past the wrist wrist crease. A complete decompression was confirmed by direct visualization of the decompressed median nerve as well as palpation. No other synovial pathology was noted. The tourniquet was then released. The nerve was noted to become hyperemic. Copious irrigation was performed. Hemostasis was obtained with bipolar cautery and the wound was closed with 3-0 nylon sutures. A sterile dressing was then applied leaving the fingers free for range of motion. The patient tolerated the procedure well and was discharged to the recovery area uneventfully. All instrument needle and lap counts were correct at the end of the case.

## 2019-10-24 NOTE — ANESTHESIA POSTPROCEDURE EVALUATION
Procedure(s):  RIGHT OPEN CARPAL TUNNEL RELEASE (ANES LOCAL WITH MAC). MAC, total IV anesthesia    Anesthesia Post Evaluation        Patient location during evaluation: PACU  Note status: Adequate. Level of consciousness: responsive to verbal stimuli and sleepy but conscious  Pain management: satisfactory to patient  Airway patency: patent  Anesthetic complications: no  Cardiovascular status: acceptable  Respiratory status: acceptable  Hydration status: acceptable  Comments: +Post-Anesthesia Evaluation and Assessment    Patient: Cachorro Hernandez MRN: [id number]  SSN: [id number]   YOB: 1945  Age: 72 y.o. Sex: male      Cardiovascular Function/Vital Signs    BP (!) 145/93 (BP 1 Location: Left arm, BP Patient Position: At rest)   Pulse 84   Temp 36.8 °C (98.3 °F)   Resp 24   SpO2 95%     Patient is status post Procedure(s):  RIGHT OPEN CARPAL TUNNEL RELEASE (ANES LOCAL WITH MAC). Nausea/Vomiting: Controlled. Postoperative hydration reviewed and adequate. Pain:  Pain Scale 1: Numeric (0 - 10) (10/24/19 0921)  Pain Intensity 1: 0 (10/24/19 0921)   Managed. Neurological Status:   Neuro (WDL): Within Defined Limits (10/24/19 0921)   At baseline. Mental Status and Level of Consciousness: Arousable. Pulmonary Status:   O2 Device: Room air (10/24/19 0921)   Adequate oxygenation and airway patent. Complications related to anesthesia: None    Post-anesthesia assessment completed. No concerns.     Signed By: Torri Agustin MD    10/24/2019  Post anesthesia nausea and vomiting:  controlled      Vitals Value Taken Time   /97 10/24/2019  9:11 AM   Temp     Pulse 83 10/24/2019  9:11 AM   Resp 21 10/24/2019  9:11 AM   SpO2 95 % 10/24/2019  9:11 AM

## 2019-10-24 NOTE — TELEPHONE ENCOUNTER
Patient's daughter in [place] states she needs a call back to discuss patient getting a dosage increase on his gabapentin to increased dosage of 200 or 300 mg's. Please call to discuss.  Thank you

## 2019-10-24 NOTE — TELEPHONE ENCOUNTER
----- Message from Chaya Post sent at 10/24/2019 12:24 PM EDT -----  Regarding: Dr. Samina Jackson  Patient return call    Caller's first and last name and relationship (if not the patient): Ena Lutz/Daughter in [place] Jacobo contact number(s): [phone]      Whose call is being returned:       Details to clarify the request: requesting a call back from a missed call      Chaya Post

## 2019-10-24 NOTE — PERIOP NOTES
Permission received to review discharge instructions and discuss private health information with dtr in law, stevenson  Patient states that family will be with them for at least 24 hours following today's procedure.    Air Warming blanket placed on pt; turned on for comfort

## 2019-10-24 NOTE — PROGRESS NOTES
Roney Soriano DNP, ANP-BC  Subjective/HPI:     Scotty Metcalf is a 72 y.o. male is here for symptom based appointment. Patient was at outpatient surgery center this morning for carpal tunnel repair. Had difficulty laying flat due to orthopnea, admitted to a 6 pound weight gain in a short period of time. The surgery was done under local block and was sent to the clinic today for further evaluation. Patient reports compliance to medications however he states he is taking half of the pink tablet that he takes twice a week which I am suspecting his metolazone. He supposed to be taking 2.5 mg twice a week, previously had a 5 mg tablet was changed to 2.5 mg dose. I question if he is breaking the metolazone 2.5 mg tablets in half as well. Fails to confirm weight gain. He presents with dependent edema.     PCP Provider  Tuan Gonzalez MD  Past Medical History:   Diagnosis Date    Arthritis     Asthma     CAD (coronary artery disease)     Calculus of kidney     Carotid artery disease (HCC)     Cervical herniated disc     Chronic systolic (congestive) heart failure (HCC)     Diabetes ([address]     Diabetic shock due to low blood sugar ([address] 2016    Glaucoma     pt is on drops unsure of what the names are    Hypercholesterolemia     Hypertension     Morbid obesity ([address]     Rheumatic fever     When he was 17    Sleep apnea     compliant with cpap as stated 9/6/2019      Past Surgical History:   Procedure Laterality Date    HX CAROTID STENT  1990's    with cath    HX CERVICAL FUSION  05/21/2019    C3- C6 ACFD     HX CHOLECYSTECTOMY      HX CIRCUMCISION      HX HEART CATHETERIZATION      x 5 total, 4 in heart, 1 in carotid    HX HEENT Left     ear torn off and repaired    HX ORTHOPAEDIC Right     repair    HX OTHER SURGICAL Right     Right hand reconstruction    HX SHOULDER ARTHROSCOPY Right     dislocated with fall, came out, he stated surgery put back in place and sowed him up Allergies   Allergen Reactions    Morphine Anaphylaxis     Pt states \"morphine gave me heart failure\"    Gabapentin Other (comments)     Made him \"loopy\"    Lyrica [Pregabalin] Other (comments)     Makes him \"loopy\"      Family History   Problem Relation Age of Onset    Heart Disease Brother     Diabetes Nephew     Hypertension Mother       Current Outpatient Medications   Medication Sig    furosemide (LASIX) 40 mg tablet Take 1 Tab by mouth two (2) times a day. Increased 10/24/19    isosorbide mononitrate ER (IMDUR) 60 mg CR tablet Take 1 Tab by mouth daily.  lisinopril (PRINIVIL, ZESTRIL) 2.5 mg tablet Take 1 Tab by mouth daily.  carvedilol (COREG) 12.5 mg tablet Take 1 Tab by mouth two (2) times a day.  gabapentin (NEURONTIN) 100 mg capsule Take 1 Cap by mouth three (3) times daily. AS DIRECTED.  DULoxetine (CYMBALTA) 60 mg capsule TAKE 1 CAPSULE BY MOUTH ONCE DAILY FOR  FEET  PAIN    acetaminophen (TYLENOL) 500 mg tablet Take 1,000 mg by mouth every six (6) hours as needed for Pain.  omega 3-DHA-EPA-fish oil (FISH OIL) 1,000 mg (120 mg-180 mg) capsule Take 1 Cap by mouth two (2) times a day.  metOLazone (ZAROXOLYN) 5 mg tablet Take 2.5 mg by mouth every Tuesday and Thursday.  glimepiride (AMARYL) 1 mg tablet Take 1 mg by mouth Daily (before breakfast).  simvastatin (ZOCOR) 10 mg tablet TAKE 1 TABLET BY MOUTH NIGHTLY    metFORMIN ER (GLUCOPHAGE XR) 500 mg tablet TAKE 1 TABLET BY MOUTH BEFORE BREAKFAST AND  DINNER    cholecalciferol, VITAMIN D3, (VITAMIN D3) 5,000 unit tab tablet Take 1 Tab by mouth daily.  aspirin delayed-release 81 mg tablet Take 81 mg by mouth daily.  omega-3 acid ethyl esters (LOVAZA) 1 gram capsule Take 1 Cap by mouth.  omega-3 fatty acids 1,000 mg cap Take 1 Cap by mouth.  DUREZOL 0.05 % ophthalmic emulsion     ILEVRO 0.3 % drps     dextran 70/hypromellose/PF (NATURAL TEARS, PF, OP) Apply 1 Drop to eye as needed.      No current facility-administered medications for this visit. Vitals:    10/24/19 1050   BP: 120/70   Pulse: 89   Resp: 18   SpO2: 94%   Weight: 217 lb (98.4 kg)   Height: 5' 3\" (1.6 m)     Social History     Socioeconomic History    Marital status:      Spouse name: Not on file    Number of children: Not on file    Years of education: Not on file    Highest education level: Not on file   Occupational History    Not on file   Social Needs    Financial resource strain: Not on file    Food insecurity:     Worry: Not on file     Inability: Not on file    Transportation needs:     Medical: Not on file     Non-medical: Not on file   Tobacco Use    Smoking status: Former Smoker     Packs/day: 5.00     Last attempt to quit: 1963     Years since quittin.5    Smokeless tobacco: Never Used   Substance and Sexual Activity    Alcohol use: No     Alcohol/week: 0.0 standard drinks    Drug use: Never    Sexual activity: Yes     Partners: Female     Birth control/protection: None   Lifestyle    Physical activity:     Days per week: Not on file     Minutes per session: Not on file    Stress: Not on file   Relationships    Social connections:     Talks on phone: Not on file     Gets together: Not on file     Attends Rastafarian service: Not on file     Active member of club or organization: Not on file     Attends meetings of clubs or organizations: Not on file     Relationship status: Not on file    Intimate partner violence:     Fear of current or ex partner: Not on file     Emotionally abused: Not on file     Physically abused: Not on file     Forced sexual activity: Not on file   Other Topics Concern    Not on file   Social History Narrative    ** Merged History Encounter **            I have reviewed the nurses notes, vitals, problem list, allergy list, medical history, family, social history and medications. Review of Symptoms:    General: Pt denies excessive weight gain or loss.  Pt is able to conduct ADL's  HEENT: Denies blurred vision, headaches, epistaxis and difficulty swallowing. Respiratory: Denies shortness of breath, ROLLE, wheezing or stridor. Cardiovascular: Denies precordial pain, palpitations,+ edema +PND  Gastrointestinal: Denies poor appetite, indigestion, abdominal pain or blood in stool  Musculoskeletal: Denies pain or swelling from muscles or joints  Neurologic: Denies tremor, paresthesias, or sensory motor disturbance  Skin: Denies rash, itching or texture change. Physical Exam:      General: Well developed, in no acute distress, cooperative and alert  HEENT: No carotid bruits, no JVD, trach is midline. Neck Supple, PERRL. Heart:  Normal S1/S2 negative S3 or S4. Regular, no murmur, gallop or rub. Respiratory: Clear bilaterally x 4, no wheezing or rales  Abdomen:   Soft, non-tender, no masses, bowel sounds are active. Extremities: +2 bilateral pitting ankle edema, normal cap refill, no cyanosis, atraumatic. Neuro: A&Ox3, speech clear, gait stable. Skin: Skin color is normal. No rashes or lesions. Non diaphoretic  Vascular: 2+ pulses symmetric in all extremities    Cardiographics    ECG: Sinus rhythm  No results found for this or any previous visit.       Cardiology Labs:  Lab Results   Component Value Date/Time    Cholesterol, total 116 05/10/2019 01:46 PM    HDL Cholesterol 43 05/10/2019 01:46 PM    LDL, calculated 49 05/10/2019 01:46 PM    Triglyceride 118 05/10/2019 01:46 PM       Lab Results   Component Value Date/Time    Sodium 142 10/21/2019 11:18 AM    Potassium 4.3 10/21/2019 11:18 AM    Chloride 107 10/21/2019 11:18 AM    CO2 31 10/21/2019 11:18 AM    Anion gap 4 (L) 10/21/2019 11:18 AM    Glucose 112 (H) 10/21/2019 11:18 AM    BUN 39 (H) 10/21/2019 11:18 AM    Creatinine 1.22 10/21/2019 11:18 AM    BUN/Creatinine ratio 32 (H) 10/21/2019 11:18 AM    GFR est AA >60 10/21/2019 11:18 AM    GFR est non-AA 59 (L) 10/21/2019 11:18 AM    Calcium 9.0 10/21/2019 11:18 AM Bilirubin, total 0.2 09/12/2019 04:32 PM    AST (SGOT) 11 09/12/2019 04:32 PM    Alk. phosphatase 70 09/12/2019 04:32 PM    Protein, total 6.4 09/12/2019 04:32 PM    Albumin 3.8 09/12/2019 04:32 PM    Globulin 3.4 05/16/2019 09:56 AM    A-G Ratio 1.5 09/12/2019 04:32 PM    ALT (SGPT) 17 09/12/2019 04:32 PM           Assessment:     Assessment:     Diagnoses and all orders for this visit:    1. Dependent edema  -     METABOLIC PANEL, COMPREHENSIVE; Future  -     NT-PRO BNP; Future  -     ECHO ADULT COMPLETE; Future    2. Pure hypercholesterolemia  -     AMB POC EKG ROUTINE W/ 12 LEADS, INTER & REP  -     METABOLIC PANEL, COMPREHENSIVE; Future  -     NT-PRO BNP; Future  -     ECHO ADULT COMPLETE; Future    3. Chronic systolic congestive heart failure (HCC)    Other orders  -     furosemide (LASIX) 40 mg tablet; Take 1 Tab by mouth two (2) times a day. Increased 10/24/19        ICD-10-CM ICD-9-CM    1. Dependent edema C87.8 033.6 METABOLIC PANEL, COMPREHENSIVE      NT-PRO BNP      ECHO ADULT COMPLETE   2. Pure hypercholesterolemia E78.00 272.0 AMB POC EKG ROUTINE W/ 12 LEADS, INTER & REP      METABOLIC PANEL, COMPREHENSIVE      NT-PRO BNP      ECHO ADULT COMPLETE   3. Chronic systolic congestive heart failure (HCC) I50.22 428.22      428.0      Orders Placed This Encounter    METABOLIC PANEL, COMPREHENSIVE     Standing Status:   Future     Standing Expiration Date:   4/24/2020    NT-PRO BNP     Standing Status:   Future     Standing Expiration Date:   4/22/2020    AMB POC EKG ROUTINE W/ 12 LEADS, INTER & REP     Order Specific Question:   Reason for Exam:     Answer:   Routine    omega-3 acid ethyl esters (LOVAZA) 1 gram capsule     Sig: Take 1 Cap by mouth.  furosemide (LASIX) 40 mg tablet     Sig: Take 1 Tab by mouth two (2) times a day.  Increased 10/24/19     Dispense:  60 Tab     Refill:  1     Please consider 90 day supplies to promote better adherence        Plan:     Patient is a 74-year-old male with a history of systolic heart failure presenting with increasing dependent edema, weight gain and orthopnea. Stable for outpatient management I am increasing furosemide to 40 mg twice a day. He will remain on Zaroxolyn 2.5 mg every Tuesday and Thursday, daughter will verify the dosing of his Zaroxolyn as I suspect he may be cutting the 2.5 mg tablet in half. Follow-up with me in 2 weeks with proBNP and metabolic panel 2 days prior to consult, echocardiogram within the next 2 weeks to reevaluate ejection fraction. Continue daily weights, sodium restriction carbohydrate restriction. Ayse Gamez, ELIZABETH      Please note that this dictation was completed with [employer], the computer voice recognition software. Quite often unanticipated grammatical, syntax, homophones, and other interpretive errors are inadvertently transcribed by the computer software. Please disregard these errors. Please excuse any errors that have escaped final proofreading. Thank you.

## 2019-10-24 NOTE — PERIOP NOTES
Pt is ROLLE; is having work of breathing when talking. Unable to complete sentences w/o having to stop for a deep breath.

## 2019-10-24 NOTE — PROGRESS NOTES
Goals Addressed                 This Visit's Progress       Chronic Disease     Maintains appointments        7/17/2019  - maintaining follow-up with Endocrinologist, Dr. Chhaya Barboza; most recent office visit 5/17/19, next scheduled appt 9/12/19  - to schedule f/u with Cardiology; daughter-in-law will contact office today to schedule this appointment  - was referred to Dermatology by PCP for multiple acquired skin tags. Daughter-in-law reports that patient does not intend to f/u with Dermatologist at this time as skin tag removal will be classified as cosmetic procedure and patient is on a fixed income. - routine 3 month follow-up with PCP scheduled for 9/11/19  - daughter-in-law reports patient attended office visit with Dr. Mary Lou Mario last week; next scheduled appt 10/17  - MRI Cervical Spine w/o contrast scheduled for 10/16 (ordered by Dr. Mary Lou Mario)  - daughter-in-law to schedule \"nerve study\" as ordered by Dr. Mary Lou Mario    9/19/2019   - attended [facility] with Ortho 8/21/19, 8/23/19  - most recent OV with PCP 9/11/19; bmp collected - kidney function improved from 4 months ago, increase water intake, avoid OTC anti-inflammatories, f/u with PCP 6 months  - attended [facility] with Dr. Chhaya Barboza 9/12/19  - still has not scheduled/attended Outpatient f/u with Cardiology; most recent OV 5/3/19 with Dr. Renate Diggs for pre-op clearance, most recent routine f/u was 11/14/18 and pt to f/u in 6 months  - pt notified of need to schedule routine f/u with Cardiology; office contact information provided. pt will contact Cardiology this week to schedule routine f/u appt.       9/26/2019  - no appt scheduled with Cardiology    9/27/2019  - notified spouse that patient is overdue for routine f/u with Cardiology; spouse will have daughter-in-law contact Cardiology office to schedule this appointment and scheduling is dependent upon her availability to provide patient transportation  - NN f/u 2 weeks    10/10/2019  - no future appointments with Cardiology are scheduled at this time    10/24/2019  - OV scheduled with [facility] 11/7/19  - NN f/u two weeks to confirm appt attended          Future Appointments:  Future Appointments   Date Time Provider Magda Graf   10/31/2019  2:30 PM ECHO, [address]   11/7/2019  9:00 AM Olga Lizama NP [facility] PERRY SCHED   11/8/2019  8:00 AM [facility]   12/17/2019  4:10 PM Frederick Oppenheim, MD RDE NATY [address]   3/11/2020  2:00 PM Nelly Mejia MD [address]   3/26/2020  2:00 PM Chacho Mcgowan  [facility] [address]   10/14/2020 11:30 AM Brenda Centeno MD [address] [facility]        Last Appointment With Me:  Visit date not found     Last Appointment My Department:  9/11/2019

## 2019-10-24 NOTE — H&P
This is a 72-year-old gentleman who presents today in follow-up for his bilateral carpal tunnel syndrome. I last thumb on 08/23/2018 injected bilateral wrist.  He reports this lasted about a week and took away about 50-75% of his problems. Symptoms have subsequently recurred and worsened. PMH, PSH, ROS reviewed on prior intake form     Objective:   Constitutional:  No acute distress. Well nourished. Well developed. Eyes:  Sclera are nonicteric. Respiratory:  No labored breathing. Cardiovascular:  No marked edema. Skin:  No marked skin ulcers. Neurological:  see below  Psychiatric: Alert and oriented x3. Musculoskeletal   Examination of bilateral hands is notable for decreased sensation in the median nerve distribution and a positive Tinel's at the wrist.     Radiographs:       No imaging obtained      Assessment:      1. Bilateral carpal tunnel syndrome          Plan: At this time he does desire surgical management. Risks benefits alternatives of an open carpal tunnel release are discussed with him as well as postoperative course and he consents to proceed. A surgical date is chosen for the 1st side. Date of Surgery Update:  Estrella Boyce was seen and examined. In fluid overload but stable per Dr. Lucio Forward and patient.  Will proceed with case under monitoring but no sedation, just local.     Signed By: Masoud Stevens MD     October 24, 2019 8:06 AM

## 2019-10-24 NOTE — PERIOP NOTES
0904-Pt. To pacu, awake, alert. Vss. Denies pain. Dressing to right hand intact. 0915-Pts. Daughter in law at bedside. Discharge instructions reviewed w/pt. And daughter in law. They verbalized understanding. Pt. States has gained 6lbs in 3 days and has been sob. Discussed w/pt. And daughter in law importance of following up w/cardiologist.  She stated would call today and make follow up. Pt. Did not take lasix this morning. Stated would take as soon as he gets home. Lungs clear. 0936-Pt. Assisted to bathroom to void. Vss. Denies pain. Sling applied to right arm. Pt. And daughter in law stated ready for discharge. Pt discharged via wheelchair to car, accompanied by RN. Pt discharged awake and alert, respirations equal and unlabored, skin warm, dry, and intact. Pt and family members' questions and concerns addressed prior to discharge.

## 2019-10-24 NOTE — TELEPHONE ENCOUNTER
I returned this call and Frederick Stone said that 4 days ago, Mr. Mcallister Fees blood sugar dropped to 50. She says it hasn't happened since and said his number usually run in the low 100's. She was at Physical Therapy and had to go but said she would keep track of his numbers and get back to us if it happens again.   Shala Santiago

## 2019-10-24 NOTE — DISCHARGE INSTRUCTIONS
[facility]  Post-operative instructions  For: Daljit Manrique first postop appointment should be scheduled with Dr. Kael Harrell for 2-3 weeks post-op. [address] [facility], [address]  [place]  Phone: [phone]  Hand Therapy Phone: [phone]  Fax: [phone]    Please follow these instructions for a safe and speedy recovery:    1. Surgical Bandage: Leave the bandage in place until 2 weeks after surgery. Please keep it clean and dry. To shower or bathe, apply a plastic bag or GLAD Press'n Seal® plastic wrap around the bandage or simply sponge bathe. After 2 weeks, you can remove the dressing and get incision wet but NO SOAKING. 2. Elevation: Hand swelling is best prevented by keeping your hand elevated above the level of your heart at all times, night and day. The opposite, dangling your hand below your waist, will cause additional pain, swelling, and later stiffness. You can elevate the hand in a sling or by propping it on a pillow at night. Occasionally, we will provide you with a custom-made foam block for elevating the arm. Ice compresses may help but do not rep[lace elevation. Frequently, extreme pain is caused by a tight bandage, which should be loosened. If pain is severe and progressive, call us at [phone] during the day (ask for immediate connection to Dr. Thirery Bustos Team) or during the night (ask for the on-call physician). 3. Medication: You will be provided with an appropriate pain medication (over-the-counter or prescription). Please fill this at a pharmacy promptly so you will have it available when all local anesthetic wears off. Take this to relieve pain as directed on the bottle. Please refrain from driving, drinking alcohol, and making important medical decisions while taking the medication. Please call us if you need something stronger.  Medication changes or refills must be made before 5pm or through your pharmacy. 4. Weight bearing: Do NOT bear any weight on the operative extremity for the first 2 weeks after surgery. After 2 weeks, you have a 5 pound weight lifting restriction. I want to thank you for choosing us for your hand care needs. My staff and I are committed to providing all our customers with the highest quality hand surgery and subsequent hand therapy care as possible. We want your recovery to be comfortable. If you have clinical non-emergent questions about your surgery or other hand conditions please call [phone] and ask for my team. Your call will be returned within 24 hours. DISCHARGE SUMMARY from Nurse    The following personal items collected during your admission are returned to you:   Dental Appliance: Dental Appliances: None  Vision: Visual Aid: None  Hearing Aid:    Jewelry: Jewelry: Ring(wedding band on left hand)  Clothing: Clothing: With patient  Other Valuables: Other Valuables: Cell Phone, Wallet(with dil)  Valuables sent to safe: If you were given prescriptions, please review the written information on prescribed medications. · You will receive a Post Operative Call from one of the Recovery Room Nurses on the day after your surgery to check on you. It is very important for us to know how you are recovering after your surgery. · You may receive an e-mail or letter in the mail from [employer] regarding your experience with us in the Ambulatory Surgery Unit. Your feedback is valuable to us and we appreciate your participation in the survey. If you have not had your influenza or pneumococcal vaccines, please follow up with your primary care physician. The discharge information has been reviewed with the patient and caregiver. The patient and caregiver verbalized understanding.

## 2019-10-25 ENCOUNTER — TELEPHONE (OUTPATIENT)
Dept: INTERNAL MEDICINE CLINIC | Age: 74
End: 2019-10-25

## 2019-10-25 NOTE — TELEPHONE ENCOUNTER
Please call daughter and clarify how he is taking the gabapentin. He was given 100mg TID as a trial.  She said he is taking 200mg dose.

## 2019-10-25 NOTE — TELEPHONE ENCOUNTER
Called, spoke to pt. Two pt identifiers confirmed. Pt states he is taking 100 mg TID. Pt wants to increase dose. Pt states he is still having left wrist numbness and pain everyday. [address] [place] [address] Rd wife is concerned that the higher dose maybe too much. Pt verbalized understanding of information discussed w/ no further questions at this time.

## 2019-10-28 ENCOUNTER — TELEPHONE (OUTPATIENT)
Dept: INTERNAL MEDICINE CLINIC | Age: 74
End: 2019-10-28

## 2019-10-28 ENCOUNTER — DOCUMENTATION ONLY (OUTPATIENT)
Dept: INTERNAL MEDICINE CLINIC | Age: 74
End: 2019-10-28

## 2019-10-28 DIAGNOSIS — M79.2 NEUROPATHIC PAIN: ICD-10-CM

## 2019-10-28 RX ORDER — GABAPENTIN 100 MG/1
200 CAPSULE ORAL 2 TIMES DAILY
Qty: 30 CAP | Refills: 1 | Status: SHIPPED | OUTPATIENT
Start: 2019-10-28 | End: 2019-10-28 | Stop reason: SDUPTHER

## 2019-10-28 RX ORDER — GABAPENTIN 100 MG/1
200 CAPSULE ORAL 2 TIMES DAILY
Qty: 120 CAP | Refills: 1 | Status: SHIPPED | OUTPATIENT
Start: 2019-10-28 | End: 2019-11-04 | Stop reason: SDUPTHER

## 2019-10-29 ENCOUNTER — APPOINTMENT (OUTPATIENT)
Dept: PHYSICAL THERAPY | Age: 74
End: 2019-10-29
Payer: MEDICARE

## 2019-10-31 ENCOUNTER — APPOINTMENT (OUTPATIENT)
Dept: PHYSICAL THERAPY | Age: 74
End: 2019-10-31
Payer: MEDICARE

## 2019-10-31 LAB
ALBUMIN SERPL-MCNC: 4 G/DL (ref 3.5–4.8)
ALBUMIN/GLOB SERPL: 1.7 {RATIO} (ref 1.2–2.2)
ALP SERPL-CCNC: 67 IU/L (ref 39–117)
ALT SERPL-CCNC: 15 IU/L (ref 0–44)
AST SERPL-CCNC: 17 IU/L (ref 0–40)
BILIRUB SERPL-MCNC: 0.4 MG/DL (ref 0–1.2)
BUN SERPL-MCNC: 34 MG/DL (ref 8–27)
BUN/CREAT SERPL: 26 (ref 10–24)
CALCIUM SERPL-MCNC: 9.6 MG/DL (ref 8.6–10.2)
CHLORIDE SERPL-SCNC: 98 MMOL/L (ref 96–106)
CO2 SERPL-SCNC: 26 MMOL/L (ref 20–29)
CREAT SERPL-MCNC: 1.32 MG/DL (ref 0.76–1.27)
GLOBULIN SER CALC-MCNC: 2.4 G/DL (ref 1.5–4.5)
GLUCOSE SERPL-MCNC: 105 MG/DL (ref 65–99)
INTERPRETATION: NORMAL
NT-PROBNP SERPL-MCNC: 702 PG/ML (ref 0–376)
POTASSIUM SERPL-SCNC: 4.4 MMOL/L (ref 3.5–5.2)
PROT SERPL-MCNC: 6.4 G/DL (ref 6–8.5)
SODIUM SERPL-SCNC: 144 MMOL/L (ref 134–144)

## 2019-11-04 ENCOUNTER — TELEPHONE (OUTPATIENT)
Dept: ENDOCRINOLOGY | Age: 74
End: 2019-11-04

## 2019-11-04 DIAGNOSIS — M79.2 NEUROPATHIC PAIN: ICD-10-CM

## 2019-11-04 RX ORDER — GABAPENTIN 100 MG/1
CAPSULE ORAL
Qty: 30 CAP | Refills: 2 | Status: SHIPPED | OUTPATIENT
Start: 2019-11-04 | End: 2020-01-24

## 2019-11-04 NOTE — TELEPHONE ENCOUNTER
Deborah Salinas,    . Jaida Slaughter would like a script sent to [facility] for 3 custom inserts for his shoes and one pair of diabetic shoes or sneakers.       Contact # [phone]

## 2019-11-05 ENCOUNTER — TELEPHONE (OUTPATIENT)
Dept: ENDOCRINOLOGY | Age: 74
End: 2019-11-05

## 2019-11-05 NOTE — TELEPHONE ENCOUNTER
I returned this call and asked Ms. Modesto Michael to have Rigo Ledezma fax us the forms they needed filled out. She will do this.   Maikol Tejada

## 2019-11-05 NOTE — TELEPHONE ENCOUNTER
Narayan Cruz, wife, called to say that Ana will be sending more paperwork for her . They really need for Dr. Gómez Arevalo to say that the patient has to have these diabetic shoes and inserts. Ping Talavera can be reached at:  [phone].

## 2019-11-05 NOTE — TELEPHONE ENCOUNTER
The foot exam we performed previously demonstrated feeling in his feet without ulceration. He will need to have his podiatrist (if he is seeing one) to send us a copy of their examination and prescription for shoes, and we will send it along with the  request. I have not seen the  request but will look out for it. Jeremy Zelaya.  [address]

## 2019-11-06 ENCOUNTER — TELEPHONE (OUTPATIENT)
Dept: ENDOCRINOLOGY | Age: 74
End: 2019-11-06

## 2019-11-06 NOTE — TELEPHONE ENCOUNTER
I called Ms. Britton Casper back and got the information regarding Mr. Jaden Aguilar foot doctor. She said it was Dr. Jony Modi at [phone]. I called that office and it was closed due to him being in surgery. I left a message asking someone to call me back when they are in the office.   Bethel Snell

## 2019-11-06 NOTE — TELEPHONE ENCOUNTER
I attempted to return this call and reached the voice mail again. I left another message asking them to give me a call back.    Andriy Hutchins

## 2019-11-06 NOTE — TELEPHONE ENCOUNTER
41 minutes ago (3:25 PM)         I attempted to return this call and reached the voice mail again. I left another message asking them to give me a call back.    [address] routed conversation to Solitario Lopez 2 hours ago (1:11 PM)      Shaquille Hernandes 2 hours ago (1:10 PM)         Annamaria Fee with Dr. Turner Collar office returned you call.        Contact # [phone]

## 2019-11-07 ENCOUNTER — TELEPHONE (OUTPATIENT)
Dept: ENDOCRINOLOGY | Age: 74
End: 2019-11-07

## 2019-11-07 ENCOUNTER — OFFICE VISIT (OUTPATIENT)
Dept: CARDIOLOGY CLINIC | Age: 74
End: 2019-11-07

## 2019-11-07 VITALS
SYSTOLIC BLOOD PRESSURE: 128 MMHG | HEIGHT: 62 IN | HEART RATE: 88 BPM | RESPIRATION RATE: 18 BRPM | BODY MASS INDEX: 39.45 KG/M2 | OXYGEN SATURATION: 92 % | DIASTOLIC BLOOD PRESSURE: 78 MMHG | WEIGHT: 214.4 LBS

## 2019-11-07 DIAGNOSIS — I50.22 CHRONIC SYSTOLIC CONGESTIVE HEART FAILURE (HCC): Primary | ICD-10-CM

## 2019-11-07 DIAGNOSIS — E78.00 PURE HYPERCHOLESTEROLEMIA: ICD-10-CM

## 2019-11-07 DIAGNOSIS — R60.9 DEPENDENT EDEMA: ICD-10-CM

## 2019-11-07 NOTE — TELEPHONE ENCOUNTER
I called Dr. Panda Rene office and spoke with them. They will fax over the information needed.   Ramila Howard

## 2019-11-07 NOTE — PROGRESS NOTES
Desmond Vaz DNP, ANP-BC  Subjective/HPI:     Alisa Mckay is a 72 y.o. male is here for 2-week follow-up from exacerbation of diastolic heart failure. To recall he was at outpatient setting and developed orthopnea and was sent to the clinic. Weight was slightly elevated at an increased furosemide to 40 mg twice a day, confirmed that patient had been taking 2.5 mg Zaroxolyn on Tuesdays and Thursdays as directed. With the higher dose of diuretics his creatinine is stable at 1.32, echocardiogram showed ejection fraction of 60% with grade 1 diastolic dysfunction. Since the uptitrate of diuretics patient reports feeling well, denies orthopnea however sleeps typically with a few pillows for regular comfort. Denies edema. 2018 cardiac catheterization:  SUMMARY:    --  CORONARY CIRCULATION:  --  Proximal LAD: There was a 30 % stenosis. --  Distal LAD: There was a 40 % stenosis. --  1st diagonal: There was a 30 % stenosis. --  Mid circumflex: There was a discrete 25 % stenosis.     PCP Provider  Angelina Kumar MD  Past Medical History:   Diagnosis Date    Arthritis     Asthma     CAD (coronary artery disease)     Calculus of kidney     Carotid artery disease (HCC)     Cervical herniated disc     Chronic systolic (congestive) heart failure (HCC)     Diabetes ([address]     Diabetic shock due to low blood sugar ([address] 2016    Glaucoma     pt is on drops unsure of what the names are    Hypercholesterolemia     Hypertension     Morbid obesity ([address]     Rheumatic fever     When he was 17    Sleep apnea     compliant with cpap as stated 9/6/2019      Past Surgical History:   Procedure Laterality Date    HX CAROTID STENT  1990's    with cath    HX CERVICAL FUSION  05/21/2019    C3- C6 ACFD     HX CHOLECYSTECTOMY      HX CIRCUMCISION      HX HEART CATHETERIZATION      x 5 total, 4 in heart, 1 in carotid    HX HEENT Left     ear torn off and repaired    HX ORTHOPAEDIC Right     repair   [place] [facility] HX OTHER SURGICAL Right     Right hand reconstruction    HX SHOULDER ARTHROSCOPY Right     dislocated with fall, came out, he stated surgery put back in place and sowed him up     Allergies   Allergen Reactions    Morphine Anaphylaxis     Pt states \"morphine gave me heart failure\"    Gabapentin Other (comments)     Made him \"loopy\"    Lyrica [Pregabalin] Other (comments)     Makes him \"loopy\"      Family History   Problem Relation Age of Onset    Heart Disease Brother     Diabetes Nephew     Hypertension Mother       Current Outpatient Medications   Medication Sig    simvastatin (ZOCOR) 10 mg tablet TAKE 1 TABLET BY MOUTH NIGHTLY    metOLazone (ZAROXOLYN) 2.5 mg tablet Take 1 Tab by mouth every Tuesday and Thursday.  gabapentin (NEURONTIN) 100 mg capsule TAKE 1 CAPSULE BY MOUTH THREE TIMES DAILY AS DIRECTED    omega-3 acid ethyl esters (LOVAZA) 1 gram capsule Take 1 Cap by mouth.  furosemide (LASIX) 40 mg tablet Take 1 Tab by mouth two (2) times a day. Increased 10/24/19    omega-3 fatty acids 1,000 mg cap Take 1 Cap by mouth.  isosorbide mononitrate ER (IMDUR) 60 mg CR tablet Take 1 Tab by mouth daily.  lisinopril (PRINIVIL, ZESTRIL) 2.5 mg tablet Take 1 Tab by mouth daily.  carvedilol (COREG) 12.5 mg tablet Take 1 Tab by mouth two (2) times a day.  DUREZOL 0.05 % ophthalmic emulsion     ILEVRO 0.3 % drps     dextran 70/hypromellose/PF (NATURAL TEARS, PF, OP) Apply 1 Drop to eye as needed.  DULoxetine (CYMBALTA) 60 mg capsule TAKE 1 CAPSULE BY MOUTH ONCE DAILY FOR  FEET  PAIN    acetaminophen (TYLENOL) 500 mg tablet Take 1,000 mg by mouth every six (6) hours as needed for Pain.  omega 3-DHA-EPA-fish oil (FISH OIL) 1,000 mg (120 mg-180 mg) capsule Take 1 Cap by mouth two (2) times a day.  metOLazone (ZAROXOLYN) 5 mg tablet Take 2.5 mg by mouth every Tuesday and Thursday.  glimepiride (AMARYL) 1 mg tablet Take 1 mg by mouth Daily (before breakfast).     metFORMIN ER (GLUCOPHAGE XR) 500 mg tablet TAKE 1 TABLET BY MOUTH BEFORE BREAKFAST AND  DINNER    cholecalciferol, VITAMIN D3, (VITAMIN D3) 5,000 unit tab tablet Take 1 Tab by mouth daily.  aspirin delayed-release 81 mg tablet Take 81 mg by mouth daily. No current facility-administered medications for this visit.        Vitals:    19 0905   Weight: 214 lb 6.4 oz (97.3 kg)   Height: 5' 2\" (1.575 m)     Social History     Socioeconomic History    Marital status:      Spouse name: Not on file    Number of children: Not on file    Years of education: Not on file    Highest education level: Not on file   Occupational History    Not on file   Social Needs    Financial resource strain: Not on file    Food insecurity:     Worry: Not on file     Inability: Not on file    Transportation needs:     Medical: Not on file     Non-medical: Not on file   Tobacco Use    Smoking status: Former Smoker     Packs/day: 5.00     Last attempt to quit: 1963     Years since quittin.5    Smokeless tobacco: Never Used   Substance and Sexual Activity    Alcohol use: No     Alcohol/week: 0.0 standard drinks    Drug use: Never    Sexual activity: Yes     Partners: Female     Birth control/protection: None   Lifestyle    Physical activity:     Days per week: Not on file     Minutes per session: Not on file    Stress: Not on file   Relationships    Social connections:     Talks on phone: Not on file     Gets together: Not on file     Attends [employer] service: Not on file     Active member of club or organization: Not on file     Attends meetings of clubs or organizations: Not on file     Relationship status: Not on file    Intimate partner violence:     Fear of current or ex partner: Not on file     Emotionally abused: Not on file     Physically abused: Not on file     Forced sexual activity: Not on file   Other Topics Concern    Not on file   Social History Narrative    ** Merged History Encounter ** I have reviewed the nurses notes, vitals, problem list, allergy list, medical history, family, social history and medications. Review of Symptoms:    General: Pt denies excessive weight gain or loss. Pt is able to conduct ADL's  HEENT: Denies blurred vision, headaches, epistaxis and difficulty swallowing. Respiratory: Denies shortness of breath, ROLLE, wheezing or stridor. Cardiovascular: Denies precordial pain, palpitations, edema or PND  Gastrointestinal: Denies poor appetite, indigestion, abdominal pain or blood in stool  Musculoskeletal: Has diffuse musculoskeletal pain, right hand is wrapped in Ace bandage today  Neurologic: Denies tremor, paresthesias, or sensory motor disturbance  Skin: Denies rash, itching or texture change. Physical Exam:      General: Well developed, in no acute distress, cooperative and alert  HEENT: No carotid bruits, no JVD, trach is midline. Neck Supple,   Heart:  Normal S1/S2 negative S3 or S4. Regular, no murmur, gallop or rub. Respiratory: Clear bilaterally x 4, no wheezing or rales  Abdomen:   Soft, non-tender, no masses, bowel sounds are active. Extremities: Trace amount of ankle edema, normal cap refill, no cyanosis, atraumatic. Neuro: A&Ox3, speech clear, gait stable. Skin: Skin color is normal. No rashes or lesions. Non diaphoretic  Vascular: 2+ pulses symmetric in all extremities    Cardiographics    ECG:   No results found for this or any previous visit.       Cardiology Labs:  Lab Results   Component Value Date/Time    Cholesterol, total 116 05/10/2019 01:46 PM    HDL Cholesterol 43 05/10/2019 01:46 PM    LDL, calculated 49 05/10/2019 01:46 PM    Triglyceride 118 05/10/2019 01:46 PM       Lab Results   Component Value Date/Time    Sodium 144 10/30/2019 10:59 AM    Potassium 4.4 10/30/2019 10:59 AM    Chloride 98 10/30/2019 10:59 AM    CO2 26 10/30/2019 10:59 AM    Anion gap 4 (L) 10/21/2019 11:18 AM    Glucose 105 (H) 10/30/2019 10:59 AM    BUN 34 (H) 10/30/2019 10:59 AM    Creatinine 1.32 (H) 10/30/2019 10:59 AM    BUN/Creatinine ratio 26 (H) 10/30/2019 10:59 AM    GFR est AA 62 10/30/2019 10:59 AM    GFR est non-AA 54 (L) 10/30/2019 10:59 AM    Calcium 9.6 10/30/2019 10:59 AM    Bilirubin, total 0.4 10/30/2019 10:59 AM    AST (SGOT) 17 10/30/2019 10:59 AM    Alk. phosphatase 67 10/30/2019 10:59 AM    Protein, total 6.4 10/30/2019 10:59 AM    Albumin 4.0 10/30/2019 10:59 AM    Globulin 3.4 05/16/2019 09:56 AM    A-G Ratio 1.7 10/30/2019 10:59 AM    ALT (SGPT) 15 10/30/2019 10:59 AM           Assessment:     Assessment:     Diagnoses and all orders for this visit:    1. Chronic systolic congestive heart failure ([facility]    2. Pure hypercholesterolemia        ICD-10-CM ICD-9-CM    1. Chronic systolic congestive heart failure (HCC) I50.22 428.22      428.0    2. Pure hypercholesterolemia E78.00 272.0      No orders of the defined types were placed in this encounter. Plan:     1. Atherosclerotic heart disease: Nonobstructive via catheterization 2018 asymptomatic continue current therapy  2. Grade 1 diastolic dysfunction: Weight trending down with up titration of furosemide to 40 mg twice a day and maintenance of Zaroxolyn 2.5 mg on Tuesdays and Thursdays. Renal function stable, continue current therapy reinforced low-sodium diet. Patient was able to lay supine for 20 minutes in office without orthopnea, chief complaint was postnasal drip. 3.  Hyperlipidemia: On simvastatin 10 mg. Stable from cardiac perspective can follow-up in 6 months. Plan to repeat renal panel in 3 months. Meme Carpio NP      Please note that this dictation was completed with Tr[employer]an[employer], the thinkingphones voice recognition software. Quite often unanticipated grammatical, syntax, homophones, and other interpretive errors are inadvertently transcribed by the computer software. Please disregard these errors. Please excuse any errors that have escaped final proofreading. Thank you.

## 2019-11-07 NOTE — PROGRESS NOTES
1. Have you been to the ER, urgent care clinic since your last visit? Hospitalized since your last visit? No    2. Have you seen or consulted any other health care providers outside of the [address] [place] [address] since your last visit? Include any pap smears or colon screening.  No

## 2019-11-08 ENCOUNTER — ANESTHESIA (OUTPATIENT)
Dept: MRI IMAGING | Age: 74
End: 2019-11-08
Payer: MEDICARE

## 2019-11-08 ENCOUNTER — APPOINTMENT (OUTPATIENT)
Dept: MRI IMAGING | Age: 74
End: 2019-11-08
Attending: ORTHOPAEDIC SURGERY
Payer: MEDICARE

## 2019-11-08 ENCOUNTER — ANESTHESIA EVENT (OUTPATIENT)
Dept: MRI IMAGING | Age: 74
End: 2019-11-08
Payer: MEDICARE

## 2019-11-08 ENCOUNTER — HOSPITAL ENCOUNTER (OUTPATIENT)
Dept: MRI IMAGING | Age: 74
Discharge: HOME | End: 2019-11-08
Attending: ORTHOPAEDIC SURGERY | Admitting: ORTHOPAEDIC SURGERY
Payer: MEDICARE

## 2019-11-08 VITALS
TEMPERATURE: 97.5 F | HEART RATE: 96 BPM | OXYGEN SATURATION: 91 % | HEIGHT: 63 IN | BODY MASS INDEX: 37.92 KG/M2 | DIASTOLIC BLOOD PRESSURE: 71 MMHG | WEIGHT: 214 LBS | RESPIRATION RATE: 20 BRPM | SYSTOLIC BLOOD PRESSURE: 125 MMHG

## 2019-11-08 DIAGNOSIS — M54.2 CERVICALGIA: ICD-10-CM

## 2019-11-08 DIAGNOSIS — Z98.1 S/P SPINAL FUSION: ICD-10-CM

## 2019-11-08 LAB
GLUCOSE BLD STRIP.AUTO-MCNC: 114 MG/DL (ref 65–100)
SERVICE CMNT-IMP: ABNORMAL

## 2019-11-08 PROCEDURE — 82962 GLUCOSE BLOOD TEST: CPT

## 2019-11-08 PROCEDURE — 72141 MRI NECK SPINE W/O DYE: CPT

## 2019-11-08 NOTE — DISCHARGE INSTRUCTIONS
[facility]  Radiology Department  [phone]        MRI With Sedation Discharge Instructions      Go home and rest and restrict your activity the next 24 hours. You have been given sedating medications, so do not drive or drink alcohol today. Resume your previous diet and medications. You may return to work and resume normal activities tomorrow. Results of your MRI will be sent to your physician as soon as they become available.

## 2019-11-08 NOTE — PROGRESS NOTES
Lyn Berumen ([id number]) posted case - Miranda ([id number]) CRNA aware and will advise time when aware. Jamal Bernal, MRI supervisor ([id number]) aware of posting and have tech review MRI screening.     Pt has ace bandage to right forearm from carp tunnel surgery on 10/14/19 (pt stated) - verified this w/MRI (mallory)

## 2019-11-08 NOTE — PROGRESS NOTES
Talking w/daughter-in-law at bedside and continues drinking diet ginger ale and PNB crackers. Pt adjust self to comfort - states neck discomfort subsided w/adjusting self in bed.

## 2019-11-08 NOTE — PROGRESS NOTES
Pt rec'd from PACU - pt a/o x4 - states that would like diet ginger ale and PNB crackers - changed pt to room air and pt tolerating well. Pt repositioned self to sitting upright in bed and feeding self. Pt denies pain or discomfort. Discharge instructions reviewed again w/pt verbalizing understanding.

## 2019-11-08 NOTE — ANESTHESIA PREPROCEDURE EVALUATION
Relevant Problems   No relevant active problems       Anesthetic History   No history of anesthetic complications            Review of Systems / Medical History  Patient summary reviewed, nursing notes reviewed and pertinent labs reviewed    Pulmonary        Sleep apnea: CPAP  Shortness of breath  Asthma     Comments: Former smoker - Quit 1963   Neuro/Psych             Comments: Cervical Spondylosis with myelopathy/Cervicalgia Cardiovascular    Hypertension      CHF: orthopnea, dyspnea on exertion    CAD and hyperlipidemia    Exercise tolerance: <4 METS  Comments: Carotid Stenosis s/p Carotid Stent   GI/Hepatic/Renal         Renal disease: stones       Endo/Other    Diabetes: type 2    Morbid obesity and arthritis     Other Findings              Physical Exam    Airway  Mallampati: II  TM Distance: 4 - 6 cm  Neck ROM: normal range of motion, short neck   Mouth opening: Normal     Cardiovascular  Regular rate and rhythm,  S1 and S2 normal,  no murmur, click, rub, or gallop  Rhythm: regular  Rate: normal         Dental  No notable dental hx       Pulmonary  Breath sounds clear to auscultation               Abdominal  GI exam deferred       Other Findings            Anesthetic Plan    ASA: 3  Anesthesia type: general          Induction: Intravenous  Anesthetic plan and risks discussed with: Patient      Rivera scope if needed.

## 2019-11-11 ENCOUNTER — PATIENT OUTREACH (OUTPATIENT)
Dept: INTERNAL MEDICINE CLINIC | Age: 74
End: 2019-11-11
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
## 2019-11-19 NOTE — PERIOP NOTES
[facility]  Ambulatory Surgery Unit  Pre-operative Instructions    Procedure Date  Thursday, November 21, 2019            Tentative Arrival Time 0930      1. On the day of your procedure, please report to the Ambulatory Surgery Unit Registration Desk and sign in at your designated time. The Ambulatory Surgery Unit is located in [facility] on the [facility] side of the [facility] across from the [address] [place] [address]. Please have all of your health insurance cards and a photo ID. 2. You must have someone with you to drive you home as directed by your surgeon. 3. Take regular prescribed medications day of surgery, no diabetic medications, no food or water after midnight Wednesday. 4. We recommend you do not drink any alcoholic beverages for 24 hours before and after your procedure. 5. Contact your surgeons office for instructions on the following medications: non-steroidal anti-inflammatory drugs (i.e. Advil, Aleve), vitamins, and supplements. (Some surgeons will want you to stop these medications prior to surgery and others may allow you to take them)   **If you are currently taking Plavix, Coumadin, Aspirin and/or other blood-thinning agents, contact your surgeon for instructions. ** Your surgeon will partner with the physician prescribing these medications to determine if it is safe to stop or if you need to continue taking. Please do not stop taking these medications without instructions from your surgeon. 6. In an effort to help prevent surgical site infection, we ask that you shower with an anti-bacterial soap (i.e. Dial or Safeguard) on the morning of your procedure. Do not apply any lotions, powders, or deodorants after showering. 7. Wear comfortable clothes. Wear glasses instead of contacts. Do not bring any jewelry or money (other than copays or fees as instructed). Do not wear make-up, particularly mascara, the morning of your procedure.  Wear your hair loose or down, no ponytails, buns, haroon pins or clips. All body piercings must be removed. 8. You should understand that if you do not follow these instructions your procedure may be cancelled. If your physical condition changes (i.e. fever, cold or flu) please contact your surgeon as soon as possible. 9. It is important that you be on time. If a situation occurs where you may be late, or if you have any questions or problems, please call [phone].    10. Your procedure time may be subject to change. You will receive a phone call the day prior to confirm your arrival time. I understand a pre-operative phone call will be made to verify my procedure time. In the event that I am not available, I give permission for a message to be left on my answering service and/or with another person?       yes    Preop instructions reviewed  Pt verbalized understanding.      ___________________      ___________________      ___________________  (Signature of Patient)          (Witness)                   (Date and Time)

## 2019-11-19 NOTE — PERIOP NOTES
Pt gave verbal permission to speak with wife, after giving two person identifiers and giving correct surgery. While speaking with pt, his voice was very soft. METS>4 stated, I felt he was weak while speaking, as though he was ROLLE. Wife requested both home number and daughter's number be called with arrival instructions. The daughter is pt's transportation.

## 2019-11-20 ENCOUNTER — ANESTHESIA EVENT (OUTPATIENT)
Dept: SURGERY | Age: 74
End: 2019-11-20
Payer: MEDICARE

## 2019-11-20 ENCOUNTER — TELEPHONE (OUTPATIENT)
Dept: ENDOCRINOLOGY | Age: 74
End: 2019-11-20

## 2019-11-20 RX ORDER — OXYCODONE AND ACETAMINOPHEN 5; 325 MG/1; MG/1
1 TABLET ORAL
Status: CANCELLED | OUTPATIENT
Start: 2019-11-20 | End: 2019-11-21

## 2019-11-20 RX ORDER — SODIUM CHLORIDE 0.9 % (FLUSH) 0.9 %
5-40 SYRINGE (ML) INJECTION EVERY 8 HOURS
Status: CANCELLED | OUTPATIENT
Start: 2019-11-20

## 2019-11-20 RX ORDER — SODIUM CHLORIDE, SODIUM LACTATE, POTASSIUM CHLORIDE, CALCIUM CHLORIDE 600; 310; 30; 20 MG/100ML; MG/100ML; MG/100ML; MG/100ML
25 INJECTION, SOLUTION INTRAVENOUS CONTINUOUS
Status: CANCELLED | OUTPATIENT
Start: 2019-11-20

## 2019-11-20 RX ORDER — FENTANYL CITRATE 50 UG/ML
25 INJECTION, SOLUTION INTRAMUSCULAR; INTRAVENOUS
Status: CANCELLED | OUTPATIENT
Start: 2019-11-20

## 2019-11-20 RX ORDER — DIPHENHYDRAMINE HYDROCHLORIDE 50 MG/ML
12.5 INJECTION, SOLUTION INTRAMUSCULAR; INTRAVENOUS AS NEEDED
Status: CANCELLED | OUTPATIENT
Start: 2019-11-20 | End: 2019-11-20

## 2019-11-20 RX ORDER — SODIUM CHLORIDE 0.9 % (FLUSH) 0.9 %
5-40 SYRINGE (ML) INJECTION AS NEEDED
Status: CANCELLED | OUTPATIENT
Start: 2019-11-20

## 2019-11-20 RX ORDER — ONDANSETRON 2 MG/ML
4 INJECTION INTRAMUSCULAR; INTRAVENOUS AS NEEDED
Status: CANCELLED | OUTPATIENT
Start: 2019-11-20

## 2019-11-20 NOTE — TELEPHONE ENCOUNTER
I returned this call and spoke with both Mr and Mrs Ever Loomis. She said that  needs more documentation saying that Mr. Ever Loomis needs special shoes for them to be paid for by insurance. I told her I would pass this on to Dr. Chhaya Barboza and see if there was anything he could do.   Yandel Navarro

## 2019-11-21 ENCOUNTER — HOSPITAL ENCOUNTER (OUTPATIENT)
Age: 74
Setting detail: OUTPATIENT SURGERY
Discharge: HOME | End: 2019-11-21
Attending: ORTHOPAEDIC SURGERY | Admitting: ORTHOPAEDIC SURGERY
Payer: MEDICARE

## 2019-11-21 ENCOUNTER — ANESTHESIA (OUTPATIENT)
Dept: SURGERY | Age: 74
End: 2019-11-21
Payer: MEDICARE

## 2019-11-21 VITALS
HEART RATE: 84 BPM | DIASTOLIC BLOOD PRESSURE: 67 MMHG | OXYGEN SATURATION: 95 % | WEIGHT: 211 LBS | SYSTOLIC BLOOD PRESSURE: 111 MMHG | RESPIRATION RATE: 18 BRPM | BODY MASS INDEX: 37.39 KG/M2 | TEMPERATURE: 98.1 F | HEIGHT: 63 IN

## 2019-11-21 LAB
GLUCOSE BLD STRIP.AUTO-MCNC: 116 MG/DL (ref 65–100)
GLUCOSE BLD STRIP.AUTO-MCNC: 122 MG/DL (ref 65–100)
SERVICE CMNT-IMP: ABNORMAL
SERVICE CMNT-IMP: ABNORMAL

## 2019-11-21 PROCEDURE — 76030000000 HC AMB SURG OR TIME 0.5 TO 1: Performed by: ORTHOPAEDIC SURGERY

## 2019-11-21 PROCEDURE — 82962 GLUCOSE BLOOD TEST: CPT

## 2019-11-21 PROCEDURE — 77030018836 HC SOL IRR NACL ICUM -A: Performed by: ORTHOPAEDIC SURGERY

## 2019-11-21 PROCEDURE — 77030000032 HC CUF TRNQT ZIMM -B: Performed by: ORTHOPAEDIC SURGERY

## 2019-11-21 PROCEDURE — 76060000061 HC AMB SURG ANES 0.5 TO 1 HR: Performed by: ORTHOPAEDIC SURGERY

## 2019-11-21 PROCEDURE — 77030002916 HC SUT ETHLN J&J -A: Performed by: ORTHOPAEDIC SURGERY

## 2019-11-21 PROCEDURE — 74011000250 HC RX REV CODE- 250: Performed by: ORTHOPAEDIC SURGERY

## 2019-11-21 PROCEDURE — 74011250636 HC RX REV CODE- 250/636: Performed by: ANESTHESIOLOGY

## 2019-11-21 PROCEDURE — 77030040356 HC CORD BPLR FRCP COVD -A: Performed by: ORTHOPAEDIC SURGERY

## 2019-11-21 PROCEDURE — 74011250636 HC RX REV CODE- 250/636: Performed by: ORTHOPAEDIC SURGERY

## 2019-11-21 PROCEDURE — 76210000050 HC AMBSU PH II REC 0.5 TO 1 HR: Performed by: ORTHOPAEDIC SURGERY

## 2019-11-21 RX ORDER — LIDOCAINE HYDROCHLORIDE 10 MG/ML
0.1 INJECTION, SOLUTION EPIDURAL; INFILTRATION; INTRACAUDAL; PERINEURAL AS NEEDED
Status: DISCONTINUED | OUTPATIENT
Start: 2019-11-21 | End: 2019-11-21 | Stop reason: HOSPADM

## 2019-11-21 RX ORDER — SODIUM CHLORIDE, SODIUM LACTATE, POTASSIUM CHLORIDE, CALCIUM CHLORIDE 600; 310; 30; 20 MG/100ML; MG/100ML; MG/100ML; MG/100ML
25 INJECTION, SOLUTION INTRAVENOUS CONTINUOUS
Status: DISCONTINUED | OUTPATIENT
Start: 2019-11-21 | End: 2019-11-21 | Stop reason: HOSPADM

## 2019-11-21 RX ORDER — SODIUM CHLORIDE 0.9 % (FLUSH) 0.9 %
5-40 SYRINGE (ML) INJECTION AS NEEDED
Status: DISCONTINUED | OUTPATIENT
Start: 2019-11-21 | End: 2019-11-21 | Stop reason: HOSPADM

## 2019-11-21 RX ORDER — SODIUM CHLORIDE 0.9 % (FLUSH) 0.9 %
5-40 SYRINGE (ML) INJECTION EVERY 8 HOURS
Status: DISCONTINUED | OUTPATIENT
Start: 2019-11-21 | End: 2019-11-21 | Stop reason: HOSPADM

## 2019-11-21 RX ADMIN — SODIUM CHLORIDE, POTASSIUM CHLORIDE, SODIUM LACTATE AND CALCIUM CHLORIDE: 600; 310; 30; 20 INJECTION, SOLUTION INTRAVENOUS at 10:38

## 2019-11-21 RX ADMIN — WATER 2 G: 1 INJECTION INTRAMUSCULAR; INTRAVENOUS; SUBCUTANEOUS at 10:47

## 2019-11-21 NOTE — PERIOP NOTES
Rodolfo Leon   1945  [id number]    Situation:  Verbal report given from: Aries Ohara RN  Procedure: Procedure(s):  LEFT OPEN CARPAL TUNNEL RELEASE (LOCAL ONLY)    Background:    Preoperative diagnosis: BILATERAL CARPAL TUNNEL SYNDROME    Postoperative diagnosis: BILATERAL CARPAL TUNNEL SYNDROME    :  Dr. Vandana Mendoza    Assistant(s): Circ-1: Serenity Whitehead RN  Scrub Tech-1: Wendy ALFARO    Specimens: * No specimens in log *    Assessment:  Intra-procedure medications         Anesthesia gave intra-procedure sedation and medications, see anesthesia flow sheet     Intravenous fluids: LR@ KVO     Vital signs stable       Recommendation:    Permission to share finding with daughter in law : yes

## 2019-11-21 NOTE — DISCHARGE INSTRUCTIONS
[facility]  Post-operative instructions  For: Brooke Pots first postop appointment should be scheduled with Dr. Anali Crook for 2-3 weeks post-op. [address] [facility], [address]  Adrian Hackett [place]  Phone: [phone]  Hand Therapy Phone: [phone]  Fax: [phone]    Please follow these instructions for a safe and speedy recovery:    1. Surgical Bandage: Leave the bandage in place until 2 weeks after surgery. Please keep it clean and dry. To shower or bathe, apply a plastic bag or GLAD Press'n Seal® plastic wrap around the bandage or simply sponge bathe. After 2 weeks, you can remove the dressing and get incision wet but NO SOAKING. 2. Elevation: Hand swelling is best prevented by keeping your hand elevated above the level of your heart at all times, night and day. The opposite, dangling your hand below your waist, will cause additional pain, swelling, and later stiffness. You can elevate the hand in a sling or by propping it on a pillow at night. Occasionally, we will provide you with a custom-made foam block for elevating the arm. Ice compresses may help but do not rep[lace elevation. Frequently, extreme pain is caused by a tight bandage, which should be loosened. If pain is severe and progressive, call us at [phone] during the day (ask for immediate connection to Dr. Vu Carrier Team) or during the night (ask for the on-call physician). 3. Medication: You will be provided with an appropriate pain medication (over-the-counter or prescription). Please fill this at a pharmacy promptly so you will have it available when all local anesthetic wears off. Take this to relieve pain as directed on the bottle. Please refrain from driving, drinking alcohol, and making important medical decisions while taking the medication. Please call us if you need something stronger.  Medication changes or refills must be made before 5pm or through your pharmacy. 4. Weight bearing: Do NOT bear any weight on the operative extremity for the first 2 weeks after surgery. After 2 weeks, you have a 5 pound weight lifting restriction. I want to thank you for choosing us for your hand care needs. My staff and I are committed to providing all our customers with the highest quality hand surgery and subsequent hand therapy care as possible. We want your recovery to be comfortable. If you have clinical non-emergent questions about your surgery or other hand conditions please call [phone] and ask for my team. Your call will be returned within 24 hours. DISCHARGE SUMMARY from Nurse    The following personal items collected during your admission are returned to you:   Dental Appliance: Dental Appliances: None  Vision: Visual Aid: At home, Glasses  Hearing Aid:    Jewelry: Jewelry: Ring(wedding band to dil)  Clothing: Clothing: With patient  Other Valuables: Other Valuables: None  Valuables sent to safe: If you were given prescriptions, please review the written information on prescribed medications. · You will receive a Post Operative Call from one of the Recovery Room Nurses on the day after your surgery to check on you. It is very important for us to know how you are recovering after your surgery. · You may receive an e-mail or letter in the mail from Susan regarding your experience with us in the Ambulatory Surgery Unit. Your feedback is valuable to us and we appreciate your participation in the survey. If you have not had your influenza or pneumococcal vaccines, please follow up with your primary care physician. The discharge information has been reviewed with the patient and caregiver. The patient and caregiver verbalized understanding.

## 2019-11-21 NOTE — OP NOTES
PATIENT NAME:  Rodolfo Leon Sr    SURGEON:  Guillermo Raza MD    DATE OF SURGERY:  11/21/2019    LOCATION: [facility]    PREOPERATIVE DIAGNOSIS:   left carpal tunnel syndrome    POSTOPERATIVE DIAGNOSIS:  Same    PROCEDURE:  left Open carpal tunnel decompression             ANESTHESIA:  Local (1% lidocaine with 0.25% bupivicaine in a 1:1 mixture) with sedation     BLOOD LOSS:  Minimal    TOURNIQUET TIME:  9 min    OPERATIVE INDICATIONS: The patient is a 72 y.o. old male who has developed progressive left carpal tunnel syndrome, unresponsive to all conservative treatment. Symptoms have failed to respond consistently to conservative treatment such that patient has elected to undergo carpal tunnel decompression. He understands the alternatives to surgery, the nature of this elective procedure, the usual recovery, possible variations in healing, and the potential for shortcomings and complications (including but not exclusive to bleeding, infection, scar tenderness,  weakness, residual numbness, or thenar muscle weakness). DESCRIPTION  OF PROCEDURE: Patient identified correctly in the pre-operative holding area and correct extremity marked. Was then taken stable to the operating room and placed supine with the operative extremity on a hand table. After sedation was administered by the anesthesia team, the left hand and forearm were prepped and draped in a sterile field. A timeout was taken and the operative site was confirmed. Local anesthesia was instilled into the wound. The extremity was then elevated and exsanguinated and an upper arm tourniquet was inflated to 250 mm of mercury. An incision was made in the proximal palm in line with the radial side of the ring finger from the distal wrist crease to Kaplans line. Dissection was carried through the subcutaneous tissue and the transverse carpal ligament was visualized.   This was released under direct visualization first distally followed by proximally and carried up past the wrist wrist crease. A complete decompression was confirmed by direct visualization of the decompressed median nerve as well as palpation. No other synovial pathology was noted. The tourniquet was then released. The nerve was noted to become hyperemic. Copious irrigation was performed. Hemostasis was obtained with bipolar cautery and the wound was closed with 3-0 nylon sutures. A sterile dressing was then applied leaving the fingers free for range of motion. The patient tolerated the procedure well and was discharged to the recovery area uneventfully. All instrument needle and lap counts were correct at the end of the case.

## 2019-11-21 NOTE — PERIOP NOTES
[address] Pt Denies pain. Dressing on left arm CDI. Pt up to use restrom and to get changed. 1145 Discharge instructions reviewed with pt. Sling placed on left arm. Left fingers warm and pink. Pt meets discharge criteria.

## 2019-11-21 NOTE — TELEPHONE ENCOUNTER
We have already done this. Additionally his podiatrist notes had recommended shoes, and attention to podiatry note was advised. Can resend my office note dated 5/17/19 once again which included my DM foot examination. Thanks,     Jadon Emery.  [address]

## 2019-11-21 NOTE — PERIOP NOTES
Permission received to review discharge instructions and discuss private health information with dtr in law  Patient states that family will be with them for at least 24 hours following today's procedure.

## 2019-11-21 NOTE — H&P
Please see prior H&P for full detail    Comes today for f/u of his [facility]. Doing well. Numbness improving.      Objective:   Constitutional:  No acute distress. Well nourished. Well developed. Eyes:  Sclera are nonicteric. Respiratory:  No labored breathing. Cardiovascular:  No marked edema. Skin:  No marked skin ulcers. Neurological:  see below  Psychiatric: Alert and oriented x3. Musculoskeletal   Right hand:  -wound well healed, sutures removed today without issue  -can make composite fist  -NVID     Radiographs:       No imaging obtained      Assessment:      1. Bilateral carpal tunnel syndrome          Plan:   Declines silicone sleeve. One more week before soaking or scar massage. 3.5 more weeks no heavy lifting. Desires left sided surgery. Understands risks and benefits. Consents to proceed and surgical date chosen. Will do without sedation due to pulmonary risk and baseline SOB. Date of Surgery Update:  Rosetta Rick was seen and examined. History and physical has been reviewed. The patient has been examined.  There have been no significant clinical changes since the completion of the originally dated History and Physical.    Signed By: Carla García MD     November 21, 2019 10:09 AM

## 2019-11-21 NOTE — ANESTHESIA PREPROCEDURE EVALUATION
Relevant Problems   No relevant active problems       Anesthetic History   No history of anesthetic complications            Review of Systems / Medical History  Patient summary reviewed, nursing notes reviewed and pertinent labs reviewed    Pulmonary        Sleep apnea: CPAP    Asthma     Comments: Former smoker - [address]   Neuro/Psych   Within defined limits           Cardiovascular    Hypertension      CHF: orthopnea, dyspnea on exertion    CAD and hyperlipidemia    Exercise tolerance: <4 METS  Comments: 05/18 Cath with minimal CAD    10/19 ECHO= EF 56-60% grade 1 DD    Carotid Stenosis s/p Carotid Stent   GI/Hepatic/Renal  Within defined limits              Endo/Other    Diabetes: type 2    Obesity and arthritis     Other Findings   Comments: S/p ACF 05/19           Physical Exam    Airway  Mallampati: III  TM Distance: 4 - 6 cm  Neck ROM: normal range of motion, short neck   Mouth opening: Normal     Cardiovascular    Rhythm: regular  Rate: normal         Dental  No notable dental hx       Pulmonary  Breath sounds clear to auscultation               Abdominal  GI exam deferred       Other Findings            Anesthetic Plan    ASA: 3  Anesthesia type: MAC          Induction: Intravenous  Anesthetic plan and risks discussed with: Patient      Took BB at 730 am.  Local only, but needs monitored care by CRNA due to ASA status

## 2019-12-17 ENCOUNTER — OFFICE VISIT (OUTPATIENT)
Dept: ENDOCRINOLOGY | Age: 74
End: 2019-12-17

## 2019-12-17 VITALS
DIASTOLIC BLOOD PRESSURE: 72 MMHG | WEIGHT: 213 LBS | HEART RATE: 97 BPM | BODY MASS INDEX: 37.74 KG/M2 | HEIGHT: 63 IN | SYSTOLIC BLOOD PRESSURE: 134 MMHG

## 2019-12-17 DIAGNOSIS — N18.30 TYPE 2 DIABETES MELLITUS WITH STAGE 3 CHRONIC KIDNEY DISEASE, WITH LONG-TERM CURRENT USE OF INSULIN (HCC): Primary | ICD-10-CM

## 2019-12-17 DIAGNOSIS — E11.22 TYPE 2 DIABETES MELLITUS WITH STAGE 3 CHRONIC KIDNEY DISEASE, WITH LONG-TERM CURRENT USE OF INSULIN (HCC): Primary | ICD-10-CM

## 2019-12-17 DIAGNOSIS — E78.5 HYPERLIPIDEMIA, UNSPECIFIED HYPERLIPIDEMIA TYPE: ICD-10-CM

## 2019-12-17 DIAGNOSIS — I10 ESSENTIAL HYPERTENSION WITH GOAL BLOOD PRESSURE LESS THAN 130/80: ICD-10-CM

## 2019-12-17 DIAGNOSIS — Z79.4 TYPE 2 DIABETES MELLITUS WITH STAGE 3 CHRONIC KIDNEY DISEASE, WITH LONG-TERM CURRENT USE OF INSULIN (HCC): Primary | ICD-10-CM

## 2019-12-17 LAB — HBA1C MFR BLD HPLC: 5.5 %

## 2019-12-17 NOTE — PATIENT INSTRUCTIONS
Continue metformin 500mg twice per day    Glimepiride 1 mg tab each morning before breakfast    See you back in 4 months,       Please note our new policy, you must arrive to the clinic 15 minutes before your appointment time to allow enough time for proper check-in, adequate time to spend with your doctor, and also to respect the appointment time of the next patient. Not arriving 15 minutes in advance may result in having your appointment rescheduled for the next available day/time.  ----------------------------------------------------------------------------------------------------------------------    Below you will find a glucose log sheet which you can use to record your blood sugars. Without checking and recording what your home glucose levels are, it will be difficult to make any changes to your medication dose, even when significant changes may be needed. Please feel free to use the log below to record your home glucose levels. At the very least, I would like for you to login the entire 2-3 weeks just before your visit so we can make your visit much more productive and beneficial to you. GLUCOSE LOG SHEET:    Date Breakfast Lunch Dinner Bedtime Comments ? GLUCOSE LOG SHEET:    Date Breakfast Lunch Dinner Bedtime Comments ? GLUCOSE LOG SHEET:    Date Breakfast Lunch Dinner Bedtime Comments ?

## 2019-12-17 NOTE — PROGRESS NOTES
CHIEF COMPLAINT: f/u evaluation for uncontrolled type 2 diabetes    HISTORY OF PRESENT ILLNESS:   Mendy Gibson is a 69 y.o. male with a PMHx as noted below who presents to the endocrinology clinic for f/u evaluation of uncontrolled type 2 diabetes.     A1c today is 5.5% w/o low sugars     Currently taking the following meds:  metformin 500mg twice per day,   Glimepiride only 0.5 mg (1/2 tab each morning before breakfast)   HE IS TAKING A FULL TAB    Review of home blood glucose:  Log reviewed   mostly, no lows    Review of most recent diabetes-related labs:  Lab Results   Component Value Date    HBA1C 5.9 (H) 09/12/2019    HBA1C 7.7 (H) 05/10/2019    STJ6QCFK 5.3 01/17/2019    LCR1NOWC 5.6 10/09/2018    PMX8VNTG 5.8 07/09/2018    CHOL 116 05/10/2019    LDLC 49 05/10/2019    GFRAA 62 10/30/2019    GFRNA 54 (L) 10/30/2019    MCACR 1,624.4 (H) 05/10/2019    TSH 0.681 04/23/2018    VITD3 59.9 05/16/2019     Lab Key:  [id number] = IA-2 pancreatic islet cell autoantibody  GADLT = MARIAN-65 autoantibody   MCACR = Urine Microalbumin  INSUL = Insulin level  CPEPL = C-peptide level      PAST MEDICAL/SURGICAL HISTORY:   Past Medical History:   Diagnosis Date    Arthritis     Asthma     CAD (coronary artery disease)     Calculus of kidney     Carotid artery disease (HCC)     Cervical herniated disc     Chronic systolic (congestive) heart failure (HCC)     Diabetes ([address]     Diabetic shock due to low blood sugar ([address] 2016    Glaucoma     pt is on drops unsure of what the names are    Hypercholesterolemia     Hypertension     Morbid obesity ([address]     Rheumatic fever     When he was 17    Sleep apnea     compliant with cpap as stated 9/6/2019     Past Surgical History:   Procedure Laterality Date    HX CAROTID STENT  1990's    with cath    HX CATARACT REMOVAL Left     HX CERVICAL FUSION  05/21/2019    C3- C6 ACFD     HX CHOLECYSTECTOMY      HX CIRCUMCISION      HX HEART CATHETERIZATION      x 5 total, 4 in heart, 1 in carotid    HX HEENT Left     ear torn off and repaired    HX OTHER SURGICAL Right     Right hand reconstruction    HX SHOULDER ARTHROSCOPY Right     dislocated with fall, came out, he stated surgery put back in place and sowed him up       ALLERGIES:   Allergies   Allergen Reactions    Morphine Anaphylaxis     Pt states \"morphine gave me heart failure\"    Lyrica [Pregabalin] Other (comments)     Makes him \"loopy\"       MEDICATIONS ON ADMISSION:     Current Outpatient Medications:     simvastatin (ZOCOR) 10 mg tablet, TAKE 1 TABLET BY MOUTH NIGHTLY, Disp: 90 Tab, Rfl: 1    metOLazone (ZAROXOLYN) 2.5 mg tablet, Take 1 Tab by mouth every Tuesday and Thursday. , Disp: 40 Tab, Rfl: 0    gabapentin (NEURONTIN) 100 mg capsule, TAKE 1 CAPSULE BY MOUTH THREE TIMES DAILY AS DIRECTED, Disp: 30 Cap, Rfl: 2    furosemide (LASIX) 40 mg tablet, Take 1 Tab by mouth two (2) times a day. Increased 10/24/19, Disp: 60 Tab, Rfl: 1    omega-3 fatty acids 1,000 mg cap, Take 1 Cap by mouth daily. , Disp: , Rfl:     isosorbide mononitrate ER (IMDUR) 60 mg CR tablet, Take 1 Tab by mouth daily. , Disp: 90 Tab, Rfl: 3    lisinopril (PRINIVIL, ZESTRIL) 2.5 mg tablet, Take 1 Tab by mouth daily. , Disp: 90 Tab, Rfl: 3    carvedilol (COREG) 12.5 mg tablet, Take 1 Tab by mouth two (2) times a day., Disp: 180 Tab, Rfl: 3    DULoxetine (CYMBALTA) 60 mg capsule, TAKE 1 CAPSULE BY MOUTH ONCE DAILY FOR  FEET  PAIN, Disp: 90 Cap, Rfl: 1    acetaminophen (TYLENOL) 500 mg tablet, Take 1,000 mg by mouth every six (6) hours as needed for Pain., Disp: , Rfl:     glimepiride (AMARYL) 1 mg tablet, Take 1 mg by mouth Daily (before breakfast). , Disp: , Rfl:     metFORMIN ER (GLUCOPHAGE XR) 500 mg tablet, TAKE 1 TABLET BY MOUTH BEFORE BREAKFAST AND  DINNER, Disp: 180 Tab, Rfl: 3    cholecalciferol, VITAMIN D3, (VITAMIN D3) 5,000 unit tab tablet, Take 1 Tab by mouth daily. , Disp: 30 Tab, Rfl: 5    aspirin delayed-release 81 mg tablet, Take 81 mg by mouth daily. , Disp: , Rfl:     dextran 70/hypromellose/PF (NATURAL TEARS, PF, OP), Apply 1 Drop to eye as needed. , Disp: , Rfl:   No current facility-administered medications for this visit.      Facility-Administered Medications Ordered in Other Visits:     fentaNYL citrate (PF) injection 25 mcg, 25 mcg, IntraVENous, Abdi Byrd Garrison Staggers, MD    HYDROmorphone (PF) (DILAUDID) injection 0.5 mg, 0.5 mg, IntraVENous, Abdi Byrd Garrison Staggers, MD    ondansetron [facility]) injection 4 mg, 4 mg, IntraVENous, PRN, Noelle Jerome MD    midazolam (VERSED) injection 0.5 mg, 0.5 mg, IntraVENous, Q5MIN PRN, Noelle Jerome MD    SOCIAL HISTORY:   Social History     Socioeconomic History    Marital status:      Spouse name: Not on file    Number of children: Not on file    Years of education: Not on file    Highest education level: Not on file   Occupational History    Not on file   Social Needs    Financial resource strain: Not on file    Food insecurity:     Worry: Not on file     Inability: Not on file    Transportation needs:     Medical: Not on file     Non-medical: Not on file   Tobacco Use    Smoking status: Former Smoker     Packs/day: 5.00     Years: 15.00     Pack years: 75.00     Last attempt to quit: 1963     Years since quittin.6    Smokeless tobacco: Never Used   Substance and Sexual Activity    Alcohol use: No     Alcohol/week: 0.0 standard drinks    Drug use: Never    Sexual activity: Yes     Partners: Female     Birth control/protection: None   Lifestyle    Physical activity:     Days per week: Not on file     Minutes per session: Not on file    Stress: Not on file   Relationships    Social connections:     Talks on phone: Not on file     Gets together: Not on file     Attends Rastafari service: Not on file     Active member of club or organization: Not on file     Attends meetings of clubs or organizations: Not on file     Relationship status: Not on file   [address] [place] [address] Intimate partner violence:     Fear of current or ex partner: Not on file     Emotionally abused: Not on file     Physically abused: Not on file     Forced sexual activity: Not on file   Other Topics Concern    Not on file   Social History Narrative    ** Merged History Encounter **            FAMILY HISTORY:  Family History   Problem Relation Age of Onset    Heart Disease Brother     Diabetes Nephew     Hypertension Mother        REVIEW OF SYSTEMS: Complete ROS assessed and noted for that which is described above, all else are negative. Eyes: normal  ENT: normal  CVS: normal  Resp: normal  GI: normal  : normal  GYN: normal  Endocrine: normal  Integument: normal  Musculoskeletal: normal  Neuro: normal  Psych: normal      PHYSICAL EXAMINATION:    VITAL SIGNS:  Visit Vitals  /72 (BP 1 Location: Left arm, BP Patient Position: Sitting)   Pulse 97   Ht 5' 3\" (1.6 m)   Wt 213 lb (96.6 kg)   BMI 37.73 kg/m²       GENERAL: NCAT, Sitting comfortably, NAD  EYES: EOMI, non-icteric, no proptosis  Ear/Nose/Throat: NCAT, no inflammation, no masses  LYMPH NODES: No LAD  CARDIOVASCULAR: S1 S2, RRR, No murmur, 2+ radial pulses  RESPIRATORY: CTA b/l, no wheeze/rales  GASTROINTESTINAL: ND  MUSCULOSKELETAL: Normal ROM, no atrophy  SKIN: warm, no edema/rash/ or other skin changes  NEUROLOGIC: 5/5 power all extremities, no tremors, AAOx3  PSYCHIATRIC: Normal affect, Normal insight and judgement    Diabetic foot exam:     Left Foot:   Visual Exam: normal  thick nails   Pulse DP: 2+ (normal)   Filament test: normal sensation    Vibratory sensation: Vibratory sensation: normal       Right Foot:   Visual Exam: normal  thick nails   Pulse DP: 2+ (normal)   Filament test: normal sensation    Vibratory sensation: Vibratory sensation: normal    * History of advanced diabetic neuropathy requiring medical therapy.  Follows with podiatrist. * I am treating the patient under a comprehensive plan of care for diabetes and the patient would benefit from diabetic footwear to protect their feet, and this includes shoes and insoles. Previously we submitted documentation along with his podiatrist prescription and additional podiatry examination but the request for his diabetic shoes were denied for unclear reasons. I agree with podiatry examination / assessment and recommendations. Patient should receive diabetic shoes. REVIEW OF LABORATORY AND RADIOLOGY DATA:   Labs and documentation have been reviewed as described above. ASSESSMENT AND PLAN:   Rosetta Rick is a 69 y.o. male with a PMHx as noted above who presents to the endocrinology clinic for f/u evaluation of uncontrolled type 2 diabetes. DM2 with hypoglycemia  HTN  HLD    Patient's blood sugars are looking pretty good. A1c is 5.5% without hypoglycemia. He is taking the full glimepiride tablet rather than 1/2 as we noted previously however as his sugars are stable and we are entering the holiday's, we will continue as is. DM2:  Continue metformin 500mg twice per day,   Glimepiride only 1 mg tablet before breakfast  Continue to check glucose 1-2 times daily  Provided with new log sheets    HTN: BP stable on current meds  HLD: stable on statin    4 month follow up visit,    Kay VILLARREAL  [address] [facility]

## 2019-12-20 ENCOUNTER — DOCUMENTATION ONLY (OUTPATIENT)
Dept: ENDOCRINOLOGY | Age: 74
End: 2019-12-20

## 2019-12-24 ENCOUNTER — HOSPITAL ENCOUNTER (OUTPATIENT)
Dept: PHYSICAL THERAPY | Age: 74
Discharge: HOME | End: 2019-12-24
Payer: MEDICARE

## 2019-12-24 PROCEDURE — 97110 THERAPEUTIC EXERCISES: CPT | Performed by: PHYSICAL THERAPIST

## 2019-12-24 PROCEDURE — 97162 PT EVAL MOD COMPLEX 30 MIN: CPT | Performed by: PHYSICAL THERAPIST

## 2019-12-24 PROCEDURE — 97014 ELECTRIC STIMULATION THERAPY: CPT | Performed by: PHYSICAL THERAPIST

## 2019-12-24 NOTE — PROGRESS NOTES
PT INITIAL EVALUATION NOTE - [facility] 2-15    Patient Name: Fifi Best  Date:2019  : 1945  [x]  Patient  Verified  Payor: BLUE CROSS MEDICARE / Plan: [place] / Product Type: Managed Care Medicare /    In time: 10:45  Out time: 11:40am  Total Treatment Time (min): 55  Total Timed Codes (min): 25  1:1 Treatment Time ( only): 40   Visit #: 1     Treatment Area: Neck pain [M54.2]  Lower back pain [M54.5]    SUBJECTIVE  Pain Level (0-10 scale): 10 (8-10/10)  Any medication changes, allergies to medications, adverse drug reactions, diagnosis change, or new procedure performed?: [] No    [x] Yes (see summary sheet for update)  Subjective: The patient reports that his arm and shoulder pain has been worse since his carpal tunnel surgery (right was done first, left one was about 3 weeks ago). He feels worse after he does his exercises (he's been doing more than prescribed for certain exercises, and performing some incorrectly, along with some that were not given). He's on 5lb restrictions for the left side for another week (but continued to use the resistance bands for the past 2 months, even during his recovery from carpal tunnel surgery). It's pain/numbness across the shoulders and down his arms. He's been taking pain pills for 3 weeks.      OBJECTIVE    Posture: scapular protraction bilaterally; forward head  Other Observations: left carpal tunnel surgical scar  Gait and Functional Mobility:  antalgic  Palpation: bilateral UTs        Cervical AROM:        R  L    Flexion    nt  nt    Extension   nt  nt    Side Bending   nt  nt    Rotation   42  48      Flexibility: tight pecs bilaterally   Mobility Assessment: hypomobile cervical spine      A/PROM Shoulder:   Right   Left   Flexion   45/145   130 (a lot of effort)/150   Extension  nt/nt   nt/nt   Abduction  nt/nt   nt/nt   Adduction  nt/nt   nt/nt   IR   Hand to nt/85  nt/100   ER   nt/60   nt/60    Flexibility: tight pecs bilaterally    UPPER QUARTER     MUSCLE STRENGTH  KEY        R  L  0 - No Contraction   Flexion   2+  3-  1 - Trace    Extension (elbow) 3  3+  2 - Poor    Abduction  2+  3+  3 - Fair     IR   3+  3+  4 - Good    ER   2+  3-  5 - Normal       Neurological: Reflexes / Sensations: decreased sensation to light touch in RUE      Modality rationale: decrease edema, decrease inflammation, decrease pain and increase tissue extensibility to improve the patients ability to perform daily activities. Min Type Additional Details   15 [x] Estim: []Att   [x]Unatt        []TENS instruct                  [x]IFC  []Premod   []NMES                     []Other:  []w/US   []w/ice   [x]w/heat  Position: sitting  Location: cervical    []  Traction: [] Cervical       []Lumbar                       [] Prone          []Supine                       []Intermittent   []Continuous Lbs:  [] before manual  [] after manual  []w/heat    []  Ultrasound: []Continuous   [] Pulsed at:                           []1MHz   []3MHz Location:  W/cm2:    [] Paraffin         Location:   []w/heat    []  Ice     []  Heat  []  Ice massage Position:  Location:    []  Laser  []  Other: Position:  Location:      []  Vasopneumatic Device Pressure:       [] lo [] med [] hi   Temperature:      [x] Skin assessment post-treatment:  [x]intact []redness- no adverse reaction    []redness - adverse reaction:     10 min Therapeutic Exercise:  [x] See flow sheet :   Rationale: increase ROM, increase strength and improve coordination to improve the patients ability to perform daily activities.            With   [x] TE   [] TA   [] neuro   [] other: Patient Education: [x] Review HEP    [x] Progressed/Changed HEP based on:   [x] positioning   [x] body mechanics   [] transfers   [] heat/ice application    [] other:      Other Objective/Functional Measures: FOTO= 42    Pain Level (0-10 scale) post treatment: 9    ASSESSMENT/Changes in Function:     [x]  See Plan of [facility], PT 12/24/2019

## 2019-12-24 NOTE — PROGRESS NOTES
[address] [address] (MOB IV), [address] [facility] [place]  Phone: [phone] Fax: [phone]     Plan of Care/Statement of Necessity for Physical Therapy Services  2-15    Patient name: Fifi Jacobo  : 1945  Provider#: [id number]  Referral source: Jen Harris MD      Medical/Treatment Diagnosis: Neck pain [M54.2]  Lower back pain [M54.5]     Prior Hospitalization: see medical history     Comorbidities: arthritis, asthma, back pain, BMI over 30, CHF, DM II, GI disease, hearing impairment, HTN, incontinence, prior surgery, sleep dysfunction, visual impairment  Prior Level of Function: 20 min of exercise at least 3x/wk  Medications: Verified on Patient Summary List  Start of Care: 19      Onset Date: s/p C3-C6 ACDF May 2019              The Plan of Care and following information is based on the information from the initial evaluation. Assessment/ key information: The following was taken from the discharge summary of this patient for the same symptoms on 10/23/19: \"The patient has progressed with the following AROM in his Right shoulder: Flex= 80 at most; 75 today (45 at eval) with his left shoulder being [facility]. He reports being able to drive more comfortably and feels like he is stronger. As of note, since the initial evaluation, his  strength has improved from 25lbs to 30lbs average on the right, and from 39lbs to 40lbs (at most 47lbs) average on the left. He plans on getting carpal tunnel surgery on the right side tomorrow (10/24/19).      NMES was attempted on the deltoids and rotator cuff over the past few weeks to attempt to surpass 80 degrees of shoulder flexion, but the patient has essentially plateaued with his AROM on the left side. His deficits are consistent with the EMG findings of C5 nerve issues.  He has full PROM bilaterally and there is no concern of a frozen shoulder developing, as he is very diligent with his advanced HEP, which he will continue independently to maintain improvements made thus far. \"      The patient now returns (though he plateaued in therapy 2 months ago), and has since had both a right carpal tunnel and left carpal tunnel surgery, but reports that his shoulder and arm pain and numbness has become worse, and the home exercises make it feel worse afterwards. He reports that he only got a script from the physician, but did not physically have a follow up. DUE TO THIS PATIENT'S REGRESSION IN BOTH SYMPTOMS AND FUNCTIONAL MOBILITY, ESSENTIALLY RETURNING TO HIS SHOULDER ROM (39 DEGREES OF RIGHT SHOULDER FLEXION, LIKE IT WAS ON 7/29/19), HAVING INCREASED PAIN AND NUMBNESS DOWN BOTH ARMS, BUT IS CONSISTENT WITH HIS HOME EXERCISES, THE PATIENT NEEDS TO BE SEEN BY THE PHYSICIAN TO HAVE REPEAT IMAGING PERFORMED. THE PATIENT PLATEAUED IN THERAPY 2 MONTHS AGO (AT MOST 80 DEGREES OF FLEXION AND DOWN TO A FREQUENT 5/10 PAIN) AND IS CONSISTENT WITH HIS HOME EXERCISES, SO IT IS UNCERTAIN HOW ANY MORE THERAPY MAY HELP. Moist heat and IFC was attempted to ease the patient's pain today, which helped only slightly (from 10/10 pain to 9/10 pain), and he was instructed to not perform any resistance exercises for 1-2 weeks. He was also instructed to schedule an actual follow up appointment with the physician to make sure his fusion is healing well, which he understood. The patient will only be seen 1x/wk to monitor his symptoms, and attempt modified exercises (but essentially any movement with both cervical and shoulders increase symptoms). Evaluation Complexity History HIGH Complexity :3+ comorbidities / personal factors will impact the outcome/ POC ; Examination HIGH Complexity : 4+ Standardized tests and measures addressing body structure, function, activity limitation and / or participation in recreation  ;Presentation MEDIUM Complexity : Evolving with changing characteristics  ; Clinical Decision Making MEDIUM Complexity : FOTO score of 26-74  Overall Complexity Rating: MEDIUM    Problem List: pain affecting function, decrease ROM, decrease strength, edema affecting function, decrease ADL/ functional abilitiies, decrease activity tolerance, decrease flexibility/ joint mobility and decrease transfer abilities   Treatment Plan may include any combination of the following: Therapeutic exercise, Therapeutic activities, Neuromuscular re-education, Physical agent/modality, Manual therapy, Patient education, Self Care training, Functional mobility training and Home safety training  Patient / Family readiness to learn indicated by: asking questions, trying to perform skills and interest  Persons(s) to be included in education: patient (P)  Barriers to Learning/Limitations: None  Patient Goal (s): less pain, movement  Patient Self Reported Health Status: fair  Rehabilitation Potential: fair    Short Term Goals: To be accomplished in 2 treatments:                         1.) The patient will be independent with their HEP consistently for at least one week. Long Term Goals: To be accomplished in 10 treatments:                         1.) The patient will have an average of 6/10 pain with daily activities. 2.) The patient will improve his AROM shoulder flexion from 45 to at least 55 degrees on the right.                         3.) The patient will improve their FOTO score from 42 to at least 45 to show improvements in functional mobility. Frequency / Duration: Patient to be seen 1-2 times per week for 10 treatments. Patient/ Caregiver education and instruction: self care, activity modification and exercises    [x]  Plan of care has been reviewed with PTA        Certification Period: 12/24/19-3/24/19  Miguel Scherer, PT 12/24/2019     ________________________________________________________________________    I certify that the above Therapy Services are being furnished while the patient is under my care.  I agree with the treatment plan and certify that this therapy is necessary.     [de-identified] Signature:____________________  Date:____________Time: _________

## 2020-01-03 RX ORDER — FUROSEMIDE 40 MG/1
TABLET ORAL
Qty: 60 TAB | Refills: 0 | Status: SHIPPED | OUTPATIENT
Start: 2020-01-03 | End: 2020-02-03

## 2020-01-03 RX ORDER — METOLAZONE 2.5 MG/1
TABLET ORAL
Qty: 40 TAB | Refills: 0 | Status: SHIPPED | OUTPATIENT
Start: 2020-01-03 | End: 2020-04-02 | Stop reason: SDUPTHER

## 2020-01-09 ENCOUNTER — TELEPHONE (OUTPATIENT)
Dept: ENDOCRINOLOGY | Age: 75
End: 2020-01-09

## 2020-01-09 NOTE — TELEPHONE ENCOUNTER
I returned this call and Ms. Ashley Hatfield said that [address] needed some more paperwork sent to them. I asked her to have them contact me as we had sent over all the paperwork multiple times. She will do this.   Leisa Torres

## 2020-01-09 NOTE — TELEPHONE ENCOUNTER
Pt's wife Rashad Thacker called, she asked if you can call Mr. Jaylen Reyes @[phone]. This is regarding Ana Alvarez.

## 2020-01-24 DIAGNOSIS — M79.2 NEUROPATHIC PAIN: ICD-10-CM

## 2020-01-24 RX ORDER — GABAPENTIN 100 MG/1
CAPSULE ORAL
Qty: 30 CAP | Refills: 0 | Status: SHIPPED | OUTPATIENT
Start: 2020-01-24 | End: 2020-08-11

## 2020-01-31 ENCOUNTER — TELEPHONE (OUTPATIENT)
Dept: INTERNAL MEDICINE CLINIC | Age: 75
End: 2020-01-31

## 2020-01-31 RX ORDER — DULOXETIN HYDROCHLORIDE 60 MG/1
CAPSULE, DELAYED RELEASE ORAL
Qty: 90 CAP | Refills: 0 | Status: SHIPPED | OUTPATIENT
Start: 2020-01-31 | End: 2020-04-06

## 2020-02-03 ENCOUNTER — TELEPHONE (OUTPATIENT)
Dept: ENDOCRINOLOGY | Age: 75
End: 2020-02-03

## 2020-02-03 RX ORDER — FUROSEMIDE 40 MG/1
TABLET ORAL
Qty: 60 TAB | Refills: 0 | Status: SHIPPED | OUTPATIENT
Start: 2020-02-03 | End: 2020-03-16

## 2020-02-03 NOTE — TELEPHONE ENCOUNTER
2/3/2020  11:29 AM      Natali Chery called to check the status on a prior [facility]    [phone]  Ref# [id number]

## 2020-02-05 ENCOUNTER — DOCUMENTATION ONLY (OUTPATIENT)
Dept: CARDIOLOGY CLINIC | Age: 75
End: 2020-02-05

## 2020-02-07 ENCOUNTER — TELEPHONE (OUTPATIENT)
Dept: CARDIOLOGY CLINIC | Age: 75
End: 2020-02-07

## 2020-02-07 DIAGNOSIS — I50.22 CHRONIC SYSTOLIC CONGESTIVE HEART FAILURE (HCC): ICD-10-CM

## 2020-02-07 DIAGNOSIS — E78.00 PURE HYPERCHOLESTEROLEMIA: ICD-10-CM

## 2020-02-07 LAB
ALBUMIN SERPL-MCNC: 4 G/DL (ref 3.7–4.7)
ALBUMIN/GLOB SERPL: 1.6 {RATIO} (ref 1.2–2.2)
ALP SERPL-CCNC: 70 IU/L (ref 39–117)
ALT SERPL-CCNC: 14 IU/L (ref 0–44)
AST SERPL-CCNC: 12 IU/L (ref 0–40)
BILIRUB SERPL-MCNC: 0.3 MG/DL (ref 0–1.2)
BUN SERPL-MCNC: 45 MG/DL (ref 8–27)
BUN/CREAT SERPL: 30 (ref 10–24)
CALCIUM SERPL-MCNC: 9.3 MG/DL (ref 8.6–10.2)
CHLORIDE SERPL-SCNC: 99 MMOL/L (ref 96–106)
CHOLEST SERPL-MCNC: 109 MG/DL (ref 100–199)
CK SERPL-CCNC: 263 U/L (ref 24–204)
CO2 SERPL-SCNC: 26 MMOL/L (ref 20–29)
CREAT SERPL-MCNC: 1.49 MG/DL (ref 0.76–1.27)
GLOBULIN SER CALC-MCNC: 2.5 G/DL (ref 1.5–4.5)
GLUCOSE SERPL-MCNC: 108 MG/DL (ref 65–99)
HDLC SERPL-MCNC: 42 MG/DL
INTERPRETATION, 910389: NORMAL
INTERPRETATION: NORMAL
LDLC SERPL CALC-MCNC: 39 MG/DL (ref 0–99)
PDF IMAGE, 910387: NORMAL
POTASSIUM SERPL-SCNC: 4.4 MMOL/L (ref 3.5–5.2)
PROT SERPL-MCNC: 6.5 G/DL (ref 6–8.5)
SODIUM SERPL-SCNC: 144 MMOL/L (ref 134–144)
TRIGL SERPL-MCNC: 141 MG/DL (ref 0–149)
VLDLC SERPL CALC-MCNC: 28 MG/DL (ref 5–40)

## 2020-02-07 NOTE — TELEPHONE ENCOUNTER
----- Message from Mulu Izquierdo NP sent at 2/7/2020  8:48 AM EST -----  Please call patient and or daughter. Labs stable to remain on same dose of fluid pill. CK a little elevated and LDL is below 50, can reduce Simvastatin to every other day. Also let them know my time is limited if not at all in [place] and can certainly be seen by me in [facility] or [place].  Thanks

## 2020-02-07 NOTE — TELEPHONE ENCOUNTER
Spoke with patient. Verified patient with two patient identifiers. Discussed all labs with pt as noted below. Advised to decrease Simvastatin to every other day. Can return for FU at [facility] or [facility] [place] [place] - [place] office. Patient verbalized understanding.

## 2020-02-07 NOTE — PROGRESS NOTES
Please call patient and or daughter. Labs stable to remain on same dose of fluid pill. CK a little elevated and LDL is below 50, can reduce Simvastatin to every other day. Also let them know my time is limited if not at all in [place] and can certainly be seen by me in [address] [place] [address] [address] or [place].  Thanks

## 2020-02-11 NOTE — ANCILLARY DISCHARGE INSTRUCTIONS
[facility] Physical Therapy  [address] (MOB IV), [address] [facility] [address], [address]  Phone: [phone] Fax: [phone]    Discharge Summary 2-15    Patient name: Mendy Gibson  : 1945  Provider#: [id number]  Referral source: Oc Garza MD      Medical/Treatment Diagnosis: Neck pain [M54.2]  Lower back pain [M54.5]     Prior Hospitalization: see medical history     Comorbidities: See Plan of Care  Prior Level of Function: See Plan of Care  Medications: Verified on Patient Summary List    Start of Care: 19      Onset Date:s/p ACDF May 2019   Visits from Start of Care: 1     Missed Visits: 0  Reporting Period : 19 to 20    Assessment/Summary of care: The patient did not return to for follow up appointments after this initial evaluation, though it was made clear that it was uncertain how much progression could be made due to the patient plateauing in therapy months before. It was also suggested he return to his physician to obtain new imaging due to his sudden increase in pain and decrease in upper extremity ROM, despite being consistent with his HEP. Pt is discharged today, 2020, as they have stopped attending therapy. Final objective data and outcomes were unable to be obtained.           RECOMMENDATIONS:  [x]Discontinue therapy: []Patient has reached or is progressing toward set goals     [x]Patient is non-compliant or has abdicated     [x]Due to lack of appreciable progress towards set goals     []Other  Monroe Kussmaul, PT 2020

## 2020-02-25 ENCOUNTER — DOCUMENTATION ONLY (OUTPATIENT)
Dept: CARDIOLOGY CLINIC | Age: 75
End: 2020-02-25

## 2020-02-25 NOTE — PROGRESS NOTES
Received letter from [facility] that metolazone has been approved from 2/3/20 to 12/31/20.     Req/Auth #:  [id number]

## 2020-03-04 ENCOUNTER — HOSPITAL ENCOUNTER (OUTPATIENT)
Dept: PHYSICAL THERAPY | Age: 75
Discharge: HOME | End: 2020-03-04
Payer: MEDICARE

## 2020-03-04 PROCEDURE — 97162 PT EVAL MOD COMPLEX 30 MIN: CPT | Performed by: PHYSICAL THERAPIST

## 2020-03-04 NOTE — PROGRESS NOTES
[address] [facility] [address] (MOB IV), [address] [facility] [place]  Phone: [phone] Fax: [phone]     Plan of Care/Statement of Necessity for Physical Therapy Services  2-15    Patient name: Beth Peter  : 1945  Provider#: [id number]  Referral source: Yael Riley MD      Medical/Treatment Diagnosis: Cervicalgia [M54.2]     Prior Hospitalization: see medical history     Comorbidities: Arthritis, asthma, BMI over 30, CHF, DM, HBP, Prior Surgery  Prior Level of Function: completes 20 mins of exercise at least 3 times per week  Medications: Verified on Patient Summary List  Start of Care: 3/4/20      Onset Date: Chronic   The Plan of Care and following information is based on the information from the initial evaluation. Assessment/ key information: Patient presents with intermittent numbness and tingling as well as decreased range of motion and activity tolerance of right shoulder consistent with chronic deconditioning exacerbated by chronic cervical radiculopathy s/p C3-C6 ACDF May 2019. His right shoulder was severely limited when compared with his left, measuring flexion at 35 degrees actively with 160 passively. The patient's subjective report has repeatedly been inconsistent, as far as pain versus numbness, and which he is having more issues with and wants to be resolved. He does report the recent increase in Gabapentin dosage has improved his numbness a little. Of note he was seen recently in this clinic in the fall of  and was discharged due to reaching a plateau with progress over a 3 month period. He was then evaluated again for the same issue, but reporting more \"pain\" than numbness in the arms on 19 and it was not certain how much progress would be made, but he did not return for follow up appointments thereafter.      He is very reliable to completing his HEP and reports he has done so essentially every day over the last few months. Due to this, it is unlikely he will make a large amount of progress in therapy, but will be seen 1 visit per week to reassess understanding and form with HEP and progress or modify resistance if needed. Evaluation Complexity History MEDIUM  Complexity : 1-2 comorbidities / personal factors will impact the outcome/ POC ; Examination MEDIUM Complexity : 3 Standardized tests and measures addressing body structure, function, activity limitation and / or participation in recreation  ;Presentation MEDIUM Complexity : Evolving with changing characteristics  ; Clinical Decision Making MEDIUM Complexity : FOTO score of 26-74  Overall Complexity Rating: MEDIUM    Problem List: pain affecting function, decrease ROM, decrease strength, edema affecting function, impaired gait/ balance, decrease ADL/ functional abilitiies, decrease activity tolerance, decrease flexibility/ joint mobility and decrease transfer abilities   Treatment Plan may include any combination of the following: Therapeutic exercise, Therapeutic activities, Neuromuscular re-education, Physical agent/modality, Gait/balance training, Manual therapy, Patient education, Self Care training, Functional mobility training, Home safety training and Stair training  Patient / Family readiness to learn indicated by: asking questions, trying to perform skills and interest  Persons(s) to be included in education: patient (P)  Barriers to Learning/Limitations: None  Patient Goal (s): get rid of pain  Patient Self Reported Health Status: good  Rehabilitation Potential: fair/good    Short Term Goals: To be accomplished in 2 treatments:                         1.) The patient will be independent with their HEP consistently for at least one week. Long Term Goals: To be accomplished in 6 treatments:                         1.) The patient will have at most 8/10 numbness with daily activities.                          2.) The patient will be able to drive for at least 20min without having to change positions due to pain. 3.) The patient will improve their FOTO score from 50 to at least 55 to show improvements in functional mobility. Frequency / Duration: Patient to be seen 1 times per week for 6 treatments. Patient/ Caregiver education and instruction: self care, activity modification and exercises    [x]  Plan of care has been reviewed with PTA        Certification Period: 3/4/20-6/4/20  Darlene Faustin, PT 3/4/2020     ________________________________________________________________________    I certify that the above Therapy Services are being furnished while the patient is under my care. I agree with the treatment plan and certify that this therapy is necessary.     [de-identified] Signature:____________________  Date:____________Time: _________

## 2020-03-04 NOTE — PROGRESS NOTES
PT INITIAL EVALUATION NOTE - [facility] 2-15    Patient Name: Hawa Mcrae  Date:3/4/2020  : 1945  [x]  Patient  Verified  Payor: BLUE CROSS MEDICARE / Plan: [place] BLUE [place] MEDIC[place] PPO / Product Type: Managed Care Medicare /    In time: 10:40am  Out time:11:30am  Total Treatment Time (min): 50  Total Timed Codes (min): 7  1:1 Treatment Time ( only): 50   Visit #: 1     Treatment Area: Cervicalgia [M54.2]    SUBJECTIVE  Pain Level (0-10 scale): Numbness (bilateral shoulders): 9 (7-10)  Any medication changes, allergies to medications, adverse drug reactions, diagnosis change, or new procedure performed?: [] No    [x] Yes (see summary sheet for update)  Subjective:    Patient reports pain increasing over the last few years. Reports a spinal fusion about a year ago which helped a lot with the pain he was having, however he is now feeling a lot of numbness still from his neck bilaterally down to both hands. He also reports a history of torn biceps in both arms along with an ORIF in the right with \"good recovery\". He currently sleeps sitting upright in a chair due to his CPAP issues however is not waking up any nights of the week due to pain or numbness. Reports he can drive for about an hour before numbness, but has only driven as far as 15 minutes in the past few weeks. OBJECTIVE/EXAMINATION  Posture: Forward head, kyphotic posture  Other Observations:   Strength (3x) R/L: 21,21,25 (22.3lbs avg.) /49,75,89 (38.67lbs.  avg)  Palpation: No TTP        Cervical AROM:        R  L    Flexion    43      Extension   36      Side Bending   Limited bilaterally      Rotation   56  53            UPPER QUARTER   MUSCLE STRENGTH  KEY       R  L  0 - No Contraction  C1, C2 Neck Flex nt  nt  1 - Trace   C3 Side Flex  nt  nt  2 - Poor   C4 Sh Elev  2-  4+  3 - Fair    C5 Deltoid/Biceps 5  5  4 - Good   C6 Wrist Ext  5  5  5 - Normal   C7 Triceps  5  5      C8 Thumb Ext  5  5      T1 Hand Inst  5  5    A/PROM Shoulder:   Right   Left   Flexion   35/160   100/165   Extension  nt/nt   nt/nt   Abduction  45/nt   95/nt   Adduction  nt/nt   nt/nt   IR   Hand to T12/90 T9/90   ER   nt/80   nt/90    UPPER QUARTER     MUSCLE STRENGTH  KEY        R  L  0 - No Contraction   Flexion   2-  4+  1 - Trace    Extension (elbow) 5  5  2 - Poor    Abduction  2-  4  3 - Fair     IR   4  4  4 - Good    ER   3  3  5 - Normal       Neurological: Reflexes / Sensations: Intact to LT bilaterally despite subjective reports of numbness  Special Tests: NT      7 min Therapeutic Exercise:  [x] See flow sheet :   Rationale: increase ROM, increase strength, improve coordination, improve balance and increase proprioception to improve the patients ability to complete ADL's.           With   [x] TE   [] TA   [] neuro   [] other: Patient Education: [x] Review HEP    [x] Progressed/Changed HEP based on:   [x] positioning   [x] body mechanics   [] transfers   [] heat/ice application    [] other:      Other Objective/Functional Measures: FOTO = 50    Pain Level (0-10 scale) post treatment: Numbness (bilateral shoulders): 9    ASSESSMENT/Changes in Function:     [x]  See Plan of [facility], PT 3/4/2020

## 2020-03-11 ENCOUNTER — OFFICE VISIT (OUTPATIENT)
Dept: INTERNAL MEDICINE CLINIC | Age: 75
End: 2020-03-11

## 2020-03-11 VITALS
RESPIRATION RATE: 18 BRPM | HEIGHT: 63 IN | OXYGEN SATURATION: 96 % | TEMPERATURE: 97.6 F | SYSTOLIC BLOOD PRESSURE: 128 MMHG | BODY MASS INDEX: 39.62 KG/M2 | DIASTOLIC BLOOD PRESSURE: 79 MMHG | WEIGHT: 223.6 LBS | HEART RATE: 104 BPM

## 2020-03-11 DIAGNOSIS — E11.21 TYPE 2 DIABETES WITH NEPHROPATHY (HCC): ICD-10-CM

## 2020-03-11 DIAGNOSIS — Z98.1 S/P CERVICAL SPINAL FUSION: ICD-10-CM

## 2020-03-11 DIAGNOSIS — I50.9 CONGESTIVE HEART FAILURE, UNSPECIFIED HF CHRONICITY, UNSPECIFIED HEART FAILURE TYPE (HCC): ICD-10-CM

## 2020-03-11 DIAGNOSIS — Z23 ENCOUNTER FOR IMMUNIZATION: ICD-10-CM

## 2020-03-11 DIAGNOSIS — M79.2 NEUROPATHIC PAIN: ICD-10-CM

## 2020-03-11 DIAGNOSIS — N18.30 CKD (CHRONIC KIDNEY DISEASE) STAGE 3, GFR 30-59 ML/MIN (HCC): Primary | ICD-10-CM

## 2020-03-11 DIAGNOSIS — E78.00 PURE HYPERCHOLESTEROLEMIA: ICD-10-CM

## 2020-03-11 DIAGNOSIS — E66.01 OBESITY, MORBID (HCC): ICD-10-CM

## 2020-03-11 DIAGNOSIS — G95.9 MYELOPATHY (HCC): ICD-10-CM

## 2020-03-11 NOTE — PROGRESS NOTES
Maikol Ferguson is a 69 y.o. male who presents for follow-up. Last seen September 2019    He sees Dr. Anne Whatley, endocrinology for diabetes. Last hemoglobin A1c December 2019 5.5% taking metformin XR and Amaryl. Following diabetic diet. Up-to-date on eye exam.  He has glaucoma. S/p cervical fusion with Dr. Helena Perea May 2019. He has had physical therapy. He denies neck pain. Has persistent numbness in upper back and in both arms. He is weak in both arms. An MRI of the neck is scheduled for March 24. Taking gabapentin for pain,  200 mg three times a day and duloxetine 60 mg daily for neuropathic pain in the feet. He feels pain is controlled. Left shoulder popping. No left shoulder pain. History of CAD, CHF, and treated for hypertension. He is on carvedilol and Lasix 40mg BID. Prior Zaroxolyn twice a week. Last ECHO EF 60%    On statin, no myalgias. LDL 39 February 2020. Creatinine elevated 1.49. Urinary frequency. No dysuria. S/p left cataract surgery Dr. Suraj Ledezma September 2019. Right cataract, no surgery planned yet. S/p bilateral carpal tunnel surgery with Dr. Jennifer Antonio October 2019. Can use hands better now.           Past Medical History:   Diagnosis Date    Arthritis     Asthma     CAD (coronary artery disease)     Calculus of kidney     Carotid artery disease (HCC)     Cervical herniated disc     Chronic systolic (congestive) heart failure (HCC)     Diabetes ([address]     Diabetic shock due to low blood sugar ([address] 2016    Glaucoma     pt is on drops unsure of what the names are    Hypercholesterolemia     Hypertension     Morbid obesity ([address]     Rheumatic fever     When he was 17    Sleep apnea     compliant with cpap as stated 9/6/2019       Family History   Problem Relation Age of Onset    Heart Disease Brother     Diabetes Nephew     Hypertension Mother        Social History     Socioeconomic History    Marital status:      Spouse name: Not on file    Number of children: Not on file    Years of education: Not on file    Highest education level: Not on file   Occupational History    Not on file   Social Needs    Financial resource strain: Not on file    Food insecurity     Worry: Not on file     Inability: Not on file    Transportation needs     Medical: Not on file     Non-medical: Not on file   Tobacco Use    Smoking status: Former Smoker     Packs/day: 5.00     Years: 15.00     Pack years: 75.00     Last attempt to quit: 1963     Years since quittin.9    Smokeless tobacco: Never Used   Substance and Sexual Activity    Alcohol use: No     Alcohol/week: 0.0 standard drinks    Drug use: Never    Sexual activity: Yes     Partners: Female     Birth control/protection: None   Lifestyle    Physical activity     Days per week: Not on file     Minutes per session: Not on file    Stress: Not on file   Relationships    Social connections     Talks on phone: Not on file     Gets together: Not on file     Attends Restorationism service: Not on file     Active member of club or organization: Not on file     Attends meetings of clubs or organizations: Not on file     Relationship status: Not on file    Intimate partner violence     Fear of current or ex partner: Not on file     Emotionally abused: Not on file     Physically abused: Not on file     Forced sexual activity: Not on file   Other Topics Concern    Not on file   Social History Narrative    ** Merged History Encounter **            Current Outpatient Medications on File Prior to Visit   Medication Sig Dispense Refill    furosemide (LASIX) 40 mg tablet TAKE 1 TABLET BY MOUTH TWICE DAILY 60 Tab 0    DULoxetine (CYMBALTA) 60 mg capsule TAKE 1 CAPSULE BY MOUTH ONCE DAILY FOR  FEET  PAIN 90 Cap 0    gabapentin (NEURONTIN) 100 mg capsule TAKE 1 CAPSULE BY MOUTH THREE TIMES DAILY AS DIRECTED (Patient taking differently: 2 capsules three times a day) 30 Cap 0    metOLazone (ZAROXOLYN) 2.5 mg tablet TAKE 1 TABLET BY MOUTH EVERY TUESDAY AND THURSDAY 40 Tab 0    simvastatin (ZOCOR) 10 mg tablet TAKE 1 TABLET BY MOUTH NIGHTLY (Patient taking differently: 10 mg every other day.) 90 Tab 1    omega-3 fatty acids 1,000 mg cap Take 1 Cap by mouth daily.  isosorbide mononitrate ER (IMDUR) 60 mg CR tablet Take 1 Tab by mouth daily. 90 Tab 3    lisinopril (PRINIVIL, ZESTRIL) 2.5 mg tablet Take 1 Tab by mouth daily. 90 Tab 3    carvedilol (COREG) 12.5 mg tablet Take 1 Tab by mouth two (2) times a day. 180 Tab 3    dextran 70/hypromellose/PF (NATURAL TEARS, PF, OP) Apply 1 Drop to eye as needed.  acetaminophen (TYLENOL) 500 mg tablet Take 1,000 mg by mouth every six (6) hours as needed for Pain.  glimepiride (AMARYL) 1 mg tablet Take 1 mg by mouth Daily (before breakfast).  metFORMIN ER (GLUCOPHAGE XR) 500 mg tablet TAKE 1 TABLET BY MOUTH BEFORE BREAKFAST AND  DINNER 180 Tab 3    cholecalciferol, VITAMIN D3, (VITAMIN D3) 5,000 unit tab tablet Take 1 Tab by mouth daily. 30 Tab 5    aspirin delayed-release 81 mg tablet Take 81 mg by mouth daily. No current facility-administered medications on file prior to visit. Review of Systems  Pertinent items are noted in HPI. Objective:     Visit Vitals  /79 (BP 1 Location: Left arm, BP Patient Position: Sitting)   Pulse (!) 104   Temp 97.6 °F (36.4 °C) (Oral)   Resp 18   Ht 5' 3\" (1.6 m)   Wt 223 lb 9.6 oz (101.4 kg)   SpO2 96%   BMI 39.61 kg/m²     Gen: well appearing male  HEENT:   PERRL,normal conjunctiva. External ear and canals normal, TMs no opacification or erythema,  OP no erythema, no exudates, MMM  Neck:  Supple. Thyroid normal size, nontender, without nodules. No masses or LAD  Resp:  No wheezing, no rhonchi, no rales. CV:  RRR, with ectopy, normal S1S2, no murmur. GI: soft, nontender, without masses. No hepatosplenomegaly.   Extrem:  +2 pulses, no edema, warm distally, good ROM left shoulder, loss of left trapezius. Limited ROM right shoulder with abduction. Assessment/Plan:       ICD-10-CM ICD-9-CM    1. CKD (chronic kidney disease) stage 3, GFR 30-59 ml/min ([facility]) N18.3 585.3 REFERRAL TO NEPHROLOGY   2. Neuropathic pain M79.2 729.2    3. Obesity, morbid ([facility] E66.01 278.01    4. Type 2 diabetes with nephropathy ([facility]) E11.21 250.40      583.81    5. S/P cervical spinal fusion Z98.1 V45.4    6. Pure hypercholesterolemia E78.00 272.0    7. Congestive heart failure, unspecified HF chronicity, unspecified heart failure type ([facility]) I50.9 428.0    8. Myelopathy ([facility]) G95.9 336.9    9. Encounter for immunization Z23 V03.89 pneumococcal 13 qiana conj dip (PREVNAR-13) 0.5 mL syrg injection      varicella-zoster recombinant, PF, (SHINGRIX) 50 mcg/0.5 mL susr injection       Follow-up and Dispositions    · Return in about 6 months (around 9/11/2020) for follow up on medications/medicare wellness. Suresh Davis MD    Discussed the patient's BMI with him. The BMI follow up plan is as follows:     dietary management education, guidance, and counseling  encourage exercise  monitor weight  prescribed dietary intake    An After Visit Summary was printed and given to the patient.

## 2020-03-11 NOTE — PATIENT INSTRUCTIONS
Body Mass Index: Care Instructions  Your Care Instructions    Body mass index (BMI) can help you see if your weight is raising your risk for health problems. It uses a formula to compare how much you weigh with how tall you are. · A BMI lower than 18.5 is considered underweight. · A BMI between 18.5 and 24.9 is considered healthy. · A BMI between 25 and 29.9 is considered overweight. A BMI of 30 or higher is considered obese. If your BMI is in the normal range, it means that you have a lower risk for weight-related health problems. If your BMI is in the overweight or obese range, you may be at increased risk for weight-related health problems, such as high blood pressure, heart disease, stroke, arthritis or joint pain, and diabetes. If your BMI is in the underweight range, you may be at increased risk for health problems such as fatigue, lower protection (immunity) against illness, muscle loss, bone loss, hair loss, and hormone problems. BMI is just one measure of your risk for weight-related health problems. You may be at higher risk for health problems if you are not active, you eat an unhealthy diet, or you drink too much alcohol or use tobacco products. Follow-up care is a key part of your treatment and safety. Be sure to make and go to all appointments, and call your doctor if you are having problems. It's also a good idea to know your test results and keep a list of the medicines you take. How can you care for yourself at home? · Practice healthy eating habits. This includes eating plenty of fruits, vegetables, whole grains, lean protein, and low-fat dairy. · If your doctor recommends it, get more exercise. Walking is a good choice. Bit by bit, increase the amount you walk every day. Try for at least 30 minutes on most days of the week. · Do not smoke. Smoking can increase your risk for health problems. If you need help quitting, talk to your doctor about stop-smoking programs and medicines. These can increase your chances of quitting for good. · Limit alcohol to 2 drinks a day for men and 1 drink a day for women. Too much alcohol can cause health problems. If you have a BMI higher than 25  · Your doctor may do other tests to check your risk for weight-related health problems. This may include measuring the distance around your waist. A waist measurement of more than 40 inches in men or 35 inches in women can increase the risk of weight-related health problems. · Talk with your doctor about steps you can take to stay healthy or improve your health. You may need to make lifestyle changes to lose weight and stay healthy, such as changing your diet and getting regular exercise. If you have a BMI lower than 18.5  · Your doctor may do other tests to check your risk for health problems. · Talk with your doctor about steps you can take to stay healthy or improve your health. You may need to make lifestyle changes to gain or maintain weight and stay healthy, such as getting more healthy foods in your diet and doing exercises to build muscle. Where can you learn more? Go to http://lalo-ashley.info/. Enter S176 in the search box to learn more about \"Body Mass Index: Care Instructions. \"  Current as of: October 13, 2016  Content Version: 11.4  © 6813-2630 [employer], [employer]. Care instructions adapted under license by [employer] (which disclaims liability or warranty for this information). If you have questions about a medical condition or this instruction, always ask your healthcare professional. [address] any warranty or liability for your use of this information.

## 2020-03-12 ENCOUNTER — HOSPITAL ENCOUNTER (OUTPATIENT)
Dept: PHYSICAL THERAPY | Age: 75
Discharge: HOME | End: 2020-03-12
Payer: MEDICARE

## 2020-03-12 PROCEDURE — 97110 THERAPEUTIC EXERCISES: CPT | Performed by: PHYSICAL THERAPIST

## 2020-03-12 NOTE — PROGRESS NOTES
PT DAILY TREATMENT NOTE - [facility] 2-15    Patient Name: Shawna James  Date:3/12/2020  : 1945  [x]  Patient  Verified  Payor: BLUE CROSS MEDICARE / Plan: [employer] CROSS MEDICARE PPO / Product Type: Managed Care Medicare /    In time: 12:05pm  Out time: 12:55pm  Total Treatment Time (min): 50  Total Timed Codes (min): 50  1:1 Treatment Time ( only): 50   Visit #: 2    Treatment Area: Cervicalgia [M54.2]    SUBJECTIVE  Pain Level (0-10 scale): Numbness: 9  Any medication changes, allergies to medications, adverse drug reactions, diagnosis change, or new procedure performed?: [x] No    [] Yes (see summary sheet for update)  Subjective functional status/changes:   [] No changes reported  Patient reports he felt as if he overdid it over the weekend while walking around an autograph show in [place] with his son. OBJECTIVE    50 min Therapeutic Exercise:  [] See flow sheet :   Rationale: increase ROM, increase strength, improve coordination, improve balance and increase proprioception to improve the patients ability to complete ADL's. With   [x] TE   [] TA   [] neuro   [] other: Patient Education: [x] Review HEP    [x] Progressed/Changed HEP based on:   [x] positioning   [x] body mechanics   [] transfers   [] heat/ice application    [] other:      Other Objective/Functional Measures: none noted     Pain Level (0-10 scale) post treatment: Numbness: 9    ASSESSMENT/Changes in Function:   Patient demonstrated poor strength and motor coordination throughout session in relation to his right shoulder. He was given instruction on modifications for exercises that he has been completing at home. We will continue to progress as tolerated.    Patient will continue to benefit from skilled PT services to modify and progress therapeutic interventions, address functional mobility deficits, address ROM deficits, address strength deficits, analyze and address soft tissue restrictions, analyze and cue movement patterns, analyze and modify body mechanics/ergonomics, assess and modify postural abnormalities, address imbalance/dizziness and instruct in home and community integration to attain remaining goals. [x]  See Plan of Care  []  See progress note/recertification  []  See Discharge Summary         Progress towards goals / Updated goals: The patient is progressing towards goals.      PLAN  [x]  Upgrade activities as tolerated     [x]  Continue plan of care  [x]  Update interventions per flow sheet       []  Discharge due to:_  []  Other:_      Cleo Acevedo, PT 3/12/2020

## 2020-03-16 RX ORDER — FUROSEMIDE 40 MG/1
TABLET ORAL
Qty: 60 TAB | Refills: 0 | Status: SHIPPED | OUTPATIENT
Start: 2020-03-16 | End: 2020-04-06

## 2020-03-17 ENCOUNTER — APPOINTMENT (OUTPATIENT)
Dept: PHYSICAL THERAPY | Age: 75
End: 2020-03-17
Payer: MEDICARE

## 2020-03-24 ENCOUNTER — APPOINTMENT (OUTPATIENT)
Dept: PHYSICAL THERAPY | Age: 75
End: 2020-03-24
Payer: MEDICARE

## 2020-03-31 ENCOUNTER — APPOINTMENT (OUTPATIENT)
Dept: PHYSICAL THERAPY | Age: 75
End: 2020-03-31
Payer: MEDICARE

## 2020-04-01 ENCOUNTER — APPOINTMENT (OUTPATIENT)
Dept: PHYSICAL THERAPY | Age: 75
End: 2020-04-01

## 2020-04-02 RX ORDER — METOLAZONE 2.5 MG/1
TABLET ORAL
Qty: 40 TAB | Refills: 0 | Status: SHIPPED | OUTPATIENT
Start: 2020-04-02 | End: 2020-06-17

## 2020-04-02 RX ORDER — METOLAZONE 2.5 MG/1
TABLET ORAL
Qty: 40 TAB | Refills: 0 | Status: CANCELLED | OUTPATIENT
Start: 2020-04-02

## 2020-04-06 RX ORDER — SIMVASTATIN 10 MG/1
10 TABLET, FILM COATED ORAL EVERY OTHER DAY
Qty: 90 TAB | Refills: 3 | Status: SHIPPED | OUTPATIENT
Start: 2020-04-06 | End: 2021-05-24 | Stop reason: SDUPTHER

## 2020-04-06 RX ORDER — FUROSEMIDE 40 MG/1
TABLET ORAL
Qty: 60 TAB | Refills: 0 | Status: SHIPPED | OUTPATIENT
Start: 2020-04-06 | End: 2020-05-11

## 2020-04-09 ENCOUNTER — TELEPHONE (OUTPATIENT)
Dept: PHYSICAL THERAPY | Age: 75
End: 2020-04-09

## 2020-04-09 NOTE — TELEPHONE ENCOUNTER
spoke with patient and wife; not able to do virtual health; did NOT want to be discharged but phone check ins; open to email contact: [email or web address]

## 2020-04-20 ENCOUNTER — VIRTUAL VISIT (OUTPATIENT)
Dept: ENDOCRINOLOGY | Age: 75
End: 2020-04-20

## 2020-04-20 DIAGNOSIS — E11.22 TYPE 2 DIABETES MELLITUS WITH STAGE 3 CHRONIC KIDNEY DISEASE, WITH LONG-TERM CURRENT USE OF INSULIN (HCC): Primary | ICD-10-CM

## 2020-04-20 DIAGNOSIS — E78.5 HYPERLIPIDEMIA, UNSPECIFIED HYPERLIPIDEMIA TYPE: ICD-10-CM

## 2020-04-20 DIAGNOSIS — I10 ESSENTIAL HYPERTENSION WITH GOAL BLOOD PRESSURE LESS THAN 130/80: ICD-10-CM

## 2020-04-20 DIAGNOSIS — N18.30 TYPE 2 DIABETES MELLITUS WITH STAGE 3 CHRONIC KIDNEY DISEASE, WITH LONG-TERM CURRENT USE OF INSULIN (HCC): Primary | ICD-10-CM

## 2020-04-20 DIAGNOSIS — Z79.4 TYPE 2 DIABETES MELLITUS WITH STAGE 3 CHRONIC KIDNEY DISEASE, WITH LONG-TERM CURRENT USE OF INSULIN (HCC): Primary | ICD-10-CM

## 2020-04-20 NOTE — PROGRESS NOTES
**DUE TO PANDEMIC AND HEALTH CONCERNS IN THE COMMUNITY, THIS PATIENT WAS EITHER ILL OR FOUND TO BE HIGH RISK FOR IN-PERSON EVALUATION WITHIN THE CLINIC. THE FOLLOWING IS A VIRTUAL VISIT VIA A TELEPHONE ENCOUNTER TO WHICH THE PATIENT AGREED. THE PURPOSE IS TO LIMIT INTERRUPTIONS IN HEALTHCARE AND TO PROVIDE FOR ONGOING URGENT NEEDS UNDER THE CURRENT CONDITIONS. THE PATIENT CONFIRMS THEY ARE AWARE OF THE LIMITATIONS OF THE TELEPHONE VISIT. CHIEF COMPLAINT: f/u evaluation for uncontrolled type 2 diabetes    HISTORY OF PRESENT ILLNESS:   Anna Marie Barger is a 69 y.o. male with a PMHx as noted below who presents to the endocrinology clinic for f/u evaluation of uncontrolled type 2 diabetes. A1c previously 5.5%.      Currently taking the following meds:  metformin 500mg twice per day,   Glimepiride only 1 mg tablet before breakfast,    Review of home blood glucose:  Log reviewed      Review of most recent diabetes-related labs:  Lab Results   Component Value Date    HBA1C 5.9 (H) 09/12/2019    HBA1C 7.7 (H) 05/10/2019    EMI1QNST 5.5 12/17/2019    FNJ2TMNG 5.3 01/17/2019    GBG2CBAL 5.6 10/09/2018    CHOL 109 02/06/2020    LDLC 39 02/06/2020    GFRAA 53 (L) 02/06/2020    GFRNA 46 (L) 02/06/2020    MCACR 1,624.4 (H) 05/10/2019    TSH 0.681 04/23/2018    VITD3 59.9 05/16/2019     Lab Key:  [id number] = IA-2 pancreatic islet cell autoantibody  GADLT = MARIAN-65 autoantibody   MCACR = Urine Microalbumin  INSUL = Insulin level  CPEPL = C-peptide level    PAST MEDICAL/SURGICAL HISTORY:   Past Medical History:   Diagnosis Date    Arthritis     Asthma     CAD (coronary artery disease)     Calculus of kidney     Carotid artery disease (HCC)     Cervical herniated disc     Chronic systolic (congestive) heart failure (HCC)     Diabetes ([address]     Diabetic shock due to low blood sugar ([address] 2016    Glaucoma     pt is on drops unsure of what the names are    Hypercholesterolemia     Hypertension     [facility]     Rheumatic fever     When he was 17    Sleep apnea     compliant with cpap as stated 9/6/2019     Past Surgical History:   Procedure Laterality Date    HX CAROTID STENT  1990's    with cath    HX CATARACT REMOVAL Left     HX CERVICAL FUSION  05/21/2019    C3- C6 ACFD     HX CHOLECYSTECTOMY      HX CIRCUMCISION      HX HEART CATHETERIZATION      x 5 total, 4 in heart, 1 in carotid    HX HEENT Left     ear torn off and repaired    HX OTHER SURGICAL Right     Right hand reconstruction    HX SHOULDER ARTHROSCOPY Right     dislocated with fall, came out, he stated surgery put back in place and sowed him up       ALLERGIES:   Allergies   Allergen Reactions    Morphine Anaphylaxis     Pt states \"morphine gave me heart failure\"    Lyrica [Pregabalin] Other (comments)     Makes him \"loopy\"       MEDICATIONS ON ADMISSION:     Current Outpatient Medications:     simvastatin (ZOCOR) 10 mg tablet, Take 1 Tab by mouth every other day., Disp: 90 Tab, Rfl: 3    metFORMIN ER (GLUCOPHAGE XR) 500 mg tablet, TAKE 1 TABLET BY MOUTH BEFORE BREAKFAST AND  DINNER, Disp: 180 Tab, Rfl: 3    furosemide (LASIX) 40 mg tablet, Take 1 tablet by mouth twice daily, Disp: 60 Tab, Rfl: 0    DULoxetine (CYMBALTA) 60 mg capsule, TAKE 1 CAPSULE BY MOUTH ONCE DAILY FOR  FEET  PAIN, Disp: 90 Cap, Rfl: 3    metOLazone (ZAROXOLYN) 2.5 mg tablet, TAKE 1 TABLET BY MOUTH EVERY TUESDAY AND THURSDAY, Disp: 40 Tab, Rfl: 0    glimepiride (AMARYL) 1 mg tablet, TAKE 1 TABLET BY MOUTH ONCE DAILY BEFORE BREAKFAST, Disp: 90 Tab, Rfl: 3    gabapentin (NEURONTIN) 100 mg capsule, TAKE 1 CAPSULE BY MOUTH THREE TIMES DAILY AS DIRECTED (Patient taking differently: 100 mg two (2) times a day.), Disp: 30 Cap, Rfl: 0    omega-3 fatty acids 1,000 mg cap, Take 1 Cap by mouth daily. , Disp: , Rfl:     isosorbide mononitrate ER (IMDUR) 60 mg CR tablet, Take 1 Tab by mouth daily. , Disp: 90 Tab, Rfl: 3    lisinopril (PRINIVIL, ZESTRIL) 2.5 mg tablet, Take 1 Tab by mouth daily. , Disp: 90 Tab, Rfl: 3    carvedilol (COREG) 12.5 mg tablet, Take 1 Tab by mouth two (2) times a day., Disp: 180 Tab, Rfl: 3    acetaminophen (TYLENOL) 500 mg tablet, Take 1,000 mg by mouth every six (6) hours as needed for Pain., Disp: , Rfl:     cholecalciferol, VITAMIN D3, (VITAMIN D3) 5,000 unit tab tablet, Take 1 Tab by mouth daily. , Disp: 30 Tab, Rfl: 5    aspirin delayed-release 81 mg tablet, Take 81 mg by mouth daily. , Disp: , Rfl:     dextran 70/hypromellose/PF (NATURAL TEARS, PF, OP), Apply 1 Drop to eye as needed. , Disp: , Rfl:     SOCIAL HISTORY:   Social History     Socioeconomic History    Marital status:      Spouse name: Not on file    Number of children: Not on file    Years of education: Not on file    Highest education level: Not on file   Occupational History    Not on file   Social Needs    Financial resource strain: Not on file    Food insecurity     Worry: Not on file     Inability: Not on file    Transportation needs     Medical: Not on file     Non-medical: Not on file   Tobacco Use    Smoking status: Former Smoker     Packs/day: 5.00     Years: 15.00     Pack years: 75.00     Last attempt to quit: 1963     Years since quittin.0    Smokeless tobacco: Never Used   Substance and Sexual Activity    Alcohol use: No     Alcohol/week: 0.0 standard drinks    Drug use: Never    Sexual activity: Yes     Partners: Female     Birth control/protection: None   Lifestyle    Physical activity     Days per week: Not on file     Minutes per session: Not on file    Stress: Not on file   Relationships    Social connections     Talks on phone: Not on file     Gets together: Not on file     Attends Quaker service: Not on file     Active member of club or organization: Not on file     Attends meetings of clubs or organizations: Not on file     Relationship status: Not on file    Intimate partner violence     Fear of current or ex partner: Not on file     Emotionally abused: Not on file     Physically abused: Not on file     Forced sexual activity: Not on file   Other Topics Concern    Not on file   Social History Narrative    ** Merged History Encounter **            FAMILY HISTORY:  Family History   Problem Relation Age of Onset    Heart Disease Brother     Diabetes Nephew     Hypertension Mother        REVIEW OF SYSTEMS: Complete ROS assessed and noted for that which is described above, all else are negative. Eyes: normal  ENT: normal  CVS: normal  Resp: normal  GI: normal  : normal  GYN: normal  Endocrine: normal  Integument: normal  Musculoskeletal: normal  Neuro: normal  Psych: normal      PHYSICAL EXAMINATION:    VITAL SIGNS:  Telephone    REVIEW OF LABORATORY AND RADIOLOGY DATA:   Labs and documentation have been reviewed as described above. ASSESSMENT AND PLAN:   Tasneem Martinez is a 69 y.o. male with a PMHx as noted above who presents to the endocrinology clinic for f/u evaluation of uncontrolled type 2 diabetes. DM2 with hypoglycemia  HTN  HLD    Discussed his meds with him, reducing his glimepiride a bit due to frequent sugars in the 80's. He was able to get his diabetes shoes finally after all the efforts to make it possible. They wanted to discuss his renal function, noted it has not changed much since 2015. Advised against NSAID use in CKD, they were not aware. Plan as follows:     DM2:  Continue metformin 500mg twice per day,   Glimepiride reduce to only 0.5 mg (1/2 tablet) before breakfast  Continue to check glucose 1-2 times daily  Provided with new log sheets    HTN: Telemedicine visit  HLD: stable on statin every other day    4 month follow up visit, Aug 11 at 11:30 AM    23 minutes spent toward telephone visit today of which >50% of this time was spent in counseling and coordination of care. Cleo Espinoza.  [id number] [facility]

## 2020-05-11 RX ORDER — FUROSEMIDE 40 MG/1
TABLET ORAL
Qty: 60 TAB | Refills: 0 | Status: SHIPPED | OUTPATIENT
Start: 2020-05-11 | End: 2020-05-22 | Stop reason: SDUPTHER

## 2020-05-12 ENCOUNTER — TELEPHONE (OUTPATIENT)
Dept: CARDIOLOGY CLINIC | Age: 75
End: 2020-05-12

## 2020-05-12 NOTE — TELEPHONE ENCOUNTER
Spoke with patient. Verified patient with two patient identifiers. Advised needs 6 mo appt. Pt wishes to stay with Shari Hamilton NP. Is scheduling a VV appt. Was given one month supply of Lasix until seen. Patient verbalized understanding.

## 2020-05-22 ENCOUNTER — VIRTUAL VISIT (OUTPATIENT)
Dept: INTERNAL MEDICINE CLINIC | Age: 75
End: 2020-05-22

## 2020-05-22 ENCOUNTER — VIRTUAL VISIT (OUTPATIENT)
Dept: CARDIOLOGY CLINIC | Age: 75
End: 2020-05-22

## 2020-05-22 VITALS — WEIGHT: 221 LBS | BODY MASS INDEX: 39.15 KG/M2

## 2020-05-22 DIAGNOSIS — N18.30 CKD (CHRONIC KIDNEY DISEASE) STAGE 3, GFR 30-59 ML/MIN (HCC): ICD-10-CM

## 2020-05-22 DIAGNOSIS — E11.21 TYPE 2 DIABETES WITH NEPHROPATHY (HCC): ICD-10-CM

## 2020-05-22 DIAGNOSIS — E78.2 MIXED HYPERLIPIDEMIA: ICD-10-CM

## 2020-05-22 DIAGNOSIS — I50.32 CHRONIC DIASTOLIC CONGESTIVE HEART FAILURE (HCC): ICD-10-CM

## 2020-05-22 DIAGNOSIS — Z13.31 SCREENING FOR DEPRESSION: ICD-10-CM

## 2020-05-22 DIAGNOSIS — I50.9 CONGESTIVE HEART FAILURE, UNSPECIFIED HF CHRONICITY, UNSPECIFIED HEART FAILURE TYPE (HCC): ICD-10-CM

## 2020-05-22 DIAGNOSIS — E78.00 PURE HYPERCHOLESTEROLEMIA: ICD-10-CM

## 2020-05-22 DIAGNOSIS — E66.01 OBESITY, MORBID (HCC): ICD-10-CM

## 2020-05-22 DIAGNOSIS — Z00.00 MEDICARE ANNUAL WELLNESS VISIT, SUBSEQUENT: Primary | ICD-10-CM

## 2020-05-22 DIAGNOSIS — I25.10 ASHD (ARTERIOSCLEROTIC HEART DISEASE): Primary | ICD-10-CM

## 2020-05-22 DIAGNOSIS — M79.2 NEUROPATHIC PAIN: ICD-10-CM

## 2020-05-22 DIAGNOSIS — Z23 ENCOUNTER FOR IMMUNIZATION: ICD-10-CM

## 2020-05-22 RX ORDER — FUROSEMIDE 40 MG/1
TABLET ORAL
Qty: 180 TAB | Refills: 3 | Status: SHIPPED | OUTPATIENT
Start: 2020-05-22 | End: 2021-06-14

## 2020-05-22 NOTE — PROGRESS NOTES
This is the Subsequent Medicare Annual Wellness Exam, performed 12 months or more after the Initial AWV or the last Subsequent AWV    Consent: Concepcion Westbrook, who was seen by synchronous (real-time) audio-video technology, and/or his healthcare decision maker, is aware that this patient-initiated, Telehealth encounter on 5/22/2020 is a billable service. While AWVs are fully covered by Medicare, any services rendered on this date that are not included in an AWV are subject to additional billing, with coverage as determined by his insurance carrier. He is aware that he may receive a bill for any such additional services and has provided verbal consent to proceed: Yes. I have reviewed the patient's medical history in detail and updated the computerized patient record.      History     Patient Active Problem List   Diagnosis Code    Obesity, morbid ([address] E66.01    SOB (shortness of breath) R06.02    Uncontrolled type 2 diabetes mellitus with complication, with long-term current use of insulin (HCC) E11.8, E11.65, Z79.4    Pure hypercholesterolemia E78.00    Chronic systolic congestive heart failure (HCC) I50.22    Pain in both hands M79.641, M79.642    Type 2 diabetes with nephropathy (HCC) E11.21    Cervical stenosis of spinal canal M48.02    S/P cervical spinal fusion Z98.1    CAD (coronary artery disease) I25.10     Past Medical History:   Diagnosis Date    Arthritis     Asthma     CAD (coronary artery disease)     Calculus of kidney     Carotid artery disease (HCC)     Cervical herniated disc     Chronic systolic (congestive) heart failure (HCC)     Diabetes ([address]     Diabetic shock due to low blood sugar ([address] 2016    Glaucoma     pt is on drops unsure of what the names are    Hypercholesterolemia     Hypertension     Morbid obesity ([address]     Rheumatic fever     When he was 17    Sleep apnea     compliant with cpap as stated 9/6/2019      Past Surgical History:   Procedure Laterality Date    HX CAROTID STENT  1990's    with cath    HX CATARACT REMOVAL Left     HX CERVICAL FUSION  05/21/2019    C3- C6 ACFD     HX CHOLECYSTECTOMY      HX CIRCUMCISION      HX HEART CATHETERIZATION      x 5 total, 4 in heart, 1 in carotid    HX HEENT Left     ear torn off and repaired    HX OTHER SURGICAL Right     Right hand reconstruction    HX SHOULDER ARTHROSCOPY Right     dislocated with fall, came out, he stated surgery put back in place and sowed him up     Current Outpatient Medications   Medication Sig Dispense Refill    furosemide (LASIX) 40 mg tablet Take 1 tablet by mouth twice daily 180 Tab 3    simvastatin (ZOCOR) 10 mg tablet Take 1 Tab by mouth every other day. 90 Tab 3    metFORMIN ER (GLUCOPHAGE XR) 500 mg tablet TAKE 1 TABLET BY MOUTH BEFORE BREAKFAST AND  DINNER 180 Tab 3    DULoxetine (CYMBALTA) 60 mg capsule TAKE 1 CAPSULE BY MOUTH ONCE DAILY FOR  FEET  PAIN 90 Cap 3    metOLazone (ZAROXOLYN) 2.5 mg tablet TAKE 1 TABLET BY MOUTH EVERY TUESDAY AND THURSDAY 40 Tab 0    glimepiride (AMARYL) 1 mg tablet TAKE 1 TABLET BY MOUTH ONCE DAILY BEFORE BREAKFAST 90 Tab 3    gabapentin (NEURONTIN) 100 mg capsule TAKE 1 CAPSULE BY MOUTH THREE TIMES DAILY AS DIRECTED (Patient taking differently: 100 mg two (2) times a day.) 30 Cap 0    omega-3 fatty acids 1,000 mg cap Take 1 Cap by mouth daily.  isosorbide mononitrate ER (IMDUR) 60 mg CR tablet Take 1 Tab by mouth daily. 90 Tab 3    lisinopril (PRINIVIL, ZESTRIL) 2.5 mg tablet Take 1 Tab by mouth daily. 90 Tab 3    carvedilol (COREG) 12.5 mg tablet Take 1 Tab by mouth two (2) times a day. 180 Tab 3    acetaminophen (TYLENOL) 500 mg tablet Take 1,000 mg by mouth every six (6) hours as needed for Pain.  cholecalciferol, VITAMIN D3, (VITAMIN D3) 5,000 unit tab tablet Take 1 Tab by mouth daily. 30 Tab 5    aspirin delayed-release 81 mg tablet Take 81 mg by mouth daily.       dextran 70/hypromellose/PF (NATURAL TEARS, PF, OP) Apply 1 Drop to eye as needed. Allergies   Allergen Reactions    Morphine Anaphylaxis     Pt states \"morphine gave me heart failure\"    Lyrica [Pregabalin] Other (comments)     Makes him \"loopy\"       Family History   Problem Relation Age of Onset    Heart Disease Brother     Diabetes Nephew     Hypertension Mother      Social History     Tobacco Use    Smoking status: Former Smoker     Packs/day: 5.00     Years: 15.00     Pack years: 75.00     Last attempt to quit: 1963     Years since quittin.1    Smokeless tobacco: Never Used   Substance Use Topics    Alcohol use: No     Alcohol/week: 0.0 standard drinks       Depression Risk Factor Screening:     3 most recent PHQ Screens 3/11/2020   Little interest or pleasure in doing things Not at all   Feeling down, depressed, irritable, or hopeless Not at all   Total Score PHQ 2 0       Alcohol Risk Factor Screening (MALE > 65): Do you average more 1 drink per night or more than 7 drinks a week: No    In the past three months have you have had more than 4 drinks containing alcohol on one occasion: No      Functional Ability and Level of Safety:   Hearing: The patient needs further evaluation. Hearing loss worse in left ear. Activities of Daily Living: The home contains: no safety equipment. Patient does total self care    Ambulation: with no difficulty    Fall Risk:  Fall Risk Assessment, last 12 mths 3/11/2020   Able to walk? Yes   Fall in past 12 months?  No       Abuse Screen:  Patient is not abused    Cognitive Screening   Has your family/caregiver stated any concerns about your memory: no  Cognitive Screening: Normal - Verbal Fluency Test    Patient Care Team   Patient Care Team:  Suzanna Patterson MD as PCP - General (Internal Medicine)  Suzanna Patterson MD as PCP - [facility] Empaneled Provider  Cleo Rowe MD Lucie River, MD as Consulting Provider (Cardiology)  Robert Peralta MD as Consulting Provider (Endocrinology)  Lida Bonilla, Anand Iniguez MD as Physician (Sleep Medicine)  Reyes Labrum, NP (Nurse Practitioner)    Assessment/Plan   Education and counseling provided:  Are appropriate based on today's review and evaluation    Diagnoses and all orders for this visit:    1. Pure hypercholesterolemia    2. CKD (chronic kidney disease) stage 3, GFR 30-59 ml/min (HCC)    3. Neuropathic pain    4. Obesity, morbid ([facility]    5. Type 2 diabetes with nephropathy ([facility]    6. Congestive heart failure, unspecified HF chronicity, unspecified heart failure type ([facility]    7. Medicare annual wellness visit, subsequent    8. Screening for depression  -     [place] Maintenance Due   Topic Date Due    Hepatitis C Screening  1945    Eye Exam Retinal or Dilated  12/10/1957    Colonoscopy  12/10/1965    DTaP/Tdap/Td series (1 - Tdap) 12/10/1968    Shingrix Vaccine Age 50> (1 of 2) 12/10/1997    GLAUCOMA SCREENING Q2Y  12/10/2012    AAA Screening 73-69 YO Male Smoking Patients  12/10/2012    MICROALBUMIN Q1  05/10/2020    Medicare Yearly Exam  06/06/2020       Rommel Balderas is a 69 y.o. male who was evaluated by an audio-video encounter for concerns as above. Patient identification was verified prior to start of the visit. A caregiver was present when appropriate. Due to this being a TeleHealth encounter ([facility]), evaluation of the following organ systems was limited: Vitals/Constitutional/EENT/Resp/CV/GI//MS/Neuro/Skin/Heme-Lymph-Imm. Pursuant to the emergency declaration under the [facility] waiver authority and the [employer] Act, this Virtual Visit was conducted, with patient's (and/or legal guardian's) consent, to reduce the patient's risk of exposure to COVID-19 and provide necessary medical care.      Services were provided through a synchronous discussion virtually to substitute for in-person clinic visit. I was at home. The patient was at home.       Jennifer Menon MD

## 2020-05-22 NOTE — PROGRESS NOTES
Tyra Vanegas is a 69 y.o. male who presents for follow up. Wife in attendance. Reports that he is tired all of the time. Has VIJAY and wears CPAP, due for follow up. Patient was seen by cardiology for follow up by phone today. Reported swelling that comes and goes. On zaroxolyn 2.5mg two days a week and lasix 40mg BID. Watches salt in diet. Not elevating legs. No SOB. inactive    Referred to Dr. Bere Chacon,nephrology, CKD. Has not gone,  Creatinine 1.49. Sees Dr. Gabriela Keller, endo, for diabetes. Trying to follow diabetic diet. HbA1C 5.5% in December. Follow up scheduled for August.      Taking simvastatin 10mg every other day. This is an established visit conducted via telemedicine with video. The patient has been instructed that this meets HIPAA criteria and acknowledges and agrees to this method of visitation. Pursuant to the emergency declaration under the [facility] waiver authority and the [employer] and [employer] Act, this Virtual Visit was conducted, with patient's consent, to reduce the patient's risk of exposure to COVID-19 and provide continuity of care for an established patient. Services were provided through a video synchronous discussion virtually to substitute for in-person clinic visit.           Past Medical History:   Diagnosis Date    Arthritis     Asthma     CAD (coronary artery disease)     Calculus of kidney     Carotid artery disease (HCC)     Cervical herniated disc     Chronic systolic (congestive) heart failure (HCC)     Diabetes ([address]     Diabetic shock due to low blood sugar ([address] 2016    Glaucoma     pt is on drops unsure of what the names are    Hypercholesterolemia     Hypertension     Morbid obesity ([address]     Rheumatic fever     When he was 17    Sleep apnea     compliant with cpap as stated 9/6/2019       Family History   Problem Relation Age of Onset    Heart Disease Brother     Diabetes Nephew     Hypertension Mother        Social History     Socioeconomic History    Marital status:      Spouse name: Not on file    Number of children: Not on file    Years of education: Not on file    Highest education level: Not on file   Occupational History    Not on file   Social Needs    Financial resource strain: Not on file    Food insecurity     Worry: Not on file     Inability: Not on file    Transportation needs     Medical: Not on file     Non-medical: Not on file   Tobacco Use    Smoking status: Former Smoker     Packs/day: 5.00     Years: 15.00     Pack years: 75.00     Last attempt to quit: 1963     Years since quittin.4    Smokeless tobacco: Never Used   Substance and Sexual Activity    Alcohol use: No     Alcohol/week: 0.0 standard drinks    Drug use: Never    Sexual activity: Yes     Partners: Female     Birth control/protection: None   Lifestyle    Physical activity     Days per week: Not on file     Minutes per session: Not on file    Stress: Not on file   Relationships    Social connections     Talks on phone: Not on file     Gets together: Not on file     Attends Anabaptism service: Not on file     Active member of club or organization: Not on file     Attends meetings of clubs or organizations: Not on file     Relationship status: Not on file    Intimate partner violence     Fear of current or ex partner: Not on file     Emotionally abused: Not on file     Physically abused: Not on file     Forced sexual activity: Not on file   Other Topics Concern    Not on file   Social History Narrative    ** Merged History Encounter **            Current Outpatient Medications on File Prior to Visit   Medication Sig Dispense Refill    furosemide (LASIX) 40 mg tablet Take 1 tablet by mouth twice daily 180 Tab 3    simvastatin (ZOCOR) 10 mg tablet Take 1 Tab by mouth every other day.  90 Tab 3    metFORMIN ER (GLUCOPHAGE XR) 500 mg tablet TAKE 1 TABLET BY MOUTH BEFORE BREAKFAST AND  DINNER 180 Tab 3    DULoxetine (CYMBALTA) 60 mg capsule TAKE 1 CAPSULE BY MOUTH ONCE DAILY FOR  FEET  PAIN 90 Cap 3    metOLazone (ZAROXOLYN) 2.5 mg tablet TAKE 1 TABLET BY MOUTH EVERY TUESDAY AND THURSDAY 40 Tab 0    glimepiride (AMARYL) 1 mg tablet TAKE 1 TABLET BY MOUTH ONCE DAILY BEFORE BREAKFAST 90 Tab 3    gabapentin (NEURONTIN) 100 mg capsule TAKE 1 CAPSULE BY MOUTH THREE TIMES DAILY AS DIRECTED (Patient taking differently: 100 mg two (2) times a day.) 30 Cap 0    omega-3 fatty acids 1,000 mg cap Take 1 Cap by mouth daily.  isosorbide mononitrate ER (IMDUR) 60 mg CR tablet Take 1 Tab by mouth daily. 90 Tab 3    lisinopril (PRINIVIL, ZESTRIL) 2.5 mg tablet Take 1 Tab by mouth daily. 90 Tab 3    carvedilol (COREG) 12.5 mg tablet Take 1 Tab by mouth two (2) times a day. 180 Tab 3    acetaminophen (TYLENOL) 500 mg tablet Take 1,000 mg by mouth every six (6) hours as needed for Pain.  cholecalciferol, VITAMIN D3, (VITAMIN D3) 5,000 unit tab tablet Take 1 Tab by mouth daily. 30 Tab 5    aspirin delayed-release 81 mg tablet Take 81 mg by mouth daily.  [DISCONTINUED] furosemide (LASIX) 40 mg tablet Take 1 tablet by mouth twice daily 60 Tab 0    dextran 70/hypromellose/PF (NATURAL TEARS, PF, OP) Apply 1 Drop to eye as needed. No current facility-administered medications on file prior to visit. Review of Systems  Pertinent items are noted in HPI. Objective:     Gen: well appearing male  HEENT: normal conjunctiva, no audible congestion, patient does not see oral erythema, has MMM  Neck: patient does not feel enlarged or tender LAD or masses  Resp: normal respiratory effort, no audible wheezing. CV: patient does not feel palpitations or heart irregularity  Abd: patient does not feel abdominal tenderness or mass, patient does not notice distension  Extrem: patient does not see swelling in ankles or joints.    Neuro: Alert and oriented, able to answer questions without difficulty, able to move all extremities and walk normally      Assessment/Plan:       ICD-10-CM ICD-9-CM    1. Pure hypercholesterolemia E78.00 272.0    2. CKD (chronic kidney disease) stage 3, GFR 30-59 ml/min ([facility]) N18.3 585.3    3. Neuropathic pain M79.2 729.2    4. Obesity, morbid ([facility] E66.01 278.01    5. Type 2 diabetes with nephropathy ([facility]) E11.21 250.40      583.81    6. Congestive heart failure, unspecified HF chronicity, unspecified heart failure type ([facility]) I50.9 428.0    7. Medicare annual wellness visit, subsequent Z00.00 V70.0    8. Screening for depression Z13.31 V79.0 [facility]   9. Encounter for immunization Z23 V03.89 pneumococcal 13 qiana conj dip (PREVNAR-13) 0.5 mL syrg injection      varicella-zoster recombinant, PF, (SHINGRIX) 50 mcg/0.5 mL susr injection   to schedule with nephrology. This was a telemedicine visit by video. Sterling Ballesteros MD    Follow-up and Dispositions    · Return in about 3 months (around 9/1/2020) for september nurse visit- flu shot and prevnar 13. Maria G Desir

## 2020-05-22 NOTE — ADDENDUM NOTE
Addended by: Christen Best on: 5/22/2020 03:50 PM     Modules accepted: Orders, Level of Service, SmartSet

## 2020-05-22 NOTE — PROGRESS NOTES
Subjective/HPI:     Tyra Vanegas is a 69 y.o. male who is having a f/u appt 5/22/20 via telephone only. He reports that his swelling has been under control. Has some days where he will have a bit of swelling, but resolves usually quickly. The patient denies chest pain/ shortness of breath, orthopnea, PND, palpitations, syncope, presyncope or fatigue.          PCP Provider  Flip Yanes MD  Past Medical History:   Diagnosis Date    Arthritis     Asthma     CAD (coronary artery disease)     Calculus of kidney     Carotid artery disease (HCC)     Cervical herniated disc     Chronic systolic (congestive) heart failure ([address]     Diabetes ([address]     Diabetic shock due to low blood sugar ([address] 2016    Glaucoma     pt is on drops unsure of what the names are    Hypercholesterolemia     Hypertension     Morbid obesity ([address]     Rheumatic fever     When he was 17    Sleep apnea     compliant with cpap as stated 9/6/2019      Past Surgical History:   Procedure Laterality Date    HX CAROTID STENT  1990's    with cath    HX CATARACT REMOVAL Left     HX CERVICAL FUSION  05/21/2019    C3- C6 ACFD     HX CHOLECYSTECTOMY      HX CIRCUMCISION      HX HEART CATHETERIZATION      x 5 total, 4 in heart, 1 in carotid    HX HEENT Left     ear torn off and repaired    HX OTHER SURGICAL Right     Right hand reconstruction    HX SHOULDER ARTHROSCOPY Right     dislocated with fall, came out, he stated surgery put back in place and sowed him up     Allergies   Allergen Reactions    Morphine Anaphylaxis     Pt states \"morphine gave me heart failure\"    Lyrica [Pregabalin] Other (comments)     Makes him \"loopy\"      Family History   Problem Relation Age of Onset    Heart Disease Brother     Diabetes Nephew     Hypertension Mother       Current Outpatient Medications   Medication Sig    furosemide (LASIX) 40 mg tablet Take 1 tablet by mouth twice daily    simvastatin (ZOCOR) 10 mg tablet Take 1 Tab by mouth every other day.  metFORMIN ER (GLUCOPHAGE XR) 500 mg tablet TAKE 1 TABLET BY MOUTH BEFORE BREAKFAST AND  DINNER    DULoxetine (CYMBALTA) 60 mg capsule TAKE 1 CAPSULE BY MOUTH ONCE DAILY FOR  FEET  PAIN    metOLazone (ZAROXOLYN) 2.5 mg tablet TAKE 1 TABLET BY MOUTH EVERY TUESDAY AND THURSDAY    glimepiride (AMARYL) 1 mg tablet TAKE 1 TABLET BY MOUTH ONCE DAILY BEFORE BREAKFAST    gabapentin (NEURONTIN) 100 mg capsule TAKE 1 CAPSULE BY MOUTH THREE TIMES DAILY AS DIRECTED (Patient taking differently: 100 mg two (2) times a day.)    omega-3 fatty acids 1,000 mg cap Take 1 Cap by mouth daily.  isosorbide mononitrate ER (IMDUR) 60 mg CR tablet Take 1 Tab by mouth daily.  lisinopril (PRINIVIL, ZESTRIL) 2.5 mg tablet Take 1 Tab by mouth daily.  carvedilol (COREG) 12.5 mg tablet Take 1 Tab by mouth two (2) times a day.  dextran 70/hypromellose/PF (NATURAL TEARS, PF, OP) Apply 1 Drop to eye as needed.  acetaminophen (TYLENOL) 500 mg tablet Take 1,000 mg by mouth every six (6) hours as needed for Pain.  cholecalciferol, VITAMIN D3, (VITAMIN D3) 5,000 unit tab tablet Take 1 Tab by mouth daily.  aspirin delayed-release 81 mg tablet Take 81 mg by mouth daily. No current facility-administered medications for this visit.        Vitals:    05/22/20 1143   Weight: 221 lb (100.2 kg)     Social History     Socioeconomic History    Marital status:      Spouse name: Not on file    Number of children: Not on file    Years of education: Not on file    Highest education level: Not on file   Occupational History    Not on file   Social Needs    Financial resource strain: Not on file    Food insecurity     Worry: Not on file     Inability: Not on file    Transportation needs     Medical: Not on file     Non-medical: Not on file   Tobacco Use    Smoking status: Former Smoker     Packs/day: 5.00     Years: 15.00     Pack years: 75.00     Last attempt to quit: 4/20/1963     Years since quittin.1    Smokeless tobacco: Never Used   Substance and Sexual Activity    Alcohol use: No     Alcohol/week: 0.0 standard drinks    Drug use: Never    Sexual activity: Yes     Partners: Female     Birth control/protection: None   Lifestyle    Physical activity     Days per week: Not on file     Minutes per session: Not on file    Stress: Not on file   Relationships    Social connections     Talks on phone: Not on file     Gets together: Not on file     Attends Christianity service: Not on file     Active member of club or organization: Not on file     Attends meetings of clubs or organizations: Not on file     Relationship status: Not on file    Intimate partner violence     Fear of current or ex partner: Not on file     Emotionally abused: Not on file     Physically abused: Not on file     Forced sexual activity: Not on file   Other Topics Concern    Not on file   Social History Narrative    ** Merged History Encounter **            I have reviewed the nurses notes, vitals, problem list, allergy list, medical history, family, social history and medications. Review of Symptoms:    General: Pt denies excessive weight gain or loss. Pt is able to conduct ADL's  HEENT: Denies blurred vision, headaches, epistaxis and difficulty swallowing. Respiratory: Denies shortness of breath, ROLLE, wheezing or stridor. Cardiovascular: Denies precordial pain, palpitations, PND, or worsening edema. Gastrointestinal: Denies poor appetite, indigestion, abdominal pain or blood in stool  Urinary: Denies dysuria, pyuria  Musculoskeletal: Denies pain or swelling from muscles or joints  Neurologic: Denies tremor, paresthesias, or sensory motor disturbance  Skin: Denies rash, itching or texture change. Psych: Denies depression        Physical Exam:      No physical exam, telephone only.      Cardiographics  No EKG today    Cardiology Labs:  Lab Results   Component Value Date/Time    Cholesterol, total 109 2020 01:34 PM    HDL Cholesterol 42 02/06/2020 01:34 PM    LDL, calculated 39 02/06/2020 01:34 PM    LDL, calculated 49 05/10/2019 01:46 PM    LDL, calculated 45 11/15/2018 03:06 PM    VLDL, calculated 28 02/06/2020 01:34 PM     Lab Results   Component Value Date/Time    Sodium 144 02/06/2020 01:34 PM    Potassium 4.4 02/06/2020 01:34 PM    Chloride 99 02/06/2020 01:34 PM    CO2 26 02/06/2020 01:34 PM    Glucose 108 (H) 02/06/2020 01:34 PM    BUN 45 (H) 02/06/2020 01:34 PM    Creatinine 1.49 (H) 02/06/2020 01:34 PM    BUN/Creatinine ratio 30 (H) 02/06/2020 01:34 PM    GFR est AA 53 (L) 02/06/2020 01:34 PM    GFR est non-AA 46 (L) 02/06/2020 01:34 PM    Calcium 9.3 02/06/2020 01:34 PM    Anion gap 4 (L) 10/21/2019 11:18 AM    Bilirubin, total 0.3 02/06/2020 01:34 PM    ALT (SGPT) 14 02/06/2020 01:34 PM    AST (SGOT) 12 02/06/2020 01:34 PM    Alk. phosphatase 70 02/06/2020 01:34 PM    Protein, total 6.5 02/06/2020 01:34 PM    Albumin 4.0 02/06/2020 01:34 PM    Globulin 3.4 05/16/2019 09:56 AM    A-G Ratio 1.6 02/06/2020 01:34 PM     Lab Results   Component Value Date/Time    Hemoglobin A1c 5.9 (H) 09/12/2019 04:32 PM    Hemoglobin A1c (POC) 5.5 12/17/2019 03:55 PM        Assessment:     Assessment:     Diagnoses and all orders for this visit:    1. ASHD (arteriosclerotic heart disease)    2. Chronic diastolic congestive heart failure ([facility]    3. Mixed hyperlipidemia    4. Type 2 diabetes with nephropathy (HCC)    Other orders  -     furosemide (LASIX) 40 mg tablet; Take 1 tablet by mouth twice daily        ICD-10-CM ICD-9-CM    1. ASHD (arteriosclerotic heart disease) I25.10 414.00    2. Chronic diastolic congestive heart failure (HCC) I50.32 428.32      428.0    3. Mixed hyperlipidemia E78.2 272.2    4. Type 2 diabetes with nephropathy (HCC) E11.21 250.40      583.81           Plan:     1. Atherosclerotic heart disease: Nonobstructive via catheterization 2018. He is without c/o anginal symptoms today of concern.  Continue BB, ASA, statin    2. Grade 1 diastolic dysfunction: reports his edema is at his baseline. Denies SOB/ROLLE. Creatinine most recent 1.41. Referral has been given to pt for nephrology consult. Cont Furosemide to 40 mg twice a day and maintenance of Zaroxolyn 2.5 mg on Tuesdays and Thursdays. 3.  Hyperlipidemia: LDL was low in February at 44. Simvastatin 10 mg changed to every other day and he is tolerating. Dr Ginnie Ormond is following labs. 4. Type 2 Diabetes with nephropathy Dr Ginnie Ormond has been managing with PCP. Referral to Dr Refugio Yi. F/U in 6 months with Dr Sammi Rivera. Pursuant to the emergency declaration under the [facility], [facility] waiver authority and the [employer] Act, this telephone call visit was conducted, with patient's previous consent to utilize this technology and understand the risks and benefits of proceeding, to reduce the patient's risk of exposure to COVID-19 and provide continuity of care for an established patient. Services were provided through telephone only to substitute for in-person clinic visit. I am speaking with this patient today from my office in [place], [place], and from their home located within [place]. Consent:  He/She and/or health care decision maker is aware that that he may receive a bill for this telephone service, depending on his insurance coverage, and has provided verbal consent to proceed: Yes    I affirm this is a Patient Initiated Episode with an Established Patient who has not had a related appointment within my department in the past 7 days or scheduled within the next 24 hours.     Total Time: minutes: 11-20 minutes  Kevin Storey NP

## 2020-05-22 NOTE — PATIENT INSTRUCTIONS
Medicare Wellness Visit, Male The best way to live healthy is to have a lifestyle where you eat a well-balanced diet, exercise regularly, limit alcohol use, and quit all forms of tobacco/nicotine, if applicable. Regular preventive services are another way to keep healthy. Preventive services (vaccines, screening tests, monitoring & exams) can help personalize your care plan, which helps you manage your own care. Screening tests can find health problems at the earliest stages, when they are easiest to treat. [facility] follows the current, evidence-based guidelines published by the [facility] Kenneth Tulio ([facility]) when recommending preventive services for our patients. Because we follow these guidelines, sometimes recommendations change over time as research supports it. (For example, a prostate screening blood test is no longer routinely recommended for men with no symptoms). Of course, you and your doctor may decide to screen more often for some diseases, based on your risk and co-morbidities (chronic disease you are already diagnosed with). Preventive services for you include: - Medicare offers their members a free annual wellness visit, which is time for you and your primary care provider to discuss and plan for your preventive service needs. Take advantage of this benefit every year! 
-All adults over age 72 should receive the recommended pneumonia vaccines. Current USPSTF guidelines recommend a series of two vaccines for the best pneumonia protection.  
-All adults should have a flu vaccine yearly and tetanus vaccine every 10 years. 
-All adults age 48 and older should receive the shingles vaccines (series of two vaccines).       
-All adults age 38-68 who are overweight should have a diabetes screening test once every three years.  
-Other screening tests & preventive services for persons with diabetes include: an eye exam to screen for diabetic retinopathy, a kidney function test, a foot exam, and stricter control over your cholesterol.  
-Cardiovascular screening for adults with routine risk involves an electrocardiogram (ECG) at intervals determined by the provider.  
-Colorectal cancer screening should be done for adults age 54-65 with no increased risk factors for colorectal cancer. There are a number of acceptable methods of screening for this type of cancer. Each test has its own benefits and drawbacks. Discuss with your provider what is most appropriate for you during your annual wellness visit. The different tests include: colonoscopy (considered the best screening method), a fecal occult blood test, a fecal DNA test, and sigmoidoscopy. 
-All adults born between [facility] should be screened once for Hepatitis C. 
-An Abdominal Aortic Aneurysm (AAA) Screening is recommended for men age 73-68 who has ever smoked in their lifetime. Here is a list of your current Health Maintenance items (your personalized list of preventive services) with a due date: 
Health Maintenance Due Topic Date Due  
 Hepatitis C Test  1945 [address] [place] [address] Eye Exam  12/10/1957  Colonoscopy  12/10/1965  DTaP/Tdap/Td  (1 - Tdap) 12/10/1968  Shingles Vaccine (1 of 2) 12/10/1997  Glaucoma Screening   12/10/2012  AAA Screening  12/10/2012  Albumin Urine Test  05/10/2020 [address] [place] [address] Annual Well Visit  06/06/2020 Medicare Wellness Visit, Male The best way to live healthy is to have a lifestyle where you eat a well-balanced diet, exercise regularly, limit alcohol use, and quit all forms of tobacco/nicotine, if applicable. Regular preventive services are another way to keep healthy. Preventive services (vaccines, screening tests, monitoring & exams) can help personalize your care plan, which helps you manage your own care.  Screening tests can find health problems at the earliest stages, when they are easiest to treat. [facility] follows the current, evidence-based guidelines published by the [facility] Kenneth Antunez ([facility]) when recommending preventive services for our patients. Because we follow these guidelines, sometimes recommendations change over time as research supports it. (For example, a prostate screening blood test is no longer routinely recommended for men with no symptoms). Of course, you and your doctor may decide to screen more often for some diseases, based on your risk and co-morbidities (chronic disease you are already diagnosed with). Preventive services for you include: - Medicare offers their members a free annual wellness visit, which is time for you and your primary care provider to discuss and plan for your preventive service needs. Take advantage of this benefit every year! 
-All adults over age 72 should receive the recommended pneumonia vaccines. Current USPSTF guidelines recommend a series of two vaccines for the best pneumonia protection.  
-All adults should have a flu vaccine yearly and tetanus vaccine every 10 years. 
-All adults age 48 and older should receive the shingles vaccines (series of two vaccines). -All adults age 38-68 who are overweight should have a diabetes screening test once every three years.  
-Other screening tests & preventive services for persons with diabetes include: an eye exam to screen for diabetic retinopathy, a kidney function test, a foot exam, and stricter control over your cholesterol.  
-Cardiovascular screening for adults with routine risk involves an electrocardiogram (ECG) at intervals determined by the provider.  
-Colorectal cancer screening should be done for adults age 54-65 with no increased risk factors for colorectal cancer. There are a number of acceptable methods of screening for this type of cancer.  Each test has its own benefits and drawbacks. Discuss with your provider what is most appropriate for you during your annual wellness visit. The different tests include: colonoscopy (considered the best screening method), a fecal occult blood test, a fecal DNA test, and sigmoidoscopy. 
-All adults born between [facility] should be screened once for Hepatitis C. 
-An Abdominal Aortic Aneurysm (AAA) Screening is recommended for men age 73-68 who has ever smoked in their lifetime. Here is a list of your current Health Maintenance items (your personalized list of preventive services) with a due date: 
Health Maintenance Due Topic Date Due  
 Hepatitis C Test  1945 [facility] Eye Exam  12/10/1957  Colonoscopy  12/10/1965  DTaP/Tdap/Td  (1 - Tdap) 12/10/1968  Shingles Vaccine (1 of 2) 12/10/1997  Glaucoma Screening   12/10/2012  AAA Screening  12/10/2012  Albumin Urine Test  05/10/2020 [facility] [place] Annual Well Visit  06/06/2020

## 2020-05-26 ENCOUNTER — TELEPHONE (OUTPATIENT)
Dept: INTERNAL MEDICINE CLINIC | Age: 75
End: 2020-05-26

## 2020-05-26 NOTE — TELEPHONE ENCOUNTER
----- Message from Zaid Forbes MD sent at 5/22/2020  3:38 PM EDT -----  Please call Dr. Zeke Boggs for eye report.

## 2020-06-17 RX ORDER — METOLAZONE 2.5 MG/1
TABLET ORAL
Qty: 24 TAB | Refills: 0 | Status: SHIPPED | OUTPATIENT
Start: 2020-06-17 | End: 2020-07-07

## 2020-07-07 RX ORDER — CARVEDILOL 12.5 MG/1
TABLET ORAL
Qty: 180 TAB | Refills: 0 | Status: SHIPPED | OUTPATIENT
Start: 2020-07-07 | End: 2020-08-03

## 2020-07-07 RX ORDER — METOLAZONE 2.5 MG/1
TABLET ORAL
Qty: 24 TAB | Refills: 0 | Status: SHIPPED | OUTPATIENT
Start: 2020-07-07 | End: 2020-08-03

## 2020-07-09 NOTE — ANCILLARY DISCHARGE INSTRUCTIONS
[facility]  [address]  Phone: [phone] Fax: [phone]    Discharge Summary 2-15    Patient name: Elvia Jorge  : 1945  Provider#: [id number]  Referral source: Madina Roland MD      Medical/Treatment Diagnosis: Cervicalgia [M54.2]     Prior Hospitalization: see medical history     Comorbidities: See Plan of Care  Prior Level of Function: See Plan of Care  Medications: Verified on Patient Summary List    Start of Care: 3/4/20      Onset Date: chronic   Visits from Start of Care: 2     Missed Visits: 0  Reporting Period : 3/4/20 to 3/12/20    Assessment/Summary of care: This patient was no longer seen for in-person therapy due to the recent pandemic (COVID-19) and was either unable to, not willing to, or would not have benefited from virtual therapy for the past few months. Pt is discharged today, 2020, as they have stopped attending therapy. Final objective data and outcomes were unable to be obtained.         RECOMMENDATIONS:  [x]Discontinue therapy: []Patient has reached or is progressing toward set goals     []Patient is non-compliant or has abdicated     []Due to lack of appreciable progress towards set goals     [x]Other- 179-00 Felix Perez, PT 2020

## 2020-08-03 RX ORDER — CARVEDILOL 12.5 MG/1
TABLET ORAL
Qty: 180 TAB | Refills: 0 | Status: SHIPPED | OUTPATIENT
Start: 2020-08-03 | End: 2021-02-18

## 2020-08-03 RX ORDER — METOLAZONE 2.5 MG/1
TABLET ORAL
Qty: 24 TAB | Refills: 0 | Status: SHIPPED | OUTPATIENT
Start: 2020-08-03 | End: 2020-11-10

## 2020-08-11 ENCOUNTER — VIRTUAL VISIT (OUTPATIENT)
Dept: ENDOCRINOLOGY | Age: 75
End: 2020-08-11
Payer: MEDICARE

## 2020-08-11 DIAGNOSIS — E11.22 TYPE 2 DIABETES MELLITUS WITH STAGE 3 CHRONIC KIDNEY DISEASE, WITH LONG-TERM CURRENT USE OF INSULIN (HCC): Primary | ICD-10-CM

## 2020-08-11 DIAGNOSIS — G62.9 NEUROPATHY: ICD-10-CM

## 2020-08-11 DIAGNOSIS — I10 ESSENTIAL HYPERTENSION WITH GOAL BLOOD PRESSURE LESS THAN 130/80: ICD-10-CM

## 2020-08-11 DIAGNOSIS — N18.30 TYPE 2 DIABETES MELLITUS WITH STAGE 3 CHRONIC KIDNEY DISEASE, WITH LONG-TERM CURRENT USE OF INSULIN (HCC): Primary | ICD-10-CM

## 2020-08-11 DIAGNOSIS — Z79.4 TYPE 2 DIABETES MELLITUS WITH STAGE 3 CHRONIC KIDNEY DISEASE, WITH LONG-TERM CURRENT USE OF INSULIN (HCC): Primary | ICD-10-CM

## 2020-08-11 DIAGNOSIS — E78.5 HYPERLIPIDEMIA, UNSPECIFIED HYPERLIPIDEMIA TYPE: ICD-10-CM

## 2020-08-11 PROCEDURE — 99443 PR PHYS/QHP TELEPHONE EVALUATION 21-30 MIN: CPT | Performed by: INTERNAL MEDICINE

## 2020-08-11 NOTE — PROGRESS NOTES
**DUE TO PANDEMIC AND HEALTH CONCERNS IN THE COMMUNITY, THIS PATIENT WAS EITHER ILL OR FOUND TO BE HIGH RISK FOR IN-PERSON EVALUATION WITHIN THE CLINIC. THE FOLLOWING IS A VIRTUAL VISIT VIA A TELEPHONE ENCOUNTER TO WHICH THE PATIENT AGREED. THE PURPOSE IS TO LIMIT INTERRUPTIONS IN HEALTHCARE AND TO PROVIDE FOR ONGOING URGENT NEEDS UNDER THE CURRENT CONDITIONS. THE PATIENT CONFIRMS THEY ARE AWARE OF THE LIMITATIONS OF THE TELEPHONE VISIT. CHIEF COMPLAINT: f/u evaluation for uncontrolled type 2 diabetes    HISTORY OF PRESENT ILLNESS:   Waleska Walker is a 69 y.o. male with a PMHx as noted below who presents to the endocrinology clinic for f/u evaluation of uncontrolled type 2 diabetes. A1c previously 5.5%. On prior visit we reduced his glimepiride a bit to avoid hypoglycemia.       Currently taking the following meds:  Continue metformin 500mg twice per day,    (Forgets to take in the evening at times)  Glimepiride 0.5 mg (1/2 tablet) before breakfast    Review of home blood glucose:   range reported,  130-140's when he misses his metformin tabs    Review of most recent diabetes-related labs:  Lab Results   Component Value Date    HBA1C 5.9 (H) 09/12/2019    HBA1C 7.7 (H) 05/10/2019    HVG2USVU 5.5 12/17/2019    JPJ4LIFY 5.3 01/17/2019    LAI5HDMK 5.6 10/09/2018    CHOL 109 02/06/2020    LDLC 39 02/06/2020    GFRAA 53 (L) 02/06/2020    GFRNA 46 (L) 02/06/2020    MCACR 1,624.4 (H) 05/10/2019    TSH 0.681 04/23/2018    VITD3 59.9 05/16/2019     Lab Key:  [id number] = IA-2 pancreatic islet cell autoantibody  GADLT = MARIAN-65 autoantibody   MCACR = Urine Microalbumin  INSUL = Insulin level  CPEPL = C-peptide level    PAST MEDICAL/SURGICAL HISTORY:   Past Medical History:   Diagnosis Date    Arthritis     Asthma     CAD (coronary artery disease)     Calculus of kidney     Carotid artery disease (HCC)     Cervical herniated disc     Chronic systolic (congestive) heart failure (HCC)     Diabetes ([address]     Diabetic shock due to low blood sugar ([facility] 2016    Glaucoma     pt is on drops unsure of what the names are    Hypercholesterolemia     Hypertension     Morbid obesity ([facility] [address]     Rheumatic fever     When he was 17    Sleep apnea     compliant with cpap as stated 9/6/2019     Past Surgical History:   Procedure Laterality Date    HX CAROTID STENT  1990's    with cath    HX CATARACT REMOVAL Left     HX CERVICAL FUSION  05/21/2019    C3- C6 ACFD     HX CHOLECYSTECTOMY      HX CIRCUMCISION      HX HEART CATHETERIZATION      x 5 total, 4 in heart, 1 in carotid    HX HEENT Left     ear torn off and repaired    HX OTHER SURGICAL Right     Right hand reconstruction    HX SHOULDER ARTHROSCOPY Right     dislocated with fall, came out, he stated surgery put back in place and sowed him up       ALLERGIES:   Allergies   Allergen Reactions    Morphine Anaphylaxis     Pt states \"morphine gave me heart failure\"    Lyrica [Pregabalin] Other (comments)     Makes him \"loopy\"       MEDICATIONS ON ADMISSION:     Current Outpatient Medications:     carvediloL (COREG) 12.5 mg tablet, Take 1 tablet by mouth twice daily, Disp: 180 Tab, Rfl: 0    metOLazone (ZAROXOLYN) 2.5 mg tablet, TAKE 1 TABLET BY MOUTH  EVERY TUESDAY AND THURSDAY, Disp: 24 Tab, Rfl: 0    furosemide (LASIX) 40 mg tablet, Take 1 tablet by mouth twice daily, Disp: 180 Tab, Rfl: 3    simvastatin (ZOCOR) 10 mg tablet, Take 1 Tab by mouth every other day., Disp: 90 Tab, Rfl: 3    metFORMIN ER (GLUCOPHAGE XR) 500 mg tablet, TAKE 1 TABLET BY MOUTH BEFORE BREAKFAST AND  DINNER, Disp: 180 Tab, Rfl: 3    DULoxetine (CYMBALTA) 60 mg capsule, TAKE 1 CAPSULE BY MOUTH ONCE DAILY FOR  FEET  PAIN, Disp: 90 Cap, Rfl: 3    glimepiride (AMARYL) 1 mg tablet, TAKE 1 TABLET BY MOUTH ONCE DAILY BEFORE BREAKFAST (Patient taking differently: Taking 0.5 tab daily), Disp: 90 Tab, Rfl: 3    omega-3 fatty acids 1,000 mg cap, Take 1 Cap by mouth daily. , Disp: , Rfl:    isosorbide mononitrate ER (IMDUR) 60 mg CR tablet, Take 1 Tab by mouth daily. , Disp: 90 Tab, Rfl: 3    lisinopril (PRINIVIL, ZESTRIL) 2.5 mg tablet, Take 1 Tab by mouth daily. , Disp: 90 Tab, Rfl: 3    dextran 70/hypromellose/PF (NATURAL TEARS, PF, OP), Apply 1 Drop to eye as needed. , Disp: , Rfl:     acetaminophen (TYLENOL) 500 mg tablet, Take 1,000 mg by mouth every six (6) hours as needed for Pain., Disp: , Rfl:     cholecalciferol, VITAMIN D3, (VITAMIN D3) 5,000 unit tab tablet, Take 1 Tab by mouth daily. , Disp: 30 Tab, Rfl: 5    aspirin delayed-release 81 mg tablet, Take 81 mg by mouth daily. , Disp: , Rfl:     gabapentin (NEURONTIN) 100 mg capsule, TAKE 1 CAPSULE BY MOUTH THREE TIMES DAILY AS DIRECTED (Patient taking differently: 100 mg two (2) times a day.), Disp: 30 Cap, Rfl: 0    SOCIAL HISTORY:   Social History     Socioeconomic History    Marital status:      Spouse name: Not on file    Number of children: Not on file    Years of education: Not on file    Highest education level: Not on file   Occupational History    Not on file   Social Needs    Financial resource strain: Not on file    Food insecurity     Worry: Not on file     Inability: Not on file    Transportation needs     Medical: Not on file     Non-medical: Not on file   Tobacco Use    Smoking status: Former Smoker     Packs/day: 5.00     Years: 15.00     Pack years: 75.00     Last attempt to quit: 1963     Years since quittin.3    Smokeless tobacco: Never Used   Substance and Sexual Activity    Alcohol use: No     Alcohol/week: 0.0 standard drinks    Drug use: Never    Sexual activity: Yes     Partners: Female     Birth control/protection: None   Lifestyle    Physical activity     Days per week: Not on file     Minutes per session: Not on file    Stress: Not on file   Relationships    Social connections     Talks on phone: Not on file     Gets together: Not on file     Attends Rastafari service: Not on file Active member of club or organization: Not on file     Attends meetings of clubs or organizations: Not on file     Relationship status: Not on file    Intimate partner violence     Fear of current or ex partner: Not on file     Emotionally abused: Not on file     Physically abused: Not on file     Forced sexual activity: Not on file   Other Topics Concern    Not on file   Social History Narrative    ** Merged History Encounter **            FAMILY HISTORY:  Family History   Problem Relation Age of Onset    Heart Disease Brother     Diabetes Nephew     Hypertension Mother        REVIEW OF SYSTEMS: Complete ROS assessed and noted for that which is described above, all else are negative. Eyes: normal  ENT: normal  CVS: normal  Resp: normal  GI: normal  : normal  GYN: normal  Endocrine: normal  Integument: normal  Musculoskeletal: normal  Neuro: normal  Psych: normal      PHYSICAL EXAMINATION:  Telephone Visit    REVIEW OF LABORATORY AND RADIOLOGY DATA:   Labs and documentation have been reviewed as described above. ASSESSMENT AND PLAN:   Philip Hurley is a 69 y.o. male with a PMHx as noted above who presents to the endocrinology clinic for f/u evaluation of uncontrolled type 2 diabetes. DM2 with hypoglycemia  HTN  HLD    He has known neuropathy affecting his hands/feet for which he is on cymbalta. Historically has had issues getting it covered but notes it was taken care of. This was something that was very painful for him but today he describes it as reasonably controlled for now. His blood sugars are more stable now than before when his sugars were getting a bit lower than desired. His diet has changed over the course of the year and has impacted his sugars and treatment but as of today, he keeps a tight control of his sugars on this reduced regimen.  I would advise we be careful with relaxing his sugars too much however as he has tended to have worsening neuropathy symptoms when not well controlled. Plan as follows:     DM2:  Metformin 500mg twice per day,   Glimepiride 0.5 mg (1/2 tablet) before breakfast  Continue to check glucose 1-2 times daily    HTN: Telemedicine visit  HLD: reviewed, prev stable, will order new panel in early 2021,    4 month follow up visit, Dec 14 at 4:10 PM,    25 minutes spent toward telephone visit today of which >50% of this time was spent in counseling and coordination of care. Fleet Grate.  [address] [facility]

## 2020-08-11 NOTE — PROGRESS NOTES
Chief Complaint   Patient presents with    Diabetes     telephone only [phone]       1. Have you been to the ER, urgent care clinic since your last visit? Hospitalized since your last visit? No    2. Have you seen or consulted any other health care providers outside of the [address] [place] [address] since your last visit? Include any pap smears or colon screening.  Yes When: Dr. Cesar Goodman 1/2020

## 2020-09-10 ENCOUNTER — VIRTUAL VISIT (OUTPATIENT)
Dept: INTERNAL MEDICINE CLINIC | Age: 75
End: 2020-09-10
Payer: MEDICARE

## 2020-09-10 DIAGNOSIS — E66.01 OBESITY, MORBID (HCC): ICD-10-CM

## 2020-09-10 DIAGNOSIS — Z23 ENCOUNTER FOR IMMUNIZATION: ICD-10-CM

## 2020-09-10 DIAGNOSIS — N18.30 CKD (CHRONIC KIDNEY DISEASE) STAGE 3, GFR 30-59 ML/MIN (HCC): ICD-10-CM

## 2020-09-10 DIAGNOSIS — Z98.1 S/P CERVICAL SPINAL FUSION: ICD-10-CM

## 2020-09-10 DIAGNOSIS — E11.21 TYPE 2 DIABETES WITH NEPHROPATHY (HCC): ICD-10-CM

## 2020-09-10 DIAGNOSIS — E78.00 PURE HYPERCHOLESTEROLEMIA: ICD-10-CM

## 2020-09-10 DIAGNOSIS — M79.2 NEUROPATHIC PAIN: Primary | ICD-10-CM

## 2020-09-10 PROCEDURE — 99442 PR PHYS/QHP TELEPHONE EVALUATION 11-20 MIN: CPT | Performed by: FAMILY MEDICINE

## 2020-09-10 NOTE — PROGRESS NOTES
Rigo Raman is a 69 y.o. male who presents for follow up. Seen by virtual visit May 22. Reports he is sleeping a lot. Reports ongoing pain in arms and legs. Stopped gabapentin 100mg, too sedating. Taking tylenol 2 a day with relief.      Reports that he is tired all of the time. Has VIJAY and wears CPAP. Due to see sleep specialist.       Has seen cardiology. Reported swelling that comes and goes. On zaroxolyn 2.5mg two days a week and lasix 40mg BID.      Referred to Dr. Zakia Chacon,nephrology, CKD. Has not gone,  Creatinine 1.49.      Sees Dr. Shanta Luther, [facility], for diabetes. Follow up with Dr Shanta Luther in December 2020. Trying to follow diabetic diet. HbA1C 5.5% in December. Follow up scheduled for August.       Taking simvastatin 10mg every other day.           This is an established visit conducted via telemedicine, by phone. The patient has been instructed that this meets HIPAA criteria and acknowledges and agrees to this method of visitation. Pursuant to the emergency declaration under the [facility] waiver authority and the [employer] Act, this Virtual Visit was conducted, with patient's consent, to reduce the patient's risk of exposure to COVID-19 and provide continuity of care for an established patient. Services were provided by phone to substitute for in-person clinic visit.        Past Medical History:   Diagnosis Date    Arthritis     Asthma     CAD (coronary artery disease)     Calculus of kidney     Carotid artery disease (HCC)     Cervical herniated disc     Chronic systolic (congestive) heart failure (HCC)     Diabetes ([address]     Diabetic shock due to low blood sugar ([address] 2016    Glaucoma     pt is on drops unsure of what the names are    Hypercholesterolemia     Hypertension     Morbid obesity ([address]     Rheumatic fever     When he was 17    Sleep apnea     compliant with cpap as stated 2019       Family History   Problem Relation Age of Onset    Heart Disease Brother     Diabetes Nephew     Hypertension Mother        Social History     Socioeconomic History    Marital status:      Spouse name: Not on file    Number of children: Not on file    Years of education: Not on file    Highest education level: Not on file   Occupational History    Not on file   Social Needs    Financial resource strain: Not on file    Food insecurity     Worry: Not on file     Inability: Not on file    Transportation needs     Medical: Not on file     Non-medical: Not on file   Tobacco Use    Smoking status: Former Smoker     Packs/day: 5.00     Years: 15.00     Pack years: 75.00     Last attempt to quit: 1963     Years since quittin.4    Smokeless tobacco: Never Used   Substance and Sexual Activity    Alcohol use: No     Alcohol/week: 0.0 standard drinks    Drug use: Never    Sexual activity: Yes     Partners: Female     Birth control/protection: None   Lifestyle    Physical activity     Days per week: Not on file     Minutes per session: Not on file    Stress: Not on file   Relationships    Social connections     Talks on phone: Not on file     Gets together: Not on file     Attends Tenriism service: Not on file     Active member of club or organization: Not on file     Attends meetings of clubs or organizations: Not on file     Relationship status: Not on file    Intimate partner violence     Fear of current or ex partner: Not on file     Emotionally abused: Not on file     Physically abused: Not on file     Forced sexual activity: Not on file   Other Topics Concern    Not on file   Social History Narrative    ** Merged History Encounter **            Current Outpatient Medications on File Prior to Visit   Medication Sig Dispense Refill    carvediloL (COREG) 12.5 mg tablet Take 1 tablet by mouth twice daily 180 Tab 0    metOLazone (ZAROXOLYN) 2.5 mg tablet TAKE 1 TABLET BY MOUTH  EVERY TUESDAY AND THURSDAY 24 Tab 0    furosemide (LASIX) 40 mg tablet Take 1 tablet by mouth twice daily 180 Tab 3    simvastatin (ZOCOR) 10 mg tablet Take 1 Tab by mouth every other day. 90 Tab 3    metFORMIN ER (GLUCOPHAGE XR) 500 mg tablet TAKE 1 TABLET BY MOUTH BEFORE BREAKFAST AND  DINNER 180 Tab 3    DULoxetine (CYMBALTA) 60 mg capsule TAKE 1 CAPSULE BY MOUTH ONCE DAILY FOR  FEET  PAIN 90 Cap 3    glimepiride (AMARYL) 1 mg tablet TAKE 1 TABLET BY MOUTH ONCE DAILY BEFORE BREAKFAST (Patient taking differently: Taking 0.5 tab daily) 90 Tab 3    omega-3 fatty acids 1,000 mg cap Take 1 Cap by mouth daily.  isosorbide mononitrate ER (IMDUR) 60 mg CR tablet Take 1 Tab by mouth daily. 90 Tab 3    lisinopril (PRINIVIL, ZESTRIL) 2.5 mg tablet Take 1 Tab by mouth daily. 90 Tab 3    dextran 70/hypromellose/PF (NATURAL TEARS, PF, OP) Apply 1 Drop to eye as needed.  acetaminophen (TYLENOL) 500 mg tablet Take 1,000 mg by mouth every six (6) hours as needed for Pain.  cholecalciferol, VITAMIN D3, (VITAMIN D3) 5,000 unit tab tablet Take 1 Tab by mouth daily. 30 Tab 5    aspirin delayed-release 81 mg tablet Take 81 mg by mouth daily. No current facility-administered medications on file prior to visit. Review of Systems  Pertinent items are noted in HPI. Objective:     Gen: healthy sounding male  HEENT: no audible congestion, patient does not see oral erythema and sees MMM  Neck: patient does not feel enlarged or tender LAD or masses  Resp: normal respiratory effort, no audible wheezing. CV: patient does not feel palpitations or heart irregularity  Abd: patient does not feel abdominal tenderness or mass, patient does not notice distension  Extrem: patient does not see swelling in ankles or joints.    Neuro: Alert and oriented, able to answer questions without difficulty, patient reports able to move all extremities and walk normally        Assessment/Plan: ICD-10-CM ICD-9-CM    1. Neuropathic pain  M79.2 729.2    2. CKD (chronic kidney disease) stage 3, GFR 30-59 ml/min ([facility])  N18.3 585.3    3. Obesity, morbid ([facility] [address]  E66.01 278.01    4. Type 2 diabetes with nephropathy ([facility])  E11.21 250.40      583.81    5. Pure hypercholesterolemia  E78.00 272.0    6. S/P cervical spinal fusion  Z98.1 V45.4    7. Encounter for immunization  Z23 V03.89 pneumococcal 13 qiana conj dip (PREVNAR-13) 0.5 mL syrg injection       This was a telemedicine visit by phone, duration 11 minutes. David Farnsworth MD    Follow-up and Dispositions    · Return in about 6 months (around 3/10/2021) for follow up on medication. Cyndee Kearney

## 2020-09-14 RX ORDER — ISOSORBIDE MONONITRATE 60 MG/1
TABLET, EXTENDED RELEASE ORAL
Qty: 90 TAB | Refills: 0 | Status: SHIPPED | OUTPATIENT
Start: 2020-09-14 | End: 2020-12-14

## 2020-11-02 ENCOUNTER — TELEPHONE (OUTPATIENT)
Dept: CARDIOLOGY CLINIC | Age: 75
End: 2020-11-02

## 2020-11-02 NOTE — TELEPHONE ENCOUNTER
Pt's wife and son was calling on pt's behalf; Pt's wife stated that they were living with their son and that the son is a  and is suppose to be retun back to work soon; Pt's wife states that she needed documentation from [facility] stating all issues that her  has to give to their son so that the son could continue to work at home virtually; I advised the Pt's wife that unfortunately we could not due to [place] and  that if her son was a pt of the practice, then yes we could do that for them but he is not a pt of the practice; The son then gets on the phone and states that he needs the information for his job; I advised to the son that \"He\" would need to contact his own PCP to get that information to let his place of employment know that he is a risk to his parents; Pt's son then states that his PCP is not going to give him the documentation because (even when he has not called his PCP to ask for the documentation that he needs) he is not the one at risk but his parents are; I advised him to call both PCP's (his and his parents) so that they would be able to provide him the proper documentation that he needs for his place of employment;  Pt son then states that this office would be able to give him his father's health information: I advised the son that we could not because of HIPPA and that he needed to contact their PCP first and then work from there; Pt's son was not understanding and states that he will call back to [facility] for his father's information

## 2020-11-03 ENCOUNTER — TELEPHONE (OUTPATIENT)
Dept: ENDOCRINOLOGY | Age: 75
End: 2020-11-03

## 2020-11-04 ENCOUNTER — TRANSCRIBE ORDER (OUTPATIENT)
Dept: ENDOCRINOLOGY | Age: 75
End: 2020-11-04

## 2020-11-04 ENCOUNTER — TELEPHONE (OUTPATIENT)
Dept: ENDOCRINOLOGY | Age: 75
End: 2020-11-04

## 2020-11-04 NOTE — TELEPHONE ENCOUNTER
I returned this call and spoke with Mrs. James Yang. She said she would have her son mail us the form and I will make sure it gets to Dr. Jasmina Godinez to fill out.   Barbara Lora

## 2020-11-04 NOTE — TELEPHONE ENCOUNTER
----- Message from Patrick Walton sent at 11/4/2020 12:05 PM EST -----  Regarding: MD Aldrich/ telephone  Patient return call    Caller's first and last name and relationship (if not the patient): pt      Best contact number(s): [phone]      Whose call is being returned: Jose Buckner      Details to clarify the request: Pt is returning missed call from office in reference to forms need to be filled out for son school.       Patrick Walton

## 2020-11-05 RX ORDER — GLIMEPIRIDE 1 MG/1
TABLET ORAL
Qty: 90 TAB | Refills: 3 | Status: SHIPPED | OUTPATIENT
Start: 2020-11-05 | End: 2021-12-03 | Stop reason: SDUPTHER

## 2020-11-05 RX ORDER — METFORMIN HYDROCHLORIDE 500 MG/1
TABLET, EXTENDED RELEASE ORAL
Qty: 180 TAB | Refills: 3 | Status: SHIPPED | OUTPATIENT
Start: 2020-11-05 | End: 2021-12-03 | Stop reason: SDUPTHER

## 2020-11-05 RX ORDER — DULOXETIN HYDROCHLORIDE 60 MG/1
CAPSULE, DELAYED RELEASE ORAL
Qty: 90 CAP | Refills: 3 | Status: SHIPPED | OUTPATIENT
Start: 2020-11-05 | End: 2021-09-22 | Stop reason: SDUPTHER

## 2020-11-10 RX ORDER — METOLAZONE 2.5 MG/1
TABLET ORAL
Qty: 24 TAB | Refills: 3 | Status: SHIPPED | OUTPATIENT
Start: 2020-11-10 | End: 2021-12-06 | Stop reason: SDUPTHER

## 2020-12-14 ENCOUNTER — VIRTUAL VISIT (OUTPATIENT)
Dept: ENDOCRINOLOGY | Age: 75
End: 2020-12-14
Payer: MEDICARE

## 2020-12-14 DIAGNOSIS — E78.5 HYPERLIPIDEMIA, UNSPECIFIED HYPERLIPIDEMIA TYPE: ICD-10-CM

## 2020-12-14 DIAGNOSIS — E11.22 TYPE 2 DIABETES MELLITUS WITH STAGE 3 CHRONIC KIDNEY DISEASE, WITH LONG-TERM CURRENT USE OF INSULIN, UNSPECIFIED WHETHER STAGE 3A OR 3B CKD (HCC): Primary | ICD-10-CM

## 2020-12-14 DIAGNOSIS — I10 ESSENTIAL HYPERTENSION WITH GOAL BLOOD PRESSURE LESS THAN 130/80: ICD-10-CM

## 2020-12-14 DIAGNOSIS — N18.30 TYPE 2 DIABETES MELLITUS WITH STAGE 3 CHRONIC KIDNEY DISEASE, WITH LONG-TERM CURRENT USE OF INSULIN, UNSPECIFIED WHETHER STAGE 3A OR 3B CKD (HCC): Primary | ICD-10-CM

## 2020-12-14 DIAGNOSIS — G62.9 NEUROPATHY: ICD-10-CM

## 2020-12-14 DIAGNOSIS — Z79.4 TYPE 2 DIABETES MELLITUS WITH STAGE 3 CHRONIC KIDNEY DISEASE, WITH LONG-TERM CURRENT USE OF INSULIN, UNSPECIFIED WHETHER STAGE 3A OR 3B CKD (HCC): Primary | ICD-10-CM

## 2020-12-14 PROCEDURE — 99442 PR PHYS/QHP TELEPHONE EVALUATION 11-20 MIN: CPT | Performed by: INTERNAL MEDICINE

## 2020-12-14 NOTE — PROGRESS NOTES
**DUE TO PANDEMIC AND HEALTH CONCERNS IN THE COMMUNITY, THIS PATIENT WAS EITHER ILL OR FOUND TO BE HIGH RISK FOR IN-PERSON EVALUATION WITHIN THE CLINIC. THE FOLLOWING IS A VIRTUAL VISIT VIA A TELEPHONE ENCOUNTER TO WHICH THE PATIENT AGREED. THE PURPOSE IS TO LIMIT INTERRUPTIONS IN HEALTHCARE AND TO PROVIDE FOR ONGOING URGENT NEEDS UNDER THE CURRENT CONDITIONS. THE PATIENT CONFIRMS THEY ARE AWARE OF THE LIMITATIONS OF THE TELEPHONE VISIT. CHIEF COMPLAINT: f/u evaluation for uncontrolled type 2 diabetes    HISTORY OF PRESENT ILLNESS:   Lawyer Garcia is a 70 y.o. male with a PMHx as noted below who presents to the endocrinology clinic for f/u evaluation of uncontrolled type 2 diabetes.     No recent labs for review,     Currently taking the following meds:  Metformin 500mg twice per day,   Glimepiride 0.5 mg (1/2 tablet) before breakfast    Review of home blood glucose:  Reports ranging     Review of most recent diabetes-related labs:  Lab Results   Component Value Date    HBA1C 5.9 (H) 09/12/2019    HBA1C 7.7 (H) 05/10/2019    UGU0BGOW 5.5 12/17/2019    SXN5HRBA 5.3 01/17/2019    ZUL7HUJY 5.6 10/09/2018    CHOL 109 02/06/2020    LDLC 39 02/06/2020    GFRAA 53 (L) 02/06/2020    GFRNA 46 (L) 02/06/2020    MCACR 1,624.4 (H) 05/10/2019    TSH 0.681 04/23/2018    VITD3 59.9 05/16/2019     Lab Key:  [id number] = IA-2 pancreatic islet cell autoantibody  GADLT = MARIAN-65 autoantibody   MCACR = Urine Microalbumin  INSUL = Insulin level  CPEPL = C-peptide level    PAST MEDICAL/SURGICAL HISTORY:   Past Medical History:   Diagnosis Date    Arthritis     Asthma     CAD (coronary artery disease)     Calculus of kidney     Carotid artery disease (HCC)     Cervical herniated disc     Chronic systolic (congestive) heart failure (HCC)     Diabetes ([address]     Diabetic shock due to low blood sugar ([address] 2016    Glaucoma     pt is on drops unsure of what the names are    Hypercholesterolemia     Hypertension     [facility]     Rheumatic fever     When he was 17    Sleep apnea     compliant with cpap as stated 9/6/2019     Past Surgical History:   Procedure Laterality Date    HX CAROTID STENT  1990's    with cath    HX CATARACT REMOVAL Left     HX CERVICAL FUSION  05/21/2019    C3- C6 ACFD     HX CHOLECYSTECTOMY      HX CIRCUMCISION      HX HEART CATHETERIZATION      x 5 total, 4 in heart, 1 in carotid    HX HEENT Left     ear torn off and repaired    HX OTHER SURGICAL Right     Right hand reconstruction    HX SHOULDER ARTHROSCOPY Right     dislocated with fall, came out, he stated surgery put back in place and sowed him up       ALLERGIES:   Allergies   Allergen Reactions    Morphine Anaphylaxis     Pt states \"morphine gave me heart failure\"    Lyrica [Pregabalin] Other (comments)     Makes him \"loopy\"       MEDICATIONS ON ADMISSION:     Current Outpatient Medications:     isosorbide mononitrate ER (IMDUR) 60 mg CR tablet, Take 1 tablet by mouth once daily, Disp: 90 Tab, Rfl: 3    lisinopriL (PRINIVIL, ZESTRIL) 2.5 mg tablet, Take 1 Tab by mouth daily. , Disp: 90 Tab, Rfl: 3    metOLazone (ZAROXOLYN) 2.5 mg tablet, TAKE 1 TABLET BY MOUTH EVERY TUESDAY AND THURSDAY, Disp: 24 Tab, Rfl: 3    metFORMIN ER (GLUCOPHAGE XR) 500 mg tablet, TAKE 1 TABLET BY MOUTH BEFORE BREAKFAST AND  DINNER, Disp: 180 Tab, Rfl: 3    glimepiride (AMARYL) 1 mg tablet, Taking 0.5 tab daily, Disp: 90 Tab, Rfl: 3    DULoxetine (CYMBALTA) 60 mg capsule, TAKE 1 CAPSULE BY MOUTH ONCE DAILY FOR  FEET  PAIN, Disp: 90 Cap, Rfl: 3    carvediloL (COREG) 12.5 mg tablet, Take 1 tablet by mouth twice daily, Disp: 180 Tab, Rfl: 0    furosemide (LASIX) 40 mg tablet, Take 1 tablet by mouth twice daily, Disp: 180 Tab, Rfl: 3    simvastatin (ZOCOR) 10 mg tablet, Take 1 Tab by mouth every other day., Disp: 90 Tab, Rfl: 3    omega-3 fatty acids 1,000 mg cap, Take 1 Cap by mouth daily. , Disp: , Rfl:     dextran 70/hypromellose/PF (NATURAL TEARS, PF, OP), Apply 1 Drop to eye as needed. , Disp: , Rfl:     cholecalciferol, VITAMIN D3, (VITAMIN D3) 5,000 unit tab tablet, Take 1 Tab by mouth daily. , Disp: 30 Tab, Rfl: 5    aspirin delayed-release 81 mg tablet, Take 81 mg by mouth daily. , Disp: , Rfl:     acetaminophen (TYLENOL) 500 mg tablet, Take 1,000 mg by mouth every six (6) hours as needed for Pain., Disp: , Rfl:     SOCIAL HISTORY:   Social History     Socioeconomic History    Marital status:      Spouse name: Not on file    Number of children: Not on file    Years of education: Not on file    Highest education level: Not on file   Occupational History    Not on file   Social Needs    Financial resource strain: Not on file    Food insecurity     Worry: Not on file     Inability: Not on file    Transportation needs     Medical: Not on file     Non-medical: Not on file   Tobacco Use    Smoking status: Former Smoker     Packs/day: 5.00     Years: 15.00     Pack years: 75.00     Last attempt to quit: 1963     Years since quittin.6    Smokeless tobacco: Never Used   Substance and Sexual Activity    Alcohol use: No     Alcohol/week: 0.0 standard drinks    Drug use: Never    Sexual activity: Yes     Partners: Female     Birth control/protection: None   Lifestyle    Physical activity     Days per week: Not on file     Minutes per session: Not on file    Stress: Not on file   Relationships    Social connections     Talks on phone: Not on file     Gets together: Not on file     Attends Buddhism service: Not on file     Active member of club or organization: Not on file     Attends meetings of clubs or organizations: Not on file     Relationship status: Not on file    Intimate partner violence     Fear of current or ex partner: Not on file     Emotionally abused: Not on file     Physically abused: Not on file     Forced sexual activity: Not on file   Other Topics Concern    Not on file   Social History Narrative    ** Merged History Encounter **            FAMILY HISTORY:  Family History   Problem Relation Age of Onset    Heart Disease Brother     Diabetes Nephew     Hypertension Mother        REVIEW OF SYSTEMS: Complete ROS assessed and noted for that which is described above, all else are negative. Eyes: normal  ENT: normal  CVS: normal  Resp: normal  GI: normal  : normal  GYN: normal  Endocrine: normal  Integument: normal  Musculoskeletal: normal  Neuro: normal  Psych: normal    PHYSICAL EXAMINATION:  Telephone Visit    REVIEW OF LABORATORY AND RADIOLOGY DATA:   Labs and documentation have been reviewed as described above. ASSESSMENT AND PLAN:   Nicholas Turcios is a 70 y.o. male with a PMHx as noted above who presents to the endocrinology clinic for f/u evaluation of uncontrolled type 2 diabetes. DM2 with hypoglycemia  HTN  HLD    Patients blood sugars seem to be stable though trended upward over the past few days and I have asked him to monitor it closely. He would like us to send him some blood sugar log sheets to his home. He has not been getting out of the house since March unless it is for picking up his medications. Overall he reports his neuropathy is present but not as terrible as before. I have encouraged him to try to have a plan for staying active indoors through the winter months. We will plan for labs with his next visit, plan as follows:     DM2:  Metformin 500mg twice per day,   Glimepiride 0.5 mg (1/2 tablet) before breakfast  Continue to check glucose 1-2 times daily    HTN: Telemedicine visit, last checked March 128/79    HLD: prelab ordered for next visit,    LABS: Lab panel ordered to be completed few days before next visit    April 29 at 11:30 AM,     15 minutes spent toward telephone visit today of which >50% of this time was spent in counseling and coordination of care. Osito Byrnes.  [address] [facility]

## 2021-02-18 RX ORDER — CARVEDILOL 12.5 MG/1
TABLET ORAL
Qty: 180 TAB | Refills: 0 | Status: SHIPPED | OUTPATIENT
Start: 2021-02-18 | End: 2021-05-21

## 2021-02-24 ENCOUNTER — DOCUMENTATION ONLY (OUTPATIENT)
Dept: INTERNAL MEDICINE CLINIC | Age: 76
End: 2021-02-24

## 2021-02-24 NOTE — PROGRESS NOTES
Received medical record request on 2/22/21 from [facility], [place]. Faxed request to [facility] on 2/24/21 and received confirmation.

## 2021-04-29 ENCOUNTER — VIRTUAL VISIT (OUTPATIENT)
Dept: ENDOCRINOLOGY | Age: 76
End: 2021-04-29
Payer: MEDICARE

## 2021-04-29 DIAGNOSIS — I10 ESSENTIAL HYPERTENSION WITH GOAL BLOOD PRESSURE LESS THAN 130/80: ICD-10-CM

## 2021-04-29 DIAGNOSIS — E78.5 HYPERLIPIDEMIA, UNSPECIFIED HYPERLIPIDEMIA TYPE: ICD-10-CM

## 2021-04-29 DIAGNOSIS — N18.30 TYPE 2 DIABETES MELLITUS WITH STAGE 3 CHRONIC KIDNEY DISEASE, WITH LONG-TERM CURRENT USE OF INSULIN, UNSPECIFIED WHETHER STAGE 3A OR 3B CKD (HCC): Primary | ICD-10-CM

## 2021-04-29 DIAGNOSIS — E11.22 TYPE 2 DIABETES MELLITUS WITH STAGE 3 CHRONIC KIDNEY DISEASE, WITH LONG-TERM CURRENT USE OF INSULIN, UNSPECIFIED WHETHER STAGE 3A OR 3B CKD (HCC): Primary | ICD-10-CM

## 2021-04-29 DIAGNOSIS — Z79.4 TYPE 2 DIABETES MELLITUS WITH STAGE 3 CHRONIC KIDNEY DISEASE, WITH LONG-TERM CURRENT USE OF INSULIN, UNSPECIFIED WHETHER STAGE 3A OR 3B CKD (HCC): Primary | ICD-10-CM

## 2021-04-29 PROCEDURE — 99443 PR PHYS/QHP TELEPHONE EVALUATION 21-30 MIN: CPT | Performed by: INTERNAL MEDICINE

## 2021-04-29 RX ORDER — DEXTROMETHORPHAN HYDROBROMIDE, GUAIFENESIN 5; 100 MG/5ML; MG/5ML
650 LIQUID ORAL EVERY 8 HOURS
COMMUNITY

## 2021-04-29 NOTE — PROGRESS NOTES
**DUE TO PANDEMIC AND HEALTH CONCERNS IN THE COMMUNITY, THIS PATIENT WAS EITHER ILL OR FOUND TO BE HIGH RISK FOR IN-PERSON EVALUATION WITHIN THE CLINIC. THE FOLLOWING IS A VIRTUAL VISIT VIA A TELEPHONE ENCOUNTER TO WHICH THE PATIENT AGREED. THE PURPOSE IS TO LIMIT INTERRUPTIONS IN HEALTHCARE AND TO PROVIDE FOR ONGOING URGENT NEEDS UNDER THE CURRENT CONDITIONS. THE PATIENT CONFIRMS THEY ARE AWARE OF THE LIMITATIONS OF THE TELEPHONE VISIT. CHIEF COMPLAINT: f/u evaluation for uncontrolled type 2 diabetes    HISTORY OF PRESENT ILLNESS:   Polo Corral is a 70 y.o. male with a PMHx as noted below who presents to the endocrinology clinic for f/u evaluation of uncontrolled type 2 diabetes. Patient did not repeat lab panel ordered for today's visit. Has not been going out since the start of the pandemic. Not excited to go. Feeling tired these days.       Currently taking the following meds:  Metformin 500mg twice per day,   Glimepiride 0.5 mg (1/2 tablet) before breakfast    Review of home blood glucose:  Reports ranging 140's in the AM    Review of most recent diabetes-related labs:  Lab Results   Component Value Date    HBA1C 5.9 (H) 09/12/2019    HBA1C 7.7 (H) 05/10/2019    KUD3GPUQ 5.5 12/17/2019    NVV6ZAZV 5.3 01/17/2019    TPG0UYRZ 5.6 10/09/2018    CHOL 109 02/06/2020    LDLC 39 02/06/2020    GFRAA 53 (L) 02/06/2020    GFRNA 46 (L) 02/06/2020    MCACR 1,624.4 (H) 05/10/2019    TSH 0.681 04/23/2018    VITD3 59.9 05/16/2019     Lab Key:  [id number] = IA-2 pancreatic islet cell autoantibody  GADLT = MARIAN-65 autoantibody   MCACR = Urine Microalbumin  INSUL = Insulin level  CPEPL = C-peptide level    PAST MEDICAL/SURGICAL HISTORY:   Past Medical History:   Diagnosis Date    Arthritis     Asthma     CAD (coronary artery disease)     Calculus of kidney     Carotid artery disease (HCC)     Cervical herniated disc     [facility]     Diabetes [facility]     Diabetic shock due to low blood sugar ([facility] 2016    Glaucoma     pt is on drops unsure of what the names are    Hypercholesterolemia     Hypertension     Morbid obesity ([facility]     Rheumatic fever     When he was 17    Sleep apnea     compliant with cpap as stated 9/6/2019     Past Surgical History:   Procedure Laterality Date    HX CAROTID STENT  1990's    with cath    HX CATARACT REMOVAL Left     HX CERVICAL FUSION  05/21/2019    C3- C6 ACFD     HX CHOLECYSTECTOMY      HX CIRCUMCISION      HX HEART CATHETERIZATION      x 5 total, 4 in heart, 1 in carotid    HX HEENT Left     ear torn off and repaired    HX OTHER SURGICAL Right     Right hand reconstruction    HX SHOULDER ARTHROSCOPY Right     dislocated with fall, came out, he stated surgery put back in place and sowed him up       ALLERGIES:   Allergies   Allergen Reactions    Morphine Anaphylaxis     Pt states \"morphine gave me heart failure\"    Lyrica [Pregabalin] Other (comments)     Makes him \"loopy\"       MEDICATIONS ON ADMISSION:     Current Outpatient Medications:     acetaminophen (Tylenol Arthritis Pain) 650 mg TbER, Take 650 mg by mouth every eight (8) hours. , Disp: , Rfl:     carvediloL (COREG) 12.5 mg tablet, Take 1 tablet by mouth twice daily, Disp: 180 Tab, Rfl: 0    isosorbide mononitrate ER (IMDUR) 60 mg CR tablet, Take 1 tablet by mouth once daily, Disp: 90 Tab, Rfl: 3    lisinopriL (PRINIVIL, ZESTRIL) 2.5 mg tablet, Take 1 Tab by mouth daily. , Disp: 90 Tab, Rfl: 3    metOLazone (ZAROXOLYN) 2.5 mg tablet, TAKE 1 TABLET BY MOUTH EVERY TUESDAY AND THURSDAY, Disp: 24 Tab, Rfl: 3    metFORMIN ER (GLUCOPHAGE XR) 500 mg tablet, TAKE 1 TABLET BY MOUTH BEFORE BREAKFAST AND  DINNER, Disp: 180 Tab, Rfl: 3    glimepiride (AMARYL) 1 mg tablet, Taking 0.5 tab daily, Disp: 90 Tab, Rfl: 3    DULoxetine (CYMBALTA) 60 mg capsule, TAKE 1 CAPSULE BY MOUTH ONCE DAILY FOR  FEET  PAIN, Disp: 90 Cap, Rfl: 3    furosemide (LASIX) 40 mg tablet, Take 1 tablet by mouth twice daily, Disp: 180 Tab, Rfl: 3    simvastatin (ZOCOR) 10 mg tablet, Take 1 Tab by mouth every other day., Disp: 90 Tab, Rfl: 3    omega-3 fatty acids 1,000 mg cap, Take 1 Cap by mouth two (2) times a day., Disp: , Rfl:     cholecalciferol, VITAMIN D3, (VITAMIN D3) 5,000 unit tab tablet, Take 1 Tab by mouth daily. , Disp: 30 Tab, Rfl: 5    aspirin delayed-release 81 mg tablet, Take 81 mg by mouth daily. , Disp: , Rfl:     dextran 70/hypromellose/PF (NATURAL TEARS, PF, OP), Apply 1 Drop to eye as needed. , Disp: , Rfl:     acetaminophen (TYLENOL) 500 mg tablet, Take 1,000 mg by mouth every six (6) hours as needed for Pain., Disp: , Rfl:     SOCIAL HISTORY:   Social History     Socioeconomic History    Marital status:      Spouse name: Not on file    Number of children: Not on file    Years of education: Not on file    Highest education level: Not on file   Occupational History    Not on file   Social Needs    Financial resource strain: Not on file    Food insecurity     Worry: Not on file     Inability: Not on file    Transportation needs     Medical: Not on file     Non-medical: Not on file   Tobacco Use    Smoking status: Former Smoker     Packs/day: 5.00     Years: 15.00     Pack years: 75.00     Quit date: 1963     Years since quittin.0    Smokeless tobacco: Never Used   Substance and Sexual Activity    Alcohol use: No     Alcohol/week: 0.0 standard drinks    Drug use: Never    Sexual activity: Yes     Partners: Female     Birth control/protection: None   Lifestyle    Physical activity     Days per week: Not on file     Minutes per session: Not on file    Stress: Not on file   Relationships    Social connections     Talks on phone: Not on file     Gets together: Not on file     Attends Quaker service: Not on file     Active member of club or organization: Not on file     Attends meetings of clubs or organizations: Not on file     Relationship status: Not on file   Cassidy Agarwal Intimate partner violence     Fear of current or ex partner: Not on file     Emotionally abused: Not on file     Physically abused: Not on file     Forced sexual activity: Not on file   Other Topics Concern    Not on file   Social History Narrative    ** Merged History Encounter **            FAMILY HISTORY:  Family History   Problem Relation Age of Onset    Heart Disease Brother     Diabetes Nephew     Hypertension Mother        REVIEW OF SYSTEMS: Complete ROS assessed and noted for that which is described above, all else are negative. Eyes: normal  ENT: normal  CVS: normal  Resp: normal  GI: normal  : normal  GYN: normal  Endocrine: normal  Integument: normal  Musculoskeletal: normal  Neuro: normal  Psych: normal    PHYSICAL EXAMINATION:  Telephone Visit    REVIEW OF LABORATORY AND RADIOLOGY DATA:   Labs and documentation have been reviewed as described above. ASSESSMENT AND PLAN:   Rafy Oliveros is a 70 y.o. male with a PMHx as noted above who presents to the endocrinology clinic for f/u evaluation of uncontrolled type 2 diabetes. DM2   HTN  HLD    DM2:  Metformin 500mg twice per day,   Glimepiride 0.5 mg (1/2 tablet) before breakfast  Continue to check glucose 1-2 times daily    HTN: Telemedicine visit,     HLD: on statin, labs ordered to be completed    LABS: encouraged to obtain before May 19 if possible, though he prefers to avoid leaving his home    Spent time discussing a number of questions for patient and his wife today. 25 minutes spent toward telephone visit today of which >50% of this time was spent in counseling and coordination of care. Karma Hubbard.  [address] [facility]

## 2021-05-21 RX ORDER — CARVEDILOL 12.5 MG/1
12.5 TABLET ORAL 2 TIMES DAILY
Qty: 180 TABLET | Refills: 0 | Status: SHIPPED | OUTPATIENT
Start: 2021-05-21 | End: 2021-08-24

## 2021-05-21 RX ORDER — FUROSEMIDE 40 MG/1
TABLET ORAL
Qty: 60 TABLET | Refills: 0 | OUTPATIENT
Start: 2021-05-21

## 2021-05-24 RX ORDER — SIMVASTATIN 10 MG/1
10 TABLET, FILM COATED ORAL EVERY OTHER DAY
Qty: 90 TABLET | Refills: 0 | Status: SHIPPED | OUTPATIENT
Start: 2021-05-24 | End: 2021-12-13

## 2021-05-24 RX ORDER — SIMVASTATIN 10 MG/1
TABLET, FILM COATED ORAL
Qty: 45 TABLET | Refills: 0 | OUTPATIENT
Start: 2021-05-24

## 2021-06-14 RX ORDER — FUROSEMIDE 40 MG/1
TABLET ORAL
Qty: 180 TABLET | Refills: 0 | Status: SHIPPED | OUTPATIENT
Start: 2021-06-14 | End: 2021-09-21

## 2021-09-21 RX ORDER — FUROSEMIDE 40 MG/1
TABLET ORAL
Qty: 60 TABLET | Refills: 0 | Status: SHIPPED | OUTPATIENT
Start: 2021-09-21 | End: 2021-12-14 | Stop reason: SDUPTHER

## 2021-09-22 RX ORDER — DULOXETIN HYDROCHLORIDE 60 MG/1
CAPSULE, DELAYED RELEASE ORAL
Qty: 90 CAPSULE | Refills: 1 | Status: SHIPPED | OUTPATIENT
Start: 2021-09-22 | End: 2022-06-16 | Stop reason: SDUPTHER

## 2021-12-03 ENCOUNTER — TELEPHONE (OUTPATIENT)
Dept: ENDOCRINOLOGY | Age: 76
End: 2021-12-03

## 2021-12-03 RX ORDER — METFORMIN HYDROCHLORIDE 500 MG/1
TABLET, EXTENDED RELEASE ORAL
Qty: 180 TABLET | Refills: 0 | Status: SHIPPED | OUTPATIENT
Start: 2021-12-03 | End: 2021-12-21 | Stop reason: ALTCHOICE

## 2021-12-03 RX ORDER — GLIMEPIRIDE 1 MG/1
TABLET ORAL
Qty: 90 TABLET | Refills: 0 | Status: SHIPPED | OUTPATIENT
Start: 2021-12-03 | End: 2022-06-16 | Stop reason: SDUPTHER

## 2021-12-03 NOTE — TELEPHONE ENCOUNTER
I returned this call and let . Paz Carroll know that I would do scripts for his Glimepride and Metformin but that he needed to find a new endocrinologist or follow up with his PCP for future refills  Tejal Vasquez

## 2021-12-03 NOTE — TELEPHONE ENCOUNTER
Pt is asking a return call regarding refills on some of his medications. He can be reach @ [phone].

## 2021-12-06 ENCOUNTER — TELEPHONE (OUTPATIENT)
Dept: CARDIOLOGY CLINIC | Age: 76
End: 2021-12-06

## 2021-12-06 RX ORDER — CARVEDILOL 12.5 MG/1
TABLET ORAL
Qty: 180 TABLET | Refills: 0 | Status: SHIPPED | OUTPATIENT
Start: 2021-12-06 | End: 2022-03-14

## 2021-12-06 RX ORDER — ISOSORBIDE MONONITRATE 60 MG/1
TABLET, EXTENDED RELEASE ORAL
Qty: 90 TABLET | Refills: 0 | Status: SHIPPED | OUTPATIENT
Start: 2021-12-06 | End: 2021-12-14 | Stop reason: SDUPTHER

## 2021-12-06 RX ORDER — METOLAZONE 2.5 MG/1
TABLET ORAL
Qty: 24 TABLET | Refills: 0 | Status: SHIPPED | OUTPATIENT
Start: 2021-12-06 | End: 2021-12-17

## 2021-12-06 NOTE — TELEPHONE ENCOUNTER
Spoke with patient. Verified patient with two patient identifiers. Was due for appt Nov 2020. Is over a year, overdue for appt. Also last fasting labs done 2019. Pt has appt scheduled for Jan 2022. Giving refill for Metolazone and coreg. Cannot fill Simvastatin until we have the results of his fasting labs. Wants [facility].  Will send order for labs. Patient verbalized understanding.

## 2021-12-07 NOTE — TELEPHONE ENCOUNTER
Pt. Called again inquiring about lab order. Stated he'd like to possibly go tomorrow because his son was scheduled to take him. Pls return call at [phone].     Thank you,  Reinaldo Lewis

## 2021-12-08 DIAGNOSIS — I50.22 CHRONIC SYSTOLIC CONGESTIVE HEART FAILURE (HCC): ICD-10-CM

## 2021-12-08 DIAGNOSIS — E78.00 PURE HYPERCHOLESTEROLEMIA: Primary | ICD-10-CM

## 2021-12-08 NOTE — TELEPHONE ENCOUNTER
Spoke with patient. Verified patient with two patient identifiers. Advised fasting lab order for [facility] has been entered. Reminded to be fasting. Patient verbalized understanding.

## 2021-12-11 LAB
ALBUMIN SERPL-MCNC: 3.8 G/DL (ref 3.7–4.7)
ALBUMIN/GLOB SERPL: 1.7 {RATIO} (ref 1.2–2.2)
ALP SERPL-CCNC: 75 IU/L (ref 44–121)
ALT SERPL-CCNC: 10 IU/L (ref 0–44)
AST SERPL-CCNC: 10 IU/L (ref 0–40)
BILIRUB SERPL-MCNC: <0.2 MG/DL (ref 0–1.2)
BUN SERPL-MCNC: 41 MG/DL (ref 8–27)
BUN/CREAT SERPL: 22 (ref 10–24)
CALCIUM SERPL-MCNC: 8.9 MG/DL (ref 8.6–10.2)
CHLORIDE SERPL-SCNC: 103 MMOL/L (ref 96–106)
CHOLEST SERPL-MCNC: 95 MG/DL (ref 100–199)
CK SERPL-CCNC: 271 U/L (ref 41–331)
CO2 SERPL-SCNC: 21 MMOL/L (ref 20–29)
CREAT SERPL-MCNC: 1.88 MG/DL (ref 0.76–1.27)
GLOBULIN SER CALC-MCNC: 2.3 G/DL (ref 1.5–4.5)
GLUCOSE SERPL-MCNC: 145 MG/DL (ref 65–99)
HDLC SERPL-MCNC: 37 MG/DL
IMP & REVIEW OF LAB RESULTS: NORMAL
INTERPRETATION: NORMAL
LDLC SERPL CALC-MCNC: 40 MG/DL (ref 0–99)
POTASSIUM SERPL-SCNC: 4.9 MMOL/L (ref 3.5–5.2)
PROT SERPL-MCNC: 6.1 G/DL (ref 6–8.5)
SODIUM SERPL-SCNC: 140 MMOL/L (ref 134–144)
TRIGL SERPL-MCNC: 95 MG/DL (ref 0–149)
VLDLC SERPL CALC-MCNC: 18 MG/DL (ref 5–40)

## 2021-12-13 ENCOUNTER — TELEPHONE (OUTPATIENT)
Dept: CARDIOLOGY CLINIC | Age: 76
End: 2021-12-13

## 2021-12-13 RX ORDER — SIMVASTATIN 10 MG/1
TABLET, FILM COATED ORAL
Qty: 45 TABLET | Refills: 0 | Status: SHIPPED | OUTPATIENT
Start: 2021-12-13 | End: 2022-03-14

## 2021-12-13 NOTE — TELEPHONE ENCOUNTER
Pt's wife Jasmyn [place] called stating that the pt had his blood test done today and is looking to get his cholesterol medication refilled. She did not give me the name of the medication and states he is not feeling good and  would know what she is referring to.  Pharmacy is [facility] is [phone]    Thanks  Shaheed Vega

## 2021-12-14 ENCOUNTER — TELEPHONE (OUTPATIENT)
Dept: CARDIOLOGY CLINIC | Age: 76
End: 2021-12-14

## 2021-12-14 ENCOUNTER — APPOINTMENT (OUTPATIENT)
Dept: GENERAL RADIOLOGY | Age: 76
DRG: 811 | End: 2021-12-14
Attending: EMERGENCY MEDICINE
Payer: MEDICARE

## 2021-12-14 ENCOUNTER — HOSPITAL ENCOUNTER (INPATIENT)
Age: 76
LOS: 3 days | Discharge: HOME | DRG: 811 | End: 2021-12-17
Attending: EMERGENCY MEDICINE | Admitting: INTERNAL MEDICINE
Payer: MEDICARE

## 2021-12-14 ENCOUNTER — APPOINTMENT (OUTPATIENT)
Dept: CT IMAGING | Age: 76
DRG: 811 | End: 2021-12-14
Attending: EMERGENCY MEDICINE
Payer: MEDICARE

## 2021-12-14 ENCOUNTER — OFFICE VISIT (OUTPATIENT)
Dept: CARDIOLOGY CLINIC | Age: 76
End: 2021-12-14
Payer: MEDICARE

## 2021-12-14 DIAGNOSIS — E11.21 TYPE 2 DIABETES WITH NEPHROPATHY (HCC): ICD-10-CM

## 2021-12-14 DIAGNOSIS — I50.32 CHRONIC DIASTOLIC CONGESTIVE HEART FAILURE (HCC): ICD-10-CM

## 2021-12-14 DIAGNOSIS — R06.02 SOB (SHORTNESS OF BREATH): ICD-10-CM

## 2021-12-14 DIAGNOSIS — K92.2 UPPER GI BLEED: Primary | ICD-10-CM

## 2021-12-14 DIAGNOSIS — N18.32 STAGE 3B CHRONIC KIDNEY DISEASE (HCC): ICD-10-CM

## 2021-12-14 DIAGNOSIS — I25.10 ASHD (ARTERIOSCLEROTIC HEART DISEASE): Primary | ICD-10-CM

## 2021-12-14 DIAGNOSIS — D64.9 SYMPTOMATIC ANEMIA: ICD-10-CM

## 2021-12-14 DIAGNOSIS — R60.0 BILATERAL LEG EDEMA: ICD-10-CM

## 2021-12-14 LAB
ALBUMIN SERPL-MCNC: 2.8 G/DL (ref 3.5–5)
ALBUMIN/GLOB SERPL: 0.7 {RATIO} (ref 1.1–2.2)
ALP SERPL-CCNC: 72 U/L (ref 45–117)
ALT SERPL-CCNC: 14 U/L (ref 12–78)
ANION GAP SERPL CALC-SCNC: 3 MMOL/L (ref 5–15)
AST SERPL-CCNC: 7 U/L (ref 15–37)
BASE EXCESS BLD CALC-SCNC: 0.2 MMOL/L
BASOPHILS # BLD: 0.1 K/UL (ref 0–0.1)
BASOPHILS NFR BLD: 1 % (ref 0–1)
BILIRUB SERPL-MCNC: 0.3 MG/DL (ref 0.2–1)
BNP SERPL-MCNC: 1256 PG/ML
BUN SERPL-MCNC: 42 MG/DL (ref 6–20)
BUN/CREAT SERPL: 20 (ref 12–20)
CA-I BLD-MCNC: 1.29 MMOL/L (ref 1.12–1.32)
CALCIUM SERPL-MCNC: 8.9 MG/DL (ref 8.5–10.1)
CHLORIDE BLD-SCNC: 108 MMOL/L (ref 100–108)
CHLORIDE SERPL-SCNC: 110 MMOL/L (ref 97–108)
CK SERPL-CCNC: 279 U/L (ref 39–308)
CO2 BLD-SCNC: 27 MMOL/L (ref 19–24)
CO2 SERPL-SCNC: 29 MMOL/L (ref 21–32)
CREAT SERPL-MCNC: 2.08 MG/DL (ref 0.7–1.3)
CREAT UR-MCNC: 2 MG/DL (ref 0.6–1.3)
DIFFERENTIAL METHOD BLD: ABNORMAL
EOSINOPHIL # BLD: 0.3 K/UL (ref 0–0.4)
EOSINOPHIL NFR BLD: 3 % (ref 0–7)
ERYTHROCYTE [DISTWIDTH] IN BLOOD BY AUTOMATED COUNT: 14 % (ref 11.5–14.5)
GLOBULIN SER CALC-MCNC: 4 G/DL (ref 2–4)
GLUCOSE BLD STRIP.AUTO-MCNC: 191 MG/DL (ref 74–106)
GLUCOSE SERPL-MCNC: 197 MG/DL (ref 65–100)
HCO3 BLDA-SCNC: 27 MMOL/L
HCT VFR BLD AUTO: 26.5 % (ref 36.6–50.3)
HEMOCCULT STL QL: POSITIVE
HGB BLD-MCNC: 7.7 G/DL (ref 12.1–17)
IMM GRANULOCYTES # BLD AUTO: 0 K/UL (ref 0–0.04)
IMM GRANULOCYTES NFR BLD AUTO: 0 % (ref 0–0.5)
IRON SATN MFR SERPL: 5 % (ref 20–50)
IRON SERPL-MCNC: 18 UG/DL (ref 35–150)
LACTATE BLD-SCNC: 2.21 MMOL/L (ref 0.4–2)
LYMPHOCYTES # BLD: 0.7 K/UL (ref 0.8–3.5)
LYMPHOCYTES NFR BLD: 6 % (ref 12–49)
MCH RBC QN AUTO: 26.1 PG (ref 26–34)
MCHC RBC AUTO-ENTMCNC: 29.1 G/DL (ref 30–36.5)
MCV RBC AUTO: 89.8 FL (ref 80–99)
MONOCYTES # BLD: 1 K/UL (ref 0–1)
MONOCYTES NFR BLD: 9 % (ref 5–13)
NEUTS SEG # BLD: 8.9 K/UL (ref 1.8–8)
NEUTS SEG NFR BLD: 81 % (ref 32–75)
NRBC # BLD: 0 K/UL (ref 0–0.01)
NRBC BLD-RTO: 0 PER 100 WBC
PCO2 BLDV: 50.2 MMHG (ref 41–51)
PH BLDV: 7.33 [PH] (ref 7.32–7.42)
PLATELET # BLD AUTO: 319 K/UL (ref 150–400)
PMV BLD AUTO: 9.4 FL (ref 8.9–12.9)
PO2 BLDV: 22 MMHG (ref 25–40)
POTASSIUM BLD-SCNC: 5 MMOL/L (ref 3.5–5.5)
POTASSIUM SERPL-SCNC: 5.1 MMOL/L (ref 3.5–5.1)
PROT SERPL-MCNC: 6.8 G/DL (ref 6.4–8.2)
RBC # BLD AUTO: 2.95 M/UL (ref 4.1–5.7)
SODIUM BLD-SCNC: 144 MMOL/L (ref 136–145)
SODIUM SERPL-SCNC: 142 MMOL/L (ref 136–145)
SPECIMEN SITE: ABNORMAL
TIBC SERPL-MCNC: 363 UG/DL (ref 250–450)
TROPONIN-HIGH SENSITIVITY: 18 NG/L (ref 0–76)
WBC # BLD AUTO: 11 K/UL (ref 4.1–11.1)

## 2021-12-14 PROCEDURE — 93005 ELECTROCARDIOGRAM TRACING: CPT

## 2021-12-14 PROCEDURE — 83550 IRON BINDING TEST: CPT

## 2021-12-14 PROCEDURE — 65660000000 HC RM CCU STEPDOWN

## 2021-12-14 PROCEDURE — G8417 CALC BMI ABV UP PARAM F/U: HCPCS | Performed by: NURSE PRACTITIONER

## 2021-12-14 PROCEDURE — 94760 N-INVAS EAR/PLS OXIMETRY 1: CPT

## 2021-12-14 PROCEDURE — 83880 ASSAY OF NATRIURETIC PEPTIDE: CPT

## 2021-12-14 PROCEDURE — 74011250636 HC RX REV CODE- 250/636: Performed by: EMERGENCY MEDICINE

## 2021-12-14 PROCEDURE — 84295 ASSAY OF SERUM SODIUM: CPT

## 2021-12-14 PROCEDURE — 74011250636 HC RX REV CODE- 250/636: Performed by: INTERNAL MEDICINE

## 2021-12-14 PROCEDURE — 84484 ASSAY OF TROPONIN QUANT: CPT

## 2021-12-14 PROCEDURE — 99215 OFFICE O/P EST HI 40 MIN: CPT | Performed by: NURSE PRACTITIONER

## 2021-12-14 PROCEDURE — 74011000250 HC RX REV CODE- 250: Performed by: EMERGENCY MEDICINE

## 2021-12-14 PROCEDURE — 3017F COLORECTAL CA SCREEN DOC REV: CPT | Performed by: NURSE PRACTITIONER

## 2021-12-14 PROCEDURE — 74011000250 HC RX REV CODE- 250: Performed by: INTERNAL MEDICINE

## 2021-12-14 PROCEDURE — 94640 AIRWAY INHALATION TREATMENT: CPT

## 2021-12-14 PROCEDURE — 85025 COMPLETE CBC W/AUTO DIFF WBC: CPT

## 2021-12-14 PROCEDURE — 36415 COLL VENOUS BLD VENIPUNCTURE: CPT

## 2021-12-14 PROCEDURE — 82272 OCCULT BLD FECES 1-3 TESTS: CPT

## 2021-12-14 PROCEDURE — 1101F PT FALLS ASSESS-DOCD LE1/YR: CPT | Performed by: NURSE PRACTITIONER

## 2021-12-14 PROCEDURE — 74011250637 HC RX REV CODE- 250/637: Performed by: INTERNAL MEDICINE

## 2021-12-14 PROCEDURE — 99284 EMERGENCY DEPT VISIT MOD MDM: CPT

## 2021-12-14 PROCEDURE — 3046F HEMOGLOBIN A1C LEVEL >9.0%: CPT | Performed by: NURSE PRACTITIONER

## 2021-12-14 PROCEDURE — 82550 ASSAY OF CK (CPK): CPT

## 2021-12-14 PROCEDURE — 71275 CT ANGIOGRAPHY CHEST: CPT

## 2021-12-14 PROCEDURE — 74011000636 HC RX REV CODE- 636: Performed by: INTERNAL MEDICINE

## 2021-12-14 PROCEDURE — C9113 INJ PANTOPRAZOLE SODIUM, VIA: HCPCS | Performed by: INTERNAL MEDICINE

## 2021-12-14 PROCEDURE — G8754 DIAS BP LESS 90: HCPCS | Performed by: NURSE PRACTITIONER

## 2021-12-14 PROCEDURE — 80053 COMPREHEN METABOLIC PANEL: CPT

## 2021-12-14 PROCEDURE — 2022F DILAT RTA XM EVC RTNOPTHY: CPT | Performed by: NURSE PRACTITIONER

## 2021-12-14 PROCEDURE — G8753 SYS BP > OR = 140: HCPCS | Performed by: NURSE PRACTITIONER

## 2021-12-14 PROCEDURE — 71045 X-RAY EXAM CHEST 1 VIEW: CPT

## 2021-12-14 PROCEDURE — 96374 THER/PROPH/DIAG INJ IV PUSH: CPT

## 2021-12-14 PROCEDURE — G8536 NO DOC ELDER MAL SCRN: HCPCS | Performed by: NURSE PRACTITIONER

## 2021-12-14 PROCEDURE — G8428 CUR MEDS NOT DOCUMENT: HCPCS | Performed by: NURSE PRACTITIONER

## 2021-12-14 PROCEDURE — G8432 DEP SCR NOT DOC, RNG: HCPCS | Performed by: NURSE PRACTITIONER

## 2021-12-14 RX ORDER — ONDANSETRON 2 MG/ML
4 INJECTION INTRAMUSCULAR; INTRAVENOUS
Status: DISCONTINUED | OUTPATIENT
Start: 2021-12-14 | End: 2021-12-17 | Stop reason: HOSPADM

## 2021-12-14 RX ORDER — POLYETHYLENE GLYCOL 3350 17 G/17G
17 POWDER, FOR SOLUTION ORAL DAILY PRN
Status: DISCONTINUED | OUTPATIENT
Start: 2021-12-14 | End: 2021-12-17 | Stop reason: HOSPADM

## 2021-12-14 RX ORDER — ISOSORBIDE MONONITRATE 30 MG/1
60 TABLET, EXTENDED RELEASE ORAL DAILY
Status: DISCONTINUED | OUTPATIENT
Start: 2021-12-15 | End: 2021-12-17 | Stop reason: HOSPADM

## 2021-12-14 RX ORDER — SODIUM CHLORIDE 0.9 % (FLUSH) 0.9 %
5-40 SYRINGE (ML) INJECTION EVERY 8 HOURS
Status: DISCONTINUED | OUTPATIENT
Start: 2021-12-14 | End: 2021-12-17 | Stop reason: SDUPTHER

## 2021-12-14 RX ORDER — SODIUM CHLORIDE 9 MG/ML
75 INJECTION, SOLUTION INTRAVENOUS CONTINUOUS
Status: DISCONTINUED | OUTPATIENT
Start: 2021-12-14 | End: 2021-12-14

## 2021-12-14 RX ORDER — PANTOPRAZOLE SODIUM 40 MG/10ML
40 INJECTION, POWDER, LYOPHILIZED, FOR SOLUTION INTRAVENOUS
Status: COMPLETED | OUTPATIENT
Start: 2021-12-14 | End: 2021-12-14

## 2021-12-14 RX ORDER — CARVEDILOL 3.12 MG/1
3.12 TABLET ORAL 2 TIMES DAILY WITH MEALS
Status: DISCONTINUED | OUTPATIENT
Start: 2021-12-14 | End: 2021-12-17 | Stop reason: HOSPADM

## 2021-12-14 RX ORDER — FUROSEMIDE 10 MG/ML
40 INJECTION INTRAMUSCULAR; INTRAVENOUS
Status: COMPLETED | OUTPATIENT
Start: 2021-12-14 | End: 2021-12-14

## 2021-12-14 RX ORDER — ACETAMINOPHEN 325 MG/1
650 TABLET ORAL
Status: DISCONTINUED | OUTPATIENT
Start: 2021-12-14 | End: 2021-12-17 | Stop reason: HOSPADM

## 2021-12-14 RX ORDER — ATORVASTATIN CALCIUM 10 MG/1
10 TABLET, FILM COATED ORAL DAILY
Status: DISCONTINUED | OUTPATIENT
Start: 2021-12-15 | End: 2021-12-17 | Stop reason: HOSPADM

## 2021-12-14 RX ORDER — FUROSEMIDE 40 MG/1
TABLET ORAL
Qty: 6 TABLET | Refills: 0 | Status: SHIPPED | OUTPATIENT
Start: 2021-12-14 | End: 2021-12-17

## 2021-12-14 RX ORDER — SODIUM CHLORIDE 0.9 % (FLUSH) 0.9 %
5-40 SYRINGE (ML) INJECTION AS NEEDED
Status: DISCONTINUED | OUTPATIENT
Start: 2021-12-14 | End: 2021-12-17 | Stop reason: SDUPTHER

## 2021-12-14 RX ORDER — LISINOPRIL 2.5 MG/1
2.5 TABLET ORAL DAILY
Qty: 90 TABLET | Refills: 3 | Status: SHIPPED | OUTPATIENT
Start: 2021-12-14

## 2021-12-14 RX ORDER — ISOSORBIDE MONONITRATE 60 MG/1
60 TABLET, EXTENDED RELEASE ORAL DAILY
Qty: 90 TABLET | Refills: 0 | Status: SHIPPED | OUTPATIENT
Start: 2021-12-14 | End: 2022-06-14

## 2021-12-14 RX ORDER — IPRATROPIUM BROMIDE AND ALBUTEROL SULFATE 2.5; .5 MG/3ML; MG/3ML
3 SOLUTION RESPIRATORY (INHALATION)
Status: COMPLETED | OUTPATIENT
Start: 2021-12-14 | End: 2021-12-14

## 2021-12-14 RX ORDER — DULOXETIN HYDROCHLORIDE 30 MG/1
60 CAPSULE, DELAYED RELEASE ORAL DAILY
Status: DISCONTINUED | OUTPATIENT
Start: 2021-12-15 | End: 2021-12-17 | Stop reason: HOSPADM

## 2021-12-14 RX ADMIN — Medication 10 ML: at 23:48

## 2021-12-14 RX ADMIN — PANTOPRAZOLE SODIUM 40 MG: 40 INJECTION, POWDER, FOR SOLUTION INTRAVENOUS at 17:50

## 2021-12-14 RX ADMIN — CARVEDILOL 3.12 MG: 3.12 TABLET, FILM COATED ORAL at 18:26

## 2021-12-14 RX ADMIN — IOPAMIDOL 100 ML: 755 INJECTION, SOLUTION INTRAVENOUS at 17:37

## 2021-12-14 RX ADMIN — IPRATROPIUM BROMIDE AND ALBUTEROL SULFATE 3 ML: .5; 3 SOLUTION RESPIRATORY (INHALATION) at 15:05

## 2021-12-14 RX ADMIN — SODIUM CHLORIDE 40 MG: 9 INJECTION, SOLUTION INTRAMUSCULAR; INTRAVENOUS; SUBCUTANEOUS at 23:48

## 2021-12-14 RX ADMIN — FUROSEMIDE 40 MG: 10 INJECTION, SOLUTION INTRAMUSCULAR; INTRAVENOUS at 14:57

## 2021-12-14 NOTE — TELEPHONE ENCOUNTER
He has been on diuretics which will strain his kidneys.  They have been getting more strained with time, last year we referred him to Dr Sidhu Knows the nephrologist.

## 2021-12-14 NOTE — PROGRESS NOTES
Pt presented for appt today to discuss LE swelling. Hx of CAD with non-obstructive CAD on cath in 2018, HFpEF with baseline LE edema, CKD, HTN, HLD, DM2. Pt has been experiencing shortness of breath for a while with increased LE edema. Had been advised that based on recent labs kidney function was worsening and should follow up with nephrologist.  Pt pt, had seen podiatrist yesterday, who was concerned about worsening LE edema, from his feet to his knees, with weeping blisters. He was recommended to follow up with us ASAP. Pt today reports SOB with minimal exertion. Cannot ambulate, is in a wheelchair. Claims he feels like his drowning. Visibly dyspneic at rest, cannot speak in full sentences. Appears pale. Worried more about his leg swelling than he is about his shortness of breath. Pt advised he must go to the ER. Given worsening renal function, worsening LE edema with dyspnea on exertion, concerned for worsening heart failure exacerbation vs. Anemia. Discussed with pt's daughter-in-law. Recommended to go to [facility] ER via Ambulance, but pt & pt's daughter-in-law declined, stating she will drive him with POV. Discussed with ER physician on call, Dr. Belia Parmar, regarding pt's arrival.    Physical Exam  General: Obese. Visibly uncomfortable, appears visibly dyspneic at rest.  Pallor. CV/Respiratory exam deferred  Extremities:  Bilateral LE edema, both wrapped with ACE bandages      ICD-10-CM ICD-9-CM    1. ASHD (arteriosclerotic heart disease)  I25.10 414.00    2. Chronic diastolic congestive heart failure (HCC)  I50.32 428.32      428.0    3. Type 2 diabetes with nephropathy (HCC)  E11.21 250.40      583.81    4. Stage 3b chronic kidney disease (HCC)  N18.32 585.3    5. SOB (shortness of breath)  R06.02 786.05    6. Bilateral leg edema  R60.0 782. 3      This visit required high level medical decision making due to patient's current status, complex medical history, acute decompensated status requiring ER referral.    Roger Perez NP  12/14/2021  2:04 PM

## 2021-12-14 NOTE — ED NOTES
Patient is being transferred to [facility] General Surgery, Room # 1169. Report given to Casey Reeves on [address] for routine progression of care. Report consisted of the following information SBAR, ED Summary, Intake/Output and MAR. Patient transferred to receiving unit by: Transport (RN or tech name). Outstanding consults needed: No     Next labs due: No     The following personal items will be sent with the patient during transfer to the floor: All valuables:    Cardiac monitoring ordered: No     The following CURRENT information was reported to the receiving RN:    Code status: Full Code at time of transfer    Last set of vital signs:  Vital Signs  Level of Consciousness: Alert (0) (12/14/21 1815)  Temp: 98 °F (36.7 °C) (12/14/21 1815)  Temp Source: Oral (12/14/21 1815)  Pulse (Heart Rate): 87 (12/14/21 1815)  Resp Rate: 24 (12/14/21 1815)  BP: 124/73 (12/14/21 1815)  MAP (Monitor): 86 (12/14/21 1815)  MAP (Calculated): 90 (12/14/21 1815)  BP 1 Location: Left upper arm (12/14/21 1353)  BP 1 Method: Automatic (12/14/21 1353)  BP Patient Position: At rest (12/14/21 1353)  MEWS Score: 2 (12/14/21 1815)         Oxygen Therapy  O2 Sat (%): 98 % (12/14/21 1815)  Pulse via Oximetry: 87 beats per minute (12/14/21 1815)  O2 Device: None (Room air) (12/14/21 1815)      Last pain assessment:  Pain 1  Pain Scale 1: Numeric (0 - 10)  Pain Intensity 1: 5  Patient Stated Pain Goal: 0  Pain Location 1: Chest  Pain Orientation 1: Left  Pain Description 1: Aching  Pain Intervention(s) 1: Rest      Wounds: No     Urinary catheter: voiding  Is there a matute order: No     LDAs:       Peripheral IV 12/14/21 Anterior; Proximal; Right Forearm (Active)         Opportunity for questions and clarification was provided.     Sabiha Mathur RN

## 2021-12-14 NOTE — TELEPHONE ENCOUNTER
Spoke with Brianne with [employer]. Asking for tier exception for Metolazone. Gave all info needed. Metolazone tier exception Case #  [id number]. Informed Ammon Gonzalez, to inform pt.

## 2021-12-14 NOTE — TELEPHONE ENCOUNTER
Spoke with patient. Verified patient with two patient identifiers. C/O weeping legs, edema, SOB. Advised appt made for Dr. Bere Tavarez today, be here by 1:30 pm.    Patient verbalized understanding.

## 2021-12-14 NOTE — TELEPHONE ENCOUNTER
? Anything to do regarding worsening kidney function? Will get details on \"pt not feeling well\" once I call them.

## 2021-12-14 NOTE — ED PROVIDER NOTES
EMERGENCY DEPARTMENT HISTORY AND PHYSICAL EXAM      Date: 12/14/2021  Patient Name: Beatrice Seen    History of Presenting Illness     Chief Complaint   Patient presents with    Shortness of Breath     Pt arrives in wheelchair to triage with CC of SOB x 1 week. Patient also reports bilateral leg swelling and weeping. Known hx of COPD, CHF, stent placement       History Provided By: Patient    HPI: Beatrice Seen, 78 y.o. male with a past medical history significant for CAD, history of CHF, diabetes, morbid obesity, medical problems as stated below presents to the ED with cc of moderate to severe dyspnea on exertion over the last 1 to 2 weeks as well as some lower extremity swelling. Patient reports nonproductive cough with no sputum or fevers. He reports he went to the cardiology office because been out of his medications for couple weeks sent to the ER for evaluation. He does report some intermittent black stools over the last week. He denies any blood thinner use. He has no other associated symptoms. No other exacerbating or mounting factors. There are no other complaints, changes, or physical findings at this time. PCP: Gerard Mcgarry MD    No current facility-administered medications on file prior to encounter. Current Outpatient Medications on File Prior to Encounter   Medication Sig Dispense Refill    isosorbide mononitrate ER (IMDUR) 60 mg CR tablet Take 1 Tablet by mouth daily. 90 Tablet 0    lisinopriL (PRINIVIL, ZESTRIL) 2.5 mg tablet Take 1 Tablet by mouth daily. 90 Tablet 3    furosemide (LASIX) 40 mg tablet Take one tablet by mouth on Tuesdays and Thurs (take 30 minutes after the Metolazone)  Further refills once seen in [facility] 6 Tablet 0    simvastatin (ZOCOR) 10 mg tablet TAKE 1 TABLET BY MOUTH EVERY OTHER DAY .  APPOINTMENT REQUIRED FOR FUTURE REFILLS 45 Tablet 0    metOLazone (ZAROXOLYN) 2.5 mg tablet Take one tablet or 2.5 mg by mouth 30 minutes prior to Lasix on Tuesdays and Thursdays only. Overdue for appt, no further refills unless seen in office. 24 Tablet 0    carvediloL (COREG) 12.5 mg tablet TAKE 1 TABLET BY MOUTH TWICE DAILY. NO FURTHER REFILLS UNTIL SEEN IN THE OFFICE 180 Tablet 0    [DISCONTINUED] isosorbide mononitrate ER (IMDUR) 60 mg CR tablet Take 1 tablet by mouth once daily 90 Tablet 0    glimepiride (AMARYL) 1 mg tablet Taking 0.5 tab daily. MUST ESTABLISH WITH NEW ENDOCRINOLOGIST OR CONTACT PCP FOR FURTHER REFILLS 90 Tablet 0    metFORMIN ER (GLUCOPHAGE XR) 500 mg tablet TAKE 1 TABLET BY MOUTH BEFORE BREAKFAST AND  DINNER. MUST ESTABLISH WITH NEW ENDOCRINOLOGIST OR CONTACT PCP FOR FURTHER REFILLS 180 Tablet 0    DULoxetine (CYMBALTA) 60 mg capsule TAKE 1 CAPSULE BY MOUTH ONCE DAILY FOR  FEET  PAIN. SEND FUTURE REFILL REQUESTS TO PCP AS DR MAI IS NO LONGER WITH THIS PRACTICE 90 Capsule 1    [DISCONTINUED] furosemide (LASIX) 40 mg tablet TAKE 1 TABLET BY MOUTH TWICE DAILY **NO  FURTHER  REFILLS  UNTIL  SEEN  IN  THE  OFFICE** 60 Tablet 0    acetaminophen (Tylenol Arthritis Pain) 650 mg TbER Take 650 mg by mouth every eight (8) hours.  [DISCONTINUED] lisinopriL (PRINIVIL, ZESTRIL) 2.5 mg tablet Take 1 Tab by mouth daily. 90 Tab 3    omega-3 fatty acids 1,000 mg cap Take 1 Cap by mouth two (2) times a day.  dextran 70/hypromellose/PF (NATURAL TEARS, PF, OP) Apply 1 Drop to eye as needed.  acetaminophen (TYLENOL) 500 mg tablet Take 1,000 mg by mouth every six (6) hours as needed for Pain.  cholecalciferol, VITAMIN D3, (VITAMIN D3) 5,000 unit tab tablet Take 1 Tab by mouth daily. 30 Tab 5    aspirin delayed-release 81 mg tablet Take 81 mg by mouth daily.          Past History     Past Medical History:  Past Medical History:   Diagnosis Date    Arthritis     Asthma     CAD (coronary artery disease)     Calculus of kidney     Carotid artery disease (HCC)     Cervical herniated disc     [facility] systolic [facility]estive[facility] failure [facility]     Diabetes [facility]     Diabetic shock due to low blood sugar ([facility] 2016    Glaucoma     pt is on drops unsure of what the names are    Hypercholesterolemia     Hypertension     Morbid obesity ([facility]     Rheumatic fever     When he was 17    Sleep apnea     compliant with cpap as stated 2019       Past Surgical History:  Past Surgical History:   Procedure Laterality Date    HX CAROTID STENT      with cath    HX CATARACT REMOVAL Left     HX CERVICAL FUSION  2019    C3- C6 ACFD     HX CHOLECYSTECTOMY      HX CIRCUMCISION      HX HEART CATHETERIZATION      x 5 total, 4 in heart, 1 in carotid    HX HEENT Left     ear torn off and repaired    HX OTHER SURGICAL Right     Right hand reconstruction    HX SHOULDER ARTHROSCOPY Right     dislocated with fall, came out, he stated surgery put back in place and sowed him up       Family History:  Family History   Problem Relation Age of Onset    Heart Disease Brother     Diabetes Nephew     Hypertension Mother        Social History:  Social History     Tobacco Use    Smoking status: Former Smoker     Packs/day: 5.00     Years: 15.00     Pack years: 75.00     Quit date: 1963     Years since quittin.6    Smokeless tobacco: Never Used   Substance Use Topics    Alcohol use: No     Alcohol/week: 0.0 standard drinks    Drug use: Never       Allergies: Allergies   Allergen Reactions    Morphine Anaphylaxis     Pt states \"morphine gave me heart failure\"    Lyrica [Pregabalin] Other (comments)     Makes him \"loopy\"         Review of Systems   Review of Systems   Constitutional: Negative for chills, diaphoresis, fatigue and fever. HENT: Negative for ear pain and sore throat. Eyes: Negative for pain and redness. Respiratory: Positive for shortness of breath. Negative for cough. Cardiovascular: Negative for chest pain and leg swelling. Gastrointestinal: Negative for abdominal pain, diarrhea, nausea and vomiting. Endocrine: Negative for cold intolerance and heat intolerance. Genitourinary: Negative for flank pain and hematuria. Musculoskeletal: Negative for back pain and neck stiffness. Skin: Negative for rash and wound. Neurological: Negative for dizziness, syncope and headaches. All other systems reviewed and are negative. Physical Exam   Physical Exam  Vitals and nursing note reviewed. Constitutional:       General: He is in acute distress. Appearance: He is well-developed. HENT:      Head: Normocephalic and atraumatic. Mouth/Throat:      Pharynx: No oropharyngeal exudate. Eyes:      Conjunctiva/sclera: Conjunctivae normal.      Pupils: Pupils are equal, round, and reactive to light. Cardiovascular:      Rate and Rhythm: Normal rate and regular rhythm. Heart sounds: No murmur heard. Pulmonary:      Effort: Tachypnea and accessory muscle usage present. No respiratory distress (HWSOB). Breath sounds: No decreased breath sounds, wheezing, rhonchi or rales. Abdominal:      General: Bowel sounds are normal. There is no distension. Palpations: Abdomen is soft. Hepatomegaly: . HWSOB. Tenderness: There is no abdominal tenderness. Genitourinary:     Rectum: Guaiac result positive. Comments: Dark black stool,  Musculoskeletal:         General: No deformity. Normal range of motion. Cervical back: Normal range of motion. Skin:     General: Skin is warm and dry. Findings: No rash. Neurological:      Mental Status: He is alert and oriented to person, place, and time.       Coordination: Coordination normal.   Psychiatric:         Behavior: Behavior normal.         Diagnostic Study Results     Labs -     Recent Results (from the past 24 hour(s))   CBC WITH AUTOMATED DIFF    Collection Time: 12/14/21  2:11 PM   Result Value Ref Range    WBC 11.0 4.1 - 11.1 K/uL    RBC 2.95 (L) 4.10 - 5.70 M/uL    HGB 7.7 (L) 12.1 - 17.0 g/dL    HCT 26.5 (L) 36.6 - 50.3 % MCV 89.8 80.0 - 99.0 FL    MCH 26.1 26.0 - 34.0 PG    MCHC 29.1 (L) 30.0 - 36.5 g/dL    RDW 14.0 11.5 - 14.5 %    PLATELET 486 724 - 096 K/uL    MPV 9.4 8.9 - 12.9 FL    NRBC 0.0 0  WBC    ABSOLUTE NRBC 0.00 0.00 - 0.01 K/uL    NEUTROPHILS 81 (H) 32 - 75 %    LYMPHOCYTES 6 (L) 12 - 49 %    MONOCYTES 9 5 - 13 %    EOSINOPHILS 3 0 - 7 %    BASOPHILS 1 0 - 1 %    IMMATURE GRANULOCYTES 0 0.0 - 0.5 %    ABS. NEUTROPHILS 8.9 (H) 1.8 - 8.0 K/UL    ABS. LYMPHOCYTES 0.7 (L) 0.8 - 3.5 K/UL    ABS. MONOCYTES 1.0 0.0 - 1.0 K/UL    ABS. EOSINOPHILS 0.3 0.0 - 0.4 K/UL    ABS. BASOPHILS 0.1 0.0 - 0.1 K/UL    ABS. IMM. GRANS. 0.0 0.00 - 0.04 K/UL    DF AUTOMATED     METABOLIC PANEL, COMPREHENSIVE    Collection Time: 12/14/21  2:11 PM   Result Value Ref Range    Sodium 142 136 - 145 mmol/L    Potassium 5.1 3.5 - 5.1 mmol/L    Chloride 110 (H) 97 - 108 mmol/L    CO2 29 21 - 32 mmol/L    Anion gap 3 (L) 5 - 15 mmol/L    Glucose 197 (H) 65 - 100 mg/dL    BUN 42 (H) 6 - 20 MG/DL    Creatinine 2.08 (H) 0.70 - 1.30 MG/DL    BUN/Creatinine ratio 20 12 - 20      GFR est AA 38 (L) >60 ml/min/1.73m2    GFR est non-AA 31 (L) >60 ml/min/1.73m2    Calcium 8.9 8.5 - 10.1 MG/DL    Bilirubin, total 0.3 0.2 - 1.0 MG/DL    ALT (SGPT) 14 12 - 78 U/L    AST (SGOT) 7 (L) 15 - 37 U/L    Alk.  phosphatase 72 45 - 117 U/L    Protein, total 6.8 6.4 - 8.2 g/dL    Albumin 2.8 (L) 3.5 - 5.0 g/dL    Globulin 4.0 2.0 - 4.0 g/dL    A-G Ratio 0.7 (L) 1.1 - 2.2     CK W/ REFLX CKMB    Collection Time: 12/14/21  2:11 PM   Result Value Ref Range     39 - 308 U/L   TROPONIN-HIGH SENSITIVITY    Collection Time: 12/14/21  2:11 PM   Result Value Ref Range    Troponin-High Sensitivity 18 0 - 76 ng/L   NT-PRO BNP    Collection Time: 12/14/21  2:11 PM   Result Value Ref Range    NT pro-BNP 1,256 (H) <125 PG/ML   BLOOD GAS,CHEM8,LACTIC ACID POC    Collection Time: 12/14/21  2:46 PM   Result Value Ref Range    Calcium, ionized (POC) 1.29 1.12 - 1.32 mmol/L BICARBONATE 27 mmol/L    Base excess (POC) 0.2 mmol/L    Sample source VENOUS BLOOD      CO2, POC 27 (H) 19 - 24 MMOL/L    Sodium,  136 - 145 MMOL/L    Potassium, POC 5.0 3.5 - 5.5 MMOL/L    Chloride,  100 - 108 MMOL/L    Glucose,  (H) 74 - 106 MG/DL    Creatinine, POC 2.0 (H) 0.6 - 1.3 MG/DL    Lactic Acid (POC) 2.21 (HH) 0.40 - 2.00 mmol/L    pH, venous (POC) 7.33 7.32 - 7.42      pCO2, venous (POC) 50.2 41 - 51 MMHG    pO2, venous (POC) 22 (L) 25 - 40 mmHg   OCCULT BLOOD, STOOL    Collection Time: 12/14/21  4:08 PM   Result Value Ref Range    Occult blood, stool Positive (A) NEG         Radiologic Studies -   XR CHEST PORT   Final Result   No acute process. CTA CHEST W OR W WO CONT    (Results Pending)     CT Results  (Last 48 hours)    None        CXR Results  (Last 48 hours)               12/14/21 1403  XR CHEST PORT Final result    Impression:  No acute process. Narrative:  INDICATION: Shortness of breath       EXAM:  AP CHEST RADIOGRAPH       COMPARISON: May 16, 2019       FINDINGS:       AP portable view of the chest demonstrates heart size upper normal. Prominent   bilateral consuelo likely due to enlarged pulmonary arteries, unchanged. Slight   elevation right hemidiaphragm. There is no edema, effusion, consolidation, or   pneumothorax. The osseous structures are unremarkable. Medical Decision Making   I am the first provider for this patient. I reviewed the vital signs, available nursing notes, past medical history, past surgical history, family history and social history. Vital Signs-Reviewed the patient's vital signs. Patient Vitals for the past 12 hrs:   Temp Pulse Resp BP SpO2   12/14/21 1515 -- 93 18 (!) 144/67 100 %   12/14/21 1353 97.6 °F (36.4 °C) 99 20 (!) 146/78 96 %       Records Reviewed: Nursing records and medical records reviewed    MDM:  Patient presents with melena. Currently stable vitals without tachycardia.  Exam nonperitoneal.  DDx: upper gi bleed 2/2 PUD, Esophageal varices; Iron intake, pepto bismol. Will get labs, obtain two large bore IV's, provide IVF resuscitation, administer IV PPI, send type and screen and transfuse as needed. Will perform rectal exam and monitor closely. Patient presents with acute dyspnea. DDx: asthma, copd, pna, pulmonary edema, acute bronchitis, ACS, ptx, pna. Will obtain EKG, labs, CXR, provide O2 as needed for hypoxia, treat symptomatically and reassess. Will continue to monitor closely in ED. Provider Notes (Medical Decision Making):   Patient is a 19-year-old male presenting with symptoms of symptomatic anemia over the last several days. Patient has hemoglobin of 7.7, heme positive stools, baseline hemoglobin is 14.1. I suspect this is the cause of his shortness of breath. His chest x-ray is clear. I do not think he needs a CT angiogram at this time. We will admit to the hospitalist for further work-up and management. Started on IV Protonix. Serial H&H's can be ordered by the hospitalist.    ED Course:   Initial assessment performed. The patients presenting problems have been discussed, and they are in agreement with the care plan formulated and outlined with them. I have encouraged them to ask questions as they arise throughout their visit. ED Course as of 12/14/21 1643   Tue Dec 14, 2021   1642 Pulse Oximetry Analysis - Normal 98% on room air    Cardiac Monitor:   Rate: 99  Rhythm: Normal Sinus Rhythm without ectopy         [CC]      ED Course User Index  [CC] Arabella Weston MD               Critical Care:  None      Disposition:  Admit Note:  4:43 PM  Pt is being admitted by Dr. Tamiko Gee. The results of their tests and reason(s) for their admission have been discussed with pt and/or available family. They convey agreement and understanding for the need to be admitted and for admission diagnosis. Diagnosis     Clinical Impression:   1. Upper GI bleed    2.  Symptomatic anemia Attestations:    Kameron Polk MD    Please note that this dictation was completed with GOGETMi / ?????.??, the computer voice recognition software. Quite often unanticipated grammatical, syntax, homophones, and other interpretive errors are inadvertently transcribed by the computer software. Please disregard these errors. Please excuse any errors that have escaped final proofreading. Thank you.

## 2021-12-14 NOTE — TELEPHONE ENCOUNTER
Yola Weiner from [facility] called in regards of a prior authorization for metolazone. He requested a lower co-pay for this.     Callback is [phone]  Ref # [id number]    Thanks  [employer]

## 2021-12-14 NOTE — TELEPHONE ENCOUNTER
----- Message from Julio Buerger, MD sent at 12/13/2021  3:37 PM EST -----  Cholesterol is fine.   Cr is higher indicating worse kidney function

## 2021-12-14 NOTE — TELEPHONE ENCOUNTER
Spoke with patient  'Verified patient with two patient identifiers. C/O ^ SOB with minimal exertion. Saw podiatrist yesterday. States his leg edema has worsened. Legs swollen from feet to knees. States he has weeping blisters on his legs and was advised to wrap his legs so they do not get infected. Was told to see us ASAP. Is only on Metolazone 2.5 mg and Lasix 40 mg on Tues and thurs only and his edema is rapidly increasing. Did not weigh today. Is afraid he could lose his legs. Please advise.

## 2021-12-15 ENCOUNTER — APPOINTMENT (OUTPATIENT)
Dept: NON INVASIVE DIAGNOSTICS | Age: 76
DRG: 811 | End: 2021-12-15
Attending: INTERNAL MEDICINE
Payer: MEDICARE

## 2021-12-15 LAB
ANION GAP SERPL CALC-SCNC: 7 MMOL/L (ref 5–15)
ATRIAL RATE: 104 BPM
BUN SERPL-MCNC: 42 MG/DL (ref 6–20)
BUN/CREAT SERPL: 21 (ref 12–20)
CALCIUM SERPL-MCNC: 9.5 MG/DL (ref 8.5–10.1)
CALCULATED P AXIS, ECG09: 61 DEGREES
CALCULATED R AXIS, ECG10: -41 DEGREES
CALCULATED T AXIS, ECG11: 54 DEGREES
CHLORIDE SERPL-SCNC: 110 MMOL/L (ref 97–108)
CO2 SERPL-SCNC: 25 MMOL/L (ref 21–32)
COVID-19 RAPID TEST, COVR: NOT DETECTED
CREAT SERPL-MCNC: 2.01 MG/DL (ref 0.7–1.3)
DIAGNOSIS, 93000: NORMAL
ERYTHROCYTE [DISTWIDTH] IN BLOOD BY AUTOMATED COUNT: 14.2 % (ref 11.5–14.5)
FOLATE SERPL-MCNC: 10.5 NG/ML (ref 5–21)
GLUCOSE SERPL-MCNC: 93 MG/DL (ref 65–100)
HCT VFR BLD AUTO: 25.9 % (ref 36.6–50.3)
HGB BLD-MCNC: 7.7 G/DL (ref 12.1–17)
MCH RBC QN AUTO: 26.3 PG (ref 26–34)
MCHC RBC AUTO-ENTMCNC: 29.7 G/DL (ref 30–36.5)
MCV RBC AUTO: 88.4 FL (ref 80–99)
NRBC # BLD: 0 K/UL (ref 0–0.01)
NRBC BLD-RTO: 0 PER 100 WBC
P-R INTERVAL, ECG05: 208 MS
PLATELET # BLD AUTO: 314 K/UL (ref 150–400)
PMV BLD AUTO: 9.3 FL (ref 8.9–12.9)
POTASSIUM SERPL-SCNC: 4.4 MMOL/L (ref 3.5–5.1)
Q-T INTERVAL, ECG07: 324 MS
QRS DURATION, ECG06: 92 MS
QTC CALCULATION (BEZET), ECG08: 426 MS
RBC # BLD AUTO: 2.93 M/UL (ref 4.1–5.7)
SODIUM SERPL-SCNC: 142 MMOL/L (ref 136–145)
SOURCE, COVRS: NORMAL
VENTRICULAR RATE, ECG03: 104 BPM
VIT B12 SERPL-MCNC: 463 PG/ML (ref 193–986)
WBC # BLD AUTO: 9.6 K/UL (ref 4.1–11.1)

## 2021-12-15 PROCEDURE — 82607 VITAMIN B-12: CPT

## 2021-12-15 PROCEDURE — 74011000250 HC RX REV CODE- 250: Performed by: INTERNAL MEDICINE

## 2021-12-15 PROCEDURE — 82746 ASSAY OF FOLIC ACID SERUM: CPT

## 2021-12-15 PROCEDURE — 74011250636 HC RX REV CODE- 250/636: Performed by: INTERNAL MEDICINE

## 2021-12-15 PROCEDURE — 93306 TTE W/DOPPLER COMPLETE: CPT

## 2021-12-15 PROCEDURE — 94760 N-INVAS EAR/PLS OXIMETRY 1: CPT

## 2021-12-15 PROCEDURE — 65660000000 HC RM CCU STEPDOWN

## 2021-12-15 PROCEDURE — C9113 INJ PANTOPRAZOLE SODIUM, VIA: HCPCS | Performed by: INTERNAL MEDICINE

## 2021-12-15 PROCEDURE — 87635 SARS-COV-2 COVID-19 AMP PRB: CPT

## 2021-12-15 PROCEDURE — 74011250637 HC RX REV CODE- 250/637: Performed by: INTERNAL MEDICINE

## 2021-12-15 PROCEDURE — 36415 COLL VENOUS BLD VENIPUNCTURE: CPT

## 2021-12-15 PROCEDURE — 77010033678 HC OXYGEN DAILY

## 2021-12-15 PROCEDURE — 85027 COMPLETE CBC AUTOMATED: CPT

## 2021-12-15 PROCEDURE — 80048 BASIC METABOLIC PNL TOTAL CA: CPT

## 2021-12-15 RX ORDER — BUMETANIDE 0.25 MG/ML
1 INJECTION INTRAMUSCULAR; INTRAVENOUS EVERY 12 HOURS
Status: DISCONTINUED | OUTPATIENT
Start: 2021-12-15 | End: 2021-12-16

## 2021-12-15 RX ADMIN — Medication 10 ML: at 17:43

## 2021-12-15 RX ADMIN — BUMETANIDE 1 MG: 0.25 INJECTION, SOLUTION INTRAMUSCULAR; INTRAVENOUS at 11:06

## 2021-12-15 RX ADMIN — ATORVASTATIN CALCIUM 10 MG: 10 TABLET, FILM COATED ORAL at 09:30

## 2021-12-15 RX ADMIN — ISOSORBIDE MONONITRATE 60 MG: 30 TABLET, EXTENDED RELEASE ORAL at 09:25

## 2021-12-15 RX ADMIN — SODIUM CHLORIDE 40 MG: 9 INJECTION, SOLUTION INTRAMUSCULAR; INTRAVENOUS; SUBCUTANEOUS at 09:26

## 2021-12-15 RX ADMIN — CARVEDILOL 3.12 MG: 3.12 TABLET, FILM COATED ORAL at 17:19

## 2021-12-15 RX ADMIN — DULOXETINE HYDROCHLORIDE 60 MG: 30 CAPSULE, DELAYED RELEASE ORAL at 09:26

## 2021-12-15 RX ADMIN — IRON SUCROSE 200 MG: 20 INJECTION, SOLUTION INTRAVENOUS at 17:38

## 2021-12-15 RX ADMIN — CARVEDILOL 3.12 MG: 3.12 TABLET, FILM COATED ORAL at 09:25

## 2021-12-15 NOTE — PROGRESS NOTES
Transition of Care Plan:    RUR:13%  Disposition:Home w/family  Follow up appointments:  DME needed:none  Transportation at [facility] or means to access home:        IM Medicare Letter:2nd IM needed  Is patient a BCPI-A Bundle: n/a          If yes, was Bundle Letter given?:    Is patient a  and connected with the [place]? If yes, was [place] transfer form completed and [facility] notified? Caregiver Contact:sonJustin, [phone]  Discharge Caregiver contacted prior to discharge? Reason for Admission:  Acute on chronic anemia suspect blood loss                   RUR Score:  13%                   Plan for utilizing home health:   Not anticipated       PCP: First and Last name:  Florencia Fothergill, MD     Name of Practice:    Are you a current patient: Yes/No: yes   Approximate date of last visit: 1yr ago   Can you participate in a virtual visit with your PCP:                     Current Advanced Directive/Advance Care Plan: Full [place] (ACP) Conversation      Date of Conversation: 12/15/21  Conducted with: Patient with [address] Maker:wife   No healthcare decision makers have been documented. Click here to complete [address] including selection of the Healthcare Decision Maker Relationship (ie \"Primary\")    Today we documented Decision Maker(s) consistent with Legal Next of Kin hierarchy. Content/Action Overview:   DECLINED ACP conversation - will revisit periodically     Length of Voluntary ACP Conversation in minutes:  <16 minutes (Non-Billable)    Keary Spare                               Transition of Care Plan:   CM met w/pt at bedside to complete assessment. Prior to admission, pt was independent w/ADL/IADL to include driving(sometimes), other times, pt's son provides transportation. No history of HH/Rehab or DME use.   Patients support system includes, wife, 2 son's & a daughter in-law. No needs or concerns identified at this time. CM will continue to follow. PCP-  David Beverly MD  Pharmacy-[facility] [address] in [facility] Management Interventions  PCP Verified by CM: Yes  Mode of Transport at Discharge:  Other (see comment) (son)  Transition of Care Consult (CM Consult): Discharge Planning  Discharge Durable Medical Equipment: No (no DME use)  Physical Therapy Consult: No  Occupational Therapy Consult: No  Speech Therapy Consult: No  Support Systems: Spouse/Significant Other,Child(yessenia) (Patient & wife, live with son & daughter in-law, in a one story home w/4 STE)  Confirm Follow Up Transport: Self (pt drives sometimes, other times son provides transportation)  Discharge Location  Discharge Placement:  ([address] w/family)      Zachary Begin  Ext [phone]

## 2021-12-15 NOTE — PROGRESS NOTES
End of Shift Note    Bedside shift change report given to Marleny Bautista (oncoming nurse) by Cece Yoder RN (offgoing nurse). Report included the following information SBAR, Kardex, Intake/Output, MAR and Recent Results    Shift worked:  7p-7a     Shift summary and any significant changes:     Pt. Arrived on unit around 200; this RN [facility]. Pt's legs were wrapped and bandaged up; pt stated they were blistered over. Pt legs were itchy this shift; legs are edematous with some tenderness. Pt's BP was elevated; pt on telemetry-- stayed Normal sinus throughout this shift. Shift mainly uneventful. Pt did not complain of pain.     Pt Hgb is 7.7      Concerns for physician to address:  Hemoglobin,      Zone phone for oncoming shift:              Cece Yoder, RN

## 2021-12-15 NOTE — PROGRESS NOTES
Patient assisted onto stretcher for EGD study to be done. Bumex had been given IV prior to transfer. Patient does experiences SOB with exertion. Applied 02 at 2 liters. 02 Saturations have been stable in the 90's. Notified Endoscopy of patient's current symptoms with SOB with exertion.

## 2021-12-15 NOTE — H&P
Hospitalist Admission Note    NAME: Jenae Michelle   :  1945   MRN:  [id number]     Date/Time:  2021 7:10 PM    Patient PCP: Jose Ocampo MD  ________________________________________________________________________    My assessment of this patient's clinical condition and my plan of care is as follows. Assessment / Plan:  Acute on chronic anemia suspect blood loss  FOBT positive  -Baseline hemoglobin is around 14 and patient is coming with a hemoglobin of 7.7.  -Stool for occult blood is positive and reports occasional black stool  -Check iron profile, vitamin B12  -Keep n.p.o. from midnight. Consult gastroenterology. Start Protonix 40 mg IV every 12. MALU  -base line is around 1.5 and cr now is 2.08. Check UA.  -hold home lasix and metolazone and if cr is stable, resume lasix in am   -hold metformin  -check bmp in am     DM type 2  -hold metformin and Glimeperide. Start SSI with poc's     Diastolic CHF  CAD  HTN  Dyslipidemia  -hold lasix due to malu and resume in am   -cont coreg and lipitor. Cont imdur. B/l leg blisters due to swelling   -consult wound care      Code Status: full   Surrogate Decision Maker: wife     DVT Prophylaxis: scd's due to anemia   GI Prophylaxis: not indicated    Baseline: from home lives with wife and son         Subjective:   CHIEF COMPLAINT: Shortness of breath and increasing swelling of the legs    HISTORY OF PRESENT ILLNESS:     Soif Combs is a 78 y.o.   male who presents with past medical history of coronary disease, congestive heart failure, hypertension, diabetes mellitus is coming the hospital chief complaints of shortness of breath and also swelling of the legs. Patient reports being in his usual health until about 2 weeks ago when he started having increased swelling of the legs along with shortness of breath. Shortness of breath is worse with even minimal exertion along with orthopnea but no PND.   He also reports baseline swelling of the legs but continued to get worse in the last few weeks. He reports that his legs are also weeping secondary to severe swelling of the legs. Does not report any cough or phlegm. Does not report any chest pain. He went to see his primary care physician who advised him to go to the emergency department for further evaluation. On arrival to ED, vital signs are normal.  On labs noted to have hemoglobin of 7.7. BMP shows a potassium of 5.1, creatinine 2.08. LFTs are normal.  proBNP is 1256. We were asked to admit for work up and evaluation of the above problems.      Past Medical History:   Diagnosis Date    Arthritis     Asthma     CAD (coronary artery disease)     Calculus of kidney     Carotid artery disease (HCC)     Cervical herniated disc     Chronic systolic (congestive) heart failure (HCC)     Diabetes ([address]     Diabetic shock due to low blood sugar ([address]     Glaucoma     pt is on drops unsure of what the names are    Hypercholesterolemia     Hypertension     Morbid obesity ([address]     Rheumatic fever     When he was 17    Sleep apnea     compliant with cpap as stated 2019        Past Surgical History:   Procedure Laterality Date    HX CAROTID STENT      with cath    HX CATARACT REMOVAL Left     HX CERVICAL FUSION  2019    C3- C6 ACFD     HX CHOLECYSTECTOMY      HX CIRCUMCISION      HX HEART CATHETERIZATION      x 5 total, 4 in heart, 1 in carotid    HX HEENT Left     ear torn off and repaired    HX OTHER SURGICAL Right     Right hand reconstruction    HX SHOULDER ARTHROSCOPY Right     dislocated with fall, came out, he stated surgery put back in place and sowed him up       Social History     Tobacco Use    Smoking status: Former Smoker     Packs/day: 5.00     Years: 15.00     Pack years: 75.00     Quit date: 1963     Years since quittin.6    Smokeless tobacco: Never Used   Substance Use Topics    Alcohol use: No     Alcohol/week: 0.0 standard drinks        Family History   Problem Relation Age of Onset    Heart Disease Brother     Diabetes Nephew     Hypertension Mother      Allergies   Allergen Reactions    Morphine Anaphylaxis     Pt states \"morphine gave me heart failure\"    Lyrica [Pregabalin] Other (comments)     Makes him \"loopy\"        Prior to Admission medications    Medication Sig Start Date End Date Taking? Authorizing Provider   isosorbide mononitrate ER (IMDUR) 60 mg CR tablet Take 1 Tablet by mouth daily. 12/14/21   Tosha OVALLE NP   lisinopriL (PRINIVIL, ZESTRIL) 2.5 mg tablet Take 1 Tablet by mouth daily. 12/14/21   Corita PippinsELIZABETH   furosemide (LASIX) 40 mg tablet Take one tablet by mouth on Tuesdays and Thurs (take 30 minutes after the Metolazone)  Further refills once seen in noffice 12/14/21   Tosha OVALLE NP   simvastatin (ZOCOR) 10 mg tablet TAKE 1 TABLET BY MOUTH EVERY OTHER DAY . APPOINTMENT REQUIRED FOR FUTURE REFILLS 12/13/21   Tosha OVALLE NP   metOLazone (ZAROXOLYN) 2.5 mg tablet Take one tablet or 2.5 mg by mouth 30 minutes prior to Lasix on Tuesdays and Thursdays only. Overdue for appt, no further refills unless seen in office. 12/6/21   Tosha OVALLE NP   carvediloL (COREG) 12.5 mg tablet TAKE 1 TABLET BY MOUTH TWICE DAILY. NO FURTHER REFILLS UNTIL SEEN IN THE OFFICE 12/6/21   Tosha OVALLE NP   glimepiride (AMARYL) 1 mg tablet Taking 0.5 tab daily. MUST ESTABLISH WITH NEW ENDOCRINOLOGIST OR CONTACT PCP FOR FURTHER REFILLS 12/3/21   Leatha Obrien MD   metFORMIN ER (GLUCOPHAGE XR) 500 mg tablet TAKE 1 TABLET BY MOUTH BEFORE BREAKFAST AND  DINNER. MUST ESTABLISH WITH NEW ENDOCRINOLOGIST OR CONTACT PCP FOR FURTHER REFILLS 12/3/21   Leatha Obrien MD   DULoxetine (CYMBALTA) 60 mg capsule TAKE 1 CAPSULE BY MOUTH ONCE DAILY FOR  FEET  PAIN.  SEND FUTURE REFILL REQUESTS TO PCP AS DR MAI IS NO LONGER WITH THIS PRACTICE 9/22/21   Leatha Obrien MD   acetaminophen (Tylenol Arthritis Pain) 650 mg TbER Take 650 mg by mouth every eight (8) hours. Provider, Historical   omega-3 fatty acids 1,000 mg cap Take 1 Cap by mouth two (2) times a day. Provider, Historical   dextran 70/hypromellose/PF (NATURAL TEARS, PF, OP) Apply 1 Drop to eye as needed. Provider, Historical   acetaminophen (TYLENOL) 500 mg tablet Take 1,000 mg by mouth every six (6) hours as needed for Pain. Provider, Historical   cholecalciferol, VITAMIN D3, (VITAMIN D3) 5,000 unit tab tablet Take 1 Tab by mouth daily. 6/6/18   Jesus Simpson MD   aspirin delayed-release 81 mg tablet Take 81 mg by mouth daily. Other, MD Cleo       REVIEW OF SYSTEMS:     I am not able to complete the review of systems because:    The patient is intubated and sedated    The patient has altered mental status due to his acute medical problems    The patient has baseline aphasia from prior stroke(s)    The patient has baseline dementia and is not reliable historian    The patient is in acute medical distress and unable to provide information           Total of 12 systems reviewed as follows:       POSITIVE= underlined text  Negative = text not underlined  General:  fever, chills, sweats, generalized weakness, weight loss/gain,      loss of appetite   Eyes:    blurred vision, eye pain, loss of vision, double vision  ENT:    rhinorrhea, pharyngitis   Respiratory:   cough, sputum production, SOB, ROLLE, wheezing, pleuritic pain   Cardiology:   chest pain, palpitations, orthopnea, PND, edema, syncope   Gastrointestinal:  abdominal pain , N/V, diarrhea, dysphagia, constipation, bleeding   Genitourinary:  frequency, urgency, dysuria, hematuria, incontinence   Muskuloskeletal :  arthralgia, myalgia, back pain  Hematology:  easy bruising, nose or gum bleeding, lymphadenopathy   Dermatological: rash, ulceration, pruritis, color change / jaundice  Endocrine:   hot flashes or polydipsia   Neurological:  headache, dizziness, confusion, focal weakness, paresthesia,     Speech difficulties, memory loss, gait difficulty  Psychological: Feelings of anxiety, depression, agitation    Objective:   VITALS:    Visit Vitals  BP (!) 152/74   Pulse (!) 104   Temp 96.8 °F (36 °C)   Resp 22   Ht 5' 3\" (1.6 m)   Wt 105.7 kg (233 lb)   SpO2 95%   BMI 41.27 kg/m²       PHYSICAL EXAM:    General:    Alert, cooperative, no distress, appears stated age. HEENT: Atraumatic, anicteric sclerae, pink conjunctivae     No oral ulcers, mucosa moist  Neck:  Supple, symmetrical,  thyroid: non tender  Lungs:   Bilateral air entry present, basal crackles are present, no wheezing  Chest wall:  No tenderness  No Accessory muscle use. Heart:   Regular  rhythm,  No  murmur   3+ edema, weeping ulcers, wrapped in Ace wrap  Abdomen:   Soft, non-tender. Not distended. Bowel sounds normal  Extremities: No cyanosis. No clubbing,      Skin turgor normal, Capillary refill normal, Radial dial pulse 2+  Skin:     Not pale. Not Jaundiced  No rashes   Psych:  Not anxious or agitated. Neurologic: EOMs intact. No facial asymmetry. No aphasia or slurred speech. Symmetrical strength, Sensation grossly intact.  Alert and oriented X 4.     _______________________________________________________________________  Care Plan discussed with:    Comments   Patient y    Family      RN y    Care Manager                    Consultant:      _______________________________________________________________________  Expected  Disposition:   Home with Family y   HH/PT/OT/RN    SNF/LTC    RASHEED    ________________________________________________________________________  TOTAL TIME:  61 Minutes    Critical Care Provided     Minutes non procedure based      Comments    y Reviewed previous records   >50% of visit spent in counseling and coordination of care y Discussion with patient and/or family and questions answered       ________________________________________________________________________  Signed: Alicia Diss, MD    Procedures: see electronic medical records for all procedures/Xrays and details which were not copied into this note but were reviewed prior to creation of Plan. LAB DATA REVIEWED:    Recent Results (from the past 24 hour(s))   CBC WITH AUTOMATED DIFF    Collection Time: 12/14/21  2:11 PM   Result Value Ref Range    WBC 11.0 4.1 - 11.1 K/uL    RBC 2.95 (L) 4.10 - 5.70 M/uL    HGB 7.7 (L) 12.1 - 17.0 g/dL    HCT 26.5 (L) 36.6 - 50.3 %    MCV 89.8 80.0 - 99.0 FL    MCH 26.1 26.0 - 34.0 PG    MCHC 29.1 (L) 30.0 - 36.5 g/dL    RDW 14.0 11.5 - 14.5 %    PLATELET 924 514 - 288 K/uL    MPV 9.4 8.9 - 12.9 FL    NRBC 0.0 0  WBC    ABSOLUTE NRBC 0.00 0.00 - 0.01 K/uL    NEUTROPHILS 81 (H) 32 - 75 %    LYMPHOCYTES 6 (L) 12 - 49 %    MONOCYTES 9 5 - 13 %    EOSINOPHILS 3 0 - 7 %    BASOPHILS 1 0 - 1 %    IMMATURE GRANULOCYTES 0 0.0 - 0.5 %    ABS. NEUTROPHILS 8.9 (H) 1.8 - 8.0 K/UL    ABS. LYMPHOCYTES 0.7 (L) 0.8 - 3.5 K/UL    ABS. MONOCYTES 1.0 0.0 - 1.0 K/UL    ABS. EOSINOPHILS 0.3 0.0 - 0.4 K/UL    ABS. BASOPHILS 0.1 0.0 - 0.1 K/UL    ABS. IMM. GRANS. 0.0 0.00 - 0.04 K/UL    DF AUTOMATED     METABOLIC PANEL, COMPREHENSIVE    Collection Time: 12/14/21  2:11 PM   Result Value Ref Range    Sodium 142 136 - 145 mmol/L    Potassium 5.1 3.5 - 5.1 mmol/L    Chloride 110 (H) 97 - 108 mmol/L    CO2 29 21 - 32 mmol/L    Anion gap 3 (L) 5 - 15 mmol/L    Glucose 197 (H) 65 - 100 mg/dL    BUN 42 (H) 6 - 20 MG/DL    Creatinine 2.08 (H) 0.70 - 1.30 MG/DL    BUN/Creatinine ratio 20 12 - 20      GFR est AA 38 (L) >60 ml/min/1.73m2    GFR est non-AA 31 (L) >60 ml/min/1.73m2    Calcium 8.9 8.5 - 10.1 MG/DL    Bilirubin, total 0.3 0.2 - 1.0 MG/DL    ALT (SGPT) 14 12 - 78 U/L    AST (SGOT) 7 (L) 15 - 37 U/L    Alk.  phosphatase 72 45 - 117 U/L    Protein, total 6.8 6.4 - 8.2 g/dL    Albumin 2.8 (L) 3.5 - 5.0 g/dL    Globulin 4.0 2.0 - 4.0 g/dL    A-G Ratio 0.7 (L) 1.1 - 2.2     CK W/ REFLX CKMB    Collection Time: 12/14/21  2:11 PM   Result Value Ref Range     39 - 308 U/L   TROPONIN-HIGH SENSITIVITY    Collection Time: 12/14/21  2:11 PM   Result Value Ref Range    Troponin-High Sensitivity 18 0 - 76 ng/L   NT-PRO BNP    Collection Time: 12/14/21  2:11 PM   Result Value Ref Range    NT pro-BNP 1,256 (H) <125 PG/ML   BLOOD GAS,CHEM8,LACTIC ACID POC    Collection Time: 12/14/21  2:46 PM   Result Value Ref Range    Calcium, ionized (POC) 1.29 1.12 - 1.32 mmol/L    BICARBONATE 27 mmol/L    Base excess (POC) 0.2 mmol/L    Sample source VENOUS BLOOD      CO2, POC 27 (H) 19 - 24 MMOL/L    Sodium,  136 - 145 MMOL/L    Potassium, POC 5.0 3.5 - 5.5 MMOL/L    Chloride,  100 - 108 MMOL/L    Glucose,  (H) 74 - 106 MG/DL    Creatinine, POC 2.0 (H) 0.6 - 1.3 MG/DL    Lactic Acid (POC) 2.21 (HH) 0.40 - 2.00 mmol/L    pH, venous (POC) 7.33 7.32 - 7.42      pCO2, venous (POC) 50.2 41 - 51 MMHG    pO2, venous (POC) 22 (L) 25 - 40 mmHg   OCCULT BLOOD, STOOL    Collection Time: 12/14/21  4:08 PM   Result Value Ref Range    Occult blood, stool Positive (A) NEG

## 2021-12-15 NOTE — PROGRESS NOTES
.. TRANSFER - OUT REPORT:    Verbal report given to Ivette(name) on Elsa Montgomery  being transferred to [facility](unit) for ordered procedure       Report consisted of patients Situation, Background, Assessment and   Recommendations(SBAR). Information from the following report(s) Procedure Summary, MAR and Accordion was reviewed with the receiving nurse. Lines:   Peripheral IV 12/14/21 Anterior; Proximal; Right Forearm (Active)   Site Assessment Clean, dry, & intact 12/14/21 2019   Phlebitis Assessment 0 12/14/21 2019   Infiltration Assessment 0 12/14/21 2019   Dressing Status Clean, dry, & intact 12/14/21 2019   Dressing Type Transparent;Tape 12/14/21 2019   Hub Color/Line Status Pink 12/14/21 2019   Action Taken Open ports on tubing capped 12/14/21 2019   Alcohol Cap Used Yes 12/14/21 2019        Opportunity for questions and clarification was provided.       Patient transported with:   Registered Nurse

## 2021-12-15 NOTE — PROGRESS NOTES
Hospitalist Progress Note    NAME: Michael Lan   :  1945   MRN:  [id number]       Assessment / Plan:  Acute on chronic anemia suspect blood loss  FOBT positive  -Baseline hemoglobin is around 14 and patient is coming with a hemoglobin of 7.7.  -Stool for occult blood is positive and reports occasional black stool  Iron panel showed low serum iron and low iron saturation  Start IV iron  -Continue with Protonix  Plan for EGD tomorrow, EGD canceled for today because of dyspnea   MALU  -base line is around 1.5 and cr now is 2.08.  UA is within normal limit  Creatinine trending down slightly  -hold home lasix and metolazone, start t IV Bumex as patient has significant dyspnea and lower extremity edema.  -hold metformin  -check bmp in am      DM type 2  -hold metformin and Glimeperide. Start SSI with poc's      Diastolic CHF  Lower extremity edema  CAD  HTN  Dyslipidemia  -Start IV Bumex, hold metolazone-  I's and O's and daily weight  cont coreg and lipitor. Cont imdur.      B/l leg blisters due to swelling   -consult wound care        Code Status: full   Surrogate Decision Maker: wife      DVT Prophylaxis: scd's due to anemia   GI Prophylaxis: not indicated     Baseline: from home lives with wife and son       36 or above Morbid obesity / Body mass index is 41.27 kg/m². Estimated discharge date:   Barriers: EGD and swelling lower extremities     Subjective:     Chief Complaint / Reason for Physician Visit  Follow-up acute blood loss anemia, dyspnea  Patient feels short of breath, has a significant lower extremity edema  He is not hypoxic   discussed with RN events overnight.      Review of Systems:  Symptom Y/N Comments  Symptom Y/N Comments   Fever/Chills n   Chest Pain n    Poor Appetite    Edema     Cough    Abdominal Pain n    Sputum    Joint Pain     SOB/ROLLE y   Pruritis/Rash     Nausea/vomit    Tolerating PT/OT     Diarrhea    Tolerating Diet y    Constipation    Other       Could NOT obtain due to:      Objective:     VITALS:   Last 24hrs VS reviewed since prior progress note. Most recent are:  Patient Vitals for the past 24 hrs:   Temp Pulse Resp BP SpO2   12/15/21 0753 97.9 °F (36.6 °C) 97 19 (!) 127/55 91 %   12/15/21 0322 98.7 °F (37.1 °C) 96 20 (!) 152/72 92 %   12/14/21 2321 98.4 °F (36.9 °C) 94 20 (!) 130/98 90 %   12/14/21 2019 97.5 °F (36.4 °C) (!) 101 22 (!) 122/56 92 %   12/14/21 1909 96.8 °F (36 °C) (!) 104 22 (!) 152/74 95 %   12/14/21 1815 98 °F (36.7 °C) 87 24 124/73 98 %   12/14/21 1515 -- 93 18 (!) 144/67 100 %   12/14/21 1353 97.6 °F (36.4 °C) 99 20 (!) 146/78 96 %     No intake or output data in the 24 hours ending 12/15/21 0844     I had a face to face encounter and independently examined this patient on 12/15/2021, as outlined below:  PHYSICAL EXAM:  General: WD, WN. Alert, cooperative, no acute distress    EENT:  EOMI. Anicteric sclerae. MMM  Resp:  CTA bilaterally, no wheezing or rales. No accessory muscle use  CV:  Regular  rhythm,  No edema  GI:  Soft, Non distended, Non tender. +Bowel sounds  Neurologic:  Alert and oriented X 3, normal speech,   Psych:   Good insight. Not anxious nor agitated  Skin:  No rashes. No jaundice    Reviewed most current lab test results and cultures  YES  Reviewed most current radiology test results   YES  Review and summation of old records today    NO  Reviewed patient's current orders and MAR    YES  PMH/SH reviewed - no change compared to H&P  ________________________________________________________________________  Care Plan discussed with:    Comments   Patient x    Family      RN x    Care Manager     Consultant                        Multidiciplinary team rounds were held today with , nursing, pharmacist and clinical coordinator. Patient's plan of care was discussed; medications were reviewed and discharge planning was addressed.      ________________________________________________________________________  Total NON critical care TIME: 35  Minutes    Total CRITICAL CARE TIME Spent:   Minutes non procedure based      Comments   >50% of visit spent in counseling and coordination of care     ________________________________________________________________________  Bernard Lobato MD     Procedures: see electronic medical records for all procedures/Xrays and details which were not copied into this note but were reviewed prior to creation of Plan. LABS:  I reviewed today's most current labs and imaging studies.   Pertinent labs include:  Recent Labs     12/15/21  0539 12/14/21  1411   WBC 9.6 11.0   HGB 7.7* 7.7*   HCT 25.9* 26.5*    319     Recent Labs     12/15/21  0539 12/14/21  1411    142   K 4.4 5.1   * 110*   CO2 25 29   GLU 93 197*   BUN 42* 42*   CREA 2.01* 2.08*   CA 9.5 8.9   ALB  --  2.8*   TBILI  --  0.3   ALT  --  14       Signed: Bernard Lobato MD

## 2021-12-15 NOTE — TELEPHONE ENCOUNTER
Spoke with [facility] pharmacist.   # [phone]  Verified patient with two patient identifiers. Advised Metolazone tier exception is denied. Had given all info including on Lasix, CKD, all diagnoses. States he had to try two other meds comparable to HCTZ before any approval would be given. They will fill and notify pt. Pharmacist vebalized understanding.

## 2021-12-15 NOTE — CONSULTS
GI CONSULTATION NOTE  Yoly Frank, ELIZABETH  [phone] NP in-hospital cell phone M-F until 4:30  After 5pm or on weekends, please call  for physician on call    NAME: Lc Gaspar   :  1945   MRN:  [id number]   Attending:  Dr. Kristi Broussard  Primary GI:  Dr. Monisha Hernandez   Date/Time:  12/15/2021 10:15 AM  Assessment:   Symptomatic anemia  Melena   · Reports 1 with of melena with increasing SOB  · Never had a CLN or EGD  · Hgb 7.7; baseline ~14  · Iron 18, TIBC 363, iron % saturation 5  · FOBT +    MALU   Type 2 DM  CHF  · Treatment per primary team     Plan:   · Plan for EGD today with Dr. Monisha Hernandez; obtain consent  · Details and risks of the procedure to include (but not limited to) anesthesia, bleeding, infection, and perforation were discussed. Patient understands and is in agreement with the plan  · NPO  · Rapid COVID ordered  · Serial H&H; goal for hgb >7.0  · Monitor for s/s of bleeding; transfuse as clinically indicated  · Continue PPI  · Symptomatic care per primary team  Plan discussed with Dr. Monisha Hernandez    Subjective:     HISTORY OF PRESENT ILLNESS:     Lc Gaspar is an 78 y.o.  male who we are asked to see for complaint of anemia. He as a past medical history of coronary disease, congestive heart failure, hypertension, diabetes mellitus is coming the hospital chief complaints of shortness of breath and also swelling of the legs. Patient reports being in his usual health until about 2 weeks ago when he started having increased swelling of the legs along with shortness of breath. Shortness of breath is worse with even minimal exertion along with orthopnea but no PND. He also reports baseline swelling of the legs but continued to get worse in the last few weeks. He reports that his legs are also weeping secondary to severe swelling of the legs. Does not report any cough or phlegm. Does not report any chest pain.   He went to see his primary care physician who advised him to go to the emergency department for further evaluation. He reports melena for 1 week. Never had an EGD or CLN. Denies any blood thinner use. Past Medical History:   Diagnosis Date    Arthritis     Asthma     CAD (coronary artery disease)     Calculus of kidney     Carotid artery disease (HCC)     Cervical herniated disc     Chronic systolic (congestive) heart failure (HCC)     Diabetes ([address]     Diabetic shock due to low blood sugar ([facility] [address]     Glaucoma     pt is on drops unsure of what the names are    Hypercholesterolemia     Hypertension     Morbid obesity ([facility] [facility]     Rheumatic fever     When he was 17    Sleep apnea     compliant with cpap as stated 2019      Past Surgical History:   Procedure Laterality Date    HX CAROTID STENT      with cath    HX CATARACT REMOVAL Left     HX CERVICAL FUSION  2019    C3- C6 ACFD     HX CHOLECYSTECTOMY      HX CIRCUMCISION      HX HEART CATHETERIZATION      x 5 total, 4 in heart, 1 in carotid    HX HEENT Left     ear torn off and repaired    HX OTHER SURGICAL Right     Right hand reconstruction    HX SHOULDER ARTHROSCOPY Right     dislocated with fall, came out, he stated surgery put back in place and sowed him up     Social History     Tobacco Use    Smoking status: Former Smoker     Packs/day: 5.00     Years: 15.00     Pack years: 75.00     Quit date: 1963     Years since quittin.6    Smokeless tobacco: Never Used   Substance Use Topics    Alcohol use: No     Alcohol/week: 0.0 standard drinks      Family History   Problem Relation Age of Onset    Heart Disease Brother     Diabetes Nephew     Hypertension Mother       Allergies   Allergen Reactions    Morphine Anaphylaxis     Pt states \"morphine gave me heart failure\"    Lyrica [Pregabalin] Other (comments)     Makes him \"loopy\"      Prior to Admission medications    Medication Sig Start Date End Date Taking?  Authorizing Provider   isosorbide mononitrate ER (IMDUR) 60 mg CR tablet Take 1 Tablet by mouth daily. 12/14/21   Moisés OVALLE NP   lisinopriL (PRINIVIL, ZESTRIL) 2.5 mg tablet Take 1 Tablet by mouth daily. 12/14/21   Hang Soares NP   furosemide (LASIX) 40 mg tablet Take one tablet by mouth on Tuesdays and Thurs (take 30 minutes after the Metolazone)  Further refills once seen in noffice 12/14/21   Moisés OVALLE NP   simvastatin (ZOCOR) 10 mg tablet TAKE 1 TABLET BY MOUTH EVERY OTHER DAY . APPOINTMENT REQUIRED FOR FUTURE REFILLS 12/13/21   Moisés OVALLE NP   metOLazone (ZAROXOLYN) 2.5 mg tablet Take one tablet or 2.5 mg by mouth 30 minutes prior to Lasix on Tuesdays and Thursdays only. Overdue for appt, no further refills unless seen in office. 12/6/21   Moisés OVALLE NP   carvediloL (COREG) 12.5 mg tablet TAKE 1 TABLET BY MOUTH TWICE DAILY. NO FURTHER REFILLS UNTIL SEEN IN THE OFFICE 12/6/21   Moisés OVALLE NP   glimepiride (AMARYL) 1 mg tablet Taking 0.5 tab daily. MUST ESTABLISH WITH NEW ENDOCRINOLOGIST OR CONTACT PCP FOR FURTHER REFILLS 12/3/21   Betina Joe MD   metFORMIN ER (GLUCOPHAGE XR) 500 mg tablet TAKE 1 TABLET BY MOUTH BEFORE BREAKFAST AND  DINNER. MUST ESTABLISH WITH NEW ENDOCRINOLOGIST OR CONTACT PCP FOR FURTHER REFILLS 12/3/21   Betina Joe MD   DULoxetine (CYMBALTA) 60 mg capsule TAKE 1 CAPSULE BY MOUTH ONCE DAILY FOR  FEET  PAIN. SEND FUTURE REFILL REQUESTS TO PCP AS DR MAI IS NO LONGER WITH THIS PRACTICE 9/22/21   Betina Joe MD   acetaminophen (Tylenol Arthritis Pain) 650 mg TbER Take 650 mg by mouth every eight (8) hours. Provider, Historical   omega-3 fatty acids 1,000 mg cap Take 1 Cap by mouth two (2) times a day. Provider, Historical   dextran 70/hypromellose/PF (NATURAL TEARS, PF, OP) Apply 1 Drop to eye as needed. Provider, Historical   acetaminophen (TYLENOL) 500 mg tablet Take 1,000 mg by mouth every six (6) hours as needed for Pain.     Provider, Historical   cholecalciferol, VITAMIN D3, (VITAMIN D3) 5,000 unit tab tablet Take 1 Tab by mouth daily. 6/6/18   Leanna Wheeler MD   aspirin delayed-release 81 mg tablet Take 81 mg by mouth daily. Other, MD Cleo       Patient Active Problem List   Diagnosis Code    Obesity, morbid ([facility] [address] E66.01    SOB (shortness of breath) R06.02    Uncontrolled type 2 diabetes mellitus with complication, with long-term current use of insulin (HCC) E11.8, E11.65, Z79.4    Pure hypercholesterolemia E78.00    Chronic systolic congestive heart failure (HCC) I50.22    Pain in both hands M79.641, M79.642    Type 2 diabetes with nephropathy [facility] E11.21    Cervical stenosis of spinal canal M48.02    S/P cervical spinal fusion Z98.1    CAD (coronary artery disease) I25.10    Anemia D64.9       REVIEW OF SYSTEMS:    Constitutional: negative fever, negative chills, negative weight loss  Eyes:   negative visual changes  ENT:   negative sore throat, tongue or lip swelling   Respiratory:  negative cough, negative dyspnea  Cards:  negative for chest pain, palpitations, lower extremity edema  GI:   See HPI  :  negative for frequency, dysuria  Integument:  negative for rash and pruritus  Heme:  negative for easy bruising and gum/nose bleeding  Musculoskel: negative for myalgias,  back pain and muscle weakness  Neuro: negative for headaches, dizziness, vertigo  Psych: negative for feelings of anxiety, depression     Objective:   VITALS:    Visit Vitals  BP (!) 127/55   Pulse 97   Temp 97.9 °F (36.6 °C)   Resp 19   Ht 5' 3\" (1.6 m)   Wt 105.7 kg (233 lb)   SpO2 91%   BMI 41.27 kg/m²       PHYSICAL EXAM:   General:           Pleasant  male. NAD   Head:               Normocephalic, without obvious abnormality, atraumatic. Eyes:               Conjunctivae clear and pale, anicteric sclerae. Pupils are equal  Nose:               Nares normal. No drainage or sinus tenderness.   Throat:             Lips, mucosa, and tongue normal.  No Thrush  Neck: Supple, symmetrical,  no adenopathy, thyroid: non tender  Back:               Symmetric,  No CVA tenderness. Lungs:             CTA bilaterally. No wheezing/rhonchi/rales. Chest wall:      No tenderness or deformity. No Accessory muscle use. Heart:              Regular rate and rhythm,  no murmur, rub or gallop. Abdomen:        Soft, non-tender. Not distended. Bowel sounds normal. No masses  Extremities:     Atraumatic, No cyanosis. No edema. No clubbing  Skin:                Texture, turgor normal. No rashes/lesions/jaundice. + pallor   Psych:             Good insight. Not depressed. Not anxious or agitated. Neurologic:      EOMs intact. No facial asymmetry. No aphasia or slurred speech. A/O X 3. LAB DATA REVIEWED:    Recent Results (from the past 24 hour(s))   EKG, 12 LEAD, INITIAL    Collection Time: 12/14/21  2:03 PM   Result Value Ref Range    Ventricular Rate 104 BPM    Atrial Rate 104 BPM    P-R Interval 208 ms    QRS Duration 92 ms    Q-T Interval 324 ms    QTC Calculation (Bezet) 426 ms    Calculated P Axis 61 degrees    Calculated R Axis -41 degrees    Calculated T Axis 54 degrees    Diagnosis       Sinus tachycardia  Left axis deviation  Low voltage QRS  Cannot rule out Anterior infarct , age undetermined  No previous ECGs available     CBC WITH AUTOMATED DIFF    Collection Time: 12/14/21  2:11 PM   Result Value Ref Range    WBC 11.0 4.1 - 11.1 K/uL    RBC 2.95 (L) 4.10 - 5.70 M/uL    HGB 7.7 (L) 12.1 - 17.0 g/dL    HCT 26.5 (L) 36.6 - 50.3 %    MCV 89.8 80.0 - 99.0 FL    MCH 26.1 26.0 - 34.0 PG    MCHC 29.1 (L) 30.0 - 36.5 g/dL    RDW 14.0 11.5 - 14.5 %    PLATELET 523 164 - 850 K/uL    MPV 9.4 8.9 - 12.9 FL    NRBC 0.0 0  WBC    ABSOLUTE NRBC 0.00 0.00 - 0.01 K/uL    NEUTROPHILS 81 (H) 32 - 75 %    LYMPHOCYTES 6 (L) 12 - 49 %    MONOCYTES 9 5 - 13 %    EOSINOPHILS 3 0 - 7 %    BASOPHILS 1 0 - 1 %    IMMATURE GRANULOCYTES 0 0.0 - 0.5 %    ABS.  NEUTROPHILS 8.9 (H) 1.8 - 8.0 K/UL ABS. LYMPHOCYTES 0.7 (L) 0.8 - 3.5 K/UL    ABS. MONOCYTES 1.0 0.0 - 1.0 K/UL    ABS. EOSINOPHILS 0.3 0.0 - 0.4 K/UL    ABS. BASOPHILS 0.1 0.0 - 0.1 K/UL    ABS. IMM. GRANS. 0.0 0.00 - 0.04 K/UL    DF AUTOMATED     METABOLIC PANEL, COMPREHENSIVE    Collection Time: 12/14/21  2:11 PM   Result Value Ref Range    Sodium 142 136 - 145 mmol/L    Potassium 5.1 3.5 - 5.1 mmol/L    Chloride 110 (H) 97 - 108 mmol/L    CO2 29 21 - 32 mmol/L    Anion gap 3 (L) 5 - 15 mmol/L    Glucose 197 (H) 65 - 100 mg/dL    BUN 42 (H) 6 - 20 MG/DL    Creatinine 2.08 (H) 0.70 - 1.30 MG/DL    BUN/Creatinine ratio 20 12 - 20      GFR est AA 38 (L) >60 ml/min/1.73m2    GFR est non-AA 31 (L) >60 ml/min/1.73m2    Calcium 8.9 8.5 - 10.1 MG/DL    Bilirubin, total 0.3 0.2 - 1.0 MG/DL    ALT (SGPT) 14 12 - 78 U/L    AST (SGOT) 7 (L) 15 - 37 U/L    Alk.  phosphatase 72 45 - 117 U/L    Protein, total 6.8 6.4 - 8.2 g/dL    Albumin 2.8 (L) 3.5 - 5.0 g/dL    Globulin 4.0 2.0 - 4.0 g/dL    A-G Ratio 0.7 (L) 1.1 - 2.2     CK W/ REFLX CKMB    Collection Time: 12/14/21  2:11 PM   Result Value Ref Range     39 - 308 U/L   TROPONIN-HIGH SENSITIVITY    Collection Time: 12/14/21  2:11 PM   Result Value Ref Range    Troponin-High Sensitivity 18 0 - 76 ng/L   NT-PRO BNP    Collection Time: 12/14/21  2:11 PM   Result Value Ref Range    NT pro-BNP 1,256 (H) <125 PG/ML   BLOOD GAS,CHEM8,LACTIC ACID POC    Collection Time: 12/14/21  2:46 PM   Result Value Ref Range    Calcium, ionized (POC) 1.29 1.12 - 1.32 mmol/L    BICARBONATE 27 mmol/L    Base excess (POC) 0.2 mmol/L    Sample source VENOUS BLOOD      CO2, POC 27 (H) 19 - 24 MMOL/L    Sodium,  136 - 145 MMOL/L    Potassium, POC 5.0 3.5 - 5.5 MMOL/L    Chloride,  100 - 108 MMOL/L    Glucose,  (H) 74 - 106 MG/DL    Creatinine, POC 2.0 (H) 0.6 - 1.3 MG/DL    Lactic Acid (POC) 2.21 (HH) 0.40 - 2.00 mmol/L    pH, venous (POC) 7.33 7.32 - 7.42      pCO2, venous (POC) 50.2 41 - 51 MMHG    pO2, venous (POC) 22 (L) 25 - 40 mmHg   OCCULT BLOOD, STOOL    Collection Time: 12/14/21  4:08 PM   Result Value Ref Range    Occult blood, stool Positive (A) NEG     IRON PROFILE    Collection Time: 12/14/21  5:46 PM   Result Value Ref Range    Iron 18 (L) 35 - 150 ug/dL    TIBC 363 250 - 450 ug/dL    Iron % saturation 5 (L) 20 - 50 %   METABOLIC PANEL, BASIC    Collection Time: 12/15/21  5:39 AM   Result Value Ref Range    Sodium 142 136 - 145 mmol/L    Potassium 4.4 3.5 - 5.1 mmol/L    Chloride 110 (H) 97 - 108 mmol/L    CO2 25 21 - 32 mmol/L    Anion gap 7 5 - 15 mmol/L    Glucose 93 65 - 100 mg/dL    BUN 42 (H) 6 - 20 MG/DL    Creatinine 2.01 (H) 0.70 - 1.30 MG/DL    BUN/Creatinine ratio 21 (H) 12 - 20      GFR est AA 40 (L) >60 ml/min/1.73m2    GFR est non-AA 33 (L) >60 ml/min/1.73m2    Calcium 9.5 8.5 - 10.1 MG/DL   CBC W/O DIFF    Collection Time: 12/15/21  5:39 AM   Result Value Ref Range    WBC 9.6 4.1 - 11.1 K/uL    RBC 2.93 (L) 4.10 - 5.70 M/uL    HGB 7.7 (L) 12.1 - 17.0 g/dL    HCT 25.9 (L) 36.6 - 50.3 %    MCV 88.4 80.0 - 99.0 FL    MCH 26.3 26.0 - 34.0 PG    MCHC 29.7 (L) 30.0 - 36.5 g/dL    RDW 14.2 11.5 - 14.5 %    PLATELET 151 020 - 898 K/uL    MPV 9.3 8.9 - 12.9 FL    NRBC 0.0 0  WBC    ABSOLUTE NRBC 0.00 0.00 - 0.01 K/uL       IMAGING RESULTS:  I have personally reviewed the imaging reports      Total time spent with patient: 30 minutes ________________________________________________________________________  Care Plan discussed with:  Patient x   Family     RN x              Consultant:       CT  12/15/2021:  ________________________________________________________________________    ___________________________________________________  Consulting Provider: Orquidea Muir NP      12/15/2021  10:15 AM

## 2021-12-15 NOTE — PROGRESS NOTES
I prayed with Lila Real and his son and celebrated with Lila Real the [address].   Father María Sosa

## 2021-12-15 NOTE — ADT AUTH CERT NOTES
[address]     FACILITY NPI :[id number]  FACILITY TAX ID :      [address]  [facility] [address]   [address]  [address] [place] [address] [phone]          DEPT CONTACT:  Mayra MARTINEZG#[phone]  LBX#[phone]       Patient Name :Jose Man   : 1945 (76 yrs)  MRN : [id number]     Patient Mailing Address [address] [place] [[address]] , [address]                Insurance Plan Payor: BLUE CROSS MEDICARE / Plan: [place] / Product Type: Managed Care Medicare /      Primary Coverage Subscriber ID : [id number]     Secondary Coverage:  N/A         Current Patient Class : INPATIENT  Admit Date : 2021     REQUESTED LEVEL OF CARE: INPATIENT [101]                                                           Diagnosis : Anemia                          ICD10 Code : Anemia [D64.9]     Current Room and Bed 3215/01     Admitting and Attending Info:  Admitting Provider : Rafaela Herr MD   NPI: [id number]  Admitting Provider Phone.  [phone]  Admitting Provider Address: [address]     Attending Provider Matthew Rogers MD   [id number]  Attending Provider Address:  [address] [place] [address]     Attending Provider Phone: Titus yee phone: [phone]             Utilization Reviews         Gastrointestinal Bleeding, Upper - Care Day 2 (12/15/2021) by Elizabeth Estrella       Review Entered Review Status   12/15/2021 14:06 Completed      Criteria Review      Care Day: 2 Care Date: 12/15/2021 Level of Care: Telemetry    Guideline Day 2    Clinical Status    (X) * Hypotension absent    12/15/2021 14:06:00 EST by Timothy Briseno      119/59, 101, 98.7, 22, O2 at 2lpm via NC placed when RA sat down to 90%    ( ) * Bleeding absent or reduced    12/15/2021 14:06:00 EST by Yung Adan      Pt pale, still very dyspneic. Inappropriate for EGD sec to inability to tolerate sedation. Will reassess tomorrow. Hgb 7.7, hct 25.9. Routes    ( ) * Oral hydration tolerated    12/15/2021 14:06:00 EST by Yung Adan      NPO but not on IVF/receiving diuretics    ( ) * Oral diet tolerated    12/15/2021 14:06:00 EST by Agata Craig Pt remains NPO. Rest of meds: bumex 1mg IV Q12H, lipitor 10mg po qd, coreg 3.125mg po bid, cymbalta 60mg po qd, imdur ER 60mg po qd    Interventions    (X) * NG tube absent    12/15/2021 14:06:00 EST by Yung Adan      None    (X) Hgb/Hct    12/15/2021 14:06:00 EST by Yung Adan      7.7/25.9. Rest of labs: Chl 110, bun 42, creat 2.01, gfr 40. COVID neg    Medications    (X) Proton pump inhibitor    12/15/2021 14:06:00 EST by Yung Adan      Protonix 40mg IV Q12H    * Milestone   Additional Notes   GI MD:    On exam,   Pale, leaning forward in bed, tachypnea   S1S2   CTAB   Soft, NT/ND abd       A/P:   77 yo M with symptomatic anemia, no obvious GI bleeding, but possible melenic stool reported prior to admission. He's dyspneic, not yet ready for sedation for EGD eval which was initially planned.    -- PPI BID   -- diet okay    -- delay EGD for today, reassess breathing tomorrow        Gastrointestinal Bleeding, Upper - Care Day 1 (12/14/2021) by Shala Toth       Review Entered Review Status   12/15/2021 09:13 Completed      Criteria Review      Care Day: 1 Care Date: 12/14/2021 Level of Care: Telemetry    Guideline Day 1    Level Of Care    (X) ICU or floor    12/15/2021 09:13:08 EST by Yung Robles    Clinical Status    (X) * Clinical Indications met    12/15/2021 09:13:08 EST by Brandy Metzger old male w/PMH as doc to ED from cardiology office visit after report mod to severe ROLLE/SOB, report black stools, and sig drop in Hgb from baseline 14.1 to 7.7. 146/78, 104, 98.4, 22, RA sat 90%, 105.7kg    Activity    (X) Bed rest or up to chair    12/15/2021 09:13:08 EST by Darrell Griggs      act as quirino with asst. SCD    Routes    (X) IV medications    12/15/2021 09:13:08 EST by Jostin Hand      Lasix 40mg IV x1, duoneb x1, coreg 3.125mg po bid    Interventions    (X) CBC    12/15/2021 09:13:08 EST by Jostin Hand      Hgb 7.7, hct 26.5. Chl 110, AG 3, gluc 197, bun 42, creat 2.08, gfr 31, alb 2.8, ast 7. BNP 1256. Iron 18, Iron sat 5%. Stool occult bld POSITIVE. CTA chest: no PE, diff grnd glass mosaic att, trace pleural effusion. CXR neg. 12 lead: sinus tach, 104    ( ) GI consultation    12/15/2021 09:13:08 EST by [employer]      GI consult pending    Medications    (X) IV proton pump inhibitor    12/15/2021 09:13:08 EST by Jostin Hand      Protonix 40mg IV x1 and then Q12H    * Milestone   Additional Notes   Clinical Impression:    1. Upper GI bleed    2. Symptomatic anemia       Plan:    Patient is a 74-year-old male presenting with symptoms of symptomatic anemia over the last several days.  Patient has hemoglobin of 7.7, heme positive stools, baseline hemoglobin is 14.1.  I suspect this is the cause of his shortness of breath.  His chest x-ray is clear.  I do not think he needs a CT angiogram at this time.  We will admit. Started on IV Protonix.        MALU   -base line is around 1.5 and cr now is 2.08. Check UA.   -hold home lasix and metolazone and if cr is stable, resume lasix in am    -hold metformin   -check bmp in am        DM type 2   -hold metformin and Glimeperide. Start SSI with poc's        Diastolic CHF   CAD   HTN   Dyslipidemia   -hold lasix due to malu and resume in am    -cont coreg and lipitor. Cont imdur.        B/l leg blisters due to swelling    -consult wound care      Physical Exam   Constitutional:        General: He is in acute distress.       Appearance: He is well-developed. HENT:       Head: Normocephalic and atraumatic.       Mouth/Throat:       Pharynx: No oropharyngeal exudate. Eyes:       Conjunctiva/sclera: Conjunctivae normal.       Pupils: Pupils are equal, round, and reactive to light. Cardiovascular:       Rate and Rhythm: Normal rate and regular rhythm.       Heart sounds: No murmur heard.    Pulmonary:       Effort: Tachypnea and accessory muscle usage present. No respiratory distress (HWSOB).    Breath sounds: No decreased breath sounds, wheezing, rhonchi or rales. Abdominal:       General: Bowel sounds are normal. There is no distension.       Palpations: Abdomen is soft. Hepatomegaly: . HWSOB.       Tenderness: There is no abdominal tenderness. Genitourinary:      Rectum: Guaiac result positive.       Comments: Dark black stool,   Musculoskeletal:          General: No deformity. Normal range of motion.       Cervical back: Normal range of motion. Skin:      General: Skin is warm and dry.       Findings: No rash.     Neurological:       Mental Status: He is alert and oriented to person, place, and time.       Coordination: Coordination normal.    Psychiatric:          Behavior: Behavior normal.         Gastrointestinal Bleeding, Upper - Clinical Indications for Admission to Inpatient Care by Velma Ortiz       Review Entered Review Status   12/15/2021 09:06 Completed      Criteria Review      Clinical Indications for Admission to Inpatient Care    Most Recent : Roxane Jimenez Most Recent Date: 12/15/2021 09:06:29 EST    (X) Admission is indicated for  1 or more  of the following  (1) (2):       (X) Anemia or hypovolemia requiring inpatient admission, as indicated by Yoly Garvin the following       :          (X) Presence of significant clinical finding, as indicated by  1 or more  of the following :             (X) Dyspnea             12/15/2021 09:06:29 EST by Roxane Jimenez               dyspnea/SOB sec to symptomatic anemia          (X) Observation care treatment with transfusion or volume replacement is judged inappropriate          (due to severity of finding) or has been ineffective.          12/15/2021 09:06:29 EST by Jaime Reyes            For GI consult-NPO after MN    Notes:    12/15/2021 09:06:29 EST by Jaime Reyes    Subject: Additional Clinical Information      * 76yr old male w/PMH CAD, CHF, MD, morbid obesity sent to ED via cardiologist after visit revealed moderate to severe ROLLE/SOB x1-2 wks and BBLD swelling, report of melena. Baseline Hgb is 14.1 and Hgb today is 7.7, heme positive stools confirmed. GI consult pending. Admitted. H&P Notes       H&P by Vignesh Diaz MD at 21 documented on ED to Hosp-Admission (Current) from 2021 in [facility] 3 SURG TELE    Author: Vignesh Diaz MD Author Type: Physician Filed: 21   Note Status: Signed Cosign: Cosign Not Required Date of Service: 21   : Vignesh Diaz MD (Physician)               Expand AllCollapse All                    Hospitalist Admission Note     NAME:            Jenae Michelle   :               1945   MRN:               [id number]      Date/Time:      2021 7:10 PM     Patient PCP: Jose Ocampo MD  ________________________________________________________________________     My assessment of this patient's clinical condition and my plan of care is as follows.     Assessment / Plan:  Acute on chronic anemia suspect blood loss  FOBT positive  -Baseline hemoglobin is around 14 and patient is coming with a hemoglobin of 7.7.  -Stool for occult blood is positive and reports occasional black stool  -Check iron profile, vitamin B12  -Keep n.p.o. from midnight. Consult gastroenterology. Start Protonix 40 mg IV every 12.     MALU  -base line is around 1.5 and cr now is 2.08.  Check UA.  -hold home lasix and metolazone and if cr is stable, resume lasix in am   -hold metformin  -check bmp in am      DM type 2  -hold metformin and Glimeperide. Start SSI with poc's      Diastolic CHF  CAD  HTN  Dyslipidemia  -hold lasix due to noé and resume in am   -cont coreg and lipitor. Cont imdur.      B/l leg blisters due to swelling   -consult wound care        Code Status: full   Surrogate Decision Maker: wife      DVT Prophylaxis: scd's due to anemia   GI Prophylaxis: not indicated     Baseline: from home lives with wife and son                     Subjective:   CHIEF COMPLAINT: Shortness of breath and increasing swelling of the legs     HISTORY OF PRESENT ILLNESS:     Darline Laurent is a 78 y.o.   male who presents with past medical history of coronary disease, congestive heart failure, hypertension, diabetes mellitus is coming the hospital chief complaints of shortness of breath and also swelling of the legs. Patient reports being in his usual health until about 2 weeks ago when he started having increased swelling of the legs along with shortness of breath. Shortness of breath is worse with even minimal exertion along with orthopnea but no PND. He also reports baseline swelling of the legs but continued to get worse in the last few weeks. He reports that his legs are also weeping secondary to severe swelling of the legs. Does not report any cough or phlegm. Does not report any chest pain. He went to see his primary care physician who advised him to go to the emergency department for further evaluation.     On arrival to ED, vital signs are normal.  On labs noted to have hemoglobin of 7.7. BMP shows a potassium of 5.1, creatinine 2.08.   LFTs are normal.  proBNP is 1256.     We were asked to admit for work up and evaluation of the above problems.           Past Medical History:   Diagnosis Date    Arthritis      Asthma      CAD (coronary artery disease)      Calculus of kidney      Carotid artery disease (HCC)      Cervical herniated disc      Chronic systolic (congestive) heart failure (HCC)      Diabetes [facility])      Diabetic shock due to low blood sugar ([facility] 2016    Glaucoma       pt is on drops unsure of what the names are    Hypercholesterolemia      Hypertension      Morbid obesity ([facility] [facility]      Rheumatic fever       When he was 17    Sleep apnea       compliant with cpap as stated 2019               Past Surgical History:   Procedure Laterality Date    HX CAROTID STENT        with cath    HX CATARACT REMOVAL Left      HX CERVICAL FUSION   2019     C3- C6 ACFD     HX CHOLECYSTECTOMY        HX CIRCUMCISION        HX HEART CATHETERIZATION         x 5 total, 4 in heart, 1 in carotid    HX HEENT Left       ear torn off and repaired    HX OTHER SURGICAL Right       Right hand reconstruction    HX SHOULDER ARTHROSCOPY Right       dislocated with fall, came out, he stated surgery put back in place and sowed him up         Social History            Tobacco Use    Smoking status: Former Smoker       Packs/day: 5.00       Years: 15.00       Pack years: 75.00       Quit date: 1963       Years since quittin.6    Smokeless tobacco: Never Used   Substance Use Topics    Alcohol use: No       Alcohol/week: 0.0 standard drinks               Family History   Problem Relation Age of Onset    Heart Disease Brother      Diabetes Nephew      Hypertension Mother              Allergies   Allergen Reactions    Morphine Anaphylaxis       Pt states \"morphine gave me heart failure\"    Lyrica [Pregabalin] Other (comments)       Makes him \"loopy\"                 Prior to Admission medications    Medication Sig Start Date End Date Taking? Authorizing Provider   isosorbide mononitrate ER (IMDUR) 60 mg CR tablet Take 1 Tablet by mouth daily. 21     Bj Faustin D, NP   lisinopriL (PRINIVIL, ZESTRIL) 2.5 mg tablet Take 1 Tablet by mouth daily.  21     Bj OVALLE, NP   furosemide (LASIX) 40 mg tablet Take one tablet by mouth on  and Thurs (take 30 minutes after the Metolazone) Further refills once seen in [facility] 12/14/21     Nancy OVALLE NP   simvastatin (ZOCOR) 10 mg tablet TAKE 1 TABLET BY MOUTH EVERY OTHER DAY . APPOINTMENT REQUIRED FOR FUTURE REFILLS 12/13/21     Nancy OVALLE NP   metOLazone (ZAROXOLYN) 2.5 mg tablet Take one tablet or 2.5 mg by mouth 30 minutes prior to Lasix on Tuesdays and Thursdays only. Overdue for appt, no further refills unless seen in office. 12/6/21     Nancy OVALLE NP   carvediloL (COREG) 12.5 mg tablet TAKE 1 TABLET BY MOUTH TWICE DAILY. NO FURTHER REFILLS UNTIL SEEN IN THE OFFICE 12/6/21     Nancy OVALLE NP   glimepiride (AMARYL) 1 mg tablet Taking 0.5 tab daily. MUST ESTABLISH WITH NEW ENDOCRINOLOGIST OR CONTACT PCP FOR FURTHER REFILLS 12/3/21     Ken Rose MD   metFORMIN ER (GLUCOPHAGE XR) 500 mg tablet TAKE 1 TABLET BY MOUTH BEFORE BREAKFAST AND  DINNER. MUST ESTABLISH WITH NEW ENDOCRINOLOGIST OR CONTACT PCP FOR FURTHER REFILLS 12/3/21     Ken Rose MD   DULoxetine (CYMBALTA) 60 mg capsule TAKE 1 CAPSULE BY MOUTH ONCE DAILY FOR  FEET  PAIN. SEND FUTURE REFILL REQUESTS TO PCP AS DR MAI IS NO LONGER WITH THIS PRACTICE 9/22/21     Ken Rose MD   acetaminophen (Tylenol Arthritis Pain) 650 mg TbER Take 650 mg by mouth every eight (8) hours.       Provider, Historical   omega-3 fatty acids 1,000 mg cap Take 1 Cap by mouth two (2) times a day.       Provider, Historical   dextran 70/hypromellose/PF (NATURAL TEARS, PF, OP) Apply 1 Drop to eye as needed.       Provider, Historical   acetaminophen (TYLENOL) 500 mg tablet Take 1,000 mg by mouth every six (6) hours as needed for Pain.       Provider, Historical   cholecalciferol, VITAMIN D3, (VITAMIN D3) 5,000 unit tab tablet Take 1 Tab by mouth daily.  6/6/18     Cleve Ha MD   aspirin delayed-release 81 mg tablet Take 81 mg by mouth daily.       Other, MD Cleo         REVIEW OF SYSTEMS:     I am not able to complete the review of systems because:    The patient is intubated and sedated     The patient has altered mental status due to his acute medical problems     The patient has baseline aphasia from prior stroke(s)     The patient has baseline dementia and is not reliable historian     The patient is in acute medical distress and unable to provide information               Total of 12 systems reviewed as follows:                                        POSITIVE= underlined text  Negative = text not underlined  General:                     fever, chills, sweats, generalized weakness, weight loss/gain,                                       loss of appetite   Eyes:                           blurred vision, eye pain, loss of vision, double vision  ENT:                            rhinorrhea, pharyngitis   Respiratory:               cough, sputum production, SOB, ROLLE, wheezing, pleuritic pain   Cardiology:                chest pain, palpitations, orthopnea, PND, edema, syncope   Gastrointestinal:       abdominal pain , N/V, diarrhea, dysphagia, constipation, bleeding   Genitourinary:           frequency, urgency, dysuria, hematuria, incontinence   Muskuloskeletal :      arthralgia, myalgia, back pain  Hematology:              easy bruising, nose or gum bleeding, lymphadenopathy   Dermatological:         rash, ulceration, pruritis, color change / jaundice  Endocrine:                 hot flashes or polydipsia   Neurological:             headache, dizziness, confusion, focal weakness, paresthesia,                                      Speech difficulties, memory loss, gait difficulty  Psychological:          Feelings of anxiety, depression, agitation     Objective:   VITALS:    Visit Vitals  BP (!) 152/74   Pulse (!) 104   Temp 96.8 °F (36 °C)   Resp 22   Ht 5' 3\" (1.6 m)   Wt 105.7 kg (233 lb)   SpO2 95%   BMI 41.27 kg/m²         PHYSICAL EXAM:     General:          Alert, cooperative, no distress, appears stated age.      HEENT:           Atraumatic, anicteric sclerae, pink conjunctivae                          No oral ulcers, mucosa moist  Neck:               Supple, symmetrical,  thyroid: non tender  Lungs:             Bilateral air entry present, basal crackles are present, no wheezing  Chest wall:      No tenderness  No Accessory muscle use. Heart:              Regular  rhythm,  No  murmur   3+ edema, weeping ulcers, wrapped in Ace wrap  Abdomen:        Soft, non-tender. Not distended. Bowel sounds normal  Extremities:     No cyanosis. No clubbing,                            Skin turgor normal, Capillary refill normal, Radial dial pulse 2+  Skin:                Not pale. Not Jaundiced  No rashes   Psych:             Not anxious or agitated. Neurologic:      EOMs intact. No facial asymmetry. No aphasia or slurred speech. Symmetrical strength, Sensation grossly intact.  Alert and oriented X 4.      _______________________________________________________________________  Care Plan discussed with:      Comments   Patient y     Family        RN y     Care Manager                      Consultant:        _______________________________________________________________________  Expected  Disposition:   Home with Family y   HH/PT/OT/RN     SNF/LTC     RASHEED     ________________________________________________________________________  TOTAL TIME:  61 Minutes     Critical Care Provided     Minutes non procedure based         Comments     y Reviewed previous records   >50% of visit spent in counseling and coordination of care y Discussion with patient and/or family and questions answered         ________________________________________________________________________  Signed: Jadiel Garcia MD     Procedures: see electronic medical records for all procedures/Xrays and details which were not copied into this note but were reviewed prior to creation of Plan.     LAB DATA REVIEWED:    Recent Results         Recent Results (from the past 24 hour(s))   CBC WITH AUTOMATED DIFF     Collection Time: 12/14/21  2:11 PM   Result Value Ref Range     WBC 11.0 4.1 - 11.1 K/uL     RBC 2.95 (L) 4.10 - 5.70 M/uL     HGB 7.7 (L) 12.1 - 17.0 g/dL     HCT 26.5 (L) 36.6 - 50.3 %     MCV 89.8 80.0 - 99.0 FL     MCH 26.1 26.0 - 34.0 PG     MCHC 29.1 (L) 30.0 - 36.5 g/dL     RDW 14.0 11.5 - 14.5 %     PLATELET 729 218 - 915 K/uL     MPV 9.4 8.9 - 12.9 FL     NRBC 0.0 0  WBC     ABSOLUTE NRBC 0.00 0.00 - 0.01 K/uL     NEUTROPHILS 81 (H) 32 - 75 %     LYMPHOCYTES 6 (L) 12 - 49 %     MONOCYTES 9 5 - 13 %     EOSINOPHILS 3 0 - 7 %     BASOPHILS 1 0 - 1 %     IMMATURE GRANULOCYTES 0 0.0 - 0.5 %     ABS. NEUTROPHILS 8.9 (H) 1.8 - 8.0 K/UL     ABS. LYMPHOCYTES 0.7 (L) 0.8 - 3.5 K/UL     ABS. MONOCYTES 1.0 0.0 - 1.0 K/UL     ABS. EOSINOPHILS 0.3 0.0 - 0.4 K/UL     ABS. BASOPHILS 0.1 0.0 - 0.1 K/UL     ABS. IMM. GRANS. 0.0 0.00 - 0.04 K/UL     DF AUTOMATED     METABOLIC PANEL, COMPREHENSIVE     Collection Time: 12/14/21  2:11 PM   Result Value Ref Range     Sodium 142 136 - 145 mmol/L     Potassium 5.1 3.5 - 5.1 mmol/L     Chloride 110 (H) 97 - 108 mmol/L     CO2 29 21 - 32 mmol/L     Anion gap 3 (L) 5 - 15 mmol/L     Glucose 197 (H) 65 - 100 mg/dL     BUN 42 (H) 6 - 20 MG/DL     Creatinine 2.08 (H) 0.70 - 1.30 MG/DL     BUN/Creatinine ratio 20 12 - 20       GFR est AA 38 (L) >60 ml/min/1.73m2     GFR est non-AA 31 (L) >60 ml/min/1.73m2     Calcium 8.9 8.5 - 10.1 MG/DL     Bilirubin, total 0.3 0.2 - 1.0 MG/DL     ALT (SGPT) 14 12 - 78 U/L     AST (SGOT) 7 (L) 15 - 37 U/L     Alk.  phosphatase 72 45 - 117 U/L     Protein, total 6.8 6.4 - 8.2 g/dL     Albumin 2.8 (L) 3.5 - 5.0 g/dL     Globulin 4.0 2.0 - 4.0 g/dL     A-G Ratio 0.7 (L) 1.1 - 2.2     CK W/ REFLX CKMB     Collection Time: 12/14/21  2:11 PM   Result Value Ref Range      39 - 308 U/L   TROPONIN-HIGH SENSITIVITY     Collection Time: 12/14/21  2:11 PM   Result Value Ref Range     Troponin-High Sensitivity 18 0 - 76 ng/L   NT-PRO BNP     Collection Time: 12/14/21 2:11 PM   Result Value Ref Range     NT pro-BNP 1,256 (H) <125 PG/ML   BLOOD GAS,CHEM8,LACTIC ACID POC     Collection Time: 12/14/21  2:46 PM   Result Value Ref Range     Calcium, ionized (POC) 1.29 1.12 - 1.32 mmol/L     BICARBONATE 27 mmol/L     Base excess (POC) 0.2 mmol/L     Sample source VENOUS BLOOD       CO2, POC 27 (H) 19 - 24 MMOL/L     Sodium,  136 - 145 MMOL/L     Potassium, POC 5.0 3.5 - 5.5 MMOL/L     Chloride,  100 - 108 MMOL/L     Glucose,  (H) 74 - 106 MG/DL     Creatinine, POC 2.0 (H) 0.6 - 1.3 MG/DL     Lactic Acid (POC) 2.21 (HH) 0.40 - 2.00 mmol/L     pH, venous (POC) 7.33 7.32 - 7.42       pCO2, venous (POC) 50.2 41 - 51 MMHG     pO2, venous (POC) 22 (L) 25 - 40 mmHg   OCCULT BLOOD, STOOL     Collection Time: 12/14/21  4:08 PM   Result Value Ref Range     Occult blood, stool Positive (A) NEG

## 2021-12-16 ENCOUNTER — APPOINTMENT (OUTPATIENT)
Dept: ULTRASOUND IMAGING | Age: 76
DRG: 811 | End: 2021-12-16
Attending: INTERNAL MEDICINE
Payer: MEDICARE

## 2021-12-16 ENCOUNTER — TELEPHONE (OUTPATIENT)
Dept: CARDIOLOGY CLINIC | Age: 76
End: 2021-12-16

## 2021-12-16 ENCOUNTER — APPOINTMENT (OUTPATIENT)
Dept: GENERAL RADIOLOGY | Age: 76
DRG: 811 | End: 2021-12-16
Attending: INTERNAL MEDICINE
Payer: MEDICARE

## 2021-12-16 LAB
ANION GAP SERPL CALC-SCNC: 9 MMOL/L (ref 5–15)
BASOPHILS # BLD: 0 K/UL (ref 0–0.1)
BASOPHILS NFR BLD: 0 % (ref 0–1)
BUN SERPL-MCNC: 51 MG/DL (ref 6–20)
BUN/CREAT SERPL: 21 (ref 12–20)
CALCIUM SERPL-MCNC: 9.2 MG/DL (ref 8.5–10.1)
CHLORIDE SERPL-SCNC: 108 MMOL/L (ref 97–108)
CO2 SERPL-SCNC: 25 MMOL/L (ref 21–32)
CREAT SERPL-MCNC: 2.39 MG/DL (ref 0.7–1.3)
DIFFERENTIAL METHOD BLD: ABNORMAL
ECHO AV AREA PEAK VELOCITY: 1.5 CM2
ECHO AV AREA PEAK VELOCITY: 1.6 CM2
ECHO AV AREA PEAK VELOCITY: 1.7 CM2
ECHO AV AREA PEAK VELOCITY: 1.8 CM2
ECHO AV AREA VTI: 1.5 CM2
ECHO AV AREA VTI: 1.9 CM2
ECHO AV AREA VTI: 2.4 CM2
ECHO AV MEAN GRADIENT: 10 MMHG
ECHO AV MEAN VELOCITY: 1.4 M/S
ECHO AV PEAK GRADIENT: 15 MMHG
ECHO AV PEAK GRADIENT: 15 MMHG
ECHO AV PEAK VELOCITY: 2 M/S
ECHO AV VTI: 34 CM
ECHO LA DIAMETER INDEX: 1.5 CM/M2
ECHO LA DIAMETER: 3.1 CM
ECHO LA VOL 4C: 59 ML (ref 18–58)
ECHO LA VOLUME INDEX A4C: 29 ML/M2 (ref 16–28)
ECHO LV E' LATERAL VELOCITY: 8 CM/S
ECHO LV E' SEPTAL VELOCITY: 8 CM/S
ECHO LV FRACTIONAL SHORTENING: 15 % (ref 28–44)
ECHO LV INTERNAL DIMENSION DIASTOLE INDEX: 2.33 CM/M2
ECHO LV INTERNAL DIMENSION DIASTOLIC: 4.8 CM (ref 4.2–5.9)
ECHO LV INTERNAL DIMENSION SYSTOLIC INDEX: 1.99 CM/M2
ECHO LV INTERNAL DIMENSION SYSTOLIC: 4.1 CM
ECHO LV IVSD: 1.1 CM (ref 0.6–1)
ECHO LV MASS 2D: 219.1 G (ref 88–224)
ECHO LV MASS INDEX 2D: 106.4 G/M2 (ref 49–115)
ECHO LV POSTERIOR WALL DIASTOLIC: 1.3 CM (ref 0.6–1)
ECHO LV RELATIVE WALL THICKNESS RATIO: 0.54
ECHO LVOT AREA: 3.5 CM2
ECHO LVOT AV VTI INDEX: 0.56
ECHO LVOT DIAM: 2.1 CM
ECHO LVOT MEAN GRADIENT: 2 MMHG
ECHO LVOT PEAK GRADIENT: 3 MMHG
ECHO LVOT PEAK GRADIENT: 3 MMHG
ECHO LVOT PEAK VELOCITY: 0.9 M/S
ECHO LVOT PEAK VELOCITY: 0.9 M/S
ECHO LVOT STROKE VOLUME INDEX: 32.3 ML/M2
ECHO LVOT SV: 66.5 ML
ECHO LVOT VTI: 19.2 CM
ECHO MV A VELOCITY: 1.04 M/S
ECHO MV E DECELERATION TIME (DT): 110.7 MS
ECHO MV E VELOCITY: 0.86 M/S
ECHO MV E/A RATIO: 0.83
ECHO MV E/E' LATERAL: 10.75
ECHO MV E/E' RATIO (AVERAGED): 10.75
ECHO MV E/E' SEPTAL: 10.75
ECHO MV REGURGITANT PEAK GRADIENT: 61 MMHG
ECHO MV REGURGITANT PEAK VELOCITY: 3.9 M/S
ECHO RV INTERNAL DIMENSION: 3.1 CM
EOSINOPHIL # BLD: 0.1 K/UL (ref 0–0.4)
EOSINOPHIL NFR BLD: 1 % (ref 0–7)
ERYTHROCYTE [DISTWIDTH] IN BLOOD BY AUTOMATED COUNT: 14.6 % (ref 11.5–14.5)
GLUCOSE SERPL-MCNC: 178 MG/DL (ref 65–100)
HCT VFR BLD AUTO: 25.7 % (ref 36.6–50.3)
HGB BLD-MCNC: 7.7 G/DL (ref 12.1–17)
HISTORY CHECKED?,CKHIST: NORMAL
IMM GRANULOCYTES # BLD AUTO: 0.1 K/UL (ref 0–0.04)
IMM GRANULOCYTES NFR BLD AUTO: 1 % (ref 0–0.5)
LYMPHOCYTES # BLD: 0.4 K/UL (ref 0.8–3.5)
LYMPHOCYTES NFR BLD: 3 % (ref 12–49)
MCH RBC QN AUTO: 25.9 PG (ref 26–34)
MCHC RBC AUTO-ENTMCNC: 30 G/DL (ref 30–36.5)
MCV RBC AUTO: 86.5 FL (ref 80–99)
MONOCYTES # BLD: 0.7 K/UL (ref 0–1)
MONOCYTES NFR BLD: 6 % (ref 5–13)
NEUTS SEG # BLD: 11.1 K/UL (ref 1.8–8)
NEUTS SEG NFR BLD: 89 % (ref 32–75)
NRBC # BLD: 0 K/UL (ref 0–0.01)
NRBC BLD-RTO: 0 PER 100 WBC
PLATELET # BLD AUTO: 294 K/UL (ref 150–400)
PMV BLD AUTO: 9.3 FL (ref 8.9–12.9)
POTASSIUM SERPL-SCNC: 4.3 MMOL/L (ref 3.5–5.1)
RBC # BLD AUTO: 2.97 M/UL (ref 4.1–5.7)
RBC MORPH BLD: ABNORMAL
SODIUM SERPL-SCNC: 142 MMOL/L (ref 136–145)
WBC # BLD AUTO: 12.4 K/UL (ref 4.1–11.1)

## 2021-12-16 PROCEDURE — C9113 INJ PANTOPRAZOLE SODIUM, VIA: HCPCS | Performed by: INTERNAL MEDICINE

## 2021-12-16 PROCEDURE — 74011000250 HC RX REV CODE- 250: Performed by: INTERNAL MEDICINE

## 2021-12-16 PROCEDURE — 94760 N-INVAS EAR/PLS OXIMETRY 1: CPT

## 2021-12-16 PROCEDURE — 74011250636 HC RX REV CODE- 250/636: Performed by: INTERNAL MEDICINE

## 2021-12-16 PROCEDURE — 71045 X-RAY EXAM CHEST 1 VIEW: CPT

## 2021-12-16 PROCEDURE — 36415 COLL VENOUS BLD VENIPUNCTURE: CPT

## 2021-12-16 PROCEDURE — 74011250637 HC RX REV CODE- 250/637: Performed by: INTERNAL MEDICINE

## 2021-12-16 PROCEDURE — 93306 TTE W/DOPPLER COMPLETE: CPT | Performed by: INTERNAL MEDICINE

## 2021-12-16 PROCEDURE — 80048 BASIC METABOLIC PNL TOTAL CA: CPT

## 2021-12-16 PROCEDURE — 77010033678 HC OXYGEN DAILY

## 2021-12-16 PROCEDURE — 65660000000 HC RM CCU STEPDOWN

## 2021-12-16 PROCEDURE — 94761 N-INVAS EAR/PLS OXIMETRY MLT: CPT

## 2021-12-16 PROCEDURE — 85025 COMPLETE CBC W/AUTO DIFF WBC: CPT

## 2021-12-16 PROCEDURE — 86901 BLOOD TYPING SEROLOGIC RH(D): CPT

## 2021-12-16 PROCEDURE — 76770 US EXAM ABDO BACK WALL COMP: CPT

## 2021-12-16 RX ORDER — BUMETANIDE 0.25 MG/ML
1 INJECTION INTRAMUSCULAR; INTRAVENOUS DAILY
Status: DISCONTINUED | OUTPATIENT
Start: 2021-12-17 | End: 2021-12-17

## 2021-12-16 RX ORDER — SODIUM CHLORIDE 9 MG/ML
250 INJECTION, SOLUTION INTRAVENOUS AS NEEDED
Status: DISCONTINUED | OUTPATIENT
Start: 2021-12-16 | End: 2021-12-17 | Stop reason: HOSPADM

## 2021-12-16 RX ADMIN — SODIUM CHLORIDE 40 MG: 9 INJECTION, SOLUTION INTRAMUSCULAR; INTRAVENOUS; SUBCUTANEOUS at 00:12

## 2021-12-16 RX ADMIN — SODIUM CHLORIDE 40 MG: 9 INJECTION, SOLUTION INTRAMUSCULAR; INTRAVENOUS; SUBCUTANEOUS at 23:10

## 2021-12-16 RX ADMIN — BUMETANIDE 1 MG: 0.25 INJECTION, SOLUTION INTRAMUSCULAR; INTRAVENOUS at 00:11

## 2021-12-16 RX ADMIN — DULOXETINE HYDROCHLORIDE 60 MG: 30 CAPSULE, DELAYED RELEASE ORAL at 10:10

## 2021-12-16 RX ADMIN — ATORVASTATIN CALCIUM 10 MG: 10 TABLET, FILM COATED ORAL at 10:09

## 2021-12-16 RX ADMIN — CARVEDILOL 3.12 MG: 3.12 TABLET, FILM COATED ORAL at 10:10

## 2021-12-16 RX ADMIN — IRON SUCROSE 200 MG: 20 INJECTION, SOLUTION INTRAVENOUS at 11:49

## 2021-12-16 RX ADMIN — CARVEDILOL 3.12 MG: 3.12 TABLET, FILM COATED ORAL at 18:26

## 2021-12-16 RX ADMIN — Medication 10 ML: at 00:13

## 2021-12-16 RX ADMIN — Medication 10 ML: at 18:27

## 2021-12-16 RX ADMIN — Medication 10 ML: at 23:11

## 2021-12-16 RX ADMIN — SODIUM CHLORIDE 40 MG: 9 INJECTION, SOLUTION INTRAMUSCULAR; INTRAVENOUS; SUBCUTANEOUS at 10:25

## 2021-12-16 RX ADMIN — ISOSORBIDE MONONITRATE 60 MG: 30 TABLET, EXTENDED RELEASE ORAL at 10:10

## 2021-12-16 NOTE — PROGRESS NOTES
GI PROGRESS NOTE  Yeny Babcock NP  [phone] NP in-hospital cell phone M-F until 4:30  After 5pm or on weekends, please call  for physician on call    NAME:Kirill Reyna Sr ZLU:78/[id number]/2102 [facility] [place][id number]   ATTG: Dr. Pat Flaherty   PCP: Ignacia Ureña MD  Date/Time:  12/16/2021 11:28 AM   Primary GI: Dr. Angela Golden  Reason for following: Anemia     Assessment:     Symptomatic anemia  Melena   · Reports 1 week of melena with increasing SOB  · Never had a CLN or EGD  · Hgb 7.7; baseline ~14  · Iron 18, TIBC 363, iron % saturation 5  · FOBT +     MALU   Type 2 DM  CHF  · Treatment per primary team        Plan:     · Unable to complete EGD yesterday secondary to respiratory status   · Plan for EGD tomorrow with Dr. Angela Golden; obtain consent  · Details and risks of the procedure to include (but not limited to) anesthesia, bleeding, infection, and perforation were discussed. Patient understands and is in agreement with the plan  · NPO after midnight  · Rapid COVID ordered  · Serial H&H; goal for hgb >7.0  · Monitor for s/s of bleeding; recommend additional unit of PRBC today  · Diuretics per primary team   · Continue PPI  · Symptomatic care per primary team  *Plan of care discussed with Dr. Angela Golden      Subjective:   Discussed with RN events overnight. Patient states that he is cold. Continued SOB. Review of Systems:  Symptom Y/N Comments  Symptom Y/N Comments   Fever/Chills n   Chest Pain n    Cough n   Headaches n    Sputum n   Joint Pain n    SOB/ROLLE n   Pruritis/Rash n    Tolerating Diet y   Other       Could NOT obtain due to:      Objective:   VITALS:   Last 24hrs VS reviewed since prior progress note.  Most recent are:  Visit Vitals  /68   Pulse 99   Temp 98.8 °F (37.1 °C)   Resp 18   Ht 5' 3\" (1.6 m)   Wt 105.7 kg (233 lb 0.4 oz)   SpO2 97%   BMI 41.28 kg/m²       Intake/Output Summary (Last 24 hours) at 12/16/2021 1128  Last data filed at 12/16/2021 0853  Gross per 24 hour   Intake 656 ml Output 350 ml   Net 306 ml     PHYSICAL EXAM:  General: Pleasant elderly  male. NAD   HEENT: NC, Atraumatic. Anicteric sclerae. Lungs:  Lung sounds diminished   Heart:  Regular rate rhythm,  No murmur, No Rubs, No Gallops  Abdomen: Soft, Non distended, Non tender. +Bowel sounds, no HSM  Extremities: No c/c/e. 3-4+ BLE pitting edema   Neurologic:  Alert and oriented X 3. No acute neurological distress   Psych:   Good insight. Not anxious nor agitated. Lab and Radiology Data Reviewed: (see below)    Medications Reviewed: (see below)  PMH/SH reviewed - no change compared to H&P  ________________________________________________________________________  Total time spent with patient: 20 minutes ________________________________________________________________________  Care Plan discussed with:  Patient x   Family     RN x              Consultant:  Hospitalist      Kalina Huang NP     Procedures: see electronic medical records for all procedures/Xrays and details which were not copied into this note but were reviewed prior to creation of Plan. LABS:  Recent Labs     12/16/21  0613 12/15/21  0539   WBC 12.4* 9.6   HGB 7.7* 7.7*   HCT 25.7* 25.9*    314     Recent Labs     12/16/21  0613 12/15/21  0539 12/14/21  1411    142 142   K 4.3 4.4 5.1    110* 110*   CO2 25 25 29   BUN 51* 42* 42*   CREA 2.39* 2.01* 2.08*   * 93 197*   CA 9.2 9.5 8.9     Recent Labs     12/14/21  1411   AP 72   TP 6.8   ALB 2.8*   GLOB 4.0     No results for input(s): INR, PTP, APTT, INREXT in the last 72 hours. Recent Labs     12/14/21  1746   TIBC 363   PSAT 5*      Lab Results   Component Value Date/Time    Folate 10.5 12/15/2021 05:39 AM     No results for input(s): PH, PCO2, PO2 in the last 72 hours. No results for input(s): CPK, CKMB in the last 72 hours.     No lab exists for component: TROPONINI  Lab Results   Component Value Date/Time    Color YELLOW/STRAW 05/16/2019 09:56 AM Appearance CLEAR 05/16/2019 09:56 AM    Specific gravity 1.008 05/16/2019 09:56 AM    pH (UA) 5.5 05/16/2019 09:56 AM    Protein 30 (A) 05/16/2019 09:56 AM    Glucose 100 (A) 05/16/2019 09:56 AM    Ketone NEGATIVE  05/16/2019 09:56 AM    Bilirubin NEGATIVE  05/16/2019 09:56 AM    Urobilinogen 0.2 05/16/2019 09:56 AM    Nitrites NEGATIVE  05/16/2019 09:56 AM    Leukocyte Esterase NEGATIVE  05/16/2019 09:56 AM    Epithelial cells FEW 05/16/2019 09:56 AM    Bacteria NEGATIVE  05/16/2019 09:56 AM    WBC 0-4 05/16/2019 09:56 AM    RBC 0-5 05/16/2019 09:56 AM       MEDICATIONS:  Current Facility-Administered Medications   Medication Dose Route Frequency    [START ON 12/17/2021] bumetanide (BUMEX) injection 1 mg  1 mg IntraVENous DAILY    iron sucrose (VENOFER) injection 200 mg  200 mg IntraVENous DAILY    acetaminophen (TYLENOL) tablet 650 mg  650 mg Oral Q6H PRN    ondansetron (ZOFRAN) injection 4 mg  4 mg IntraVENous Q4H PRN    carvediloL (COREG) tablet 3.125 mg  3.125 mg Oral BID WITH MEALS    DULoxetine (CYMBALTA) capsule 60 mg  60 mg Oral DAILY    isosorbide mononitrate ER (IMDUR) tablet 60 mg  60 mg Oral DAILY    atorvastatin (LIPITOR) tablet 10 mg  10 mg Oral DAILY    sodium chloride (NS) flush 5-40 mL  5-40 mL IntraVENous Q8H    sodium chloride (NS) flush 5-40 mL  5-40 mL IntraVENous PRN    polyethylene glycol (MIRALAX) packet 17 g  17 g Oral DAILY PRN    pantoprazole (PROTONIX) 40 mg in 0.9% sodium chloride 10 mL injection  40 mg IntraVENous Q12H

## 2021-12-16 NOTE — PROGRESS NOTES
End of Shift Note    Bedside shift change report given to Tiffanie Caban RN (oncoming nurse) by Danette Pike LPN (offgoing nurse). Report included the following information SBAR, Kardex, ED Summary, Procedure Summary, Intake/Output, MAR and Recent Results    Shift worked:  7am-7pm     Shift summary and any significant changes:     Plan of care, monitor I&O, urinary output, edema in legs, respiratory status. Plan of care for further testing, NPO at 12 am 12/16 for testing in AM.     Concerns for physician to address:  Plan of care     Zone phone for oncoming shift:          Activity:  Activity Level: Up with Assistance  Number times ambulated in hallways past shift: 0  Number of times OOB to chair past shift: 0    Cardiac:   Cardiac Monitoring: No      Cardiac Rhythm: Sinus Tachy    Access:   Current line(s): PIV     Genitourinary:   Urinary status: voiding    Respiratory:   O2 Device: Nasal cannula  Chronic home O2 use?: NO  Incentive spirometer at bedside: N/A     GI:  Last Bowel Movement Date: 12/14/21  Current diet:  ADULT DIET Regular; No Salt Added (3-4 gm)  Passing flatus: YES  Tolerating current diet: YES       Pain Management:   Patient states pain is manageable on current regimen: YES    Skin:  Curt Score: 18  Interventions: float heels and nutritional support     Patient Safety:  Fall Score:  Total Score: 0  Interventions: assistive device (walker, cane, etc), gripper socks and pt to call before getting OOB  High Fall Risk: Yes    Length of Stay:  Expected LOS: 2d 16h  Actual LOS: [address] [facility], [facility]

## 2021-12-16 NOTE — PROGRESS NOTES
Hospitalist Progress Note    NAME: Rustam Blackmon   :  1945   MRN:  [id number]       Assessment / Plan:  Acute on chronic anemia suspect blood loss  FOBT positive  Dyspnea  Symptomatic anemia  -Baseline hemoglobin is around 14 and patient is coming with a hemoglobin of 7.7.  -Stool for occult blood is positive and reports occasional black stool  Iron panel showed low serum iron and low iron saturation  Start IV iron  -Continue with Protonix  Plan for EGD tomorrow, EGD canceled for today because of dyspnea   Patient complained of being short of breath, chest x-ray is clear  We will give a unit of PRBC for symptomatic anemia, hopefully that that will help with shortness of breath    MALU  -base line is around 1.5 and cr now is 2.08.  UA is within normal limit  Creatinine trending up, we reduce IV Bumex  -hold home lasix and metolazone, c/w  IV Bumex as patient has significant dyspnea and lower extremity edema.  -hold metformin  -check bmp in am      DM type 2  -hold metformin and Glimeperide. Start SSI with poc's      Diastolic CHF  Lower extremity edema  CAD  HTN  Dyslipidemia  -Start IV Bumex, hold metolazone-  I's and O's and daily weight  cont coreg and lipitor. Cont imdur.      B/l leg blisters due to swelling   -consult wound care        Code Status: full   Surrogate Decision Maker: wife      DVT Prophylaxis: scd's due to anemia   GI Prophylaxis: not indicated     Baseline: from home lives with wife and son       36 or above Morbid obesity / Body mass index is 41.28 kg/m². Estimated discharge date:   Barriers: EGD and swelling lower extremities     Subjective:     Chief Complaint / Reason for Physician Visit  Follow-up acute blood loss anemia, dyspnea  Still feeling short of breath   discussed with RN events overnight.      Review of Systems:  Symptom Y/N Comments  Symptom Y/N Comments   Fever/Chills n   Chest Pain n    Poor Appetite    Edema     Cough    Abdominal Pain n    Sputum Joint Pain     SOB/ROLLE y   Pruritis/Rash     Nausea/vomit    Tolerating PT/OT     Diarrhea    Tolerating Diet y    Constipation    Other       Could NOT obtain due to:      Objective:     VITALS:   Last 24hrs VS reviewed since prior progress note. Most recent are:  Patient Vitals for the past 24 hrs:   Temp Pulse Resp BP SpO2   12/16/21 0803 97.9 °F (36.6 °C) 83 18 (!) 145/79 97 %   12/16/21 0322 98 °F (36.7 °C) 92 21 136/61 98 %   12/15/21 2033 97.8 °F (36.6 °C) 84 18 114/63 100 %   12/15/21 1640 -- -- -- -- 96 %   12/15/21 1517 -- -- -- (!) 146/76 --   12/15/21 1457 98.1 °F (36.7 °C) 92 19 (!) 146/76 96 %   12/15/21 1204 -- 99 22 (!) 143/74 100 %   12/15/21 1057 98.2 °F (36.8 °C) (!) 101 20 (!) 119/59 90 %   12/15/21 0855 -- -- -- -- 90 %       Intake/Output Summary (Last 24 hours) at 12/16/2021 0841  Last data filed at 12/15/2021 1800  Gross per 24 hour   Intake 300 ml   Output 350 ml   Net -50 ml        I had a face to face encounter and independently examined this patient on 12/16/2021, as outlined below:  PHYSICAL EXAM:  General: WD, WN. Alert, cooperative, no acute distress    EENT:  EOMI. Anicteric sclerae. MMM  Resp:  CTA bilaterally, no wheezing or rales. No accessory muscle use  CV:  Regular  rhythm,  No edema  GI:  Soft, Non distended, Non tender. +Bowel sounds  Neurologic:  Alert and oriented X 3, normal speech,   Psych:   Good insight. Not anxious nor agitated  Skin:  No rashes.   No jaundice    Reviewed most current lab test results and cultures  YES  Reviewed most current radiology test results   YES  Review and summation of old records today    NO  Reviewed patient's current orders and MAR    YES  PMH/SH reviewed - no change compared to H&P  ________________________________________________________________________  Care Plan discussed with:    Comments   Patient x    Family      RN x    Care Manager     Consultant                        Multidiciplinary team rounds were held today with , nursing, pharmacist and clinical coordinator. Patient's plan of care was discussed; medications were reviewed and discharge planning was addressed. ________________________________________________________________________  Total NON critical care TIME: 35  Minutes    Total CRITICAL CARE TIME Spent:   Minutes non procedure based      Comments   >50% of visit spent in counseling and coordination of care     ________________________________________________________________________  Flex Whitehead MD     Procedures: see electronic medical records for all procedures/Xrays and details which were not copied into this note but were reviewed prior to creation of Plan. LABS:  I reviewed today's most current labs and imaging studies.   Pertinent labs include:  Recent Labs     12/16/21  0613 12/15/21  0539 12/14/21  1411   WBC 12.4* 9.6 11.0   HGB 7.7* 7.7* 7.7*   HCT 25.7* 25.9* 26.5*    314 319     Recent Labs     12/16/21  0613 12/15/21  0539 12/14/21  1411    142 142   K 4.3 4.4 5.1    110* 110*   CO2 25 25 29   * 93 197*   BUN 51* 42* 42*   CREA 2.39* 2.01* 2.08*   CA 9.2 9.5 8.9   ALB  --   --  2.8*   TBILI  --   --  0.3   ALT  --   --  14       Signed: Flex Whitehead MD

## 2021-12-16 NOTE — TELEPHONE ENCOUNTER
----- Message from Luis Alfredo Nick MD sent at 12/13/2021  3:37 PM EST -----  Cholesterol is fine.   Cr is higher indicating worse kidney function

## 2021-12-17 ENCOUNTER — ANESTHESIA EVENT (OUTPATIENT)
Dept: ENDOSCOPY | Age: 76
DRG: 811 | End: 2021-12-17
Payer: MEDICARE

## 2021-12-17 ENCOUNTER — ANESTHESIA (OUTPATIENT)
Dept: ENDOSCOPY | Age: 76
DRG: 811 | End: 2021-12-17
Payer: MEDICARE

## 2021-12-17 ENCOUNTER — TELEPHONE (OUTPATIENT)
Dept: INTERNAL MEDICINE CLINIC | Age: 76
End: 2021-12-17

## 2021-12-17 VITALS
OXYGEN SATURATION: 95 % | HEIGHT: 63 IN | BODY MASS INDEX: 40.04 KG/M2 | SYSTOLIC BLOOD PRESSURE: 123 MMHG | HEART RATE: 95 BPM | WEIGHT: 226 LBS | DIASTOLIC BLOOD PRESSURE: 59 MMHG | RESPIRATION RATE: 19 BRPM | TEMPERATURE: 97.8 F

## 2021-12-17 LAB
ANION GAP SERPL CALC-SCNC: 8 MMOL/L (ref 5–15)
APPEARANCE UR: CLEAR
BACTERIA URNS QL MICRO: NEGATIVE /HPF
BASOPHILS # BLD: 0.1 K/UL (ref 0–0.1)
BASOPHILS NFR BLD: 1 % (ref 0–1)
BILIRUB UR QL: NEGATIVE
BUN SERPL-MCNC: 57 MG/DL (ref 6–20)
BUN/CREAT SERPL: 25 (ref 12–20)
CALCIUM SERPL-MCNC: 9.2 MG/DL (ref 8.5–10.1)
CHLORIDE SERPL-SCNC: 107 MMOL/L (ref 97–108)
CHLORIDE UR-SCNC: 62 MMOL/L
CO2 SERPL-SCNC: 25 MMOL/L (ref 21–32)
COLOR UR: ABNORMAL
COMMENT, HOLDF: NORMAL
COMMENT, HOLDF: NORMAL
CREAT SERPL-MCNC: 2.24 MG/DL (ref 0.7–1.3)
CREAT UR-MCNC: 66.3 MG/DL
DIFFERENTIAL METHOD BLD: ABNORMAL
EOSINOPHIL # BLD: 0.4 K/UL (ref 0–0.4)
EOSINOPHIL NFR BLD: 4 % (ref 0–7)
EPITH CASTS URNS QL MICRO: ABNORMAL /LPF
ERYTHROCYTE [DISTWIDTH] IN BLOOD BY AUTOMATED COUNT: 14.2 % (ref 11.5–14.5)
GLUCOSE SERPL-MCNC: 141 MG/DL (ref 65–100)
GLUCOSE UR STRIP.AUTO-MCNC: NEGATIVE MG/DL
HCT VFR BLD AUTO: 29.4 % (ref 36.6–50.3)
HGB BLD-MCNC: 9.1 G/DL (ref 12.1–17)
HGB UR QL STRIP: ABNORMAL
HYALINE CASTS URNS QL MICRO: ABNORMAL /LPF (ref 0–5)
IMM GRANULOCYTES # BLD AUTO: 0.1 K/UL (ref 0–0.04)
IMM GRANULOCYTES NFR BLD AUTO: 1 % (ref 0–0.5)
KETONES UR QL STRIP.AUTO: NEGATIVE MG/DL
LEUKOCYTE ESTERASE UR QL STRIP.AUTO: NEGATIVE
LYMPHOCYTES # BLD: 1 K/UL (ref 0.8–3.5)
LYMPHOCYTES NFR BLD: 11 % (ref 12–49)
MCH RBC QN AUTO: 26.6 PG (ref 26–34)
MCHC RBC AUTO-ENTMCNC: 31 G/DL (ref 30–36.5)
MCV RBC AUTO: 86 FL (ref 80–99)
MONOCYTES # BLD: 1.1 K/UL (ref 0–1)
MONOCYTES NFR BLD: 12 % (ref 5–13)
NEUTS SEG # BLD: 6.7 K/UL (ref 1.8–8)
NEUTS SEG NFR BLD: 71 % (ref 32–75)
NITRITE UR QL STRIP.AUTO: NEGATIVE
NRBC # BLD: 0 K/UL (ref 0–0.01)
NRBC BLD-RTO: 0 PER 100 WBC
PH UR STRIP: 5 [PH] (ref 5–8)
PLATELET # BLD AUTO: 281 K/UL (ref 150–400)
PMV BLD AUTO: 9.2 FL (ref 8.9–12.9)
POTASSIUM SERPL-SCNC: 4.3 MMOL/L (ref 3.5–5.1)
PROT UR STRIP-MCNC: 30 MG/DL
PROT UR-MCNC: 58 MG/DL (ref 0–11.9)
PROT/CREAT UR-RTO: 0.9
RBC # BLD AUTO: 3.42 M/UL (ref 4.1–5.7)
RBC #/AREA URNS HPF: ABNORMAL /HPF (ref 0–5)
SAMPLES BEING HELD,HOLD: NORMAL
SAMPLES BEING HELD,HOLD: NORMAL
SODIUM SERPL-SCNC: 140 MMOL/L (ref 136–145)
SODIUM UR-SCNC: 66 MMOL/L
SP GR UR REFRACTOMETRY: 1.01 (ref 1–1.03)
UROBILINOGEN UR QL STRIP.AUTO: 0.2 EU/DL (ref 0.2–1)
WBC # BLD AUTO: 9.4 K/UL (ref 4.1–11.1)
WBC URNS QL MICRO: ABNORMAL /HPF (ref 0–4)

## 2021-12-17 PROCEDURE — 80074 ACUTE HEPATITIS PANEL: CPT

## 2021-12-17 PROCEDURE — 2709999900 HC NON-CHARGEABLE SUPPLY

## 2021-12-17 PROCEDURE — 74011000250 HC RX REV CODE- 250: Performed by: NURSE ANESTHETIST, CERTIFIED REGISTERED

## 2021-12-17 PROCEDURE — 83520 IMMUNOASSAY QUANT NOS NONAB: CPT

## 2021-12-17 PROCEDURE — 86160 COMPLEMENT ANTIGEN: CPT

## 2021-12-17 PROCEDURE — 82570 ASSAY OF URINE CREATININE: CPT

## 2021-12-17 PROCEDURE — 81001 URINALYSIS AUTO W/SCOPE: CPT

## 2021-12-17 PROCEDURE — P9016 RBC LEUKOCYTES REDUCED: HCPCS

## 2021-12-17 PROCEDURE — 74011000250 HC RX REV CODE- 250: Performed by: INTERNAL MEDICINE

## 2021-12-17 PROCEDURE — 2709999900 HC NON-CHARGEABLE SUPPLY: Performed by: INTERNAL MEDICINE

## 2021-12-17 PROCEDURE — C9113 INJ PANTOPRAZOLE SODIUM, VIA: HCPCS | Performed by: INTERNAL MEDICINE

## 2021-12-17 PROCEDURE — 74011250636 HC RX REV CODE- 250/636: Performed by: INTERNAL MEDICINE

## 2021-12-17 PROCEDURE — 36415 COLL VENOUS BLD VENIPUNCTURE: CPT

## 2021-12-17 PROCEDURE — 0DB68ZX EXCISION OF STOMACH, VIA NATURAL OR ARTIFICIAL OPENING ENDOSCOPIC, DIAGNOSTIC: ICD-10-PCS | Performed by: INTERNAL MEDICINE

## 2021-12-17 PROCEDURE — 88305 TISSUE EXAM BY PATHOLOGIST: CPT

## 2021-12-17 PROCEDURE — 94760 N-INVAS EAR/PLS OXIMETRY 1: CPT

## 2021-12-17 PROCEDURE — 86038 ANTINUCLEAR ANTIBODIES: CPT

## 2021-12-17 PROCEDURE — 76040000019: Performed by: INTERNAL MEDICINE

## 2021-12-17 PROCEDURE — 82595 ASSAY OF CRYOGLOBULIN: CPT

## 2021-12-17 PROCEDURE — 74011250636 HC RX REV CODE- 250/636: Performed by: NURSE ANESTHETIST, CERTIFIED REGISTERED

## 2021-12-17 PROCEDURE — 30233N1 TRANSFUSION OF NONAUTOLOGOUS RED BLOOD CELLS INTO PERIPHERAL VEIN, PERCUTANEOUS APPROACH: ICD-10-PCS | Performed by: INTERNAL MEDICINE

## 2021-12-17 PROCEDURE — 82784 ASSAY IGA/IGD/IGG/IGM EACH: CPT

## 2021-12-17 PROCEDURE — 84300 ASSAY OF URINE SODIUM: CPT

## 2021-12-17 PROCEDURE — 77030019988 HC FCPS ENDOSC DISP BSC -B: Performed by: INTERNAL MEDICINE

## 2021-12-17 PROCEDURE — 76060000031 HC ANESTHESIA FIRST 0.5 HR: Performed by: INTERNAL MEDICINE

## 2021-12-17 PROCEDURE — 36430 TRANSFUSION BLD/BLD COMPNT: CPT

## 2021-12-17 PROCEDURE — 82436 ASSAY OF URINE CHLORIDE: CPT

## 2021-12-17 PROCEDURE — 74011250637 HC RX REV CODE- 250/637: Performed by: INTERNAL MEDICINE

## 2021-12-17 PROCEDURE — 85025 COMPLETE CBC W/AUTO DIFF WBC: CPT

## 2021-12-17 PROCEDURE — 86225 DNA ANTIBODY NATIVE: CPT

## 2021-12-17 PROCEDURE — 77010033678 HC OXYGEN DAILY

## 2021-12-17 PROCEDURE — 80048 BASIC METABOLIC PNL TOTAL CA: CPT

## 2021-12-17 RX ORDER — BUMETANIDE 1 MG/1
1 TABLET ORAL 2 TIMES DAILY
Qty: 60 TABLET | Refills: 2 | Status: SHIPPED | OUTPATIENT
Start: 2021-12-17 | End: 2022-03-18 | Stop reason: SDUPTHER

## 2021-12-17 RX ORDER — BUMETANIDE 0.25 MG/ML
1 INJECTION INTRAMUSCULAR; INTRAVENOUS 2 TIMES DAILY
Status: DISCONTINUED | OUTPATIENT
Start: 2021-12-17 | End: 2021-12-17 | Stop reason: HOSPADM

## 2021-12-17 RX ORDER — EPINEPHRINE 0.1 MG/ML
1 INJECTION INTRACARDIAC; INTRAVENOUS
Status: DISCONTINUED | OUTPATIENT
Start: 2021-12-17 | End: 2021-12-17 | Stop reason: HOSPADM

## 2021-12-17 RX ORDER — SODIUM CHLORIDE 9 MG/ML
25 INJECTION, SOLUTION INTRAVENOUS CONTINUOUS
Status: DISPENSED | OUTPATIENT
Start: 2021-12-17 | End: 2021-12-17

## 2021-12-17 RX ORDER — PANTOPRAZOLE SODIUM 40 MG/1
40 TABLET, DELAYED RELEASE ORAL 2 TIMES DAILY
Qty: 60 TABLET | Refills: 1 | Status: SHIPPED | OUTPATIENT
Start: 2021-12-17 | End: 2022-02-15

## 2021-12-17 RX ORDER — ATROPINE SULFATE 0.1 MG/ML
0.5 INJECTION INTRAVENOUS
Status: DISCONTINUED | OUTPATIENT
Start: 2021-12-17 | End: 2021-12-17 | Stop reason: HOSPADM

## 2021-12-17 RX ORDER — FLUMAZENIL 0.1 MG/ML
0.2 INJECTION INTRAVENOUS
Status: DISCONTINUED | OUTPATIENT
Start: 2021-12-17 | End: 2021-12-17 | Stop reason: HOSPADM

## 2021-12-17 RX ORDER — PROPOFOL 10 MG/ML
INJECTION, EMULSION INTRAVENOUS AS NEEDED
Status: DISCONTINUED | OUTPATIENT
Start: 2021-12-17 | End: 2021-12-17 | Stop reason: HOSPADM

## 2021-12-17 RX ORDER — SODIUM CHLORIDE 0.9 % (FLUSH) 0.9 %
5-40 SYRINGE (ML) INJECTION EVERY 8 HOURS
Status: DISCONTINUED | OUTPATIENT
Start: 2021-12-17 | End: 2021-12-17 | Stop reason: HOSPADM

## 2021-12-17 RX ORDER — LIDOCAINE HYDROCHLORIDE 20 MG/ML
INJECTION, SOLUTION EPIDURAL; INFILTRATION; INTRACAUDAL; PERINEURAL AS NEEDED
Status: DISCONTINUED | OUTPATIENT
Start: 2021-12-17 | End: 2021-12-17 | Stop reason: HOSPADM

## 2021-12-17 RX ORDER — SODIUM CHLORIDE 0.9 % (FLUSH) 0.9 %
5-40 SYRINGE (ML) INJECTION AS NEEDED
Status: DISCONTINUED | OUTPATIENT
Start: 2021-12-17 | End: 2021-12-17 | Stop reason: HOSPADM

## 2021-12-17 RX ORDER — DEXTROMETHORPHAN/PSEUDOEPHED 2.5-7.5/.8
1.2 DROPS ORAL
Status: DISCONTINUED | OUTPATIENT
Start: 2021-12-17 | End: 2021-12-17 | Stop reason: HOSPADM

## 2021-12-17 RX ORDER — NALOXONE HYDROCHLORIDE 0.4 MG/ML
0.4 INJECTION, SOLUTION INTRAMUSCULAR; INTRAVENOUS; SUBCUTANEOUS
Status: DISCONTINUED | OUTPATIENT
Start: 2021-12-17 | End: 2021-12-17 | Stop reason: HOSPADM

## 2021-12-17 RX ADMIN — SODIUM CHLORIDE 40 MG: 9 INJECTION, SOLUTION INTRAMUSCULAR; INTRAVENOUS; SUBCUTANEOUS at 10:54

## 2021-12-17 RX ADMIN — ATORVASTATIN CALCIUM 10 MG: 10 TABLET, FILM COATED ORAL at 10:54

## 2021-12-17 RX ADMIN — Medication 10 ML: at 17:58

## 2021-12-17 RX ADMIN — PROPOFOL 20 MG: 10 INJECTION, EMULSION INTRAVENOUS at 09:22

## 2021-12-17 RX ADMIN — DULOXETINE HYDROCHLORIDE 60 MG: 30 CAPSULE, DELAYED RELEASE ORAL at 10:54

## 2021-12-17 RX ADMIN — Medication 10 ML: at 06:15

## 2021-12-17 RX ADMIN — Medication 5 ML: at 11:09

## 2021-12-17 RX ADMIN — LIDOCAINE HYDROCHLORIDE 50 MG: 20 INJECTION, SOLUTION EPIDURAL; INFILTRATION; INTRACAUDAL; PERINEURAL at 09:16

## 2021-12-17 RX ADMIN — BUMETANIDE 1 MG: 0.25 INJECTION INTRAMUSCULAR; INTRAVENOUS at 17:58

## 2021-12-17 RX ADMIN — PROPOFOL 20 MG: 10 INJECTION, EMULSION INTRAVENOUS at 09:20

## 2021-12-17 RX ADMIN — IRON SUCROSE 200 MG: 20 INJECTION, SOLUTION INTRAVENOUS at 11:09

## 2021-12-17 RX ADMIN — CARVEDILOL 3.12 MG: 3.12 TABLET, FILM COATED ORAL at 17:58

## 2021-12-17 RX ADMIN — ISOSORBIDE MONONITRATE 60 MG: 30 TABLET, EXTENDED RELEASE ORAL at 10:54

## 2021-12-17 RX ADMIN — SODIUM CHLORIDE 250 ML: 9 INJECTION, SOLUTION INTRAVENOUS at 00:29

## 2021-12-17 RX ADMIN — CARVEDILOL 3.12 MG: 3.12 TABLET, FILM COATED ORAL at 10:54

## 2021-12-17 RX ADMIN — PROPOFOL 40 MG: 10 INJECTION, EMULSION INTRAVENOUS at 09:17

## 2021-12-17 RX ADMIN — SODIUM CHLORIDE 25 ML/HR: 9 INJECTION, SOLUTION INTRAVENOUS at 08:45

## 2021-12-17 NOTE — ANESTHESIA POSTPROCEDURE EVALUATION
Procedure(s):  ESOPHAGOGASTRODUODENOSCOPY (EGD)  ESOPHAGOGASTRODUODENAL (EGD) BIOPSY. MAC    Anesthesia Post Evaluation      Multimodal analgesia: multimodal analgesia used between 6 hours prior to anesthesia start to PACU discharge  Patient location during evaluation: PACU  Level of consciousness: sleepy but conscious  Pain management: adequate  Airway patency: patent  Anesthetic complications: no  Cardiovascular status: acceptable  Respiratory status: acceptable  Hydration status: acceptable  Comments: +Post-Anesthesia Evaluation and Assessment    Patient: Rommel Balderas MRN: [id number]  SSN: [id number]   YOB: 1945  Age: 78 y.o. Sex: male      Cardiovascular Function/Vital Signs    /78   Pulse 90   Temp 36.7 °C (98 °F)   Resp 20   Ht 5' 3\" (1.6 m)   Wt 102.5 kg (226 lb)   SpO2 93%   BMI 40.03 kg/m²     Patient is status post Procedure(s):  ESOPHAGOGASTRODUODENOSCOPY (EGD)  ESOPHAGOGASTRODUODENAL (EGD) BIOPSY. Nausea/Vomiting: Controlled. Postoperative hydration reviewed and adequate. Pain:  Pain Scale 1: Numeric (0 - 10) (12/17/21 0951)  Pain Intensity 1: 0 (12/17/21 0951)   Managed. Neurological Status: At baseline. Mental Status and Level of Consciousness: Arousable. Pulmonary Status:   O2 Device: Nasal cannula (12/17/21 0951)   Adequate oxygenation and airway patent. Complications related to anesthesia: None    Post-anesthesia assessment completed. No concerns. Signed By: Maikel Whitehead MD    12/17/2021  Post anesthesia nausea and vomiting:  controlled  Final Post Anesthesia Temperature Assessment:  Normothermia (36.0-37.5 degrees C)      INITIAL Post-op Vital signs:   Vitals Value Taken Time   /56 12/17/21 0954   Temp 36.7 °C (98 °F) 12/17/21 0948   Pulse 91 12/17/21 0957   Resp 21 12/17/21 0957   SpO2 93 % 12/17/21 0957   Vitals shown include unvalidated device data.

## 2021-12-17 NOTE — PROGRESS NOTES
Patient is ready for d/c from CM standpoint    Transition of Care Plan:     RUR:15%  Disposition:Home w/family  Follow up appointments:PCP to call pt w/f/u appointment  DME needed:none  Transportation at [address] [place], [place] [address]in [facility] or means to access home:        IM Medicare Letter:2nd IM provided  Is patient a BCPI-A Bundle: n/a                     If yes, was Bundle Letter given?:    Is patient a [place] and connected with the [address] [facility] [address]? If yes, was [employer] transfer form completed and [facility] notified? Caregiver Contact:son, Alex Gibson, [phone]  Discharge Caregiver contacted prior to discharge? yes, son is on his way    Patient is d/c home today without any needs or concerns. PCP will contact pt w/a f/u appointment. Care Management Interventions  PCP Verified by CM: Yes  Mode of Transport at Discharge:  Other (see comment) (son)  Transition of Care Consult (CM Consult): Discharge Planning  Discharge Durable Medical Equipment: No (no DME use)  Physical Therapy Consult: No  Occupational Therapy Consult: No  Speech Therapy Consult: No  Support Systems: Spouse/Significant Other,Child(yessenia) (Patient & wife, live with son & daughter in-law, in a one story home w/4 STE)  Confirm Follow Up Transport: Self (pt drives sometimes, other times son provides transportation)  Discharge Location  Discharge Placement: Home with family assistance      Vj Sharpe  Ext [phone]

## 2021-12-17 NOTE — PROGRESS NOTES
Endoscope was pre-cleaned at the bedside immediately following procedure by Liliya Desai ET    Medications     lidocaine (PF) 2% (mg)     Date/Time Rate/Dose/Volume Action Route Admin User Audit       12/17/21  0916 50 mg Given IntraVENous Gomez Bacca, CRNA            propofol 10 mg/mL (mg)     Date/Time Rate/Dose/Volume Action Route Admin User Audit       12/17/21  0917 40 mg Given IntraVENous Gomez Bacca, CRNA       0920 20 mg Given IntraVENous Gomez Bacca, CRNA       9448 20 mg Given IntraVENous Gomez Bacca, CRNA            0.9% sodium chloride infusion (mL) Dosing weight:  102.7    Date/Time Rate/Dose/Volume Action Route Admin User Audit       12/17/21  0913 25 mL/hr Rate Verify IntraVENous Gomez Bacca, CRNA       1778  Esthela Corpus, CRNA      Comment: Switch to gravity       3878 200 mL Restarted IntraVENous Gomez Bacca, CRNA                .

## 2021-12-17 NOTE — DISCHARGE SUMMARY
Hospitalist Discharge Summary     Patient ID:  Michael Lan  [id number]  34 y.o.  1945  12/14/2021    PCP on record: Genna Mendoza MD    Admit date: 12/14/2021  Discharge date and time: 12/17/2021    DISCHARGE DIAGNOSIS:  Acute on chronic anemia suspect blood loss  FOBT positive  Dyspnea  Symptomatic anemia  MALU  -DM type 2  Diastolic CHF  Lower extremity edema  CAD  HTN  Dyslipidemia  B/l leg blisters due to swelling       CONSULTATIONS:  IP CONSULT TO HOSPITALIST  IP CONSULT TO GASTROENTEROLOGY  IP CONSULT TO NEPHROLOGY    Excerpted HPI from H&P of Milly Vásquez MD:  CHIEF COMPLAINT: Shortness of breath and increasing swelling of the legs     HISTORY OF PRESENT ILLNESS:     Sukumar Limon is a 78 y.o.   male who presents with past medical history of coronary disease, congestive heart failure, hypertension, diabetes mellitus is coming the hospital chief complaints of shortness of breath and also swelling of the legs. Patient reports being in his usual health until about 2 weeks ago when he started having increased swelling of the legs along with shortness of breath. Shortness of breath is worse with even minimal exertion along with orthopnea but no PND. He also reports baseline swelling of the legs but continued to get worse in the last few weeks. He reports that his legs are also weeping secondary to severe swelling of the legs. Does not report any cough or phlegm. Does not report any chest pain. He went to see his primary care physician who advised him to go to the emergency department for further evaluation.     On arrival to ED, vital signs are normal.  On labs noted to have hemoglobin of 7.7. BMP shows a potassium of 5.1, creatinine 2.08.   LFTs are normal.  proBNP is 1256.     We were asked to admit for work up and evaluation of the above problems.        ______________________________________________________________________  DISCHARGE SUMMARY/HOSPITAL COURSE:  for full details see H&P, daily progress notes, labs, consult notes. Acute on chronic anemia suspect blood loss  FOBT positive  Dyspnea  Symptomatic anemia leading to SOB   -Baseline hemoglobin is around 14 and patient is coming with a hemoglobin of 7.7.  -Stool for occult blood is positive and reports occasional black stool  Iron panel showed low serum iron and low iron saturation  S/p  IV iron and a unit of PRBC transfusion   -Continue with Protonix for 8weeks   S/p EGD on 12/17 which showed healing gastric ulcer , Fu pathology        MALU on underlying CKD 3  -base line is around 1.5 and cr now is 2.08.on admission   UA is within normal limit  Creatinine trended up then trending  down ,s/p IV bumex switch to PO Bumex   Dc  home lasix and metolazone,  c/w Bumex  .    renal US showed no hydronephrosis . Nephrology consulted , need close FU with nephro  Additional labs  Ordered by nephrologist to be drawn  DM type 2  Resume  metformin and Glimeperide with closer monitoring creatinine     Diastolic CHF  Lower extremity edema  CAD  HTN  Dyslipidemia  -c/w coreg and lipitor  DC lisinopril     B/l leg blisters due to swelling   Improving  C/w bumex          _______________________________________________________________________  Patient seen and examined by me on discharge day. Pertinent Findings:  Gen:    Not in distress  Chest: Clear lungs  CVS:   Regular rhythm. No edema  Abd:  Soft, not distended, not tender  Neuro:  Alert, orientedx3  _______________________________________________________________________  DISCHARGE MEDICATIONS:   Current Discharge Medication List      START taking these medications    Details   bumetanide (BUMEX) 1 mg tablet Take 1 Tablet by mouth two (2) times a day for 90 days. Qty: 60 Tablet, Refills: 2  Start date: 12/17/2021, End date: 3/17/2022      pantoprazole (Protonix) 40 mg tablet Take 1 Tablet by mouth two (2) times a day for 60 days.   Qty: 60 Tablet, Refills: 1  Start date: 12/17/2021, End date: 2/15/2022         CONTINUE these medications which have NOT CHANGED    Details   isosorbide mononitrate ER (IMDUR) 60 mg CR tablet Take 1 Tablet by mouth daily. Qty: 90 Tablet, Refills: 0  Start date: 12/14/2021      lisinopriL (PRINIVIL, ZESTRIL) 2.5 mg tablet Take 1 Tablet by mouth daily. Qty: 90 Tablet, Refills: 3  Start date: 12/14/2021      simvastatin (ZOCOR) 10 mg tablet TAKE 1 TABLET BY MOUTH EVERY OTHER DAY . APPOINTMENT REQUIRED FOR FUTURE REFILLS  Qty: 45 Tablet, Refills: 0      carvediloL (COREG) 12.5 mg tablet TAKE 1 TABLET BY MOUTH TWICE DAILY. NO FURTHER REFILLS UNTIL SEEN IN THE OFFICE  Qty: 180 Tablet, Refills: 0      glimepiride (AMARYL) 1 mg tablet Taking 0.5 tab daily. MUST ESTABLISH WITH NEW ENDOCRINOLOGIST OR CONTACT PCP FOR FURTHER REFILLS  Qty: 90 Tablet, Refills: 0      metFORMIN ER (GLUCOPHAGE XR) 500 mg tablet TAKE 1 TABLET BY MOUTH BEFORE BREAKFAST AND  DINNER. MUST ESTABLISH WITH NEW ENDOCRINOLOGIST OR CONTACT PCP FOR FURTHER REFILLS  Qty: 180 Tablet, Refills: 0      DULoxetine (CYMBALTA) 60 mg capsule TAKE 1 CAPSULE BY MOUTH ONCE DAILY FOR  FEET  PAIN. SEND FUTURE REFILL REQUESTS TO PCP AS DR MAI IS NO LONGER WITH THIS PRACTICE  Qty: 90 Capsule, Refills: 1      acetaminophen (Tylenol Arthritis Pain) 650 mg TbER Take 650 mg by mouth every eight (8) hours. omega-3 fatty acids 1,000 mg cap Take 1 Cap by mouth two (2) times a day. dextran 70/hypromellose/PF (NATURAL TEARS, PF, OP) Apply 1 Drop to eye as needed. acetaminophen (TYLENOL) 500 mg tablet Take 1,000 mg by mouth every six (6) hours as needed for Pain. cholecalciferol, VITAMIN D3, (VITAMIN D3) 5,000 unit tab tablet Take 1 Tab by mouth daily.   Qty: 30 Tab, Refills: 5    Associated Diagnoses: Vitamin D deficiency         STOP taking these medications       furosemide (LASIX) 40 mg tablet Comments:   Reason for Stopping:         metOLazone (ZAROXOLYN) 2.5 mg tablet Comments:   Reason for Stopping:         aspirin delayed-release 81 mg tablet Comments:   Reason for Stopping:                 Patient Follow Up Instructions: Activity: Activity as tolerated  Diet: Cardiac Diet  Wound Care: Keep wound clean and dry    Follow-up with pcp in 7 days.   Follow-up tests/labs     Follow-up Information     Follow up With Specialties Details Why Contact Shelia Simpson MD Internal Medicine   [address]  [facility] [address]  [facility]  [phone]      Derek Elizalde MD Nephrology   [facility] [address]  [place] [address] [facility] [address]  [phone]          ________________________________________________________________    Risk of deterioration: Moderate    Condition at Discharge:  Stable  __________________________________________________________________    Disposition  Home with family, no needs    ____________________________________________________________________    Code Status: Full Code  ___________________________________________________________________      Total time in minutes spent coordinating this discharge (includes going over instructions, follow-up, prescriptions, and preparing report for sign off to her PCP) :  >30 minutes    Signed:  Katherine Mason MD

## 2021-12-17 NOTE — PROGRESS NOTES
TRANSFER - OUT REPORT:    Verbal report given on Doretha Bryant  being transferred to [facility](unit) for routine progression of care       Report consisted of patients Situation, Background, Assessment and   Recommendations(SBAR). Information from the following report(s) Procedure Summary was reviewed with the receiving nurse. Opportunity for questions and clarification was provided.

## 2021-12-17 NOTE — H&P
Date of Surgery Update:  Jeri Callejas was seen and examined. History and physical has been reviewed. The patient has been examined.  There have been no significant clinical changes since the completion of the originally dated History and Physical.    Signed By: Kalyan Herrera MD     December 17, 2021 9:12 AM

## 2021-12-17 NOTE — PROCEDURES
NAME:  Tasneem Martinez   :   1945   MRN:   [id number]     Date/Time:  2021 9:26 AM    Esophagogastroduodenoscopy (EGD) Procedure Note    Procedure: Esophagogastroduodenoscopy with biopsy    Indication: melena, acute post hemorrhagic anemia  Pre-operative Diagnosis: see indication above  Post-operative Diagnosis: see findings below  :  Phuc Angel MD  Referring Provider:   Fred Quinteros MD    Exam:  Airway: clear, no airway problems anticipated  Heart: RRR, without gallops or rubs  Lungs: clear bilaterally without wheezes, crackles, or rhonchi  Abdomen: soft, nontender, nondistended, bowel sounds present  Mental Status: awake, alert and oriented to person, place and time     Anethesia/Sedation:  MAC anesthesia Propofol  Procedure Details   After informed consent was obtained for the procedure, with all risks and benefits of procedure explained the patient was taken to the endoscopy suite and placed in the left lateral decubitus position. Following sequential administration of sedation as per above, the [id number] gastroscope was inserted into the mouth and advanced under direct vision to second portion of the duodenum. A careful inspection was made as the gastroscope was withdrawn, including a retroflexed view of the proximal stomach; findings and interventions are described below. Findings:   OROPHARYNX: Cords and pyriform recesses normal.   ESOPHAGUS: The esophagus is normal. The proximal, mid, and distal portions are normal. The Z-Line is intact. STOMACH:  -- gastric ulcer, 1.0 cm in size, shallow and clean-based  -- The fundus on antegrade and retroflex views is normal. Biopsies for h.pylori obtained. DUODENUM: The bulb, second and third portions are normal.    Therapies:   biopsy of stomach body, antrum    Specimens: gastric    EBL:  None. Complications:   None; patient tolerated the procedure well.            Impression:  -- healing gastric ulcer, the expected source of recent bleeding/anemia  -- otherwise, normal.    Recommendations:  -- PPI BID x 8 weeks  -- await pathology  -- will sign off, please call if issues arise    Discharge disposition:  Back to wards    Nader Collins MD

## 2021-12-17 NOTE — CONSULTS
Nephrology Consult Note     Nam Meehan     www. [facility]              Phone - [phone]   Patient: Jalil Mo Sr   YOB: 1945    Date- 12/17/2021  MRN: [id number]             REASON FOR CONSULTATION: MALU on ckd  CONSULTING PHYSICIAN: DR. CLEVELAND  ADMIT DATE:12/14/2021 PATIENT Malvin Jack MD     IMPRESSION & PLAN:    MALU- likely due to dehydration from diuretics use +/ cardiorenal syndrome  CKD 3  EDEMA, CHF  ANEMIA  Proteinuria  Dm     PLAN-  Continue lasix  Hold metolazone  Check serology - TONI, complements level, ANCA  Check urine pr/cr ratio     Active Problems:    Anemia (12/14/2021)        [] High complexity decision making was performed  [] Patient is at high-risk of decompensation with multiple organ involvement    Subjective:   HPI: Dede Johnson is a 78 y.o.  male. He presented to er with c/o sob  He is found to have elevated cr.- his cr 2.2  He denies taking NSAIDS. He is on metolazone and lasix at home. he was anemic on admission with hb level 7.1    Review of Systems:     A 11 point review of system was performed today. Pertinent positives and negatives are mentioned in the HPI. The reminder of the ROS is negative and noncontributory.     Past Medical History:   Diagnosis Date    Arthritis     Asthma     CAD (coronary artery disease)     Calculus of kidney     Carotid artery disease (HCC)     Cervical herniated disc     Chronic systolic (congestive) heart failure ([address]     Diabetes ([address]     Diabetic shock due to low blood sugar ([address] 2016    Glaucoma     pt is on drops unsure of what the names are    Hypercholesterolemia     Hypertension     Morbid obesity ([address]     Rheumatic fever     When he was 17    Sleep apnea     compliant with cpap as stated 9/6/2019      Past Surgical History:   Procedure Laterality Date    HX CAROTID STENT  1990's    with cath    HX CATARACT REMOVAL Left     HX CERVICAL FUSION  05/21/2019    C3- C6 ACFD     HX CHOLECYSTECTOMY      HX CIRCUMCISION      HX HEART CATHETERIZATION      x 5 total, 4 in heart, 1 in carotid    HX HEENT Left     ear torn off and repaired    HX OTHER SURGICAL Right     Right hand reconstruction    HX SHOULDER ARTHROSCOPY Right     dislocated with fall, came out, he stated surgery put back in place and sowed him up    UPPER GI ENDOSCOPY,BIOPSY  12/17/2021           Prior to Admission medications    Medication Sig Start Date End Date Taking? Authorizing Provider   isosorbide mononitrate ER (IMDUR) 60 mg CR tablet Take 1 Tablet by mouth daily. 12/14/21   Bj OVALLE NP   lisinopriL (PRINIVIL, ZESTRIL) 2.5 mg tablet Take 1 Tablet by mouth daily. 12/14/21   Junior Kuhn NP   furosemide (LASIX) 40 mg tablet Take one tablet by mouth on Tuesdays and Thurs (take 30 minutes after the Metolazone)  Further refills once seen in noffice 12/14/21   Bj OVALLE NP   simvastatin (ZOCOR) 10 mg tablet TAKE 1 TABLET BY MOUTH EVERY OTHER DAY . APPOINTMENT REQUIRED FOR FUTURE REFILLS 12/13/21   Bj OVALLE NP   metOLazone (ZAROXOLYN) 2.5 mg tablet Take one tablet or 2.5 mg by mouth 30 minutes prior to Lasix on Tuesdays and Thursdays only. Overdue for appt, no further refills unless seen in office. 12/6/21   Bj OVALLE NP   carvediloL (COREG) 12.5 mg tablet TAKE 1 TABLET BY MOUTH TWICE DAILY. NO FURTHER REFILLS UNTIL SEEN IN THE OFFICE 12/6/21   Bj OVALLE NP   glimepiride (AMARYL) 1 mg tablet Taking 0.5 tab daily. MUST ESTABLISH WITH NEW ENDOCRINOLOGIST OR CONTACT PCP FOR FURTHER REFILLS 12/3/21   Nahun Stout MD   metFORMIN ER (GLUCOPHAGE XR) 500 mg tablet TAKE 1 TABLET BY MOUTH BEFORE BREAKFAST AND  DINNER. MUST ESTABLISH WITH NEW ENDOCRINOLOGIST OR CONTACT PCP FOR FURTHER REFILLS 12/3/21   Nahun Stout MD   DULoxetine (CYMBALTA) 60 mg capsule TAKE 1 CAPSULE BY MOUTH ONCE DAILY FOR  FEET  PAIN.  SEND FUTURE REFILL REQUESTS TO PCP AS DR MAI IS NO LONGER WITH THIS PRACTICE 21   Tod Ogden MD   acetaminophen (Tylenol Arthritis Pain) 650 mg TbER Take 650 mg by mouth every eight (8) hours. Provider, Historical   omega-3 fatty acids 1,000 mg cap Take 1 Cap by mouth two (2) times a day. Provider, Historical   dextran 70/hypromellose/PF (NATURAL TEARS, PF, OP) Apply 1 Drop to eye as needed. Provider, Historical   acetaminophen (TYLENOL) 500 mg tablet Take 1,000 mg by mouth every six (6) hours as needed for Pain. Provider, Historical   cholecalciferol, VITAMIN D3, (VITAMIN D3) 5,000 unit tab tablet Take 1 Tab by mouth daily. 18   Jhonny Delvalle MD   aspirin delayed-release 81 mg tablet Take 81 mg by mouth daily.     Other, MD Cleo     Allergies   Allergen Reactions    Morphine Anaphylaxis     Pt states \"morphine gave me heart failure\"    Lyrica [Pregabalin] Other (comments)     Makes him \"loopy\"      Social History     Tobacco Use    Smoking status: Former Smoker     Packs/day: 5.00     Years: 15.00     Pack years: 75.00     Quit date: 1963     Years since quittin.7    Smokeless tobacco: Never Used   Substance Use Topics    Alcohol use: No     Alcohol/week: 0.0 standard drinks      Family History   Problem Relation Age of Onset    Heart Disease Brother     Diabetes Nephew     Hypertension Mother         Objective:      Patient Vitals for the past 24 hrs:   Temp Pulse Resp BP SpO2   21 0951 -- 90 20 136/78 93 %   21 0948 98 °F (36.7 °C) 91 22 (!) 160/46 93 %   21 0945 -- 90 24 (!) 145/70 94 %   21 0929 -- 92 20 (!) 126/52 97 %   21 0844 97 °F (36.1 °C) 93 20 (!) 168/91 98 %   21 0841 97 °F (36.1 °C) 90 24 (!) 168/91 99 %   21 0807 97.9 °F (36.6 °C) 89 21 134/69 100 %   21 0545 98.4 °F (36.9 °C) 88 20 136/70 98 %   21 0419 97.9 °F (36.6 °C) 90 20 130/69 96 %   21 0400 -- 94 -- -- --   21 0245 97.8 °F (36.6 °C) 90 19 135/71 97 %   21 0145 98.2 °F (36.8 °C) 88 20 (!) 141/70 97 %   12/17/21 0131 98.1 °F (36.7 °C) 92 20 133/66 98 %   12/17/21 0116 98.8 °F (37.1 °C) 90 20 130/68 99 %   12/17/21 0100 98.8 °F (37.1 °C) 88 20 130/68 98 %   12/17/21 0045 97.9 °F (36.6 °C) 95 24 -- 99 %   12/17/21 0000 -- 93 -- -- --   12/16/21 2301 98.8 °F (37.1 °C) 93 18 (!) 125/54 99 %   12/16/21 2026 98.2 °F (36.8 °C) 96 18 128/74 98 %   12/16/21 2000 -- 88 -- -- 98 %   12/16/21 1529 98.8 °F (37.1 °C) 97 18 (!) 114/55 97 %   12/16/21 1123 98.8 °F (37.1 °C) 99 18 138/68 97 %     12/17 0701 - 12/17 1900  In: 200 [I.V.:200]  Out: -   Last 3 Recorded Weights in this Encounter    12/15/21 1517 12/17/21 0139 12/17/21 0841   Weight: 105.7 kg (233 lb 0.4 oz) 102.7 kg (226 lb 6.4 oz) 102.5 kg (226 lb)      Physical Exam:  General:Alert, No distress,   Eyes:No scleral icterus, No conjunctival pallor  Neck:Supple,no mass palpable,no thyromegaly  Lungs:Clears to auscultation Bilaterally, normal respiratory effort  CVS:RRR, S1 S2 normal,  No rub,  Abdomen:Soft, Non tender, No hepatosplenomegaly  Extremities:  LE edema  Skin:No rash or lesions, Warm and DRY   Psych: appropriate affect    :    Musculoskeletal : no redness, no joint tenderness  NEURO: Normal Speech, Non focal      CODE STATUS:  full  Care Plan discussed with:       Chart reviewed.     - Reviewed previous records   - Discussion with patient and/or family and questions answered     Xray/CT/US/MRI REV  Lab Data Personally Reviewed: (see below)  Recent Labs     12/17/21  0313 12/16/21  0613 12/15/21  0539 12/14/21  1411   WBC 9.4 12.4* 9.6 11.0   HGB 9.1* 7.7* 7.7* 7.7*    294 314 319   ANEU 6.7 11.1*  --  8.9*    142 142 142   K 4.3 4.3 4.4 5.1   * 178* 93 197*   BUN 57* 51* 42* 42*   CREA 2.24* 2.39* 2.01* 2.08*   ALT  --   --   --  14   TBILI  --   --   --  0.3   AP  --   --   --  72   CA 9.2 9.2 9.5 8.9     Lab Results   Component Value Date/Time    Color YELLOW/STRAW 05/16/2019 09:56 AM    Appearance CLEAR 05/16/2019 09:56 AM Specific gravity 1.008 05/16/2019 09:56 AM    pH (UA) 5.5 05/16/2019 09:56 AM    Protein 30 (A) 05/16/2019 09:56 AM    Glucose 100 (A) 05/16/2019 09:56 AM    Ketone NEGATIVE  05/16/2019 09:56 AM    Bilirubin NEGATIVE  05/16/2019 09:56 AM    Urobilinogen 0.2 05/16/2019 09:56 AM    Nitrites NEGATIVE  05/16/2019 09:56 AM    Leukocyte Esterase NEGATIVE  05/16/2019 09:56 AM    Epithelial cells FEW 05/16/2019 09:56 AM    Bacteria NEGATIVE  05/16/2019 09:56 AM    WBC 0-4 05/16/2019 09:56 AM    RBC 0-5 05/16/2019 09:56 AM       Lab Results   Component Value Date/Time    Iron 18 (L) 12/14/2021 05:46 PM    TIBC 363 12/14/2021 05:46 PM    Iron % saturation 5 (L) 12/14/2021 05:46 PM     Lab Results   Component Value Date/Time    Culture result: MRSA NOT PRESENT 05/16/2019 09:56 AM    Culture result:  05/16/2019 09:56 AM         Screening of patient nares for MRSA is for surveillance purposes and, if positive, to facilitate isolation considerations in high risk settings. It is not intended for automatic decolonization interventions per se as regimens are not sufficiently effective to warrant routine use. Prior to Admission Medications   Prescriptions Last Dose Informant Patient Reported? Taking? DULoxetine (CYMBALTA) 60 mg capsule   No No   Sig: TAKE 1 CAPSULE BY MOUTH ONCE DAILY FOR  FEET  PAIN. SEND FUTURE REFILL REQUESTS TO PCP AS DR MAI IS NO LONGER WITH THIS PRACTICE   acetaminophen (TYLENOL) 500 mg tablet  Self Yes No   Sig: Take 1,000 mg by mouth every six (6) hours as needed for Pain. acetaminophen (Tylenol Arthritis Pain) 650 mg TbER   Yes No   Sig: Take 650 mg by mouth every eight (8) hours. aspirin delayed-release 81 mg tablet  Self Yes No   Sig: Take 81 mg by mouth daily. carvediloL (COREG) 12.5 mg tablet   No No   Sig: TAKE 1 TABLET BY MOUTH TWICE DAILY.  NO FURTHER REFILLS UNTIL SEEN IN THE OFFICE   cholecalciferol, VITAMIN D3, (VITAMIN D3) 5,000 unit tab tablet  Self No No   Sig: Take 1 Tab by mouth daily. dextran 70/hypromellose/PF (NATURAL TEARS, PF, OP)   Yes No   Sig: Apply 1 Drop to eye as needed. furosemide (LASIX) 40 mg tablet   No No   Sig: Take one tablet by mouth on Tuesdays and Thurs (take 30 minutes after the Metolazone)  Further refills once seen in noffice   glimepiride (AMARYL) 1 mg tablet   No No   Sig: Taking 0.5 tab daily. MUST ESTABLISH WITH NEW ENDOCRINOLOGIST OR CONTACT PCP FOR FURTHER REFILLS   isosorbide mononitrate ER (IMDUR) 60 mg CR tablet   No No   Sig: Take 1 Tablet by mouth daily. lisinopriL (PRINIVIL, ZESTRIL) 2.5 mg tablet   No No   Sig: Take 1 Tablet by mouth daily. metFORMIN ER (GLUCOPHAGE XR) 500 mg tablet   No No   Sig: TAKE 1 TABLET BY MOUTH BEFORE BREAKFAST AND  DINNER. MUST ESTABLISH WITH NEW ENDOCRINOLOGIST OR CONTACT PCP FOR FURTHER REFILLS   metOLazone (ZAROXOLYN) 2.5 mg tablet   No No   Sig: Take one tablet or 2.5 mg by mouth 30 minutes prior to Lasix on Tuesdays and Thursdays only. Overdue for appt, no further refills unless seen in office. omega-3 fatty acids 1,000 mg cap   Yes No   Sig: Take 1 Cap by mouth two (2) times a day. simvastatin (ZOCOR) 10 mg tablet   No No   Sig: TAKE 1 TABLET BY MOUTH EVERY OTHER DAY . APPOINTMENT REQUIRED FOR FUTURE REFILLS      Facility-Administered Medications: None     Imaging:    Medications list Personally Reviewed   [x]      Yes     []               No    Thank you for allowing us to participate in the care this patient. We will follow patient with you. Signed By: Randall Mattson MD  [facility]  [facility] [address], [address] [facility], [address]  Phone - [phone]         Fax - [phone] [facility] Office  [address] [place] [address]  Phone - [phone]        Fax - [phone]     www. [employer]

## 2021-12-17 NOTE — PROGRESS NOTES
Problem: Breathing Pattern - Ineffective  Goal: *Absence of hypoxia  Outcome: Resolved/Met     Problem: Patient Education: Go to Patient Education Activity  Goal: Patient/Family Education  Outcome: Resolved/Met     Problem: Falls - Risk of  Goal: *Absence of Falls  Description: Document Michelle Fall Risk and appropriate interventions in the flowsheet.   Outcome: Resolved/Met  Note: Fall Risk Interventions:  Mobility Interventions: Bed/chair exit alarm,Patient to call before getting OOB    Mentation Interventions: Adequate sleep, hydration, pain control,Eyeglasses and hearing aids,Familiar objects from home,More frequent rounding,Reorient patient,Toileting rounds,Update white board    Medication Interventions: Bed/chair exit alarm,Evaluate medications/consider consulting pharmacy,Patient to call before getting OOB,Teach patient to arise slowly    Elimination Interventions: Call light in reach,Elevated toilet seat,Patient to call for help with toileting needs,Stay With Me (per policy),Toilet paper/wipes in reach,Toileting schedule/hourly rounds,Urinal in reach    History of Falls Interventions: Door open when patient unattended,Room close to nurse's station,Evaluate medications/consider consulting pharmacy         Problem: Patient Education: Go to Patient Education Activity  Goal: Patient/Family Education  Outcome: Resolved/Met

## 2021-12-17 NOTE — DISCHARGE INSTRUCTIONS
DISCHARGE DIAGNOSIS:  Acute on chronic anemia suspect blood loss  FOBT positive  Dyspnea  Symptomatic anemia  MALU  -DM type 2  Diastolic CHF  Lower extremity edema  CAD  HTN  Dyslipidemia  B/l leg blisters due to swelling     MEDICATIONS:  · It is important that you take the medication exactly as they are prescribed. · Keep your medication in the bottles provided by the pharmacist and keep a list of the medication names, dosages, and times to be taken in your wallet. · Do not take other medications without consulting your doctor. Pain Management: per above medications    What to do at [address]:  Cardiac Diet    Recommended activity: Activity as tolerated    If you have questions regarding the hospital related prescriptions or hospital related issues please call [facility] at . You can always direct your questions to your primary care doctor if you are unable to reach your hospital physician; your PCP works as an extension of your hospital doctor just like your hospital doctor is an extension of your PCP for your time at the hospital [place], [facility]). If you experience any of the following symptoms then please call your primary care physician or return to the emergency room if you cannot get hold of your doctor:  Fever, chills, nausea, vomiting, diarrhea, change in mentation, falling, bleeding, shortness of breath    Patient Education        Anemia: Care Instructions  Your Care Instructions     Anemia is a low level of red blood cells, which carry oxygen throughout your body. Many things can cause anemia. Lack of iron is one of the most common causes. Your body needs iron to make hemoglobin, a substance in red blood cells that carries oxygen from the lungs to your body's cells. Without enough iron, the body produces fewer and smaller red blood cells. As a result, your body's cells do not get enough oxygen, and you feel tired and weak.  And you may have trouble concentrating. Bleeding is the most common cause of a lack of iron. You may have heavy menstrual bleeding or bleeding caused by conditions such as ulcers, hemorrhoids, or cancer. Regular use of aspirin or other anti-inflammatory medicines (such as ibuprofen) also can cause bleeding in some people. A lack of iron in your diet also can cause anemia, especially at times when the body needs more iron, such as during pregnancy, infancy, and the teen years. Your doctor may have prescribed iron pills. It may take several months of treatment for your iron levels to return to normal. Your doctor also may suggest that you eat foods that are rich in iron, such as meat and beans. There are many other causes of anemia. It is not always due to a lack of iron. Finding the specific cause of your anemia will help your doctor find the right treatment for you. Follow-up care is a key part of your treatment and safety. Be sure to make and go to all appointments, and call your doctor if you are having problems. It's also a good idea to know your test results and keep a list of the medicines you take. How can you care for yourself at home? · Take your medicines exactly as prescribed. Call your doctor if you think you are having a problem with your medicine. · If your doctor recommends iron pills, take them as directed:  ? Try to take the pills on an empty stomach about 1 hour before or 2 hours after meals. But you may need to take iron with food to avoid an upset stomach. ? Do not take antacids or drink milk or caffeine drinks (such as coffee, tea, or cola) at the same time or within 2 hours of the time that you take your iron. They can make it hard for your body to absorb the iron. ? Vitamin C (from food or supplements) helps your body absorb iron. Try taking iron pills with a glass of orange juice or some other food that is high in vitamin C, such as citrus fruits.   ? Iron pills may cause stomach problems, such as heartburn, nausea, diarrhea, constipation, and cramps. Be sure to drink plenty of fluids, and include fruits, vegetables, and fiber in your diet each day. Iron pills often make your bowel movements dark or green. ? If you forget to take an iron pill, do not take a double dose of iron the next time you take a pill. ? Keep iron pills out of the reach of small children. An overdose of iron can be very dangerous. · Follow your doctor's advice about eating iron-rich foods. These include red meat, shellfish, poultry, eggs, beans, raisins, whole-grain bread, and leafy green vegetables. · Steam vegetables to help them keep their iron content. When should you call for help? Call 911 anytime you think you may need emergency care. For example, call if:    · You have symptoms of a heart attack. These may include:  ? Chest pain or pressure, or a strange feeling in the chest.  ? Sweating. ? Shortness of breath. ? Nausea or vomiting. ? Pain, pressure, or a strange feeling in the back, neck, jaw, or upper belly or in one or both shoulders or arms. ? Lightheadedness or sudden weakness. ? A fast or irregular heartbeat. After you call 911, the  may tell you to chew 1 adult-strength or 2 to 4 low-dose aspirin. Wait for an ambulance. Do not try to drive yourself.     · You passed out (lost consciousness). Call your doctor now or seek immediate medical care if:    · You have new or increased shortness of breath.     · You are dizzy or lightheaded, or you feel like you may faint.     · Your fatigue and weakness continue or get worse.     · You have any abnormal bleeding, such as:  ? Nosebleeds. ? Vaginal bleeding that is different (heavier, more frequent, at a different time of the month) than what you are used to.  ? Bloody or black stools, or rectal bleeding. ? Bloody or pink urine. Watch closely for changes in your health, and be sure to contact your doctor if:    · You do not get better as expected.    Where can you learn more? Go to http://www.[employer].com/  Enter R301 in the search box to learn more about \"Anemia: Care Instructions. \"  Current as of: April 29, 2021               Content Version: 13.0  © 1339-4359 [employer]wise, [employer]. Care instructions adapted under license by [employer] (which disclaims liability or warranty for this information). If you have questions about a medical condition or this instruction, always ask your healthcare professional. [address] any warranty or liability for your use of this information.

## 2021-12-17 NOTE — ROUTINE PROCESS
Jalil Mo   1945  [id number]    Situation:  Verbal report received from: Richie Muniz  Procedure: Procedure(s):  ESOPHAGOGASTRODUODENOSCOPY (EGD)  ESOPHAGOGASTRODUODENAL (EGD) BIOPSY    Background:    Preoperative diagnosis: Melena [K92.1]  Acute post-hemorrhagic anemia [D62]  Postoperative diagnosis: Gastric ulcer    :  Dr. Layla Webb  Assistant(s): Endoscopy Technician-1: Nicolas Rock  Endoscopy RN-1: Berlin Gomez RN    Specimens:   ID Type Source Tests Collected by Time Destination   1 : Gastric BX Preservative Gastric  Manjinder MD Rubin 12/17/2021 6467 Pathology     H. Pylori      Assessment:  Intra-procedure medications     Anesthesia gave intra-procedure sedation and medications, see anesthesia flow sheet yes    Intravenous fluids: NS@ KVO     Vital signs stable     Abdominal assessment: round and soft     Recommendation:  Discharge patient per MD order.   Return to floor  Family or Friend   Permission to share finding with family or friend yes

## 2021-12-17 NOTE — PROGRESS NOTES
0700: Bedside shift change report given to Mara Wheat (oncoming nurse) by Berry Avila RN (offgoing nurse). Report included the following information SBAR and Kardex. 1655: PICC team at bedside drawing labs. 1830: AVS reviewed with patient and son; allowed time for questions. Removed PIV. Pt wheelchair to exit by tech to transport home with son.

## 2021-12-18 LAB
ABO + RH BLD: NORMAL
ANA SER QL: NEGATIVE
BLD PROD TYP BPU: NORMAL
BLOOD GROUP ANTIBODIES SERPL: NORMAL
BPU ID: NORMAL
CROSSMATCH RESULT,%XM: NORMAL
DSDNA AB SER-ACNC: <1 IU/ML (ref 0–9)
HAV IGM SER QL: NONREACTIVE
HBV CORE IGM SER QL: NONREACTIVE
HBV SURFACE AG SER QL: <0.1 INDEX
HBV SURFACE AG SER QL: NEGATIVE
HCV AB SERPL QL IA: NONREACTIVE
SP1: NORMAL
SP2: NORMAL
SP3: NORMAL
SPECIMEN EXP DATE BLD: NORMAL
STATUS OF UNIT,%ST: NORMAL
UNIT DIVISION, %UDIV: 0

## 2021-12-19 LAB
C3 SERPL-MCNC: 136 MG/DL (ref 82–167)
C4 SERPL-MCNC: 22 MG/DL (ref 12–38)

## 2021-12-20 LAB
ALBUMIN SERPL ELPH-MCNC: 2.9 G/DL (ref 2.9–4.4)
ALBUMIN/GLOB SERPL: 1.1 {RATIO} (ref 0.7–1.7)
ALPHA1 GLOB SERPL ELPH-MCNC: 0.3 G/DL (ref 0–0.4)
ALPHA2 GLOB SERPL ELPH-MCNC: 0.9 G/DL (ref 0.4–1)
B-GLOBULIN SERPL ELPH-MCNC: 0.9 G/DL (ref 0.7–1.3)
GAMMA GLOB SERPL ELPH-MCNC: 0.8 G/DL (ref 0.4–1.8)
GLOBULIN SER-MCNC: 2.9 G/DL (ref 2.2–3.9)
IGA SERPL-MCNC: 198 MG/DL (ref 61–437)
IGG SERPL-MCNC: 785 MG/DL (ref 603–1613)
IGM SERPL-MCNC: 41 MG/DL (ref 15–143)
INTERPRETATION SERPL IEP-IMP: ABNORMAL
KAPPA LC FREE SER-MCNC: 60.2 MG/L (ref 3.3–19.4)
KAPPA LC FREE/LAMBDA FREE SER: 1.94 {RATIO} (ref 0.26–1.65)
LAMBDA LC FREE SERPL-MCNC: 31.1 MG/L (ref 5.7–26.3)
M PROTEIN SERPL ELPH-MCNC: ABNORMAL G/DL
PROT SERPL-MCNC: 5.8 G/DL (ref 6–8.5)

## 2021-12-21 ENCOUNTER — OFFICE VISIT (OUTPATIENT)
Dept: INTERNAL MEDICINE CLINIC | Age: 76
End: 2021-12-21
Payer: MEDICARE

## 2021-12-21 VITALS
WEIGHT: 217 LBS | HEIGHT: 63 IN | DIASTOLIC BLOOD PRESSURE: 71 MMHG | RESPIRATION RATE: 20 BRPM | HEART RATE: 88 BPM | OXYGEN SATURATION: 98 % | BODY MASS INDEX: 38.45 KG/M2 | SYSTOLIC BLOOD PRESSURE: 133 MMHG | TEMPERATURE: 96.9 F

## 2021-12-21 DIAGNOSIS — Z09 HOSPITAL DISCHARGE FOLLOW-UP: Primary | ICD-10-CM

## 2021-12-21 DIAGNOSIS — Z23 NEEDS FLU SHOT: ICD-10-CM

## 2021-12-21 DIAGNOSIS — G95.9 MYELOPATHY (HCC): ICD-10-CM

## 2021-12-21 DIAGNOSIS — D50.0 IRON DEFICIENCY ANEMIA DUE TO CHRONIC BLOOD LOSS: ICD-10-CM

## 2021-12-21 DIAGNOSIS — E11.21 TYPE 2 DIABETES WITH NEPHROPATHY (HCC): ICD-10-CM

## 2021-12-21 DIAGNOSIS — I10 PRIMARY HYPERTENSION: ICD-10-CM

## 2021-12-21 DIAGNOSIS — E66.01 SEVERE OBESITY (BMI 35.0-35.9 WITH COMORBIDITY) (HCC): ICD-10-CM

## 2021-12-21 LAB
ALBUMIN SERPL-MCNC: 3.2 G/DL (ref 3.5–5)
ALBUMIN/GLOB SERPL: 0.9 {RATIO} (ref 1.1–2.2)
ALP SERPL-CCNC: 74 U/L (ref 45–117)
ALT SERPL-CCNC: 21 U/L (ref 12–78)
ANION GAP SERPL CALC-SCNC: 5 MMOL/L (ref 5–15)
AST SERPL-CCNC: 13 U/L (ref 15–37)
BASOPHILS # BLD: 0.1 K/UL (ref 0–0.1)
BASOPHILS NFR BLD: 1 % (ref 0–1)
BILIRUB SERPL-MCNC: 0.3 MG/DL (ref 0.2–1)
BUN SERPL-MCNC: 49 MG/DL (ref 6–20)
BUN/CREAT SERPL: 24 (ref 12–20)
C-ANCA TITR SER IF: NORMAL TITER
CALCIUM SERPL-MCNC: 9.2 MG/DL (ref 8.5–10.1)
CHLORIDE SERPL-SCNC: 105 MMOL/L (ref 97–108)
CHOLEST SERPL-MCNC: 92 MG/DL
CO2 SERPL-SCNC: 29 MMOL/L (ref 21–32)
CREAT SERPL-MCNC: 2.03 MG/DL (ref 0.7–1.3)
DIFFERENTIAL METHOD BLD: ABNORMAL
EOSINOPHIL # BLD: 0.4 K/UL (ref 0–0.4)
EOSINOPHIL NFR BLD: 4 % (ref 0–7)
ERYTHROCYTE [DISTWIDTH] IN BLOOD BY AUTOMATED COUNT: 15.5 % (ref 11.5–14.5)
EST. AVERAGE GLUCOSE BLD GHB EST-MCNC: 134 MG/DL
GLOBULIN SER CALC-MCNC: 3.6 G/DL (ref 2–4)
GLUCOSE SERPL-MCNC: 174 MG/DL (ref 65–100)
HBA1C MFR BLD: 6.3 % (ref 4–5.6)
HCT VFR BLD AUTO: 31 % (ref 36.6–50.3)
HDLC SERPL-MCNC: 43 MG/DL
HDLC SERPL: 2.1 {RATIO} (ref 0–5)
HGB BLD-MCNC: 9.4 G/DL (ref 12.1–17)
IMM GRANULOCYTES # BLD AUTO: 0.2 K/UL (ref 0–0.04)
IMM GRANULOCYTES NFR BLD AUTO: 2 % (ref 0–0.5)
LDLC SERPL CALC-MCNC: 27.4 MG/DL (ref 0–100)
LYMPHOCYTES # BLD: 1 K/UL (ref 0.8–3.5)
LYMPHOCYTES NFR BLD: 10 % (ref 12–49)
MCH RBC QN AUTO: 27.2 PG (ref 26–34)
MCHC RBC AUTO-ENTMCNC: 30.3 G/DL (ref 30–36.5)
MCV RBC AUTO: 89.6 FL (ref 80–99)
MONOCYTES # BLD: 0.8 K/UL (ref 0–1)
MONOCYTES NFR BLD: 9 % (ref 5–13)
MYELOPEROXIDASE AB SER IA-ACNC: <9 U/ML (ref 0–9)
NEUTS SEG # BLD: 7 K/UL (ref 1.8–8)
NEUTS SEG NFR BLD: 74 % (ref 32–75)
NRBC # BLD: 0 K/UL (ref 0–0.01)
NRBC BLD-RTO: 0 PER 100 WBC
P-ANCA ATYPICAL TITR SER IF: NORMAL TITER
P-ANCA TITR SER IF: NORMAL TITER
PLATELET # BLD AUTO: 328 K/UL (ref 150–400)
PMV BLD AUTO: 9.8 FL (ref 8.9–12.9)
POTASSIUM SERPL-SCNC: 4 MMOL/L (ref 3.5–5.1)
PROT SERPL-MCNC: 6.8 G/DL (ref 6.4–8.2)
PROTEINASE3 AB SER IA-ACNC: <3.5 U/ML (ref 0–3.5)
RBC # BLD AUTO: 3.46 M/UL (ref 4.1–5.7)
SODIUM SERPL-SCNC: 139 MMOL/L (ref 136–145)
TRIGL SERPL-MCNC: 108 MG/DL (ref ?–150)
VLDLC SERPL CALC-MCNC: 21.6 MG/DL
WBC # BLD AUTO: 9.5 K/UL (ref 4.1–11.1)

## 2021-12-21 PROCEDURE — G8754 DIAS BP LESS 90: HCPCS | Performed by: INTERNAL MEDICINE

## 2021-12-21 PROCEDURE — G8417 CALC BMI ABV UP PARAM F/U: HCPCS | Performed by: INTERNAL MEDICINE

## 2021-12-21 PROCEDURE — 2022F DILAT RTA XM EVC RTNOPTHY: CPT | Performed by: INTERNAL MEDICINE

## 2021-12-21 PROCEDURE — 3017F COLORECTAL CA SCREEN DOC REV: CPT | Performed by: INTERNAL MEDICINE

## 2021-12-21 PROCEDURE — G8536 NO DOC ELDER MAL SCRN: HCPCS | Performed by: INTERNAL MEDICINE

## 2021-12-21 PROCEDURE — 1111F DSCHRG MED/CURRENT MED MERGE: CPT | Performed by: INTERNAL MEDICINE

## 2021-12-21 PROCEDURE — 3046F HEMOGLOBIN A1C LEVEL >9.0%: CPT | Performed by: INTERNAL MEDICINE

## 2021-12-21 PROCEDURE — G8428 CUR MEDS NOT DOCUMENT: HCPCS | Performed by: INTERNAL MEDICINE

## 2021-12-21 PROCEDURE — 1101F PT FALLS ASSESS-DOCD LE1/YR: CPT | Performed by: INTERNAL MEDICINE

## 2021-12-21 PROCEDURE — G8432 DEP SCR NOT DOC, RNG: HCPCS | Performed by: INTERNAL MEDICINE

## 2021-12-21 PROCEDURE — G0008 ADMIN INFLUENZA VIRUS VAC: HCPCS | Performed by: INTERNAL MEDICINE

## 2021-12-21 PROCEDURE — 99214 OFFICE O/P EST MOD 30 MIN: CPT | Performed by: INTERNAL MEDICINE

## 2021-12-21 PROCEDURE — G8752 SYS BP LESS 140: HCPCS | Performed by: INTERNAL MEDICINE

## 2021-12-21 PROCEDURE — 90694 VACC AIIV4 NO PRSRV 0.5ML IM: CPT | Performed by: INTERNAL MEDICINE

## 2021-12-21 NOTE — PROGRESS NOTES
Mr. Bing Mccrary is presenting to follow up     CC:  Hospital Follow Up       HPI:    Hx of CAD with non-obstructive CAD on cath in 2018, HFpEF with baseline LE edema, CKD, HTN, HLD, DM2 presented to cardiology with increased dyspnea and LE edema      Admit date: 12/14/2021  Discharge date and time: 12/17/2021  -Baseline hemoglobin is around 14 and patient is coming with a hemoglobin of 7.7.  -Stool for occult blood is positive and reports occasional black stool  Iron panel showed low serum iron and low iron saturation  S/p  IV iron and a unit of PRBC transfusion   -Continue with Protonix for 8weeks   S/p EGD on 12/17 which showed healing gastric ulcer , - pathology reviewed today and showed reactive gastropathy and no H Pylori  CBC on discharge improved to 9.1  On pantoprazole BID now  Stopped aspirin       Patient also had MALU on  CKD stage III   Renal US showed no obstruction   At discharge creatinine 2.24 - baseline is 1.5    Had  TTE which showed normal EF and normal diastolic function with mild A stenosis    He was discharged on bumex BID   Lisinopril stopped due to kidney function worsening  Continue on coreg    Review of systems:  Constitutional: negative for fever, chills, weight loss, night sweats   Eyes : negative for vision changes, eye pain and discharge  Nose and Throat: negative for tinnitus, sore throat   Cardiovascular: negative for chest pain, palpitations and shortness of breath  Respiratory: negative for shortness of breath, cough and wheezing   Gastroinstestinal: negative for abdominal pain, nausea, vomiting, diarrhea, constipation, and blood in the stool  Musculoskeletal: negative for back ache and joint ache   Genitourinary: negative for dysuria, nocturia, polyuria and hematuria   Neurologic: Negative for focal weakness, numbness or incoordination  Skin: negative for rash, pruritus  Hematologic: negative for easy bruising      Past Medical History:   Diagnosis Date    Arthritis     Asthma     CAD (coronary artery disease)     Calculus of kidney     Carotid artery disease (HCC)     Cervical herniated disc     Chronic systolic (congestive) heart failure (HCC)     Diabetes ([address]     Diabetic shock due to low blood sugar ([facility] [address] 2016    Glaucoma     pt is on drops unsure of what the names are    Hypercholesterolemia     Hypertension     Morbid obesity ([facility] [address]     Rheumatic fever     When he was 17    Sleep apnea     compliant with cpap as stated 9/6/2019        Past Surgical History:   Procedure Laterality Date    HX CAROTID STENT  1990's    with cath    HX CATARACT REMOVAL Left     HX CERVICAL FUSION  05/21/2019    C3- C6 ACFD     HX CHOLECYSTECTOMY      HX CIRCUMCISION      HX HEART CATHETERIZATION      x 5 total, 4 in heart, 1 in carotid    HX HEENT Left     ear torn off and repaired    HX OTHER SURGICAL Right     Right hand reconstruction    HX SHOULDER ARTHROSCOPY Right     dislocated with fall, came out, he stated surgery put back in place and sowed him up    UPPER GI ENDOSCOPY,BIOPSY  12/17/2021            Allergies   Allergen Reactions    Morphine Anaphylaxis     Pt states \"morphine gave me heart failure\"    Lyrica [Pregabalin] Other (comments)     Makes him \"loopy\"       Current Outpatient Medications on File Prior to Visit   Medication Sig Dispense Refill    bumetanide (BUMEX) 1 mg tablet Take 1 Tablet by mouth two (2) times a day for 90 days. 60 Tablet 2    pantoprazole (Protonix) 40 mg tablet Take 1 Tablet by mouth two (2) times a day for 60 days. 60 Tablet 1    isosorbide mononitrate ER (IMDUR) 60 mg CR tablet Take 1 Tablet by mouth daily. 90 Tablet 0    lisinopriL (PRINIVIL, ZESTRIL) 2.5 mg tablet Take 1 Tablet by mouth daily. 90 Tablet 3    simvastatin (ZOCOR) 10 mg tablet TAKE 1 TABLET BY MOUTH EVERY OTHER DAY . APPOINTMENT REQUIRED FOR FUTURE REFILLS 45 Tablet 0    carvediloL (COREG) 12.5 mg tablet TAKE 1 TABLET BY MOUTH TWICE DAILY.  NO FURTHER REFILLS UNTIL SEEN IN THE OFFICE 180 Tablet 0    glimepiride (AMARYL) 1 mg tablet Taking 0.5 tab daily. MUST ESTABLISH WITH NEW ENDOCRINOLOGIST OR CONTACT PCP FOR FURTHER REFILLS 90 Tablet 0    DULoxetine (CYMBALTA) 60 mg capsule TAKE 1 CAPSULE BY MOUTH ONCE DAILY FOR  FEET  PAIN. SEND FUTURE REFILL REQUESTS TO PCP AS DR MAI IS NO LONGER WITH THIS PRACTICE 90 Capsule 1    acetaminophen (Tylenol Arthritis Pain) 650 mg TbER Take 650 mg by mouth every eight (8) hours.  omega-3 fatty acids 1,000 mg cap Take 1 Cap by mouth two (2) times a day.  dextran 70/hypromellose/PF (NATURAL TEARS, PF, OP) Apply 1 Drop to eye as needed.  acetaminophen (TYLENOL) 500 mg tablet Take 1,000 mg by mouth every six (6) hours as needed for Pain.  cholecalciferol, VITAMIN D3, (VITAMIN D3) 5,000 unit tab tablet Take 1 Tab by mouth daily. 30 Tab 5     No current facility-administered medications on file prior to visit. family history includes Diabetes in his nephew; Heart Disease in his brother; Hypertension in his mother.     Social History     Socioeconomic History    Marital status:      Spouse name: Not on file    Number of children: Not on file    Years of education: Not on file    Highest education level: Not on file   Occupational History    Not on file   Tobacco Use    Smoking status: Former Smoker     Packs/day: 5.00     Years: 15.00     Pack years: 75.00     Quit date: 1963     Years since quittin.7    Smokeless tobacco: Never Used   Substance and Sexual Activity    Alcohol use: No     Alcohol/week: 0.0 standard drinks    Drug use: Never    Sexual activity: Yes     Partners: Female     Birth control/protection: None   Other Topics Concern    Not on file   Social History Narrative    ** Merged History Encounter **          Social Determinants of Health     Financial Resource Strain:     Difficulty of Paying Living Expenses: Not on file   Food Insecurity:     Worried About Running Out of Food in the Last Year: Not on file    Ran Out of Food in the Last Year: Not on file   Transportation Needs:     Lack of Transportation (Medical): Not on file    Lack of Transportation (Non-Medical): Not on file   Physical Activity:     Days of Exercise per Week: Not on file    Minutes of Exercise per Session: Not on file   Stress:     Feeling of Stress : Not on file   Social Connections:     Frequency of Communication with Friends and Family: Not on file    Frequency of Social Gatherings with Friends and Family: Not on file    Attends Buddhist Services: Not on file    Active Member of [address] [place] [address] [employer] or Organizations: Not on file    Attends Club or Organization Meetings: Not on file    Marital Status: Not on file   Intimate Partner Violence:     Fear of Current or Ex-Partner: Not on file    Emotionally Abused: Not on file    Physically Abused: Not on file    Sexually Abused: Not on file   Housing Stability:     Unable to Pay for Housing in the Last Year: Not on file    Number of [place] in the Last Year: Not on file    Unstable Housing in the Last Year: Not on file       Visit Vitals  /71 (BP 1 Location: Right arm, BP Patient Position: Sitting, BP Cuff Size: Small adult)   Pulse 88   Temp 96.9 °F (36.1 °C) (Temporal)   Resp 20   Ht 5' 3\" (1.6 m)   Wt 217 lb (98.4 kg)   SpO2 98%   BMI 38.44 kg/m²     General:  Well appearing male no acute distress  HEENT:   PERRL,normal conjunctiva. External ear and canals normal, TMs normal.  Hearing normal to voice. Nose without edema or discharge, normal septum. Lips, teeth, gums normal.  Oropharynx: no erythema, no exudates, no lesions, normal tongue. Neck:  Supple. Thyroid normal size, nontender, without nodules. No carotid bruit. No masses or lymphadenopathy  Respiratory: no respiratory distress,  no wheezing, no rhonchi, no rales. No chest wall tenderness. Cardiovascular:  RRR, normal S1S2, no murmur.     Gastrointestinal: normal bowel sounds, soft, nontender, without masses. No hepatosplenomegaly. Extremities +2 pulses, no edema, normal sensation   Musculoskeletal:  Normal gait. Normal digits and nails. Normal strength and tone, no atrophy, and no abnormal movement. Skin:  No rash, no lesions, no ulcers. Skin warm, normal turgor, without induration or nodules. Neuro:  A and OX4, fluent speech, cranial nerves normal 2-12. Sensation normal to light touch. DTR symmetrical  Psych:  Normal affect      Lab Results   Component Value Date/Time    WBC 9.4 12/17/2021 03:13 AM    HGB 9.1 (L) 12/17/2021 03:13 AM    HCT 29.4 (L) 12/17/2021 03:13 AM    PLATELET 008 01/29/8662 03:13 AM    MCV 86.0 12/17/2021 03:13 AM     Lab Results   Component Value Date/Time    Sodium 140 12/17/2021 03:13 AM    Potassium 4.3 12/17/2021 03:13 AM    Chloride 107 12/17/2021 03:13 AM    CO2 25 12/17/2021 03:13 AM    Anion gap 8 12/17/2021 03:13 AM    Glucose 141 (H) 12/17/2021 03:13 AM    BUN 57 (H) 12/17/2021 03:13 AM    Creatinine 2.24 (H) 12/17/2021 03:13 AM    BUN/Creatinine ratio 25 (H) 12/17/2021 03:13 AM    GFR est AA 35 (L) 12/17/2021 03:13 AM    GFR est non-AA 29 (L) 12/17/2021 03:13 AM    Calcium 9.2 12/17/2021 03:13 AM     Lab Results   Component Value Date/Time    Cholesterol, total 95 (L) 12/10/2021 09:16 AM    HDL Cholesterol 37 (L) 12/10/2021 09:16 AM    LDL, calculated 40 12/10/2021 09:16 AM    LDL, calculated 39 02/06/2020 01:34 PM    VLDL, calculated 18 12/10/2021 09:16 AM    VLDL, calculated 28 02/06/2020 01:34 PM    Triglyceride 95 12/10/2021 09:16 AM     Lab Results   Component Value Date/Time    TSH 0.681 04/23/2018 10:02 AM     Lab Results   Component Value Date/Time    Hemoglobin A1c 5.9 (H) 09/12/2019 04:32 PM    Hemoglobin A1c (POC) 5.5 12/17/2019 03:55 PM     Lab Results   Component Value Date/Time    Vitamin D 25-Hydroxy 59.9 05/16/2019 09:56 AM                   Assessment and Plan:     1.  Hospital discharge follow-up  Admitted for dyspnea, found to have anemia and MALU on CKD  EGD showed healing ulcer and patient stopped aspirin and is on PPI twice a day for now no more bleeding  Will check hemoglobin today  Reminded to scheduled appointment with kidney doctor    2. Type 2 diabetes with nephropathy (HCC)  - stop metformin GFR is less then 30  Also discuss with kidney doctor possibly starting [facility] for diabetes and kidneys and heart  - HEMOGLOBIN A1C WITH EAG; Future  - LIPID PANEL; Future  - METABOLIC PANEL, COMPREHENSIVE; Future    3. Primary hypertension  Controlled on current regimen   Discuss with kidney doctor if should continue lisinopril   - CBC WITH AUTOMATED DIFF; Future    4. Severe obesity (BMI 35.0-35.9 with comorbidity) ([facility] [address]    5. Myelopathy ([facility] [address]    6. Iron deficiency anemia due to chronic blood loss  - CBC WITH AUTOMATED DIFF; Future    7. MALU on CKD stage III will see renal MD labs pending  Follow-up and Dispositions    · Return in about 4 weeks (around 1/18/2022) for With Dr Brittany Nance.           Amber Bhagat MD

## 2021-12-21 NOTE — PATIENT INSTRUCTIONS
stop the metformin  Monitor the blood sugar if greater then 150 call the office and let me know will need to start insulin low dose if that is the case    Also discuss with kidney doctor possibly starting [address] for diabetes and kidneys and heart   Discuss with kidney doctor if should continue lisinopril         Can try alpha lipoic acid for neuropathy

## 2021-12-21 NOTE — PROGRESS NOTES
Chief Complaint   Patient presents with   [facility] Follow Up       1. Have you been to the ER, urgent care clinic since your last visit? Hospitalized since your last visit? ER, fluid around heart. 2. Have you seen or consulted any other health care providers outside of the [address] [place] [address] since your last visit? Include any pap smears or colon screening. No    Pt is with his daughter-alma Su. Pt c/o continued SOB, chest/sinus congestion, cough, post nasal drip.

## 2021-12-22 LAB — CRYOGLOB SER QL 1D COLD INC: NORMAL

## 2022-01-06 NOTE — PROGRESS NOTES
Labs done after hospital follow up   Blood sugar is in pre diabetes range continue current regimen and low sugar diet ( doing well)  Cholesterol is excellent  Chronic kidney disease is stable ( CKD stage III)  Anemia is improving ( patient had EGD with healing ulcer and on PPI)

## 2022-01-10 ENCOUNTER — TELEPHONE (OUTPATIENT)
Dept: INTERNAL MEDICINE CLINIC | Age: 77
End: 2022-01-10

## 2022-01-10 NOTE — TELEPHONE ENCOUNTER
----- Message from Leonid Esquivel sent at 1/7/2022  4:17 PM EST -----  Subject: Message to Provider    QUESTIONS  Information for Provider? Pt has a HFU appt scheduled for 1/28 with Amelie Moise. Pt would like to know if something becomes available prior to the   28th if he can be seen. [phone] if other number can not be reached  ---------------------------------------------------------------------------  --------------  CALL BACK INFO  What is the best way for the office to contact you? OK to leave message on   voicemail  Preferred Call Back Phone Number? [phone]  ---------------------------------------------------------------------------  --------------  SCRIPT ANSWERS  Relationship to Patient?  Self

## 2022-02-01 ENCOUNTER — TELEPHONE (OUTPATIENT)
Dept: INTERNAL MEDICINE CLINIC | Age: 77
End: 2022-02-01

## 2022-02-01 ENCOUNTER — NURSE TRIAGE (OUTPATIENT)
Dept: OTHER | Facility: CLINIC | Age: 77
End: 2022-02-01

## 2022-02-01 NOTE — TELEPHONE ENCOUNTER
----- Message from [facility] sent at 2/1/2022  4:15 PM EST -----  Subject: Appointment Request    Reason for Call: Urgent Return from RN Triage    QUESTIONS  Type of Appointment? Established Patient  Reason for appointment request? No appointments available during search  Additional Information for Provider? Pt was sent from [facility] due to swelling   in his hands and wrists. NT said he needs to be scheduled by tomorrow   2/1/2022.  ---------------------------------------------------------------------------  --------------  CALL BACK INFO  What is the best way for the office to contact you? OK to leave message on   voicemail  Preferred Call Back Phone Number? [phone]  ---------------------------------------------------------------------------  --------------  SCRIPT ANSWERS  Patient needs to be seen today or tomorrow? Yes   Nurse Name? [facility]  Have you been diagnosed with, awaiting test results for, or told that you   are suspected of having COVID-19 (Coronavirus)? (If patient has tested   negative or was tested as a requirement for work, school, or travel and   not based on symptoms, answer no)? No  Within the past two weeks have you developed any of the following symptoms   (answer no if symptoms have been present longer than 2 weeks or began   more than 2 weeks ago)? Fever or Chills, Cough, Shortness of breath or   difficulty breathing, Loss of taste or smell, Sore throat, Nasal   congestion, Sneezing or runny nose, Fatigue or generalized body aches   (answer no if pain is specific to a body part e.g. back pain), Diarrhea,   Headache? No  Have you had close contact with someone with COVID-19 in the last 14 days? No  (Service Expert  click yes below to proceed with [employer] As Usual   Scheduling)?  Yes

## 2022-02-01 NOTE — TELEPHONE ENCOUNTER
Received call from Sonia Delacruz at [facility] with [employer]. Subjective: Caller states \"My  started having swelling all over that started a few days ago and we think the medications that were changed for him in the hospital are not working. He is waiting to get in to see a Nephrologist - Dr. Isadora Erickson, but their office keeps     Somewhat limited triage due to patient's wife calling on his behalf, but is in the room with patient. Current Symptoms: Swelling in hands, wrists, feet, and legs with pain. Can bear weight to walk, but walking is limited due to pain. Onset: 2 days ago; rapid    Associated Symptoms: reduced activity, difficulty walking    Pain Severity: 10/10; throbbing; constant    Temperature: None at this time     What has been tried: Tylenol Arthritis for pain    LMP: NA Pregnant: NA    Recommended disposition: See PCP in office tomorrow. Advised if no available appts, then to seek care in UC or ER. Care advice provided, patient verbalizes understanding; denies any other questions or concerns; instructed to call back for any new or worsening symptoms. Writer provided warm transfer to Milad Muller at [facility] for appointment scheduling    Attention Provider: Thank you for allowing me to participate in the care of your patient. The patient was connected to triage in response to information provided to the [facility]. Please do not respond through this encounter as the response is not directed to a shared pool.       Reason for Disposition   MODERATE arm swelling (e.g., puffiness or swollen feeling of entire arm)    Protocols used: ARM SWELLING AND EDEMA-ADULT-

## 2022-02-02 ENCOUNTER — TELEPHONE (OUTPATIENT)
Dept: INTERNAL MEDICINE CLINIC | Age: 77
End: 2022-02-02

## 2022-02-02 DIAGNOSIS — N18.30 STAGE 3 CHRONIC KIDNEY DISEASE, UNSPECIFIED WHETHER STAGE 3A OR 3B CKD (HCC): Primary | ICD-10-CM

## 2022-02-02 NOTE — TELEPHONE ENCOUNTER
CASEY GOODMAN CALLED AND WOULD LIKE TO BE PUT ON A   WAITING LIST FOR AN INPERSON VISIT WITH DR JACK DIOP, PLEASE CALL   PATIENT IF THERE ARE ANY CANCELATIONS.   ---------------------------------------------------------------------------  --------------  CALL BACK INFO  What is the best way for the office to contact you? OK to leave message on   voicemail  Preferred Call Back Phone Number? [phone]  ---------------------------------------------------------------------------  --------------  SCRIPT ANSWERS  Relationship to Patient?  Self

## 2022-02-02 NOTE — TELEPHONE ENCOUNTER
Pt wants to be referred to a nephrologist Dr. Patience Rod. Has the physician seen you for this condition before? No   Preferred Specialist (if applicable)? Antonella Kim you already have an appointment scheduled? No  Additional Information for Provider?   ---------------------------------------------------------------------------  --------------  CALL BACK INFO  What is the best way for the office to contact you? OK to leave message on   voicemail  Preferred Call Back Phone Number?  [phone]

## 2022-02-03 ENCOUNTER — VIRTUAL VISIT (OUTPATIENT)
Dept: INTERNAL MEDICINE CLINIC | Age: 77
End: 2022-02-03
Payer: MEDICARE

## 2022-02-03 DIAGNOSIS — N18.30 STAGE 3 CHRONIC KIDNEY DISEASE, UNSPECIFIED WHETHER STAGE 3A OR 3B CKD (HCC): Primary | ICD-10-CM

## 2022-02-03 DIAGNOSIS — M79.643 PAIN OF HAND, UNSPECIFIED LATERALITY: ICD-10-CM

## 2022-02-03 DIAGNOSIS — R60.9 PERIPHERAL EDEMA: ICD-10-CM

## 2022-02-03 PROCEDURE — 99443 PR PHYS/QHP TELEPHONE EVALUATION 21-30 MIN: CPT | Performed by: FAMILY MEDICINE

## 2022-02-03 RX ORDER — METHYLPREDNISOLONE 4 MG/1
TABLET ORAL
Qty: 1 DOSE PACK | Refills: 0 | Status: SHIPPED | OUTPATIENT
Start: 2022-02-03 | End: 2022-05-04 | Stop reason: ALTCHOICE

## 2022-02-03 NOTE — PROGRESS NOTES
Identified pt with two pt identifiers(name and ). Reviewed record in preparation for visit and have obtained necessary documentation. Chief Complaint   Patient presents with    Foot Swelling     and hand swelling. also causing a lot of pain. almost twice the size of normal. started 4 days ago. There were no vitals filed for this visit. Health Maintenance Due   Topic    DTaP/Tdap/Td series (1 - Tdap)    Shingrix Vaccine Age 49> (1 of 2)    AAA Screening 73-67 YO Male Smoking Patients     MICROALBUMIN Q1     Foot Exam Q1     Medicare Yearly Exam     Eye Exam Retinal or Dilated     COVID-19 Vaccine (3 - Booster for timeplazza series)       Coordination of Care Questionnaire:  :   1) Have you been to an emergency room, urgent care, or hospitalized since your last visit? If yes, where when, and reason for visit? yes   December was in [facility].     2. Have seen or consulted any other health care provider since your last visit? If yes, where when, and reason for visit? NO      An electronic signature was used to authenticate this note.   -- Karen Hagen LPN

## 2022-02-03 NOTE — PROGRESS NOTES
Keenan Viramontes is a 78 y.o. male who was seen by synchronous (real-time) audio technology on 2/3/2022 for Foot Swelling (and hand swelling. also causing a lot of pain. almost twice the size of normal. started 4 days ago. ), Medication Evaluation (was told to stop taking Aspirin 81mg, Metolazone and Metformin, also stop taking Lasix. ), Other (was in the hospital on December 14-17 2021 for anemia. ), and Referral Follow Up (Pt wants to be referred to a nephrologist Dr. Elena Muse for a kidney follow up. States she needs help. )        Assessment & Plan:   Diagnoses and all orders for this visit:    1. Stage 3 chronic kidney disease, unspecified whether stage 3a or 3b CKD ([facility] [address]    2. Peripheral edema    3. Pain of hand, unspecified laterality  -     methylPREDNISolone (MEDROL DOSEPACK) 4 mg tablet; Take as directed. Peripheral Edema / Pain of hand: I prescribed an Medol dose pack to help with inflammation and pain. He has an upcoming visit with his nephrologist.    I spent at least 45 minutes on this visit with this established patient. Subjective:   Pt presents with a c/o of foot and hand swelling to double the size 4 days ago. He is present with his wife. The wife provided most of the history during the visit. At the hospital visit from 12/14 to 12/17, he was seen for congestive heart failure causing swelling and sob. Pt was requested to stop taking Asprin, Lasix, Metolazone, and Metformin. His blood sugar is at 130. His wife provided a lasix pill today, which improved the swelling. Elevating legs help with the swelling. They have not used compression stockings. He has swelling in only one hand with pain up to his elbow, and it is occurring on his other hand, but not as much. He denied SOB, urinary changes. His urine is clear. He takes Pantoprazole, Tylenol , Carvediol, Vitamin (5000) 1.25 mg, Cymbalta BID, Bumex, Simvastatin, and Lisinopril.  Tylenol not effective enough for the pain, as he is requesting to take more than one pill. He had previously gout, due to him eating only baloney and bread. He had rheumatic fever at 18 y.o. and severe arthritis. Prior to Admission medications    Medication Sig Start Date End Date Taking? Authorizing Provider   methylPREDNISolone (MEDROL DOSEPACK) 4 mg tablet Take as directed. 2/3/22  Yes Harleen Malagon MD   bumetanide (BUMEX) 1 mg tablet Take 1 Tablet by mouth two (2) times a day for 90 days. 12/17/21 3/17/22 Yes Michael Alvarado MD   pantoprazole (Protonix) 40 mg tablet Take 1 Tablet by mouth two (2) times a day for 60 days. 12/17/21 2/15/22 Yes Michael Alvarado MD   isosorbide mononitrate ER (IMDUR) 60 mg CR tablet Take 1 Tablet by mouth daily. 12/14/21  Yes Jean OVALLE NP   lisinopriL (PRINIVIL, ZESTRIL) 2.5 mg tablet Take 1 Tablet by mouth daily. 12/14/21  Yes Jean OVALLE NP   simvastatin (ZOCOR) 10 mg tablet TAKE 1 TABLET BY MOUTH EVERY OTHER DAY . APPOINTMENT REQUIRED FOR FUTURE REFILLS 12/13/21  Yes Jean OVALLE NP   carvediloL (COREG) 12.5 mg tablet TAKE 1 TABLET BY MOUTH TWICE DAILY. NO FURTHER REFILLS UNTIL SEEN IN THE OFFICE 12/6/21  Yes Jean OVALLE NP   glimepiride (AMARYL) 1 mg tablet Taking 0.5 tab daily. MUST ESTABLISH WITH NEW ENDOCRINOLOGIST OR CONTACT PCP FOR FURTHER REFILLS 12/3/21  Yes Mateo Vinson MD   DULoxetine (CYMBALTA) 60 mg capsule TAKE 1 CAPSULE BY MOUTH ONCE DAILY FOR  FEET  PAIN. SEND FUTURE REFILL REQUESTS TO PCP AS DR MAI IS NO LONGER WITH THIS PRACTICE 9/22/21  Yes Mateo Vinson MD   acetaminophen (Tylenol Arthritis Pain) 650 mg TbER Take 650 mg by mouth every eight (8) hours. Yes Provider, Historical   omega-3 fatty acids 1,000 mg cap Take 1 Cap by mouth two (2) times a day. Yes Provider, Historical   dextran 70/hypromellose/PF (NATURAL TEARS, PF, OP) Apply 1 Drop to eye as needed.    Yes Provider, Historical   acetaminophen (TYLENOL) 500 mg tablet Take 1,000 mg by mouth every six (6) hours as needed for Pain. Yes Provider, Historical   cholecalciferol, VITAMIN D3, (VITAMIN D3) 5,000 unit tab tablet Take 1 Tab by mouth daily. 6/6/18  Yes Alvena Nyhan, MD         Review of Systems   Constitutional: Negative. HENT: Negative. Eyes: Negative. Respiratory: Negative. Cardiovascular: Negative. Gastrointestinal: Negative. Genitourinary: Negative. Musculoskeletal: Negative. Skin: Negative. Neurological: Negative. Endo/Heme/Allergies: Negative. Psychiatric/Behavioral: Negative.         Objective:     Patient-Reported Vitals 8/11/2020   Patient-Reported Weight 208lb        [INSTRUCTIONS:  \"[x]\" Indicates a positive item  \"[]\" Indicates a negative item  -- DELETE ALL ITEMS NOT EXAMINED]    Constitutional: [x] Appears well-developed and well-nourished [x] No apparent distress      [] Abnormal -     Mental status: [x] Alert and awake  [x] Oriented to person/place/time [x] Able to follow commands    [] Abnormal -     Eyes:   EOM    [x]  Normal    [] Abnormal -   Sclera  [x]  Normal    [] Abnormal -          Discharge [x]  None visible   [] Abnormal -     HENT: [x] Normocephalic, atraumatic  [] Abnormal -   [x] Mouth/Throat: Mucous membranes are moist    External Ears [x] Normal  [] Abnormal -    Neck: [x] No visualized mass [] Abnormal -     Pulmonary/Chest: [x] Respiratory effort normal   [x] No visualized signs of difficulty breathing or respiratory distress        [] Abnormal -      Musculoskeletal:   [x] Normal gait with no signs of ataxia         [x] Normal range of motion of neck        [] Abnormal -     Neurological:        [x] No Facial Asymmetry (Cranial nerve 7 motor function) (limited exam due to video visit)          [x] No gaze palsy        [] Abnormal -          Skin:        [x] No significant exanthematous lesions or discoloration noted on facial skin         [] Abnormal -            Psychiatric:       [x] Normal Affect [] Abnormal -        [x] No Hallucinations    Other pertinent observable physical exam findings:-        We discussed the expected course, resolution and complications of the diagnosis(es) in detail. Medication risks, benefits, costs, interactions, and alternatives were discussed as indicated. I advised him to contact the office if his condition worsens, changes or fails to improve as anticipated. He expressed understanding with the diagnosis(es) and plan. Samir Kelley, was evaluated through a synchronous (real-time) audio-video encounter. The patient (or guardian if applicable) is aware that this is a billable service, which includes applicable co-pays. Verbal consent to proceed has been obtained. The visit was conducted pursuant to the emergency declaration under the [address] [place] [address] authority and the [employer] Act. Patient identification was verified, and a caregiver was present when appropriate. The patient was located at home in a state where the provider was licensed to provide care.     Written by Ron Cummings, as dictated by Roldan Patrick MD.      Jenna Dorantes MD

## 2022-03-14 RX ORDER — CARVEDILOL 12.5 MG/1
TABLET ORAL
Qty: 180 TABLET | Refills: 0 | Status: SHIPPED | OUTPATIENT
Start: 2022-03-14

## 2022-03-14 RX ORDER — SIMVASTATIN 10 MG/1
TABLET, FILM COATED ORAL
Qty: 45 TABLET | Refills: 0 | Status: SHIPPED | OUTPATIENT
Start: 2022-03-14

## 2022-03-18 PROBLEM — Z98.1 S/P CERVICAL SPINAL FUSION: Status: ACTIVE | Noted: 2019-05-21

## 2022-03-18 PROBLEM — I50.22 CHRONIC SYSTOLIC CONGESTIVE HEART FAILURE (HCC): Status: ACTIVE | Noted: 2018-04-23

## 2022-03-18 RX ORDER — PANTOPRAZOLE SODIUM 40 MG/1
40 TABLET, DELAYED RELEASE ORAL DAILY
Qty: 180 TABLET | Refills: 1 | Status: SHIPPED | OUTPATIENT
Start: 2022-03-18 | End: 2022-07-05

## 2022-03-18 RX ORDER — BUMETANIDE 1 MG/1
1 TABLET ORAL 2 TIMES DAILY
Qty: 60 TABLET | Refills: 2 | Status: SHIPPED | OUTPATIENT
Start: 2022-03-18 | End: 2022-06-13

## 2022-03-18 NOTE — TELEPHONE ENCOUNTER
Future Appointments:  No future appointments. Last Appointment With Me:  Visit date not found     Requested Prescriptions     Pending Prescriptions Disp Refills    bumetanide (BUMEX) 1 mg tablet 60 Tablet 2     Sig: Take 1 Tablet by mouth two (2) times a day for 90 days.  pantoprazole (PROTONIX) 40 mg tablet 180 Tablet 1     Sig: Take 1 Tablet by mouth daily.

## 2022-03-18 NOTE — TELEPHONE ENCOUNTER
----- Message from Jory Arita sent at 3/17/2022  4:52 PM EDT -----  Subject: Refill Request    QUESTIONS  Name of Medication? bumetanide (BUMEX) 1 mg tablet  Patient-reported dosage and instructions? 1 tab 2 times a day  How many days do you have left? 0  Preferred Pharmacy? [facility]  Pharmacy phone number (if available)? [phone]  ---------------------------------------------------------------------------  --------------,  Name of Medication? pantoprazole (Protonix) 40 mg tablet  Patient-reported dosage and instructions? 2 times a day  How many days do you have left? 0  Preferred Pharmacy? [facility]  Pharmacy phone number (if available)? [phone]  ---------------------------------------------------------------------------  --------------  ACTV8 INFO  What is the best way for the office to contact you? OK to leave message on   voicemail, OK to respond with electronic message via [employer] portal (only   for patients who have registered [employer] account)  Preferred Call Back Phone Number?  [phone]

## 2022-03-19 PROBLEM — M79.642 PAIN IN BOTH HANDS: Status: ACTIVE | Noted: 2018-04-23

## 2022-03-19 PROBLEM — M79.641 PAIN IN BOTH HANDS: Status: ACTIVE | Noted: 2018-04-23

## 2022-03-19 PROBLEM — D64.9 ANEMIA: Status: ACTIVE | Noted: 2021-12-14

## 2022-03-19 PROBLEM — R06.02 SOB (SHORTNESS OF BREATH): Status: ACTIVE | Noted: 2018-04-20

## 2022-03-19 PROBLEM — E66.01 OBESITY, MORBID (HCC): Status: ACTIVE | Noted: 2018-04-20

## 2022-03-20 PROBLEM — E11.21 TYPE 2 DIABETES WITH NEPHROPATHY (HCC): Status: ACTIVE | Noted: 2018-04-25

## 2022-03-20 PROBLEM — M48.02 CERVICAL STENOSIS OF SPINAL CANAL: Status: ACTIVE | Noted: 2019-05-21

## 2022-03-20 PROBLEM — E78.00 PURE HYPERCHOLESTEROLEMIA: Status: ACTIVE | Noted: 2018-04-23

## 2022-03-22 ENCOUNTER — TELEPHONE (OUTPATIENT)
Dept: ENDOCRINOLOGY | Age: 77
End: 2022-03-22

## 2022-03-22 NOTE — TELEPHONE ENCOUNTER
3/22/2022  4:31 PM    Pt called regarding Diabetic Doctor. (No more information was diclosed).  Pt can be reached at [phone]    Thanks,   Adamaris Pantoja

## 2022-03-23 NOTE — TELEPHONE ENCOUNTER
I returned this call and spoke with Ms. Silas Rueda. I let her know that Dr. Nowak was coming to the [place] office next month as they really wanted to stay in that area. She will give that office a call to see when they are scheduling for her.   Rebekah Reaves

## 2022-05-02 ENCOUNTER — TELEPHONE (OUTPATIENT)
Dept: ENDOCRINOLOGY | Age: 77
End: 2022-05-02

## 2022-05-02 ENCOUNTER — TELEPHONE (OUTPATIENT)
Dept: INTERNAL MEDICINE CLINIC | Age: 77
End: 2022-05-02

## 2022-05-02 NOTE — TELEPHONE ENCOUNTER
----- Message from Yesseniasailajabraulio May sent at 5/2/2022  3:48 PM EDT -----  Subject: Appointment Request    Reason for Call: Routine (Patient Request) No Script    QUESTIONS  Type of Appointment? Established Patient  Reason for appointment request? Available appointments did not meet   patient need  Additional Information for Provider? Pt would like to be seen for overall   not feeling well.   ---------------------------------------------------------------------------  --------------  CALL BACK INFO  What is the best way for the office to contact you? OK to leave message on   voicemail  Preferred Call Back Phone Number? [phone]  ---------------------------------------------------------------------------  --------------  SCRIPT ANSWERS  Relationship to Patient? Other  Representative Name? Franci Arriaga  Additional information verified (besides Name and Date of Birth)? Phone   Number  (Is the patient requesting to see the provider for a procedure?)? No  (Is the patient requesting to see the provider urgently  today or   tomorrow. )? No  Have you been diagnosed with, awaiting test results for, or told that you   are suspected of having COVID-19 (Coronavirus)? (If patient has tested   negative or was tested as a requirement for work, school, or travel and   not based on symptoms, answer no)? No  Within the past 10 days have you developed any of the following symptoms   (answer no if symptoms have been present longer than 10 days or began   more than 10 days ago)? Fever or Chills, Cough, Shortness of breath or   difficulty breathing, Loss of taste or smell, Sore throat, Nasal   congestion, Sneezing or runny nose, Fatigue or generalized body aches   (answer no if pain is specific to a body part e.g. back pain), Diarrhea,   Headache? No  Have you had close contact with someone with COVID-19 in the last 7 days? No  (Service Expert  click yes below to proceed with [facility] As Usual   Scheduling)?  Yes
The mother chose not to exclusively breastfeed upon admission.

## 2022-05-02 NOTE — TELEPHONE ENCOUNTER
5/2/2022  3:39 PM    Carola King called and stated she would like to speak with nurse hank regarding her 's diabetes. She can be reached at [phone]. No other information was disclosed.     Thanks,   Angeles Shields

## 2022-05-04 ENCOUNTER — OFFICE VISIT (OUTPATIENT)
Dept: INTERNAL MEDICINE CLINIC | Age: 77
End: 2022-05-04
Payer: MEDICARE

## 2022-05-04 VITALS
OXYGEN SATURATION: 96 % | WEIGHT: 210.4 LBS | BODY MASS INDEX: 37.28 KG/M2 | HEART RATE: 88 BPM | SYSTOLIC BLOOD PRESSURE: 138 MMHG | HEIGHT: 63 IN | TEMPERATURE: 96.4 F | RESPIRATION RATE: 18 BRPM | DIASTOLIC BLOOD PRESSURE: 81 MMHG

## 2022-05-04 DIAGNOSIS — G95.9 MYELOPATHY (HCC): ICD-10-CM

## 2022-05-04 DIAGNOSIS — M25.50 ARTHRALGIA, UNSPECIFIED JOINT: ICD-10-CM

## 2022-05-04 DIAGNOSIS — G62.9 NEUROPATHY: ICD-10-CM

## 2022-05-04 DIAGNOSIS — E66.01 SEVERE OBESITY (BMI 35.0-39.9) WITH COMORBIDITY (HCC): ICD-10-CM

## 2022-05-04 DIAGNOSIS — E11.21 TYPE 2 DIABETES WITH NEPHROPATHY (HCC): ICD-10-CM

## 2022-05-04 DIAGNOSIS — I50.32 CHRONIC DIASTOLIC CONGESTIVE HEART FAILURE (HCC): ICD-10-CM

## 2022-05-04 DIAGNOSIS — R60.9 CHRONIC EDEMA: ICD-10-CM

## 2022-05-04 DIAGNOSIS — E78.00 PURE HYPERCHOLESTEROLEMIA: ICD-10-CM

## 2022-05-04 DIAGNOSIS — L03.115 CELLULITIS OF RIGHT LOWER EXTREMITY: Primary | ICD-10-CM

## 2022-05-04 PROCEDURE — 99214 OFFICE O/P EST MOD 30 MIN: CPT | Performed by: FAMILY MEDICINE

## 2022-05-04 PROCEDURE — G8417 CALC BMI ABV UP PARAM F/U: HCPCS | Performed by: FAMILY MEDICINE

## 2022-05-04 PROCEDURE — G8427 DOCREV CUR MEDS BY ELIG CLIN: HCPCS | Performed by: FAMILY MEDICINE

## 2022-05-04 PROCEDURE — G8536 NO DOC ELDER MAL SCRN: HCPCS | Performed by: FAMILY MEDICINE

## 2022-05-04 PROCEDURE — 1101F PT FALLS ASSESS-DOCD LE1/YR: CPT | Performed by: FAMILY MEDICINE

## 2022-05-04 PROCEDURE — G8432 DEP SCR NOT DOC, RNG: HCPCS | Performed by: FAMILY MEDICINE

## 2022-05-04 PROCEDURE — 3046F HEMOGLOBIN A1C LEVEL >9.0%: CPT | Performed by: FAMILY MEDICINE

## 2022-05-04 PROCEDURE — 2022F DILAT RTA XM EVC RTNOPTHY: CPT | Performed by: FAMILY MEDICINE

## 2022-05-04 PROCEDURE — 3017F COLORECTAL CA SCREEN DOC REV: CPT | Performed by: FAMILY MEDICINE

## 2022-05-04 RX ORDER — AMOXICILLIN AND CLAVULANATE POTASSIUM 875; 125 MG/1; MG/1
1 TABLET, FILM COATED ORAL 2 TIMES DAILY
Qty: 20 TABLET | Refills: 0 | Status: SHIPPED | OUTPATIENT
Start: 2022-05-04 | End: 2022-05-16 | Stop reason: SDUPTHER

## 2022-05-04 NOTE — TELEPHONE ENCOUNTER
I returned this call and Ms. Wade Sosa wanted to make a appointment with Dr. Vanesa Santiago for his diabetes. I gave the information to Amelie to give them a call to make that appointment.   Zenon Sanches

## 2022-05-04 NOTE — PROGRESS NOTES
Una Chairez is a 78 y.o. male who presents with concern of fatigue, malaise and lower extremity swelling. In with daughter. Patient has a history of CAD, DM, HTN,congestive heart failure and chronic renal failure. Followed by Dr Wanda Suarez, nephrology and Dr. Ave Saravia, cardiology. Hospitalized December 2021 with GI bleed and symptomatic anemia. Hemoglobin 7.7. Today, reports normal daily BM, no BRBPR or melena. No abdominal pain. No urinary symptoms. Reports ROLLE is unchanged. Per daughter, not active. Notes legs are more swollen, worse on right side. Right LE skin weeping. Weight 7# down from December 2021. Patient reports always sleepy. Diagnosed VIJAY, given CPAP, not using it. Last visit with Dr Wanda Suarez on April 13. weight 207#. BUN 61, creatinine 2.04. taking bumex 1mg BID. Patient not sure of dose. He is setting up medications on his own, using trays. Wife helps some. He self stopped cymbalta, due to concern of side effects. He thinks he has LESS pain off the medication. He self stopped protonix, due to concern of side effects. In hospital, GI side 2 months of PPI.                 Past Medical History:   Diagnosis Date    Arthritis     Asthma     CAD (coronary artery disease)     Calculus of kidney     Carotid artery disease (HCC)     Cervical herniated disc     Chronic systolic (congestive) heart failure (HCC)     Diabetes ([address]     Diabetic shock due to low blood sugar ([address] 2016    Glaucoma     pt is on drops unsure of what the names are    Hypercholesterolemia     Hypertension     Morbid obesity ([address]     Rheumatic fever     When he was 17    Sleep apnea     compliant with cpap as stated 9/6/2019       Family History   Problem Relation Age of Onset    Heart Disease Brother     Diabetes Nephew     Hypertension Mother        Social History     Socioeconomic History    Marital status:      Spouse name: Not on file    Number of children: Not on file  Years of education: Not on file    Highest education level: Not on file   Occupational History    Not on file   Tobacco Use    Smoking status: Former Smoker     Packs/day: 5.00     Years: 15.00     Pack years: 75.00     Quit date: 1963     Years since quittin.0    Smokeless tobacco: Never Used   Vaping Use    Vaping Use: Never used   Substance and Sexual Activity    Alcohol use: No     Alcohol/week: 0.0 standard drinks    Drug use: Never    Sexual activity: Yes     Partners: Female     Birth control/protection: None   Other Topics Concern    Not on file   Social History Narrative    ** Merged History Encounter **          Social Determinants of Health     Financial Resource Strain:     Difficulty of Paying Living Expenses: Not on file   Food Insecurity:     Worried About Running Out of Food in the Last Year: Not on file    Alek of Food in the Last Year: Not on file   Transportation Needs:     Lack of Transportation (Medical): Not on file    Lack of Transportation (Non-Medical):  Not on file   Physical Activity:     Days of Exercise per Week: Not on file    Minutes of Exercise per Session: Not on file   Stress:     Feeling of Stress : Not on file   Social Connections:     Frequency of Communication with Friends and Family: Not on file    Frequency of Social Gatherings with Friends and Family: Not on file    Attends Cheondoism Services: Not on file    Active Member of [address] [place] [address] or Organizations: Not on file    Attends Club or Organization Meetings: Not on file    Marital Status: Not on file   Intimate Partner Violence:     Fear of Current or Ex-Partner: Not on file    Emotionally Abused: Not on file    Physically Abused: Not on file    Sexually Abused: Not on file   Housing Stability:     Unable to Pay for Housing in the Last Year: Not on file    Number of [place] in the Last Year: Not on file    Unstable Housing in the Last Year: Not on file       Current Outpatient Medications on File Prior to Visit   Medication Sig Dispense Refill    bumetanide (BUMEX) 1 mg tablet Take 1 Tablet by mouth two (2) times a day for 90 days. 60 Tablet 2    pantoprazole (PROTONIX) 40 mg tablet Take 1 Tablet by mouth daily. 180 Tablet 1    simvastatin (ZOCOR) 10 mg tablet TAKE 1 TABLET BY MOUTH EVERY OTHER DAY . APPOINTMENT REQUIRED FOR FUTURE REFILLS 45 Tablet 0    isosorbide mononitrate ER (IMDUR) 60 mg CR tablet Take 1 Tablet by mouth daily. 90 Tablet 0    lisinopriL (PRINIVIL, ZESTRIL) 2.5 mg tablet Take 1 Tablet by mouth daily. 90 Tablet 3    glimepiride (AMARYL) 1 mg tablet Taking 0.5 tab daily. MUST ESTABLISH WITH NEW ENDOCRINOLOGIST OR CONTACT PCP FOR FURTHER REFILLS 90 Tablet 0    acetaminophen (Tylenol Arthritis Pain) 650 mg TbER Take 650 mg by mouth every eight (8) hours.  omega-3 fatty acids 1,000 mg cap Take 1 Cap by mouth two (2) times a day.  dextran 70/hypromellose/PF (NATURAL TEARS, PF, OP) Apply 1 Drop to eye as needed.  acetaminophen (TYLENOL) 500 mg tablet Take 1,000 mg by mouth every six (6) hours as needed for Pain.  cholecalciferol, VITAMIN D3, (VITAMIN D3) 5,000 unit tab tablet Take 1 Tab by mouth daily. 30 Tab 5    carvediloL (COREG) 12.5 mg tablet TAKE 1 TABLET BY MOUTH TWICE DAILY . APPOINTMENT REQUIRED FOR FUTURE REFILLS (Patient not taking: Reported on 5/4/2022) 180 Tablet 0    [DISCONTINUED] methylPREDNISolone (MEDROL DOSEPACK) 4 mg tablet Take as directed. 1 Dose Pack 0    DULoxetine (CYMBALTA) 60 mg capsule TAKE 1 CAPSULE BY MOUTH ONCE DAILY FOR  FEET  PAIN. SEND FUTURE REFILL REQUESTS TO PCP AS DR MAI IS NO LONGER WITH THIS PRACTICE (Patient not taking: Reported on 5/4/2022) 90 Capsule 1     No current facility-administered medications on file prior to visit. Review of Systems  Pertinent items are noted in HPI.     Objective:     Visit Vitals  /81 (BP 1 Location: Left arm, BP Patient Position: Sitting, BP Cuff Size: Small adult)   Pulse 88   Temp (!) 96.4 °F (35.8 °C) (Temporal)   Resp 18   Ht 5' 3\" (1.6 m)   Wt 210 lb 6.4 oz (95.4 kg)   SpO2 96%   BMI 37.27 kg/m²     Gen: well appearing male  HEENT:   PERRL,normal conjunctiva. OP no erythema, no exudates, MMM  Resp:  No wheezing, no rhonchi, no rales. CV:  RRR, normal S1S2  Extrem: +2 pitting edema of right lower extremity with marked erythema to mid calf, +1 pitting edema of left lower extremity, no erythema    Assessment/Plan:       ICD-10-CM ICD-9-CM    1. Cellulitis of right lower extremity  L03.115 682.6 amoxicillin-clavulanate (AUGMENTIN) 875-125 mg per tablet   2. Myelopathy (HCC)  G95.9 336.9    3. Chronic diastolic congestive heart failure (HCC)  I50.32 428.32      428.0    4. Type 2 diabetes with nephropathy (HCC)  E11.21 250.40      583.81    5. Pure hypercholesterolemia  E78.00 272.0    6. Severe obesity (BMI 35.0-39. 9) with comorbidity ([facility]  E66.01 278.01    7. Arthralgia, unspecified joint  M25.50 719.40    8. Neuropathy  G62.9 355.9    9. Chronic edema  R60.9 782.3      Okay to stay off pantoprazole, fish oil. Use Voltaren gel and Tylenol as needed for arthritis pain. Resume Cymbalta 60 mg twice daily. Take Bumex as directed by nephrology 1 mg twice daily.     Begin antibiotics for right lower extremity cellulitis today      Jina Guzman MD

## 2022-05-04 NOTE — PATIENT INSTRUCTIONS
OK TO STAY OFF PANTOPRAZOLE. OK TO STAY OFF FISH OIL. RESUME DULOXETINE (CYMBALTA) 60MG TWICE A DAY. USE VOLTAREN GEL 2 GRAMS UP TO 4 TIMES A DAY AS NEEDED    TYLENOL 500MG 1-2 TABLET TWICE A DAY (NO MORE THAN 4 A DAY).

## 2022-05-04 NOTE — PROGRESS NOTES
Chief Complaint   Patient presents with    Leg Swelling       1. Have you been to the ER, urgent care clinic since your last visit? Hospitalized since your last visit? ER, [facility] leg swelling, weeping. 12/2021    2. Have you seen or consulted any other health care providers outside of the [address] [place] [address] since your last visit? Include any pap smears or colon screening. Dr. Ese Riley Nephrology.

## 2022-05-16 ENCOUNTER — TELEPHONE (OUTPATIENT)
Dept: INTERNAL MEDICINE CLINIC | Age: 77
End: 2022-05-16

## 2022-05-16 DIAGNOSIS — E11.21 TYPE 2 DIABETES WITH NEPHROPATHY (HCC): Primary | ICD-10-CM

## 2022-05-16 DIAGNOSIS — L03.115 CELLULITIS OF RIGHT LOWER EXTREMITY: ICD-10-CM

## 2022-05-16 RX ORDER — AMOXICILLIN AND CLAVULANATE POTASSIUM 875; 125 MG/1; MG/1
1 TABLET, FILM COATED ORAL 2 TIMES DAILY
Qty: 20 TABLET | Refills: 0 | Status: SHIPPED | OUTPATIENT
Start: 2022-05-16 | End: 2022-06-01

## 2022-05-16 NOTE — TELEPHONE ENCOUNTER
Caller requests Refill of:  amoxicillin-clavulanate (AUGMENTIN) 875-125 mg per tablet      Please send to:  [address], [facility]      Visit Appointment History:   Future:   None scheduled  Previous: 5/4/22          Caller was advised that Meds will be refilled as soon as possible, however there can be a 48-72 business hour turn around on refill requests.

## 2022-05-16 NOTE — TELEPHONE ENCOUNTER
Called 's office and they stated patient has stephanie ppt on 9/9 and this is their next available, pt is on high risk cancellation list  Notified Christopher Petty and she understood.   No need to fax info to 's office, their office states pt used to see  so they have all his records

## 2022-05-16 NOTE — TELEPHONE ENCOUNTER
Caller  is pt wife, Mary Ronquillo pt needs referral to Dr Kurt Norman, Endocrinology (for diabetes)  Phone [phone] [address] [place] [address] when calling    States needs referral and for our office to  and schedule the appt on behalf of the patient.           Dr [facility]  [address] [place] [address], [address]    Phone number provided by patient matches the [place] location:  [facility]  [address] [facility], [address]

## 2022-05-23 ENCOUNTER — OFFICE VISIT (OUTPATIENT)
Dept: CARDIOLOGY CLINIC | Age: 77
End: 2022-05-23

## 2022-05-23 ENCOUNTER — TELEPHONE (OUTPATIENT)
Dept: ENDOCRINOLOGY | Age: 77
End: 2022-05-23

## 2022-05-23 VITALS
RESPIRATION RATE: 16 BRPM | HEART RATE: 87 BPM | HEIGHT: 63 IN | SYSTOLIC BLOOD PRESSURE: 130 MMHG | BODY MASS INDEX: 37.07 KG/M2 | WEIGHT: 209.2 LBS | DIASTOLIC BLOOD PRESSURE: 70 MMHG | OXYGEN SATURATION: 97 %

## 2022-05-23 DIAGNOSIS — I50.32 CHRONIC DIASTOLIC CONGESTIVE HEART FAILURE (HCC): Primary | ICD-10-CM

## 2022-05-23 DIAGNOSIS — I87.2 VENOUS INSUFFICIENCY: ICD-10-CM

## 2022-05-23 DIAGNOSIS — R06.02 SOB (SHORTNESS OF BREATH): ICD-10-CM

## 2022-05-23 DIAGNOSIS — E11.21 TYPE 2 DIABETES WITH NEPHROPATHY (HCC): ICD-10-CM

## 2022-05-23 DIAGNOSIS — R60.0 BILATERAL LEG EDEMA: ICD-10-CM

## 2022-05-23 DIAGNOSIS — N18.32 STAGE 3B CHRONIC KIDNEY DISEASE (HCC): ICD-10-CM

## 2022-05-23 DIAGNOSIS — D64.9 ANEMIA, UNSPECIFIED TYPE: ICD-10-CM

## 2022-05-23 PROCEDURE — G8536 NO DOC ELDER MAL SCRN: HCPCS | Performed by: INTERNAL MEDICINE

## 2022-05-23 PROCEDURE — 3017F COLORECTAL CA SCREEN DOC REV: CPT | Performed by: INTERNAL MEDICINE

## 2022-05-23 PROCEDURE — 93000 ELECTROCARDIOGRAM COMPLETE: CPT | Performed by: INTERNAL MEDICINE

## 2022-05-23 PROCEDURE — 2022F DILAT RTA XM EVC RTNOPTHY: CPT | Performed by: INTERNAL MEDICINE

## 2022-05-23 PROCEDURE — G8427 DOCREV CUR MEDS BY ELIG CLIN: HCPCS | Performed by: INTERNAL MEDICINE

## 2022-05-23 PROCEDURE — 3046F HEMOGLOBIN A1C LEVEL >9.0%: CPT | Performed by: INTERNAL MEDICINE

## 2022-05-23 PROCEDURE — G8432 DEP SCR NOT DOC, RNG: HCPCS | Performed by: INTERNAL MEDICINE

## 2022-05-23 PROCEDURE — G8417 CALC BMI ABV UP PARAM F/U: HCPCS | Performed by: INTERNAL MEDICINE

## 2022-05-23 PROCEDURE — 99204 OFFICE O/P NEW MOD 45 MIN: CPT | Performed by: INTERNAL MEDICINE

## 2022-05-23 PROCEDURE — 1101F PT FALLS ASSESS-DOCD LE1/YR: CPT | Performed by: INTERNAL MEDICINE

## 2022-05-23 NOTE — PROGRESS NOTES
Referral for venous Reflux study with Dr. Homar Garza was faxed to [facility] at [phone].

## 2022-05-23 NOTE — PATIENT INSTRUCTIONS
Referral has been sent to Dr. Socrates Barakat. If you don't hear from physician office within a week, please call them at  [phone]. Follow up with us in 6 months.

## 2022-05-23 NOTE — PROGRESS NOTES
Kimmy , [place], [address] [place] [address]  [phone]     Subjective:      Kirstie Douglas is a 78 y.o. male is here to establish with a new cardiologist, since [facility] is no longer with Marifer Bray. The patient was last seen by nurse practitioner Rafaela Jones in May 2020, for Dr. Janak Izquierdo. He was then admitted to the hospital in December 2021 for shortness of breath and lower extremity edema with the following hospital course:    DISCHARGE SUMMARY/HOSPITAL COURSE:  for full details see H&P, daily progress notes, labs, consult notes. Acute on chronic anemia suspect blood loss  FOBT positive  Dyspnea  Symptomatic anemia leading to SOB   -Baseline hemoglobin is around 14 and patient is coming with a hemoglobin of 7.7.  -Stool for occult blood is positive and reports occasional black stool  Iron panel showed low serum iron and low iron saturation  S/p  IV iron and a unit of PRBC transfusion   -Continue with Protonix for 8weeks   S/p EGD on 12/17 which showed healing gastric ulcer , Fu pathology         MALU on underlying CKD 3  -base line is around 1.5 and cr now is 2.08.on admission   UA is within normal limit  Creatinine trended up then trending  down ,s/p IV bumex switch to PO Bumex   Dc  home lasix and metolazone,  c/w Bumex  .    renal US showed no hydronephrosis . Nephrology consulted , need close FU with nephro  Additional labs  Ordered by nephrologist to be drawn  Followed by Dr. Virgil Fatima- not seen yet    DM type 2  Resume  metformin and Glimeperide with closer monitoring creatinine     Diastolic CHF  Lower extremity edema  CAD  HTN  Dyslipidemia  -c/w coreg and lipitor  DC lisinopril     B/l leg blisters due to swelling   Improving  C/w bumex    Please see prior results below under cardiac testing. Today, the patient complains of joint pains, but denies chest pain/ shortness of breath, orthopnea, PND, palpitations, syncope, or presyncope.        Patient Active Problem List    Diagnosis Date Noted    Myelopathy ([facility] 05/04/2022    Anemia 12/14/2021    CAD (coronary artery disease)     Cervical stenosis of spinal canal 05/21/2019    S/P cervical spinal fusion 05/21/2019    Type 2 diabetes with nephropathy ([address] 04/25/2018    Uncontrolled type 2 diabetes mellitus with complication, with long-term current use of insulin 04/23/2018    Pure hypercholesterolemia 04/23/2018    Chronic systolic congestive heart failure ([address] 04/23/2018    Pain in both hands 04/23/2018    Obesity, morbid ([address] 04/20/2018    SOB (shortness of breath) 04/20/2018      Katiuska Ortiz MD  Past Medical History:   Diagnosis Date    Arthritis     Asthma     CAD (coronary artery disease)     Calculus of kidney     Carotid artery disease (HCC)     Cervical herniated disc     Chronic systolic (congestive) heart failure ([address]     Diabetes ([address]     Diabetic shock due to low blood sugar ([address] 2016    Glaucoma     pt is on drops unsure of what the names are    Hypercholesterolemia     Hypertension     Morbid obesity ([address]     Rheumatic fever     When he was 17    Sleep apnea     compliant with cpap as stated 9/6/2019      Past Surgical History:   Procedure Laterality Date    HX CAROTID STENT  1990's    with cath    HX CATARACT REMOVAL Left     HX CERVICAL FUSION  05/21/2019    C3- C6 ACFD     HX CHOLECYSTECTOMY      HX CIRCUMCISION      HX HEART CATHETERIZATION      x 5 total, 4 in heart, 1 in carotid    HX HEENT Left     ear torn off and repaired    HX OTHER SURGICAL Right     Right hand reconstruction    HX SHOULDER ARTHROSCOPY Right     dislocated with fall, came out, he stated surgery put back in place and sowed him up    UPPER GI ENDOSCOPY,BIOPSY  12/17/2021          Allergies   Allergen Reactions    Morphine Anaphylaxis     Pt states \"morphine gave me heart failure\"    Lyrica [Pregabalin] Other (comments)     Makes him \"loopy\"      Family History   Problem Relation Age of Onset    Heart Disease Brother     Diabetes Nephew     Hypertension Mother       Social History     Socioeconomic History    Marital status:      Spouse name: Not on file    Number of children: Not on file    Years of education: Not on file    Highest education level: Not on file   Occupational History    Not on file   Tobacco Use    Smoking status: Former Smoker     Packs/day: 5.00     Years: 15.00     Pack years: 75.00     Quit date: 1963     Years since quittin.1    Smokeless tobacco: Never Used   Vaping Use    Vaping Use: Never used   Substance and Sexual Activity    Alcohol use: No     Alcohol/week: 0.0 standard drinks    Drug use: Never    Sexual activity: Yes     Partners: Female     Birth control/protection: None   Other Topics Concern    Not on file   Social History Narrative    ** Merged History Encounter **          Social Determinants of Health     Financial Resource Strain:     Difficulty of Paying Living Expenses: Not on file   Food Insecurity:     Worried About Running Out of Food in the Last Year: Not on file    Alek of Food in the Last Year: Not on file   Transportation Needs:     Lack of Transportation (Medical): Not on file    Lack of Transportation (Non-Medical):  Not on file   Physical Activity:     Days of Exercise per Week: Not on file    Minutes of Exercise per Session: Not on file   Stress:     Feeling of Stress : Not on file   Social Connections:     Frequency of Communication with Friends and Family: Not on file    Frequency of Social Gatherings with Friends and Family: Not on file    Attends Synagogue Services: Not on file    Active Member of Clubs or Organizations: Not on file    Attends Club or Organization Meetings: Not on file    Marital Status: Not on file   Intimate Partner Violence:     Fear of Current or Ex-Partner: Not on file    Emotionally Abused: Not on file    Physically Abused: Not on file    Sexually Abused: Not on file Housing Stability:     Unable to Pay for Housing in the Last Year: Not on file    Number of Places Lived in the Last Year: Not on file    Unstable Housing in the Last Year: Not on file      Current Outpatient Medications   Medication Sig    amoxicillin-clavulanate (AUGMENTIN) 875-125 mg per tablet Take 1 Tablet by mouth two (2) times a day.  bumetanide (BUMEX) 1 mg tablet Take 1 Tablet by mouth two (2) times a day for 90 days.  pantoprazole (PROTONIX) 40 mg tablet Take 1 Tablet by mouth daily.  simvastatin (ZOCOR) 10 mg tablet TAKE 1 TABLET BY MOUTH EVERY OTHER DAY . APPOINTMENT REQUIRED FOR FUTURE REFILLS    carvediloL (COREG) 12.5 mg tablet TAKE 1 TABLET BY MOUTH TWICE DAILY . APPOINTMENT REQUIRED FOR FUTURE REFILLS (Patient not taking: Reported on 5/4/2022)    isosorbide mononitrate ER (IMDUR) 60 mg CR tablet Take 1 Tablet by mouth daily.  lisinopriL (PRINIVIL, ZESTRIL) 2.5 mg tablet Take 1 Tablet by mouth daily.  glimepiride (AMARYL) 1 mg tablet Taking 0.5 tab daily. MUST ESTABLISH WITH NEW ENDOCRINOLOGIST OR CONTACT PCP FOR FURTHER REFILLS    DULoxetine (CYMBALTA) 60 mg capsule TAKE 1 CAPSULE BY MOUTH ONCE DAILY FOR  FEET  PAIN. SEND FUTURE REFILL REQUESTS TO PCP AS DR MAI IS NO LONGER WITH THIS PRACTICE (Patient not taking: Reported on 5/4/2022)    acetaminophen (Tylenol Arthritis Pain) 650 mg TbER Take 650 mg by mouth every eight (8) hours.  omega-3 fatty acids 1,000 mg cap Take 1 Cap by mouth two (2) times a day.  dextran 70/hypromellose/PF (NATURAL TEARS, PF, OP) Apply 1 Drop to eye as needed.  acetaminophen (TYLENOL) 500 mg tablet Take 1,000 mg by mouth every six (6) hours as needed for Pain.  cholecalciferol, VITAMIN D3, (VITAMIN D3) 5,000 unit tab tablet Take 1 Tab by mouth daily. No current facility-administered medications for this visit.          Review of Symptoms:  11 systems reviewed, negative other than as stated in the HPI    Physical ExamPhysical Exam: Visit Vitals  /70 (BP 1 Location: Left upper arm, BP Patient Position: Sitting, BP Cuff Size: Adult)   Pulse 87   Resp 16   Ht 5' 3\" (1.6 m)   Wt 209 lb 3.2 oz (94.9 kg)   SpO2 97%   BMI 37.06 kg/m²      General PE  Gen:  NAD  Mental Status - Alert. General Appearance - Not in acute distress. HEENT:  PERRL, no carotid bruits or JVD  Chest and Lung Exam   Inspection: Accessory muscles - No use of accessory muscles in breathing. Auscultation:   Breath sounds: - Normal.   Cardiovascular   Inspection: Jugular vein - Bilateral - Inspection Normal.   Palpation/Percussion:   Apical Impulse: - Normal.   Auscultation: Rhythm - Regular. Heart Sounds - S1 WNL and S2 WNL. No S3 or S4. Murmurs & Other Heart Sounds: Auscultation of the heart reveals - No Murmurs. Peripheral Vascular   Upper Extremity: Inspection - Bilateral - No Cyanotic nailbeds or Digital clubbing. Lower Extremity:   Palpation: Edema - Bilateral -3+ pitting edema bilaterally  Abdomen:   Soft, non-tender, bowel sounds are active.   Neuro: A&O times 3, CN and motor grossly WNL    Labs:   Lab Results   Component Value Date/Time    Cholesterol, total 92 12/21/2021 10:58 AM    Cholesterol, total 95 (L) 12/10/2021 09:16 AM    Cholesterol, total 109 02/06/2020 01:34 PM    Cholesterol, total 116 05/10/2019 01:46 PM    Cholesterol, total 103 11/15/2018 03:06 PM    HDL Cholesterol 43 12/21/2021 10:58 AM    HDL Cholesterol 37 (L) 12/10/2021 09:16 AM    HDL Cholesterol 42 02/06/2020 01:34 PM    HDL Cholesterol 43 05/10/2019 01:46 PM    HDL Cholesterol 41 11/15/2018 03:06 PM    LDL, calculated 27.4 12/21/2021 10:58 AM    LDL, calculated 40 12/10/2021 09:16 AM    LDL, calculated 39 02/06/2020 01:34 PM    LDL, calculated 49 05/10/2019 01:46 PM    LDL, calculated 45 11/15/2018 03:06 PM    LDL, calculated 57 04/23/2018 10:02 AM    Triglyceride 108 12/21/2021 10:58 AM    Triglyceride 95 12/10/2021 09:16 AM    Triglyceride 141 02/06/2020 01:34 PM    Triglyceride 118 05/10/2019 01:46 PM    Triglyceride 84 11/15/2018 03:06 PM    CHOL/HDL Ratio 2.1 12/21/2021 10:58 AM     No results found for: CPK, CPKX, CPX  Lab Results   Component Value Date/Time    Sodium 139 12/21/2021 10:58 AM    Potassium 4.0 12/21/2021 10:58 AM    Chloride 105 12/21/2021 10:58 AM    CO2 29 12/21/2021 10:58 AM    Anion gap 5 12/21/2021 10:58 AM    Glucose 174 (H) 12/21/2021 10:58 AM    BUN 49 (H) 12/21/2021 10:58 AM    Creatinine 2.03 (H) 12/21/2021 10:58 AM    BUN/Creatinine ratio 24 (H) 12/21/2021 10:58 AM    GFR est AA 39 (L) 12/21/2021 10:58 AM    GFR est non-AA 32 (L) 12/21/2021 10:58 AM    Calcium 9.2 12/21/2021 10:58 AM    Bilirubin, total 0.3 12/21/2021 10:58 AM    Alk. phosphatase 74 12/21/2021 10:58 AM    Protein, total 6.8 12/21/2021 10:58 AM    Albumin 3.2 (L) 12/21/2021 10:58 AM    Globulin 3.6 12/21/2021 10:58 AM    A-G Ratio 0.9 (L) 12/21/2021 10:58 AM    ALT (SGPT) 21 12/21/2021 10:58 AM       EKG:  NSR     Echo 12/15/2021:    Left Ventricle: Left ventricle size is normal. Normal wall thickness. Normal wall motion. Low normal left ventricular systolic function with a visually estimated EF of 55 - 60%. Normal diastolic function.   Aortic Valve: Valve structure is normal. Mildly calcified cusps. Mild stenosis.   Mitral Valve: Mild mitral annular calcification. Assessment:          ICD-10-CM ICD-9-CM    1. ASHD (arteriosclerotic heart disease)  I25.10 414.00    2. Type 2 diabetes with nephropathy (HCC)  E11.21 250.40      583.81    3. Stage 3b chronic kidney disease (HCC)  N18.32 585.3    4. Bilateral leg edema  R60.0 782.3        No orders of the defined types were placed in this encounter. Plan:     1.  Atherosclerotic heart disease: Nonobstructive via catheterization 2018. Reports 5 stents in the distant past in [place]. He is without c/o anginal symptoms today of concern.  Continue BB, off aspirin due to ulcer (I advised the patient today to call Dr. Aurora Brooke office to find out if okay to restart), statin.     2.  Grade 1 diastolic dysfunction: reports his edema is at his baseline. Edema is far out of proportion to essentially normal echo, therefore I am not sure diastolic dysfunction is playing a role in edema. Denies SOB/ROLLE. Creatinine most recent 1.41. Referral has been given to pt for nephrology consult. Continue Bumex twice daily. States Markos Howe was stopped at last hospital stay in December 2021. Advised I must defer diuretics to renal due to his kidney dysfunction.     3.  Hyperlipidemia: LDL was low in February at 44. Simvastatin 10 mg changed to every other day and he is tolerating. Dr Brent Mercado is following labs.     4. Type 2 Diabetes with nephropathy Dr Brent Mercado has been managing with PCP. Referral to Dr Ashutosh Whitney previously given, and advised to follow-up ASAP. 5.  Lower extremity edema out of proportion to essentially normal echo, and probably even out of proportion to renal dysfunction. Suspect venous insufficiency[facility]. Likely venous reflux study first and follow-up with him after that.     Follow up in 1 year, sooner as needed.      David Varghese MD

## 2022-05-23 NOTE — TELEPHONE ENCOUNTER
5/23/2022  11:20 AM      Pt wife called on behalf of pt. Pt wife stated pt just left the heart doctor and his heart doctor says he needs an appointment with  right away. Pt wife stated Nohemy Thorpe knows pt because she use to work with him when he was with [employer]. Pt wife stated he needs to be seen right away. Pt wife #[phone]    Thanks,  Flor Chaidez

## 2022-05-23 NOTE — PROGRESS NOTES
Chief Complaint   Patient presents with    Follow-up     Denies chest pain/dizziness. Complaints of shortness of breath with exertion/increae in swelling.       Visit Vitals  /70 (BP 1 Location: Left upper arm, BP Patient Position: Sitting, BP Cuff Size: Adult)   Pulse 87   Resp 16   Ht 5' 3\" (1.6 m)   Wt 209 lb 3.2 oz (94.9 kg)   SpO2 97%   BMI 37.06 kg/m²

## 2022-05-23 NOTE — LETTER
5/23/2022    Patient: Naomi Carver   YOB: 1945   Date of Visit: 5/23/2022     Shelli Rod MD  [address]  [address]  [address] [address]  [place]    Dear Shelli Rod MD,      Thank you for referring Mr. Gloria Richard to [address] for evaluation. My notes for this consultation are attached. If you have questions, please do not hesitate to call me. I look forward to following your patient along with you.       Sincerely,    Zoran Keller MD

## 2022-05-27 ENCOUNTER — TELEPHONE (OUTPATIENT)
Dept: ENDOCRINOLOGY | Age: 77
End: 2022-05-27

## 2022-05-27 NOTE — TELEPHONE ENCOUNTER
5/27/2022  3:32 PM        Debra the pt's wife called on behalf of pt. Lashon Isaac stated she would like hank to call her back with an appointment for the pt with . Lashon Isaac stated the pt went to the heart doctor  And the doctor said his leg is weeping and he needs to be seen as soon as possible he can't wait til September. Lashon Isaac said please hank do what you can and she thanks you. Debra#[phone]      Thanks,  Scotty Duque

## 2022-06-01 DIAGNOSIS — L03.115 CELLULITIS OF RIGHT LOWER EXTREMITY: ICD-10-CM

## 2022-06-01 RX ORDER — AMOXICILLIN AND CLAVULANATE POTASSIUM 875; 125 MG/1; MG/1
TABLET, FILM COATED ORAL
Qty: 20 TABLET | Refills: 0 | Status: SHIPPED | OUTPATIENT
Start: 2022-06-01 | End: 2022-06-13

## 2022-06-01 NOTE — TELEPHONE ENCOUNTER
I gave this to the  to see if we could get him in earlier and they made a appointment for Rylee Gordon

## 2022-06-13 DIAGNOSIS — L03.115 CELLULITIS OF RIGHT LOWER EXTREMITY: ICD-10-CM

## 2022-06-13 RX ORDER — AMOXICILLIN AND CLAVULANATE POTASSIUM 875; 125 MG/1; MG/1
TABLET, FILM COATED ORAL
Qty: 20 TABLET | Refills: 0 | Status: SHIPPED | OUTPATIENT
Start: 2022-06-13 | End: 2022-07-05 | Stop reason: ALTCHOICE

## 2022-06-13 RX ORDER — BUMETANIDE 1 MG/1
TABLET ORAL
Qty: 60 TABLET | Refills: 0 | Status: SHIPPED | OUTPATIENT
Start: 2022-06-13 | End: 2022-07-05 | Stop reason: SDUPTHER

## 2022-06-14 RX ORDER — ISOSORBIDE MONONITRATE 60 MG/1
TABLET, EXTENDED RELEASE ORAL
Qty: 90 TABLET | Refills: 0 | Status: SHIPPED | OUTPATIENT
Start: 2022-06-14 | End: 2022-10-12

## 2022-06-16 RX ORDER — DULOXETIN HYDROCHLORIDE 60 MG/1
CAPSULE, DELAYED RELEASE ORAL
Qty: 90 CAPSULE | Refills: 1 | Status: SHIPPED | OUTPATIENT
Start: 2022-06-16

## 2022-06-16 RX ORDER — GLIMEPIRIDE 1 MG/1
TABLET ORAL
Qty: 90 TABLET | Refills: 0 | Status: SHIPPED | OUTPATIENT
Start: 2022-06-16

## 2022-06-16 NOTE — TELEPHONE ENCOUNTER
Pt's dm doctor, Dr. Albino Workman retired and pt can not see Dr. Melly Bal until July 22, 2022    He needs this medication filled as he is out and has been out. Willey Schirmer has been sending this to the incorrect doctor for days trying to get it filled. Wife, Reuben Lisa is asking how soon pt can get this? I advised it would not be before tomorrow most likely this late in the day.

## 2022-06-16 NOTE — TELEPHONE ENCOUNTER
Future Appointments:  Future Appointments   Date Time Provider Magda Gruberi   7/22/2022 11:50 AM MD JOAQUIM [facility]inal [facility] [place] [facility] AMB   11/28/2022  2:40 PM MD DOMINGO Reed  AMB        Last Appointment With Me:  5/4/2022     Requested Prescriptions     Pending Prescriptions Disp Refills    DULoxetine (CYMBALTA) 60 mg capsule 90 Capsule 1     Sig: TAKE 1 CAPSULE BY MOUTH ONCE DAILY FOR  FEET  PAIN.  SEND FUTURE REFILL REQUESTS TO PCP AS DR MAI IS NO LONGER WITH THIS PRACTICE

## 2022-06-27 ENCOUNTER — TELEPHONE (OUTPATIENT)
Dept: INTERNAL MEDICINE CLINIC | Age: 77
End: 2022-06-27

## 2022-06-27 NOTE — TELEPHONE ENCOUNTER
#[phone] daughter in-law, Al Case states pt needs a four legged walker. How do they go about getting this? Please call to let Al Mueller know where this is being sent and how they get this. She needs direction she states. Please call Al Mueller with any questions.

## 2022-06-28 ENCOUNTER — TELEPHONE (OUTPATIENT)
Dept: INTERNAL MEDICINE CLINIC | Age: 77
End: 2022-06-28

## 2022-06-28 DIAGNOSIS — H25.9 SENILE CATARACT, UNSPECIFIED AGE-RELATED CATARACT TYPE, UNSPECIFIED LATERALITY: Primary | ICD-10-CM

## 2022-06-28 DIAGNOSIS — L03.115 CELLULITIS OF RIGHT LOWER EXTREMITY: ICD-10-CM

## 2022-06-28 RX ORDER — AMOXICILLIN AND CLAVULANATE POTASSIUM 875; 125 MG/1; MG/1
1 TABLET, FILM COATED ORAL 2 TIMES DAILY
Qty: 20 TABLET | Refills: 0 | OUTPATIENT
Start: 2022-06-28

## 2022-06-28 NOTE — TELEPHONE ENCOUNTER
----- Message from April Tiburcio sent at 6/27/2022 10:01 AM EDT -----  Subject: Medication Problem    QUESTIONS  Name of Medication? amoxicillin-clavulanate (AUGMENTIN) 875-125 mg per   tablet  Patient-reported dosage and instructions? Take 1 tablet by mouth twice   daily  What question or problem do you have with the medication? Patient took   full course of this antibiotics and states still has symptoms. Patient   would like brand name of Penicillin called in for him for his symptoms,   patient states this has helped in the past. Please call patient back to   advise   Preferred Pharmacy? [address] [id number] [facility] phone number (if available)? [phone]  Additional Information for Provider? Patient would like a call back today,   thank you   ---------------------------------------------------------------------------  --------------  CALL BACK INFO  What is the best way for the office to contact you? OK to leave message on   voicemail  Preferred Call Back Phone Number? [phone]  ---------------------------------------------------------------------------  --------------  SCRIPT ANSWERS  Relationship to Patient? Other  Representative Name? [email or web address]  Is the Representative on the appropriate HIPAA document in Epic?  Yes

## 2022-06-28 NOTE — TELEPHONE ENCOUNTER
----- Message from Terrancelilia Estesjerilyncarter sent at 6/27/2022  4:11 PM EDT -----  Subject: Referral Request    QUESTIONS   Reason for referral request? pt would like to be seen by Ophthalmology, to   have his other cataract removed for his right eye   Has the physician seen you for this condition before? No   Preferred Specialist (if applicable)? Do you already have an appointment scheduled? No  Additional Information for Provider?   ---------------------------------------------------------------------------  --------------  CALL BACK INFO  What is the best way for the office to contact you? OK to leave message on   voicemail  Preferred Call Back Phone Number? [phone]  ---------------------------------------------------------------------------  --------------  SCRIPT ANSWERS  Relationship to Patient?  Self

## 2022-06-28 NOTE — TELEPHONE ENCOUNTER
augmentin is a penicillin type with clavulanate which makes it stronger - if cellulitis did not clear up patient needs appointment for evaluation and decision on next step

## 2022-06-29 NOTE — TELEPHONE ENCOUNTER
Called, spoke to pt. Two pt identifiers confirmed. Patient informed per    See message below. augmentin is a penicillin type with clavulanate which makes it stronger - if cellulitis did not clear up patient needs appointment for evaluation and decision on next step. Patient verbalized understanding of information discussed with no further questions at this time.

## 2022-07-01 NOTE — TELEPHONE ENCOUNTER
Tried calling the patient to let him know the amoxicillin was denied no answer or VM.     Noreene Saliva

## 2022-07-05 ENCOUNTER — TELEPHONE (OUTPATIENT)
Dept: INTERNAL MEDICINE CLINIC | Age: 77
End: 2022-07-05

## 2022-07-05 ENCOUNTER — HOSPITAL ENCOUNTER (OUTPATIENT)
Dept: WOUND CARE | Age: 77
Discharge: HOME | End: 2022-07-05
Payer: COMMERCIAL

## 2022-07-05 VITALS
DIASTOLIC BLOOD PRESSURE: 67 MMHG | HEART RATE: 94 BPM | RESPIRATION RATE: 18 BRPM | TEMPERATURE: 97.8 F | SYSTOLIC BLOOD PRESSURE: 148 MMHG

## 2022-07-05 DIAGNOSIS — I83.891 VENOUS STASIS ULCER OF RIGHT LOWER LEG WITH EDEMA OF RIGHT LOWER LEG (HCC): Primary | ICD-10-CM

## 2022-07-05 DIAGNOSIS — L97.919 VENOUS STASIS ULCER OF RIGHT LOWER LEG WITH EDEMA OF RIGHT LOWER LEG (HCC): Primary | ICD-10-CM

## 2022-07-05 DIAGNOSIS — R60.9 VENOUS STASIS ULCER OF RIGHT LOWER LEG WITH EDEMA OF RIGHT LOWER LEG (HCC): Primary | ICD-10-CM

## 2022-07-05 DIAGNOSIS — I83.019 VENOUS STASIS ULCER OF RIGHT LOWER LEG WITH EDEMA OF RIGHT LOWER LEG (HCC): Primary | ICD-10-CM

## 2022-07-05 PROBLEM — R60.0 VENOUS STASIS ULCER OF RIGHT LOWER LEG WITH EDEMA OF RIGHT LOWER LEG (HCC): Status: ACTIVE | Noted: 2022-07-05

## 2022-07-05 PROCEDURE — 29581 APPL MULTLAYER CMPRN SYS LEG: CPT

## 2022-07-05 PROCEDURE — 99213 OFFICE O/P EST LOW 20 MIN: CPT

## 2022-07-05 RX ORDER — BACITRACIN ZINC AND POLYMYXIN B SULFATE 500; 1000 [USP'U]/G; [USP'U]/G
OINTMENT TOPICAL ONCE
Status: CANCELLED | OUTPATIENT
Start: 2022-07-05 | End: 2022-07-05

## 2022-07-05 RX ORDER — LIDOCAINE 40 MG/G
CREAM TOPICAL ONCE
Status: CANCELLED | OUTPATIENT
Start: 2022-07-05 | End: 2022-07-05

## 2022-07-05 RX ORDER — TRIAMCINOLONE ACETONIDE 1 MG/G
OINTMENT TOPICAL ONCE
Status: CANCELLED | OUTPATIENT
Start: 2022-07-05 | End: 2022-07-05

## 2022-07-05 RX ORDER — CLOBETASOL PROPIONATE 0.5 MG/G
OINTMENT TOPICAL ONCE
Status: CANCELLED | OUTPATIENT
Start: 2022-07-05 | End: 2022-07-05

## 2022-07-05 RX ORDER — BETAMETHASONE DIPROPIONATE 0.5 MG/G
OINTMENT TOPICAL ONCE
Status: CANCELLED | OUTPATIENT
Start: 2022-07-05 | End: 2022-07-05

## 2022-07-05 RX ORDER — MUPIROCIN 20 MG/G
OINTMENT TOPICAL ONCE
Status: CANCELLED | OUTPATIENT
Start: 2022-07-05 | End: 2022-07-05

## 2022-07-05 RX ORDER — LIDOCAINE HYDROCHLORIDE 20 MG/ML
JELLY TOPICAL ONCE
Status: CANCELLED | OUTPATIENT
Start: 2022-07-05 | End: 2022-07-05

## 2022-07-05 RX ORDER — BUMETANIDE 1 MG/1
1 TABLET ORAL 3 TIMES DAILY
Qty: 90 TABLET | Refills: 0 | Status: SHIPPED | OUTPATIENT
Start: 2022-07-05 | End: 2022-08-05

## 2022-07-05 RX ORDER — GENTAMICIN SULFATE 1 MG/G
OINTMENT TOPICAL ONCE
Status: CANCELLED | OUTPATIENT
Start: 2022-07-05 | End: 2022-07-05

## 2022-07-05 RX ORDER — BACITRACIN 500 [USP'U]/G
OINTMENT TOPICAL ONCE
Status: CANCELLED | OUTPATIENT
Start: 2022-07-05 | End: 2022-07-05

## 2022-07-05 RX ORDER — SILVER SULFADIAZINE 10 G/1000G
CREAM TOPICAL ONCE
Status: CANCELLED | OUTPATIENT
Start: 2022-07-05 | End: 2022-07-05

## 2022-07-05 RX ORDER — LIDOCAINE HYDROCHLORIDE 40 MG/ML
SOLUTION TOPICAL ONCE
Status: CANCELLED | OUTPATIENT
Start: 2022-07-05 | End: 2022-07-05

## 2022-07-05 NOTE — DISCHARGE INSTRUCTIONS
Discharge Instructions for  [facility]. [address]  [facility], [address] [facility]  [address] [place] [address] [place], [id number]  Telephone: [phone]    NAME:  Ranjit Goodwin OF BIRTH:  1945  MEDICAL RECORD NUMBER:  [id number]  DATE:  7/5/2022  WOUND CARE ORDERS:  Right lower leg :Cleanse with saline , apply primary dressing xeroform cover with secondary dressing   abd pad . Apply 2 layers with Calamine  Pt./pcg/HH nurse to change (freq) once a week in clinic and as needed for compromise. F/U in 1 week. TREATMENT ORDERS:    Elevate leg(s) above the level of the heart when sitting. Avoid prolonged standing in one place. Do no get dressing/wrap wet. Follow Diet as prescribed:   [x] Diet as tolerated: [x] Calorie Diabetic Diet: Low carb and no Sugar [x] No Added Salt:          Return Appointment:  [x] Return Appointment: With DR Luster Aschoff  in  [facility])      Electronically signed Stephanie Lopez RN on 7/5/2022 at . [address]: Should you experience any significant changes in your wound(s) or have questions about your wound care, please contact the [facility] at [address] [place] [address] 8:00 am - 4:30. If you need help with your wound outside these hours and cannot wait until we are again available, contact your PCP or go to the hospital emergency room. PLEASE NOTE: IF YOU ARE UNABLE TO OBTAIN WOUND SUPPLIES, CONTINUE TO USE THE SUPPLIES YOU HAVE AVAILABLE UNTIL YOU ARE ABLE TO REACH US. IT IS MOST IMPORTANT TO KEEP THE WOUND COVERED AT ALL TIMES.      Physician Signature:_______________________    Date: ___________ Time:  ____________

## 2022-07-05 NOTE — WOUND CARE
Multilayer Compression Wrap   (Not Unna) Below the Knee    NAME:  Laura Kessler OF BIRTH:  1945  MEDICAL RECORD NUMBER:  [id number]  DATE:  7/5/2022 07/05/22 0936   Right Leg Edema Point of Measurement   Compression Therapy 2 layer compression wrap   Wound Leg lower Right # 1 07/05/22   Date First Assessed/Time First Assessed: 07/05/22 0910   Present on Hospital Admission: Yes  Wound Approximate Age at First Assessment (Weeks): 24 weeks  Primary Wound Type: Venous Ulcer  Location: Leg lower  Wound Location Orientation: Right  Wound D... Dressing Status New dressing applied   Dressing/Treatment Xeroform;ABD pad  (2 layers with calamine)     Removed old Multilayer wrap if indicated and wash leg with mild soap/water. Applied moisturizing agent to dry skin as needed. Applied primary and secondary dressing as ordered. Applied multilayered dressing below the knee to right lower leg. Instructed patient/caregiver not to remove dressing and to keep it clean and dry. Instructed patient/caregiver on complications to report to provider, such as pain, numbness in toes, heavy drainage, and slippage of dressing. Instructed patient on purpose of compression dressing and on activity and exercise recommendations.       Electronically signed by Karolina Troy RN on 7/5/2022 at 9:37 AM

## 2022-07-05 NOTE — PROGRESS NOTES
Wound Care / Plastic Surgery History and Physical    Subjective:      Benja Johnson is a 78 y.o. male who presents for eval right leg draining wounds, present for 3 months. Son wrapping with ace wrap. No prior history. Last HGB A1c was 6.5 . Does not elevate legs. Vascular studies from cardiologist show bilateral venous reflux, right worse than left.       Past Medical History:   Diagnosis Date    Arthritis     Asthma     CAD (coronary artery disease)     Calculus of kidney     Carotid artery disease (HCC)     Cervical herniated disc     Chronic systolic (congestive) heart failure (HCC)     Diabetes ([address]     Diabetic shock due to low blood sugar ([address] 2016    Glaucoma     pt is on drops unsure of what the names are    Hypercholesterolemia     Hypertension     Morbid obesity ([address]     Rheumatic fever     When he was 17    Sleep apnea     compliant with cpap as stated 2019    Venous stasis ulcer of right lower leg with edema of right lower leg ([address] 2022     Past Surgical History:   Procedure Laterality Date    HX CAROTID STENT      with cath    HX CATARACT REMOVAL Left     HX CERVICAL FUSION  2019    C3- C6 ACFD     HX CHOLECYSTECTOMY      HX CIRCUMCISION      HX HEART CATHETERIZATION      x 5 total, 4 in heart, 1 in carotid    HX HEENT Left     ear torn off and repaired    HX OTHER SURGICAL Right     Right hand reconstruction    HX SHOULDER ARTHROSCOPY Right     dislocated with fall, came out, he stated surgery put back in place and sowed him up    UPPER GI ENDOSCOPY,BIOPSY  2021           Family History   Problem Relation Age of Onset    Heart Disease Brother     Diabetes Nephew     Hypertension Mother      Social History     Tobacco Use    Smoking status: Former Smoker     Packs/day: 5.00     Years: 15.00     Pack years: 75.00     Quit date: 1963     Years since quittin.2    Smokeless tobacco: Never Used   Substance Use Topics    Alcohol use: No     Alcohol/week: 0.0 standard drinks      Prior to Admission medications    Medication Sig Start Date End Date Taking? Authorizing Provider   DULoxetine (CYMBALTA) 60 mg capsule TAKE 1 CAPSULE BY MOUTH ONCE DAILY FOR  FEET  PAIN. SEND FUTURE REFILL REQUESTS TO PCP AS DR MAI IS NO LONGER WITH THIS PRACTICE 6/16/22  Yes Zhane Lee MD   glimepiride (AMARYL) 1 mg tablet Taking 0.5 tab daily. MUST ESTABLISH WITH NEW ENDOCRINOLOGIST OR CONTACT PCP FOR FURTHER REFILLS 6/16/22  Yes Zhane Lee MD   isosorbide mononitrate ER (IMDUR) 60 mg CR tablet Take 1 tablet by mouth once daily 6/14/22  Yes Chestine Nageotte, MD   bumetanide (BUMEX) 1 mg tablet Take 1 tablet by mouth twice daily 6/13/22  Yes Zhane Lee MD   simvastatin (ZOCOR) 10 mg tablet TAKE 1 TABLET BY MOUTH EVERY OTHER DAY . APPOINTMENT REQUIRED FOR FUTURE REFILLS 3/14/22  Yes Annie Salomn MD   carvediloL (COREG) 12.5 mg tablet TAKE 1 TABLET BY MOUTH TWICE DAILY . APPOINTMENT REQUIRED FOR FUTURE REFILLS 3/14/22  Yes Annie Salmon MD   lisinopriL (PRINIVIL, ZESTRIL) 2.5 mg tablet Take 1 Tablet by mouth daily. 12/14/21  Yes Aminta OVALLE NP   acetaminophen (Tylenol Arthritis Pain) 650 mg TbER Take 650 mg by mouth every eight (8) hours. Yes Provider, Historical   dextran 70/hypromellose/PF (NATURAL TEARS, PF, OP) Apply 1 Drop to eye as needed. Yes Provider, Historical   cholecalciferol, VITAMIN D3, (VITAMIN D3) 5,000 unit tab tablet Take 1 Tab by mouth daily. 6/6/18  Yes Zhane Lee MD   acetaminophen (TYLENOL) 500 mg tablet Take 1,000 mg by mouth every six (6) hours as needed for Pain. Provider, Historical      Allergies   Allergen Reactions    Morphine Anaphylaxis     Pt states \"morphine gave me heart failure\"    Lyrica [Pregabalin] Other (comments)     Makes him \"loopy\"       Review of Systems:  Pertinent items are noted in the History of Present Illness.     Objective:        Patient Vitals for the past 8 hrs:   BP Temp Pulse Resp   22 0906 (!) 148/67 97.8 °F (36.6 °C) 94 18       Temp (24hrs), Av.8 °F (36.6 °C), Min:97.8 °F (36.6 °C), Max:97.8 °F (36.6 °C)      Physical Exam:  General: WD, WN  HEENT: normocephalic, eyes clear  Neck: Supple, no masses  Chest: Clear  Heart: Regular rate and rhythm  Abdomen: soft, NT, ND  Breasts: NA  Neuro: nonfocal  Skin and legs:  Palpable pedal pulses    22 0906   Right Leg Edema Point of Measurement   Leg circumference 42.3 cm   Ankle circumference 22.8 cm   Left Leg Edema Point of Measurement   Leg circumference 40.4 cm   Ankle circumference 25.8 cm   Wound Leg lower Right # 1 22   Date First Assessed/Time First Assessed: 22 0910   Present on Hospital Admission: Yes  Wound Approximate Age at First Assessment (Weeks): 24 weeks  Primary Wound Type: Venous Ulcer  Location: Leg lower  Wound Location Orientation: Right  Wound D... Wound Image    Wound Etiology Venous   Dressing Status    (removed)   Cleansed Soap and water   Wound Length (cm) 10.5 cm   Wound Width (cm) 4.2 cm   Wound Depth (cm) 0.1 cm   Wound Surface Area (cm^2) 44.1 cm^2   Wound Volume (cm^3) 4.41 cm^3   Wound Assessment Pink/red   Drainage Amount Moderate   Drainage Description Serous   Wound Odor None   Aliyah-Wound/Incision Assessment Hemosiderin staining (brown yellow)   Edges Flat/open edges   Wound Thickness Description Partial thickness   Pain 1   Pain Scale 1 Numeric (0 - 10)   Pain Intensity 1 0   Patient Stated Pain Goal 0   Pain Reassessment 1 Yes      Visit Vitals  BP (!) 148/67   Pulse 94   Temp 97.8 °F (36.6 °C)   Resp 18                       Assessment:     Chronic venous stasis and edema bilateral legs, right worse than left. No real open wound. Plan:     Start 2 layer wrap on right. Strict elevation. Order bilat Farrow wraps. Follow up 1 week.     Signed By: Leeann Mayen MD     2022

## 2022-07-05 NOTE — WOUND CARE
07/05/22 0906   Right Leg Edema Point of Measurement   Leg circumference 42.3 cm   Ankle circumference 22.8 cm   Left Leg Edema Point of Measurement   Leg circumference 40.4 cm   Ankle circumference 25.8 cm   Wound Leg lower Right # 1 07/05/22   Date First Assessed/Time First Assessed: 07/05/22 0910   Present on Hospital Admission: Yes  Wound Approximate Age at First Assessment (Weeks): 24 weeks  Primary Wound Type: Venous Ulcer  Location: Leg lower  Wound Location Orientation: Right  Wound D...    Wound Image    Wound Etiology Venous   Dressing Status   (removed)   Cleansed Soap and water   Wound Length (cm) 10.5 cm   Wound Width (cm) 4.2 cm   Wound Depth (cm) 0.1 cm   Wound Surface Area (cm^2) 44.1 cm^2   Wound Volume (cm^3) 4.41 cm^3   Wound Assessment Pink/red   Drainage Amount Moderate   Drainage Description Serous   Wound Odor None   Aliyah-Wound/Incision Assessment Hemosiderin staining (brown yellow)   Edges Flat/open edges   Wound Thickness Description Partial thickness   Pain 1   Pain Scale 1 Numeric (0 - 10)   Pain Intensity 1 0   Patient Stated Pain Goal 0   Pain Reassessment 1 Yes     Visit Vitals  BP (!) 148/67   Pulse 94   Temp 97.8 °F (36.6 °C)   Resp 18

## 2022-07-05 NOTE — TELEPHONE ENCOUNTER
Wife states this medication was changed by cardiologist  to taking it 3/day 2 tab in AM and 1 tab in PM    Pt is out of medication due to this.

## 2022-07-05 NOTE — TELEPHONE ENCOUNTER
Future Appointments:  Future Appointments   Date Time Provider Magda Graf   7/12/2022 10:00 AM Aston Hardy MD [facility]   7/22/2022 11:50 AM Edmundo Amaro MD [facility] [place] BS AMB   11/28/2022  2:40 PM MD DOMINGO Whelan AMB        Last Appointment With Me:  5/4/2022     Requested Prescriptions     Pending Prescriptions Disp Refills    bumetanide (BUMEX) 1 mg tablet 90 Tablet 0     Sig: Take 1 Tablet by mouth three (3) times daily.  Take 1 tab in the am and 2 in the pm

## 2022-07-05 NOTE — TELEPHONE ENCOUNTER
#[phone] wife is asking for a call back to discuss wound care that is not related to diabetes. Please call as soon as you can she ask. Thanks.

## 2022-07-06 NOTE — TELEPHONE ENCOUNTER
Patient has a wound on his leg. He has it wrapped up and wants the patient to see the DM doctor. Patient will see the wound care doctor next week and then call us if they need to see us .

## 2022-07-08 LAB — CREATININE, EXTERNAL: 1.67

## 2022-07-12 ENCOUNTER — HOSPITAL ENCOUNTER (OUTPATIENT)
Dept: WOUND CARE | Age: 77
Discharge: HOME | End: 2022-07-12
Payer: COMMERCIAL

## 2022-07-12 VITALS
DIASTOLIC BLOOD PRESSURE: 72 MMHG | TEMPERATURE: 97.8 F | SYSTOLIC BLOOD PRESSURE: 139 MMHG | RESPIRATION RATE: 18 BRPM | HEART RATE: 88 BPM

## 2022-07-12 DIAGNOSIS — I83.019 VENOUS STASIS ULCER OF RIGHT LOWER LEG WITH EDEMA OF RIGHT LOWER LEG (HCC): Primary | ICD-10-CM

## 2022-07-12 DIAGNOSIS — I83.891 VENOUS STASIS ULCER OF RIGHT LOWER LEG WITH EDEMA OF RIGHT LOWER LEG (HCC): Primary | ICD-10-CM

## 2022-07-12 DIAGNOSIS — L97.919 VENOUS STASIS ULCER OF RIGHT LOWER LEG WITH EDEMA OF RIGHT LOWER LEG (HCC): Primary | ICD-10-CM

## 2022-07-12 DIAGNOSIS — R60.9 VENOUS STASIS ULCER OF RIGHT LOWER LEG WITH EDEMA OF RIGHT LOWER LEG (HCC): Primary | ICD-10-CM

## 2022-07-12 PROCEDURE — 29581 APPL MULTLAYER CMPRN SYS LEG: CPT

## 2022-07-12 RX ORDER — BETAMETHASONE DIPROPIONATE 0.5 MG/G
OINTMENT TOPICAL ONCE
Status: CANCELLED | OUTPATIENT
Start: 2022-07-12 | End: 2022-07-12

## 2022-07-12 RX ORDER — SILVER SULFADIAZINE 10 G/1000G
CREAM TOPICAL ONCE
Status: CANCELLED | OUTPATIENT
Start: 2022-07-12 | End: 2022-07-12

## 2022-07-12 RX ORDER — LIDOCAINE 40 MG/G
CREAM TOPICAL ONCE
Status: CANCELLED | OUTPATIENT
Start: 2022-07-12 | End: 2022-07-12

## 2022-07-12 RX ORDER — BACITRACIN 500 [USP'U]/G
OINTMENT TOPICAL ONCE
Status: CANCELLED | OUTPATIENT
Start: 2022-07-12 | End: 2022-07-12

## 2022-07-12 RX ORDER — TRIAMCINOLONE ACETONIDE 1 MG/G
OINTMENT TOPICAL ONCE
Status: CANCELLED | OUTPATIENT
Start: 2022-07-12 | End: 2022-07-12

## 2022-07-12 RX ORDER — BACITRACIN ZINC AND POLYMYXIN B SULFATE 500; 1000 [USP'U]/G; [USP'U]/G
OINTMENT TOPICAL ONCE
Status: CANCELLED | OUTPATIENT
Start: 2022-07-12 | End: 2022-07-12

## 2022-07-12 RX ORDER — MUPIROCIN 20 MG/G
OINTMENT TOPICAL ONCE
Status: CANCELLED | OUTPATIENT
Start: 2022-07-12 | End: 2022-07-12

## 2022-07-12 RX ORDER — CLOBETASOL PROPIONATE 0.5 MG/G
OINTMENT TOPICAL ONCE
Status: CANCELLED | OUTPATIENT
Start: 2022-07-12 | End: 2022-07-12

## 2022-07-12 RX ORDER — GENTAMICIN SULFATE 1 MG/G
OINTMENT TOPICAL ONCE
Status: CANCELLED | OUTPATIENT
Start: 2022-07-12 | End: 2022-07-12

## 2022-07-12 RX ORDER — LIDOCAINE HYDROCHLORIDE 40 MG/ML
SOLUTION TOPICAL ONCE
Status: CANCELLED | OUTPATIENT
Start: 2022-07-12 | End: 2022-07-12

## 2022-07-12 RX ORDER — LIDOCAINE HYDROCHLORIDE 20 MG/ML
JELLY TOPICAL ONCE
Status: CANCELLED | OUTPATIENT
Start: 2022-07-12 | End: 2022-07-12

## 2022-07-12 NOTE — WOUND CARE
Multilayer Compression Wrap   (Not Unna) Below the Knee    NAME:  Leana Barrios OF BIRTH:  1945  MEDICAL RECORD NUMBER:  [id number]  DATE:  7/12/2022    Removed old Multilayer wrap if indicated and wash leg with mild soap/water. Applied moisturizing agent to dry skin as needed. Applied primary and secondary dressing as ordered. Applied multilayered dressing below the knee to right lower leg. Applied multilayered dressing below the knee to left lower leg. Instructed patient/caregiver not to remove dressing and to keep it clean and dry. Instructed patient/caregiver on complications to report to provider, such as pain, numbness in toes, heavy drainage, and slippage of dressing.       Electronically signed by Rachana Cabezas LPN on 8/43/5623 at 3:49 PM

## 2022-07-19 ENCOUNTER — HOSPITAL ENCOUNTER (OUTPATIENT)
Dept: WOUND CARE | Age: 77
Discharge: HOME | End: 2022-07-19
Payer: COMMERCIAL

## 2022-07-19 VITALS
DIASTOLIC BLOOD PRESSURE: 60 MMHG | HEART RATE: 79 BPM | SYSTOLIC BLOOD PRESSURE: 129 MMHG | TEMPERATURE: 97.8 F | RESPIRATION RATE: 18 BRPM

## 2022-07-19 DIAGNOSIS — I83.891 VENOUS STASIS ULCER OF RIGHT LOWER LEG WITH EDEMA OF RIGHT LOWER LEG (HCC): Primary | ICD-10-CM

## 2022-07-19 DIAGNOSIS — I83.019 VENOUS STASIS ULCER OF RIGHT LOWER LEG WITH EDEMA OF RIGHT LOWER LEG (HCC): Primary | ICD-10-CM

## 2022-07-19 DIAGNOSIS — L97.919 VENOUS STASIS ULCER OF RIGHT LOWER LEG WITH EDEMA OF RIGHT LOWER LEG (HCC): Primary | ICD-10-CM

## 2022-07-19 DIAGNOSIS — R60.9 VENOUS STASIS ULCER OF RIGHT LOWER LEG WITH EDEMA OF RIGHT LOWER LEG (HCC): Primary | ICD-10-CM

## 2022-07-19 PROCEDURE — 99212 OFFICE O/P EST SF 10 MIN: CPT

## 2022-07-19 RX ORDER — CLOBETASOL PROPIONATE 0.5 MG/G
OINTMENT TOPICAL ONCE
Status: CANCELLED | OUTPATIENT
Start: 2022-07-19 | End: 2022-07-19

## 2022-07-19 RX ORDER — LIDOCAINE 40 MG/G
CREAM TOPICAL ONCE
Status: CANCELLED | OUTPATIENT
Start: 2022-07-19 | End: 2022-07-19

## 2022-07-19 RX ORDER — TRIAMCINOLONE ACETONIDE 1 MG/G
OINTMENT TOPICAL ONCE
Status: CANCELLED | OUTPATIENT
Start: 2022-07-19 | End: 2022-07-19

## 2022-07-19 RX ORDER — BETAMETHASONE DIPROPIONATE 0.5 MG/G
OINTMENT TOPICAL ONCE
Status: CANCELLED | OUTPATIENT
Start: 2022-07-19 | End: 2022-07-19

## 2022-07-19 RX ORDER — LIDOCAINE HYDROCHLORIDE 20 MG/ML
JELLY TOPICAL ONCE
Status: CANCELLED | OUTPATIENT
Start: 2022-07-19 | End: 2022-07-19

## 2022-07-19 RX ORDER — BACITRACIN ZINC AND POLYMYXIN B SULFATE 500; 1000 [USP'U]/G; [USP'U]/G
OINTMENT TOPICAL ONCE
Status: CANCELLED | OUTPATIENT
Start: 2022-07-19 | End: 2022-07-19

## 2022-07-19 RX ORDER — SILVER SULFADIAZINE 10 G/1000G
CREAM TOPICAL ONCE
Status: CANCELLED | OUTPATIENT
Start: 2022-07-19 | End: 2022-07-19

## 2022-07-19 RX ORDER — GENTAMICIN SULFATE 1 MG/G
OINTMENT TOPICAL ONCE
Status: CANCELLED | OUTPATIENT
Start: 2022-07-19 | End: 2022-07-19

## 2022-07-19 RX ORDER — MUPIROCIN 20 MG/G
OINTMENT TOPICAL ONCE
Status: CANCELLED | OUTPATIENT
Start: 2022-07-19 | End: 2022-07-19

## 2022-07-19 RX ORDER — LIDOCAINE HYDROCHLORIDE 40 MG/ML
SOLUTION TOPICAL ONCE
Status: CANCELLED | OUTPATIENT
Start: 2022-07-19 | End: 2022-07-19

## 2022-07-19 RX ORDER — BACITRACIN 500 [USP'U]/G
OINTMENT TOPICAL ONCE
Status: CANCELLED | OUTPATIENT
Start: 2022-07-19 | End: 2022-07-19

## 2022-07-19 NOTE — WOUND CARE
07/19/22 1046   Right Leg Edema Point of Measurement   Leg circumference 37.6 cm   Ankle circumference 24 cm   Compression Therapy 2 layer compression wrap   Left Leg Edema Point of Measurement   Leg circumference 38 cm   Ankle circumference 25 cm   Compression Therapy 2 layer compression wrap   LLE Peripheral Vascular    Capillary Refill Less than/equal to 3 seconds   Color Appropriate for race   Temperature Warm   Sensation Decreased   Pedal Pulse Palpable   RLE Peripheral Vascular    Capillary Refill Less than/equal to 3 seconds   Color Appropriate for race   Temperature Warm   Sensation Decreased   Pedal Pulse Palpable   Wound Leg lower Right # 1 07/05/22   Date First Assessed/Time First Assessed: 07/05/22 0910   Present on Hospital Admission: Yes  Wound Approximate Age at First Assessment (Weeks): 24 weeks  Primary Wound Type: Venous Ulcer  Location: Leg lower  Wound Location Orientation: Right  Wound D...    Wound Etiology Venous   Dressing Status Intact   Cleansed Soap and water   Dressing/Treatment   (Xeroform, ABD, 2 layer with calamine)   Wound Length (cm) 0 cm   Wound Width (cm) 0 cm   Wound Depth (cm) 0 cm   Wound Surface Area (cm^2) 0 cm^2   Change in Wound Size % 100   Wound Volume (cm^3) 0 cm^3   Wound Healing % 100   Wound Assessment Epithelialization   Drainage Amount None   Wound Odor None   Aliyah-Wound/Incision Assessment Hemosiderin staining (brown yellow)   Edges Attached edges   Wound Thickness Description Partial thickness     Visit Vitals  /60 (BP 1 Location: Right upper arm, BP Patient Position: At rest;Reclining)   Pulse 79   Temp 97.8 °F (36.6 °C)   Resp 18

## 2022-07-19 NOTE — PROGRESS NOTES
Plastics / WC    Follow up venous stasis / chronic edema  Had problems obtaining compression wraps  Edema much improved  No open wounds    Dc from clinic  Recommend daily compression  Fu prn.

## 2022-07-22 ENCOUNTER — OFFICE VISIT (OUTPATIENT)
Dept: ENDOCRINOLOGY | Age: 77
End: 2022-07-22
Payer: MEDICARE

## 2022-07-22 VITALS
HEART RATE: 93 BPM | BODY MASS INDEX: 34.73 KG/M2 | HEIGHT: 63 IN | DIASTOLIC BLOOD PRESSURE: 74 MMHG | WEIGHT: 196 LBS | SYSTOLIC BLOOD PRESSURE: 122 MMHG

## 2022-07-22 DIAGNOSIS — I25.10 CORONARY ARTERY DISEASE INVOLVING NATIVE CORONARY ARTERY OF NATIVE HEART WITHOUT ANGINA PECTORIS: ICD-10-CM

## 2022-07-22 DIAGNOSIS — I10 PRIMARY HYPERTENSION: ICD-10-CM

## 2022-07-22 DIAGNOSIS — N18.32 TYPE 2 DIABETES MELLITUS WITH STAGE 3B CHRONIC KIDNEY DISEASE, WITHOUT LONG-TERM CURRENT USE OF INSULIN (HCC): ICD-10-CM

## 2022-07-22 DIAGNOSIS — E78.2 MIXED HYPERLIPIDEMIA: ICD-10-CM

## 2022-07-22 DIAGNOSIS — E11.65 TYPE 2 DIABETES MELLITUS WITH HYPERGLYCEMIA, WITHOUT LONG-TERM CURRENT USE OF INSULIN (HCC): Primary | ICD-10-CM

## 2022-07-22 DIAGNOSIS — E11.22 TYPE 2 DIABETES MELLITUS WITH STAGE 3B CHRONIC KIDNEY DISEASE, WITHOUT LONG-TERM CURRENT USE OF INSULIN (HCC): ICD-10-CM

## 2022-07-22 PROCEDURE — G8432 DEP SCR NOT DOC, RNG: HCPCS | Performed by: INTERNAL MEDICINE

## 2022-07-22 PROCEDURE — 99215 OFFICE O/P EST HI 40 MIN: CPT | Performed by: INTERNAL MEDICINE

## 2022-07-22 PROCEDURE — 3046F HEMOGLOBIN A1C LEVEL >9.0%: CPT | Performed by: INTERNAL MEDICINE

## 2022-07-22 PROCEDURE — 1123F ACP DISCUSS/DSCN MKR DOCD: CPT | Performed by: INTERNAL MEDICINE

## 2022-07-22 PROCEDURE — G8536 NO DOC ELDER MAL SCRN: HCPCS | Performed by: INTERNAL MEDICINE

## 2022-07-22 PROCEDURE — 1101F PT FALLS ASSESS-DOCD LE1/YR: CPT | Performed by: INTERNAL MEDICINE

## 2022-07-22 PROCEDURE — 2022F DILAT RTA XM EVC RTNOPTHY: CPT | Performed by: INTERNAL MEDICINE

## 2022-07-22 PROCEDURE — G8427 DOCREV CUR MEDS BY ELIG CLIN: HCPCS | Performed by: INTERNAL MEDICINE

## 2022-07-22 PROCEDURE — 3017F COLORECTAL CA SCREEN DOC REV: CPT | Performed by: INTERNAL MEDICINE

## 2022-07-22 PROCEDURE — G8417 CALC BMI ABV UP PARAM F/U: HCPCS | Performed by: INTERNAL MEDICINE

## 2022-07-22 NOTE — PROGRESS NOTES
Chief Complaint   Patient presents with    Diabetes       Patient was last seen: New Patient Visit- Last seen by Dr Armando Morgan 4/21     General:   DM x 15 years   Metfromin - didn't feel good on it - CRI   No TZD, no DPP4, no SGLT, no GLP,   Has been on insulin in the past     Admission 12/21 for weeping skin, bleeding ulcer, anemic and kidney issues   Skin issue from swelling from Cards/Renal issues; recent d/c from wound care clinic  Advised not from diabetes, nor do I treat this    A1c: last a1c was 6.3 12/21    DM Medications:    Glimepiride 1mg once a day  - 0.5mg once a day     Last Changes: :metformin stopped in 12/21    Sugar Checks: checks up to 2 x day     AM: reports:   most < 110    PM: reports:      LOWs:  denies low sugars     DIET: feels does well with diabetic diet     EXERCISE: does not exercise     HTN: on ACE-I, on B-Blk, on diuretics, HTN followed by PCP, HTN followed by Cardiology     LIPIDS: on statin, lipids followed by PCP, lipids followed by Cardiology    RENAL: has moderate renal insufficiency, has proteinuria, is followed by Nephrology, is on an ACE-I     EYES: eye exam in past year, has no retinopathy     FEET: sees podiatry, has no current issues, has neuropathy-stable     DENTAL:  overdue for dentist     HEART:  no chest pain, shortness of breath or claudication, is followed by Cardiology, has coronary artery disease, has heart failure s/p 5 stents    ASA:  does not take aspirin or other blood thinner     SYMPTOMS: no polyuria, thirst or blurred vision     THYROID: no known thyroid issue    DIABETES HISTORY: see above      LABS/STUDIES:     Lab Results   Component Value Date/Time    Hemoglobin A1c (POC) 5.5 12/17/2019 03:55 PM    Hemoglobin A1c (POC) 5.3 01/17/2019 12:20 PM    Hemoglobin A1c (POC) 5.6 10/09/2018 12:10 PM    Hemoglobin A1c (POC) 5.8 07/09/2018 12:42 PM    Hemoglobin A1c (POC) 8.9 (A) 04/23/2018 08:45 AM       Lab Results   Component Value Date/Time    Hemoglobin A1c 6.3 (H) 12/21/2021 10:58 AM    Hemoglobin A1c 5.9 (H) 09/12/2019 04:32 PM    Hemoglobin A1c 7.7 (H) 05/10/2019 01:46 PM    Glucose 174 (H) 12/21/2021 10:58 AM    Glucose (POC) 116 (H) 11/21/2019 11:28 AM    Glucose,  (H) 12/14/2021 02:46 PM    GFR est AA 39 (L) 12/21/2021 10:58 AM    GFR est non-AA 32 (L) 12/21/2021 10:58 AM    Microalb/Creat ratio (ug/mg creat.) 1,624.4 (H) 05/10/2019 01:46 PM    Creatinine, POC 2.0 (H) 12/14/2021 02:46 PM    Creatinine 2.03 (H) 12/21/2021 10:58 AM        Lab Results   Component Value Date/Time    Cholesterol, total 92 12/21/2021 10:58 AM    Triglyceride 108 12/21/2021 10:58 AM    HDL Cholesterol 43 12/21/2021 10:58 AM    LDL, calculated 27.4 12/21/2021 10:58 AM                Past Medical History:   Diagnosis Date    Arthritis     Asthma     CAD (coronary artery disease)     Calculus of kidney     Carotid artery disease (HCC)     Cervical herniated disc     Chronic systolic (congestive) heart failure ([address]     Diabetes ([address]     Diabetic shock due to low blood sugar ([address] 2016    Glaucoma     pt is on drops unsure of what the names are    Hypercholesterolemia     Hypertension     Morbid obesity ([address]     Rheumatic fever     When he was 17    Sleep apnea     compliant with cpap as stated 9/6/2019    Venous stasis ulcer of right lower leg with edema of right lower leg ([address] 7/5/2022        Past Surgical History:   Procedure Laterality Date    HX CAROTID STENT  1990's    with cath    HX CATARACT REMOVAL Left     HX CERVICAL FUSION  05/21/2019    C3- C6 ACFD     HX CHOLECYSTECTOMY      HX CIRCUMCISION      HX HEART CATHETERIZATION      x 5 total, 4 in heart, 1 in carotid    HX HEENT Left     ear torn off and repaired    HX OTHER SURGICAL Right     Right hand reconstruction    HX SHOULDER ARTHROSCOPY Right     dislocated with fall, came out, he stated surgery put back in place and sowed him up    UPPER GI ENDOSCOPY,BIOPSY  12/17/2021             Social History     Tobacco Use    Smoking status: Former     Packs/day: 5.00     Years: 15.00     Pack years: 75.00     Types: Cigarettes     Quit date: 1963     Years since quittin.2    Smokeless tobacco: Never   Substance Use Topics    Alcohol use: No     Alcohol/week: 0.0 standard drinks      Employer:  Retired        Blood pressure 122/74, pulse 93, height 5' 3\" (1.6 m), weight 196 lb (88.9 kg). Weight Metrics 2021   Weight 196 lb 209 lb 3.2 oz 210 lb 6.4 oz 217 lb 226 lb - 221 lb   BMI 34.72 kg/m2 37.06 kg/m2 37.27 kg/m2 38.44 kg/m2 - 40.03 kg/m2 39.15 kg/m2        EXAM  - GENERAL: NCAT, Appears well nourished   - EYES: EOMI, non-icteric, no proptosis   - Ear/Nose/Throat: NCAT, no visible inflammation or masses   - CARDIOVASCULAR: no cyanosis, no visible JVD   - RESPIRATORY: respiratory effort normal without any distress or labored breathing   - MUSCULOSKELETAL: Normal ROM of neck and upper extremities observed   - SKIN: No rash on face  - NEUROLOGIC:  No facial asymmetry (Cranial nerve 7 motor function), No gaze palsy   - PSYCHIATRIC: Normal affect, Normal insight and judgement      Assessment/Plan:   1. Type 2 diabetes mellitus with hyperglycemia, without long-term current use of insulin (HCC)  Sugars good, prior a1c good (better than needs to be for age)  May not need sulfonylurea  Check current a1c  Not clear why needed to see an Endo    2. Type 2 diabetes mellitus with stage 3b chronic kidney disease, without long-term current use of insulin (HCC)  GFR has been lower, but most recent in 40s    3. Primary hypertension  On meds per PCP    4. Mixed hyperlipidemia  On statin per PCP/Cards    5.  Coronary artery disease involving native coronary artery of native heart without angina pectoris  Per Cards      40+ minutes spend face to face and reviewing records (labs/notes/studies)      Orders Placed This Encounter    HEMOGLOBIN A1C WITH EAG        Follow-up and Dispositions    Return in about 4 months (around 11/22/2022).

## 2022-07-22 NOTE — LETTER
7/22/2022    Patient: Emelia Flaherty   YOB: 1945   Date of Visit: 7/22/2022     Anna Marie Guzman MD  [address]  [address] [phone]  [place]    Dear Anna Marie Guzman MD,      Thank you for referring Mr. Patricia Arnold to [facility] for evaluation. My notes for this consultation are attached. If you have questions, please do not hesitate to call me. I look forward to following your patient along with you.       Sincerely,    Rigo Kumar MD

## 2022-07-23 LAB
EST. AVERAGE GLUCOSE BLD GHB EST-MCNC: 131 MG/DL
HBA1C MFR BLD: 6.2 % (ref 4.8–5.6)

## 2022-08-05 RX ORDER — BUMETANIDE 1 MG/1
TABLET ORAL
Qty: 90 TABLET | Refills: 0 | Status: SHIPPED | OUTPATIENT
Start: 2022-08-05 | End: 2022-09-09

## 2022-09-09 RX ORDER — BUMETANIDE 1 MG/1
TABLET ORAL
Qty: 90 TABLET | Refills: 0 | Status: SHIPPED | OUTPATIENT
Start: 2022-09-09 | End: 2022-10-10

## 2022-10-10 RX ORDER — BUMETANIDE 1 MG/1
TABLET ORAL
Qty: 90 TABLET | Refills: 0 | Status: SHIPPED | OUTPATIENT
Start: 2022-10-10

## 2022-10-12 RX ORDER — ISOSORBIDE MONONITRATE 60 MG/1
TABLET, EXTENDED RELEASE ORAL
Qty: 90 TABLET | Refills: 0 | Status: SHIPPED | OUTPATIENT
Start: 2022-10-12

## 2022-10-12 NOTE — TELEPHONE ENCOUNTER
PCP: Brandy Corrales MD    Last appt: 5/2022  Future Appointments   Date Time Provider Magda Graf   11/14/2022 12:10 PM MD JOAQUIM Crespo [facility] BS AMB   11/28/2022  2:40 PM MD DOMINGO Ferrera BS AMB       Requested Prescriptions     Signed Prescriptions Disp Refills    isosorbide mononitrate ER (IMDUR) 60 mg CR tablet 90 Tablet 0     Sig: Take 1 tablet by mouth once daily     Authorizing Provider: Yehuda Johnson     Ordering User: LUIS August         Other Comments:  Verbal order per provider. Order (medication, dose, route, frequency, amount, refills) repeated and verified twice.

## 2022-11-08 ENCOUNTER — TELEPHONE (OUTPATIENT)
Dept: INTERNAL MEDICINE CLINIC | Age: 77
End: 2022-11-08

## 2022-11-08 NOTE — TELEPHONE ENCOUNTER
Triage Call. Was seeing Dr Carmen Castellanos. Leg was weak. Now sees Arelis Sabillon. Did not check medication. Started to go to wound doctor. Blood sugar was fine all the time during wound care. Needs a referral to Joann Tanner or Rico Galvez. In hospital in December off of metformin and aspirin. Now off of metolazone. Due to possible kidney damage. Now on Cephelaxin 500mg TID. Back on Metolazone but his blood sugar is low and stopped taking it. Eating and feels fine according to wife. Patient feels tired and would like to come in for an office visit. Offered appointment tomorrow with Dr Feroz Kaplan at 2:30pm. Patient accepted.

## 2022-11-09 ENCOUNTER — CLINICAL SUPPORT (OUTPATIENT)
Dept: INTERNAL MEDICINE CLINIC | Age: 77
End: 2022-11-09
Payer: COMMERCIAL

## 2022-11-09 VITALS
OXYGEN SATURATION: 97 % | DIASTOLIC BLOOD PRESSURE: 71 MMHG | HEART RATE: 100 BPM | RESPIRATION RATE: 16 BRPM | SYSTOLIC BLOOD PRESSURE: 115 MMHG

## 2022-11-09 DIAGNOSIS — E11.21 TYPE 2 DIABETES WITH NEPHROPATHY (HCC): Primary | ICD-10-CM

## 2022-11-09 LAB
GLUCOSE POC: 153 MG/DL
HBA1C MFR BLD HPLC: 6.4 % (ref 4.8–5.6)

## 2022-11-09 PROCEDURE — 83036 HEMOGLOBIN GLYCOSYLATED A1C: CPT | Performed by: FAMILY MEDICINE

## 2022-11-09 PROCEDURE — 99211 OFF/OP EST MAY X REQ PHY/QHP: CPT | Performed by: FAMILY MEDICINE

## 2022-11-09 PROCEDURE — 82962 GLUCOSE BLOOD TEST: CPT | Performed by: FAMILY MEDICINE

## 2022-11-09 RX ORDER — BLOOD-GLUCOSE METER
EACH MISCELLANEOUS
Qty: 1 EACH | Refills: 0 | Status: SHIPPED | OUTPATIENT
Start: 2022-11-09 | End: 2022-11-17 | Stop reason: SDUPTHER

## 2022-11-10 ENCOUNTER — OFFICE VISIT (OUTPATIENT)
Dept: INTERNAL MEDICINE CLINIC | Age: 77
End: 2022-11-10
Payer: MEDICARE

## 2022-11-10 VITALS
RESPIRATION RATE: 16 BRPM | BODY MASS INDEX: 34.94 KG/M2 | OXYGEN SATURATION: 98 % | HEIGHT: 63 IN | TEMPERATURE: 97.2 F | DIASTOLIC BLOOD PRESSURE: 64 MMHG | SYSTOLIC BLOOD PRESSURE: 96 MMHG | HEART RATE: 85 BPM | WEIGHT: 197.2 LBS

## 2022-11-10 DIAGNOSIS — I50.22 CHRONIC SYSTOLIC CONGESTIVE HEART FAILURE (HCC): ICD-10-CM

## 2022-11-10 DIAGNOSIS — E11.21 TYPE 2 DIABETES WITH NEPHROPATHY (HCC): ICD-10-CM

## 2022-11-10 DIAGNOSIS — Z23 NEEDS FLU SHOT: Primary | ICD-10-CM

## 2022-11-10 DIAGNOSIS — R97.20 ELEVATED PSA: ICD-10-CM

## 2022-11-10 DIAGNOSIS — Z12.5 PROSTATE CANCER SCREENING: ICD-10-CM

## 2022-11-10 PROCEDURE — 2022F DILAT RTA XM EVC RTNOPTHY: CPT | Performed by: FAMILY MEDICINE

## 2022-11-10 PROCEDURE — 3044F HG A1C LEVEL LT 7.0%: CPT | Performed by: FAMILY MEDICINE

## 2022-11-10 PROCEDURE — G8427 DOCREV CUR MEDS BY ELIG CLIN: HCPCS | Performed by: FAMILY MEDICINE

## 2022-11-10 PROCEDURE — G8536 NO DOC ELDER MAL SCRN: HCPCS | Performed by: FAMILY MEDICINE

## 2022-11-10 PROCEDURE — G8432 DEP SCR NOT DOC, RNG: HCPCS | Performed by: FAMILY MEDICINE

## 2022-11-10 PROCEDURE — 90694 VACC AIIV4 NO PRSRV 0.5ML IM: CPT | Performed by: FAMILY MEDICINE

## 2022-11-10 PROCEDURE — 3017F COLORECTAL CA SCREEN DOC REV: CPT | Performed by: FAMILY MEDICINE

## 2022-11-10 PROCEDURE — 99214 OFFICE O/P EST MOD 30 MIN: CPT | Performed by: FAMILY MEDICINE

## 2022-11-10 PROCEDURE — G8417 CALC BMI ABV UP PARAM F/U: HCPCS | Performed by: FAMILY MEDICINE

## 2022-11-10 PROCEDURE — 1101F PT FALLS ASSESS-DOCD LE1/YR: CPT | Performed by: FAMILY MEDICINE

## 2022-11-10 PROCEDURE — G0463 HOSPITAL OUTPT CLINIC VISIT: HCPCS | Performed by: FAMILY MEDICINE

## 2022-11-10 RX ORDER — BUMETANIDE 1 MG/1
TABLET ORAL
Qty: 90 TABLET | Refills: 0 | Status: SHIPPED | OUTPATIENT
Start: 2022-11-10

## 2022-11-10 NOTE — PROGRESS NOTES
SUBJECTIVE:   Mr. Aissatou Thacker is a 78 y.o. male who is here for follow up of routine medical issues. He is a patient of  [facility] Pt is accompanied by a family member. He experiences hearing loss. T2DM: Pt presents to discuss his low blood sugar. His family member states that his glucometer is broken and has been reading 22. Pt sees , endocrinology, but is interested in a second opinion. His most recent A1c from 11/9/22 was 6.4 and his glucose was 153. Pt takes glimepiride. HTN: Pt is compliant in taking carvedilol 12.5 mg BID and lisinopril 2.5 mg daily. Pt's BP in the office today was 96/64. His BP was 115/71 on 11/9/22. Pt had a vascular doppler done by . He is going to wound care regarding fluid blisters in his legs. Patient denies chest pain, ROLLE/SOB, edema, headache, visual changes, dizziness, palpitations or syncope. Pt needs to schedule an appointment to have right cataract surgery. PREVENTIVE:  Flu: 11/10/2022  PSA: due    Pt specifically denies changes in vision or hearing, trouble with swallowing or taste, CP, SOB, heartburn or upset stomach, change in bowel habits, problems urinating, unusual joint or muscle pains, numbness or tingling in extremities, skin lesions of concern, changes in appetite, dizziness    At this time, he is otherwise doing well and has brought no other complaints to my attention today. For a list of the medical issues addressed today, see the assessment and plan below.     PMH:   Past Medical History:   Diagnosis Date    Arthritis     Asthma     CAD (coronary artery disease)     Calculus of kidney     Carotid artery disease (HCC)     Cervical herniated disc     Chronic systolic (congestive) heart failure ([address]     Diabetes ([address]     Diabetic shock due to low blood sugar ([address] 2016    Glaucoma     pt is on drops unsure of what the names are    Hypercholesterolemia     Hypertension     Morbid obesity ([address] Rheumatic fever     When he was 17    Sleep apnea     compliant with cpap as stated 9/6/2019    Venous stasis ulcer of right lower leg with edema of right lower leg ([address] 7/5/2022     PSH:  has a past surgical history that includes hx cholecystectomy; hx carotid stent (3111'M); hx circumcision; hx other surgical (Right); hx cervical fusion (05/21/2019); hx heart catheterization; hx shoulder arthroscopy (Right); hx heent (Left); hx cataract removal (Left); and upper gi endoscopy,biopsy (12/17/2021). All: is allergic to morphine and lyrica [pregabalin]. MEDS:   Current Outpatient Medications   Medication Sig    bumetanide (BUMEX) 1 mg tablet TAKE 1 TABLET BY MOUTH IN THE MORNING AND 2 IN THE EVENING    Blood-Glucose Meter (ReliOn Prime Meter) misc Blood glucose testing twice daily Dx: E11.6    isosorbide mononitrate ER (IMDUR) 60 mg CR tablet Take 1 tablet by mouth once daily    DULoxetine (CYMBALTA) 60 mg capsule TAKE 1 CAPSULE BY MOUTH ONCE DAILY FOR  FEET  PAIN. SEND FUTURE REFILL REQUESTS TO PCP AS DR MAI IS NO LONGER WITH THIS PRACTICE    glimepiride (AMARYL) 1 mg tablet Taking 0.5 tab daily. MUST ESTABLISH WITH NEW ENDOCRINOLOGIST OR CONTACT PCP FOR FURTHER REFILLS    simvastatin (ZOCOR) 10 mg tablet TAKE 1 TABLET BY MOUTH EVERY OTHER DAY . APPOINTMENT REQUIRED FOR FUTURE REFILLS (Patient taking differently: Monday, Wednesday, Friday)    carvediloL (COREG) 12.5 mg tablet TAKE 1 TABLET BY MOUTH TWICE DAILY . APPOINTMENT REQUIRED FOR FUTURE REFILLS    lisinopriL (PRINIVIL, ZESTRIL) 2.5 mg tablet Take 1 Tablet by mouth daily. acetaminophen (TYLENOL) 650 mg TbER Take 650 mg by mouth every eight (8) hours. dextran 70/hypromellose/PF (NATURAL TEARS, PF, OP) Apply 1 Drop to eye as needed. acetaminophen (TYLENOL) 500 mg tablet Take 1,000 mg by mouth every six (6) hours as needed for Pain. cholecalciferol, VITAMIN D3, (VITAMIN D3) 5,000 unit tab tablet Take 1 Tab by mouth daily.      No current facility-administered medications for this visit. FH: family history includes Diabetes in his nephew; Heart Disease in his brother; Hypertension in his mother. SH:  reports that he quit smoking about 59 years ago. His smoking use included cigarettes. He has a 75.00 pack-year smoking history. He has never used smokeless tobacco. He reports that he does not drink alcohol and does not use drugs. Review of Systems - History obtained from the patient  General ROS: no fever, chills, fatigue, body aches  Psychological ROS: no change in anxiety, depression, SI/HI  Ophthalmic ROS: no blurred vision, myopia, double vision  ENT ROS: no dysphagia, otalgia, otorrhea, rhinorrhea, post nasal drip  Respiratory ROS: no cough, shortness of breath, or wheezing  Cardiovascular ROS: no chest pain or dyspnea on exertion  Gastrointestinal ROS: no abdominal pain, change in bowel habits, or black or bloody stools  Genito-Urinary ROS: no frequency, urgency, incontinence, dysuria, hematouria  Musculoskeletal ROS: no arthralagia, myalgia  Neurological ROS: no headaches, dizziness, lightheadedness, tremors, seizures  Dermatological ROS: no rash or lesions    OBJECTIVE:   Vitals: Visit Vitals  BP 96/64 (BP 1 Location: Left arm, BP Patient Position: Sitting, BP Cuff Size: Large adult)   Pulse 85   Temp 97.2 °F (36.2 °C) (Temporal)   Resp 16   Ht 5' 3\" (1.6 m)   Wt 197 lb 3.2 oz (89.4 kg)   SpO2 98%   BMI 34.93 kg/m²      Gen: Pleasant 78 y.o.  male in NAD. HEENT: PERRLA. EOMI. OP moist and pink. Neck: Supple. No LAD. HEART: RRR, No M/G/R.      LUNGS: CTAB No W/R. ABDOMEN: S, NT, ND, BS+. EXTREMITIES: Warm. No C/C/E.    MUSCULOSKELETAL: Normal ROM, muscle strength 5/5 all groups. NEURO: Alert and oriented x 3. Cranial nerves grossly intact. No focal sensory or motor deficits noted. SKIN: Warm. Dry. No rashes or other lesions noted. ASSESSMENT/ PLAN: Diagnoses and all orders for this visit:    1.  Needs flu shot  -     INFLUENZA, FLUAD, (AGE 65 Y+), IM, PF, 0.5 ML    2. Type 2 diabetes with nephropathy (HCC)  -     REFERRAL TO ENDOCRINOLOGY  -     METABOLIC PANEL, COMPREHENSIVE; Future  -     CBC WITH AUTOMATED DIFF; Future  -     MICROALBUMIN, UR, RAND W/ MICROALB/CREAT RATIO; Future    3. Chronic systolic congestive heart failure (HCC)  -     bumetanide (BUMEX) 1 mg tablet; TAKE 1 TABLET BY MOUTH IN THE MORNING AND 2 IN THE EVENING    4. Prostate cancer screening  -     PSA SCREENING (SCREENING); Future    1. Needs flu shot  Pt received his flu shot today. -     INFLUENZA, FLUAD, (AGE 65 Y+), IM, PF, 0.5 ML    2. Type 2 diabetes with nephropathy (HCC)  Pt's blood sugar seems to be well controlled. He is trying to get a new glucometer. I advised pt to continue current dose of medications, avoid sugars and starches, and to increase exercise when possible. Recheck CMP, CBC, and microalbumin. I have referred him to , endocrinology.     -     REFERRAL TO ENDOCRINOLOGY  -     METABOLIC PANEL, COMPREHENSIVE; Future  -     CBC WITH AUTOMATED DIFF; Future  -     MICROALBUMIN, UR, RAND W/ MICROALB/CREAT RATIO; Future    3. Chronic systolic congestive heart failure (HCC)  I refilled his Bumex and advised him to take it as prescribed. -     bumetanide (BUMEX) 1 mg tablet; TAKE 1 TABLET BY MOUTH IN THE MORNING AND 2 IN THE EVENING      ICD-10-CM ICD-9-CM    1. Needs flu shot  Z23 V04.81 INFLUENZA, FLUAD, (AGE 65 Y+), IM, PF, 0.5 ML      2. Type 2 diabetes with nephropathy (HCC)  E11.21 250.40 REFERRAL TO ENDOCRINOLOGY     608.44 METABOLIC PANEL, COMPREHENSIVE      CBC WITH AUTOMATED DIFF      MICROALBUMIN, UR, RAND W/ MICROALB/CREAT RATIO      3. Chronic systolic congestive heart failure (HCC)  I50.22 428.22 bumetanide (BUMEX) 1 mg tablet     428.0       4.  Prostate cancer screening  Z12.5 V76.44 PSA SCREENING (SCREENING)           Follow-up and Dispositions    Return in about 3 months (around 2/10/2023) for follow up PCP.     I have reviewed the patient's medications and risks/side effects/benefits were discussed. Diagnosis(-es) explained to patient and questions answered. Literature provided where appropriate.      Written by Zach aBrkley, as dictated by Maite Sanches MD.

## 2022-11-10 NOTE — PROGRESS NOTES
1. \"Have you been to the ER, urgent care clinic since your last visit? Hospitalized since your last visit? \" No    2. \"Have you seen or consulted any other health care providers outside of the [address] [place] [address] since your last visit? \" No     3. For patients aged 39-70: Has the patient had a colonoscopy / FIT/ Cologuard? Yes - no Care Gap present      If the patient is female:    4. For patients aged 41-77: Has the patient had a mammogram within the past 2 years? NA - based on age or sex      11. For patients aged 21-65: Has the patient had a pap smear?  NA - based on age or sex

## 2022-11-11 LAB
ALBUMIN SERPL-MCNC: 3.4 G/DL (ref 3.5–5)
ALBUMIN/GLOB SERPL: 1 {RATIO} (ref 1.1–2.2)
ALP SERPL-CCNC: 76 U/L (ref 45–117)
ALT SERPL-CCNC: 30 U/L (ref 12–78)
ANION GAP SERPL CALC-SCNC: 8 MMOL/L (ref 5–15)
AST SERPL-CCNC: 15 U/L (ref 15–37)
BASOPHILS # BLD: 0.1 K/UL (ref 0–0.1)
BASOPHILS NFR BLD: 1 % (ref 0–1)
BILIRUB SERPL-MCNC: 0.2 MG/DL (ref 0.2–1)
BUN SERPL-MCNC: 108 MG/DL (ref 6–20)
BUN/CREAT SERPL: 49 (ref 12–20)
CALCIUM SERPL-MCNC: 9.2 MG/DL (ref 8.5–10.1)
CHLORIDE SERPL-SCNC: 104 MMOL/L (ref 97–108)
CO2 SERPL-SCNC: 26 MMOL/L (ref 21–32)
CREAT SERPL-MCNC: 2.2 MG/DL (ref 0.7–1.3)
CREAT UR-MCNC: 30.5 MG/DL
DIFFERENTIAL METHOD BLD: ABNORMAL
EOSINOPHIL # BLD: 0.4 K/UL (ref 0–0.4)
EOSINOPHIL NFR BLD: 4 % (ref 0–7)
ERYTHROCYTE [DISTWIDTH] IN BLOOD BY AUTOMATED COUNT: 14.9 % (ref 11.5–14.5)
GLOBULIN SER CALC-MCNC: 3.4 G/DL (ref 2–4)
GLUCOSE SERPL-MCNC: 163 MG/DL (ref 65–100)
HCT VFR BLD AUTO: 36.9 % (ref 36.6–50.3)
HGB BLD-MCNC: 11.7 G/DL (ref 12.1–17)
IMM GRANULOCYTES # BLD AUTO: 0.1 K/UL (ref 0–0.04)
IMM GRANULOCYTES NFR BLD AUTO: 1 % (ref 0–0.5)
LYMPHOCYTES # BLD: 1.4 K/UL (ref 0.8–3.5)
LYMPHOCYTES NFR BLD: 12 % (ref 12–49)
MCH RBC QN AUTO: 28.6 PG (ref 26–34)
MCHC RBC AUTO-ENTMCNC: 31.7 G/DL (ref 30–36.5)
MCV RBC AUTO: 90.2 FL (ref 80–99)
MICROALBUMIN UR-MCNC: 9.78 MG/DL
MICROALBUMIN/CREAT UR-RTO: 321 MG/G (ref 0–30)
MONOCYTES # BLD: 0.9 K/UL (ref 0–1)
MONOCYTES NFR BLD: 8 % (ref 5–13)
NEUTS SEG # BLD: 8.5 K/UL (ref 1.8–8)
NEUTS SEG NFR BLD: 74 % (ref 32–75)
NRBC # BLD: 0 K/UL (ref 0–0.01)
NRBC BLD-RTO: 0 PER 100 WBC
PLATELET # BLD AUTO: 279 K/UL (ref 150–400)
PMV BLD AUTO: 10.4 FL (ref 8.9–12.9)
POTASSIUM SERPL-SCNC: 4.4 MMOL/L (ref 3.5–5.1)
PROT SERPL-MCNC: 6.8 G/DL (ref 6.4–8.2)
PSA SERPL-MCNC: 25.3 NG/ML (ref 0.01–4)
RBC # BLD AUTO: 4.09 M/UL (ref 4.1–5.7)
SODIUM SERPL-SCNC: 138 MMOL/L (ref 136–145)
WBC # BLD AUTO: 11.5 K/UL (ref 4.1–11.1)

## 2022-11-14 ENCOUNTER — OFFICE VISIT (OUTPATIENT)
Dept: ENDOCRINOLOGY | Age: 77
End: 2022-11-14
Payer: MEDICARE

## 2022-11-14 DIAGNOSIS — E11.22 TYPE 2 DIABETES MELLITUS WITH STAGE 3B CHRONIC KIDNEY DISEASE, WITHOUT LONG-TERM CURRENT USE OF INSULIN (HCC): ICD-10-CM

## 2022-11-14 DIAGNOSIS — E78.2 MIXED HYPERLIPIDEMIA: ICD-10-CM

## 2022-11-14 DIAGNOSIS — E11.65 TYPE 2 DIABETES MELLITUS WITH HYPERGLYCEMIA, WITHOUT LONG-TERM CURRENT USE OF INSULIN (HCC): Primary | ICD-10-CM

## 2022-11-14 DIAGNOSIS — I25.10 CORONARY ARTERY DISEASE INVOLVING NATIVE CORONARY ARTERY OF NATIVE HEART WITHOUT ANGINA PECTORIS: ICD-10-CM

## 2022-11-14 DIAGNOSIS — N18.32 TYPE 2 DIABETES MELLITUS WITH STAGE 3B CHRONIC KIDNEY DISEASE, WITHOUT LONG-TERM CURRENT USE OF INSULIN (HCC): ICD-10-CM

## 2022-11-14 DIAGNOSIS — I10 PRIMARY HYPERTENSION: ICD-10-CM

## 2022-11-14 PROCEDURE — 1123F ACP DISCUSS/DSCN MKR DOCD: CPT | Performed by: INTERNAL MEDICINE

## 2022-11-14 PROCEDURE — 3044F HG A1C LEVEL LT 7.0%: CPT | Performed by: INTERNAL MEDICINE

## 2022-11-14 PROCEDURE — G8427 DOCREV CUR MEDS BY ELIG CLIN: HCPCS | Performed by: INTERNAL MEDICINE

## 2022-11-14 PROCEDURE — G8417 CALC BMI ABV UP PARAM F/U: HCPCS | Performed by: INTERNAL MEDICINE

## 2022-11-14 PROCEDURE — 1101F PT FALLS ASSESS-DOCD LE1/YR: CPT | Performed by: INTERNAL MEDICINE

## 2022-11-14 PROCEDURE — G8432 DEP SCR NOT DOC, RNG: HCPCS | Performed by: INTERNAL MEDICINE

## 2022-11-14 PROCEDURE — 2022F DILAT RTA XM EVC RTNOPTHY: CPT | Performed by: INTERNAL MEDICINE

## 2022-11-14 PROCEDURE — G8536 NO DOC ELDER MAL SCRN: HCPCS | Performed by: INTERNAL MEDICINE

## 2022-11-14 PROCEDURE — 99214 OFFICE O/P EST MOD 30 MIN: CPT | Performed by: INTERNAL MEDICINE

## 2022-11-14 PROCEDURE — 3017F COLORECTAL CA SCREEN DOC REV: CPT | Performed by: INTERNAL MEDICINE

## 2022-11-14 RX ORDER — METOLAZONE 2.5 MG/1
2.5 TABLET ORAL
COMMUNITY

## 2022-11-14 RX ORDER — CEPHALEXIN 500 MG/1
CAPSULE ORAL
COMMUNITY
Start: 2022-10-27

## 2022-11-14 NOTE — PROGRESS NOTES
Chief Complaint   Patient presents with    Diabetes       Patient was last seen: 4 months ago- 7/22/2022     On Abx for leg - has stopped weeping (wound care)    General:     A1c: current a1c is 6.4     DM Medications:    Glimepiride 1mg once a day  - 0.5mg once a day     Last Changes: :none, last visit; metformin stopped in 12/21    Sugar Checks: checks up to 2 x day     AM: reports:  100-120    PM: reports:  < 150    LOWs:  denies low sugars     DIET: feels does well with diabetic diet     EXERCISE: does not exercise     HTN: on ACE-I, on B-Blk, on diuretics, HTN followed by PCP, HTN followed by Cardiology     LIPIDS: on statin, lipids followed by PCP, lipids followed by Cardiology    RENAL: has moderate renal insufficiency, has proteinuria, is followed by Nephrology, is on an ACE-I     EYES: eye exam in past year, has no retinopathy     FEET: sees podiatry, has no current issues, has neuropathy-stable     DENTAL:  overdue for dentist     HEART:  no chest pain, shortness of breath or claudication, is followed by Cardiology, has coronary artery disease, has heart failure s/p 5 stents    ASA:  does not take aspirin or other blood thinner     SYMPTOMS: no polyuria, thirst or blurred vision     THYROID: no known thyroid issue    DIABETES HISTORY:  From initial visit: 7/22/2022   DM x 15 years   Metfromin - didn't feel good on it - CRI   No TZD, no DPP4, no SGLT, no GLP,   Has been on insulin in the past     Admission 12/21 for weeping skin, bleeding ulcer, anemic and kidney issues   Skin issue from swelling from Cards/Renal issues; recent d/c from wound care clinic  Advised not from diabetes, nor do I treat this    LABS/STUDIES:     Lab Results   Component Value Date/Time    Hemoglobin A1c (POC) 6.4 (A) 11/09/2022 03:31 PM    Hemoglobin A1c (POC) 5.5 12/17/2019 03:55 PM    Hemoglobin A1c (POC) 5.3 01/17/2019 12:20 PM    Hemoglobin A1c (POC) 5.6 10/09/2018 12:10 PM    Hemoglobin A1c (POC) 5.8 07/09/2018 12:42 PM Hemoglobin A1c (POC) 8.9 (A) 04/23/2018 08:45 AM       Lab Results   Component Value Date/Time    Hemoglobin A1c 6.2 (H) 07/22/2022 12:56 PM    Hemoglobin A1c 6.3 (H) 12/21/2021 10:58 AM    Hemoglobin A1c 5.9 (H) 09/12/2019 04:32 PM    Glucose 163 (H) 11/10/2022 12:50 PM    Glucose (POC) 116 (H) 11/21/2019 11:28 AM    Glucose,  (H) 12/14/2021 02:46 PM    Glucose  11/09/2022 03:20 PM    GFR est AA 39 (L) 12/21/2021 10:58 AM    GFR est non-AA 32 (L) 12/21/2021 10:58 AM    eGFR 31 (L) 11/10/2022 12:50 PM    Microalbumin/Creat ratio (mg/g creat) 321 (H) 11/10/2022 01:17 PM    Microalbumin,urine random 9.78 11/10/2022 01:17 PM    Creatinine, POC 2.0 (H) 12/14/2021 02:46 PM    Creatinine 2.20 (H) 11/10/2022 12:50 PM        Lab Results   Component Value Date/Time    Cholesterol, total 92 12/21/2021 10:58 AM    Triglyceride 108 12/21/2021 10:58 AM    HDL Cholesterol 43 12/21/2021 10:58 AM    LDL, calculated 27.4 12/21/2021 10:58 AM        Past Medical History:   Diagnosis Date    Arthritis     Asthma     CAD (coronary artery disease)     Calculus of kidney     Carotid artery disease (HCC)     Cervical herniated disc     Chronic systolic (congestive) heart failure ([address]     Diabetes ([address]     Diabetic shock due to low blood sugar ([address] 2016    Glaucoma     pt is on drops unsure of what the names are    Hypercholesterolemia     Hypertension     Morbid obesity ([address]     Rheumatic fever     When he was 17    Sleep apnea     compliant with cpap as stated 9/6/2019    Venous stasis ulcer of right lower leg with edema of right lower leg ([address] 7/5/2022         There were no vitals taken for this visit. Weight Metrics 11/10/2022 7/22/2022 5/23/202223/2022 5/4/2022 12/21/2021 12/17/2021 12/14/2021   Weight 197 lb 3.2 oz 196 lb 209 lb 3.2 oz 210 lb 6.4 oz 217 lb 226 lb -   BMI 34.93 kg/m2 34.72 kg/m2 37.06 kg/m2 37.27 kg/m2 38.44 kg/m2 - 40.03 kg/m2        EXAM  - not done today       Assessment/Plan:   1.  Type 2 diabetes mellitus with hyperglycemia, without long-term current use of insulin (HCC)  a1c good (better than needs to be for age)  May not need sulfonylurea but will keep while leg continues to heal (wound care ctr)  Not clear why needed to see an Endo; does have PCP now    2. Type 2 diabetes mellitus with stage 3b chronic kidney disease, without long-term current use of insulin (HCC)  GFR stable in 30s per Nephrology    3. Primary hypertension  On meds per PCP    4. Mixed hyperlipidemia  On statin per PCP/Cards    5. Coronary artery disease involving native coronary artery of native heart without angina pectoris  Per Cards    No orders of the defined types were placed in this encounter. Follow-up and Dispositions    Return switching to [place] office.

## 2022-11-14 NOTE — LETTER
11/14/2022    Patient: Alexandria Espinoza   YOB: 1945   Date of Visit: 11/14/2022     Cynthia Chicas MD  [address] [facility] [address]  [facility] [address]  [address] [facility] [address]    Dear Cynthia Chicas MD,      [facility]. My notes for this consultation are attached. If you have questions, please do not hesitate to call me. I look forward to following your patient along with you.       Sincerely,    Patience Martinez MD

## 2022-11-15 ENCOUNTER — TELEPHONE (OUTPATIENT)
Dept: INTERNAL MEDICINE CLINIC | Age: 77
End: 2022-11-15

## 2022-11-15 ENCOUNTER — TELEPHONE (OUTPATIENT)
Dept: ENDOCRINOLOGY | Age: 77
End: 2022-11-15

## 2022-11-15 NOTE — TELEPHONE ENCOUNTER
11/15/2022  3:25 PM    Thank you Dr. Nette Samano.      Pt is now scheduled for 02/13/2023 at 3:50 pm.     Thanks,   Gisele Sahu

## 2022-11-15 NOTE — TELEPHONE ENCOUNTER
Nicholas Pickard presents to the office on behalf of patient to drop off Physician Statement form for Vivienne Lenz MD. Ms. Morena Lester was advised of 7-10 business day turnaround time for form completion & may incur a fee of $25.00. This has been fully explained to Ms. Morena Lester who indicates understanding. Form has been scanned in patient's chart and placed in provider's inbox. Please contact MsNeil Morena Lester when form has been completed and faxed.      Phone number: [phone] (ok to leave detailed message if no answer)

## 2022-11-15 NOTE — TELEPHONE ENCOUNTER
11/15/2022  2:14 PM    Good Afternoon Dr. Nowak,     I spoke with Ms. Silas Rueda regarding scheduling the pt and switching providers. The pt last seen Dr. Edward Heaton on 11/14/2022. When would you like to see the pt for an F/U? Please Advise.     Thanks,   Cosme Tapia

## 2022-11-17 NOTE — TELEPHONE ENCOUNTER
PCP: Jhonny Bustos MD    Last appt: 11/10/2022  Future Appointments   Date Time Provider Magda Graf   11/28/2022  2:40 PM MD DOMINGO Hammond BS AMB   2/13/2023  1:20 PM Caio Aleman MD [facility] BS AMB   2/13/2023  3:50 PM Magen Abdalla MD [facility] [address] BS AMB       Requested Prescriptions     Pending Prescriptions Disp Refills    Blood-Glucose Meter (ReliOn Prime Meter) misc 1 Each 0     Sig: Blood glucose testing twice daily Dx: E11.6

## 2022-11-18 RX ORDER — BLOOD-GLUCOSE METER
EACH MISCELLANEOUS
Qty: 1 EACH | Refills: 0 | Status: SHIPPED | OUTPATIENT
Start: 2022-11-18

## 2022-11-21 NOTE — PROGRESS NOTES
This patient has an elevated PSA and will need to see a urologist asap. He needs a repeat CBC today or Tuesday. CMP-electrolyte levels are relatively normal except for the elevated glucose. This patient has chronic renal disease and is followed by nephrology.   Renal function looks relatively stable compared to prior results

## 2022-11-22 ENCOUNTER — TELEPHONE (OUTPATIENT)
Dept: INTERNAL MEDICINE CLINIC | Age: 77
End: 2022-11-22

## 2022-11-22 DIAGNOSIS — E11.21 TYPE 2 DIABETES WITH NEPHROPATHY (HCC): Primary | ICD-10-CM

## 2022-11-22 DIAGNOSIS — I25.10 CORONARY ARTERY DISEASE, UNSPECIFIED VESSEL OR LESION TYPE, UNSPECIFIED WHETHER ANGINA PRESENT, UNSPECIFIED WHETHER NATIVE OR TRANSPLANTED HEART: ICD-10-CM

## 2022-11-22 NOTE — TELEPHONE ENCOUNTER
Vivienne Lenz MD  You 4 hours ago (9:28 AM)     JM  No reason to recheck PSA. Was not routinely checked due to age. The elevated PSA not due to medication. See urology, can see first available. Ok to wait until February to see DR Abdalla.           Urology appt scheduled with Dr. Maci Jeronimo for 12/5/2022 @ 1:40 pm     [facility], [address] [facility]    Patient and spouse notified of response from Dr. Yunior Virk and urology appointment information     States understanding

## 2022-11-22 NOTE — TELEPHONE ENCOUNTER
Patient notified of lab results and recommendations from Dr. Milla Ruano and Dr. Keny Adrian     Patient unable to come today for lab, they need to check with daughter about transportation    Patient wants to know if PSA can be rechecked with the CBC    Patient would like to know if elevated PSA is related to Metolazone use    Patient would like to know if endocrine appt with Dr. Zackary Rosales 2/13/23 is appropriate or if patient needs to see Dr. Dorothea Mims sooner.

## 2022-11-22 NOTE — TELEPHONE ENCOUNTER
Called patient about recent lab work. Patient stated Nirmala Reid already called him. No further questions. Will come to do lab work asap when he can get a ride. Nothing further.

## 2022-11-22 NOTE — PROGRESS NOTES
Called pt, left vm to return call to office for lab results    This patient has an elevated PSA and will need to see a urologist asap. He needs a repeat CBC today or Tuesday.    CMP-electrolyte levels are relatively normal except for the elevated glucose.  This patient has chronic renal disease and is followed by nephrology. Morena Chowdhury function looks relatively stable compared to prior

## 2022-11-23 ENCOUNTER — TELEPHONE (OUTPATIENT)
Dept: INTERNAL MEDICINE CLINIC | Age: 77
End: 2022-11-23

## 2022-11-23 ENCOUNTER — APPOINTMENT (OUTPATIENT)
Dept: INTERNAL MEDICINE CLINIC | Age: 77
End: 2022-11-23

## 2022-11-23 NOTE — TELEPHONE ENCOUNTER
----- Message from Vivi Berger sent at 11/22/2022  4:37 PM EST -----  Subject: Message to Provider    QUESTIONS  Information for Provider? Patient returned a call; if you need to reach   them please call.  patient will be in tomorrow for blood work.  ---------------------------------------------------------------------------  --------------  [id number]  [phone]; OK to leave message on voicemail  ---------------------------------------------------------------------------  --------------  SCRIPT ANSWERS  undefined

## 2022-11-23 NOTE — TELEPHONE ENCOUNTER
Please ask patient or family, has he seen podiatrist for comprehensive foot exam. He will need to in order to qualify for diabetic shoes.

## 2022-11-26 LAB
BASOPHILS # BLD AUTO: 0.1 X10E3/UL (ref 0–0.2)
BASOPHILS NFR BLD AUTO: 1 %
EOSINOPHIL # BLD AUTO: 0.2 X10E3/UL (ref 0–0.4)
EOSINOPHIL NFR BLD AUTO: 2 %
ERYTHROCYTE [DISTWIDTH] IN BLOOD BY AUTOMATED COUNT: 15 % (ref 11.6–15.4)
HCT VFR BLD AUTO: 37.7 % (ref 37.5–51)
HGB BLD-MCNC: 12 G/DL (ref 13–17.7)
IMM GRANULOCYTES # BLD AUTO: 0.1 X10E3/UL (ref 0–0.1)
IMM GRANULOCYTES NFR BLD AUTO: 1 %
LYMPHOCYTES # BLD AUTO: 1.4 X10E3/UL (ref 0.7–3.1)
LYMPHOCYTES NFR BLD AUTO: 12 %
MCH RBC QN AUTO: 27.9 PG (ref 26.6–33)
MCHC RBC AUTO-ENTMCNC: 31.8 G/DL (ref 31.5–35.7)
MCV RBC AUTO: 88 FL (ref 79–97)
MONOCYTES # BLD AUTO: 1 X10E3/UL (ref 0.1–0.9)
MONOCYTES NFR BLD AUTO: 9 %
NEUTROPHILS # BLD AUTO: 9 X10E3/UL (ref 1.4–7)
NEUTROPHILS NFR BLD AUTO: 75 %
PLATELET # BLD AUTO: 365 X10E3/UL (ref 150–450)
RBC # BLD AUTO: 4.3 X10E6/UL (ref 4.14–5.8)
WBC # BLD AUTO: 11.8 X10E3/UL (ref 3.4–10.8)

## 2022-11-27 ENCOUNTER — PATIENT MESSAGE (OUTPATIENT)
Dept: INTERNAL MEDICINE CLINIC | Age: 77
End: 2022-11-27

## 2022-12-14 DIAGNOSIS — I50.22 CHRONIC SYSTOLIC CONGESTIVE HEART FAILURE (HCC): ICD-10-CM

## 2022-12-15 NOTE — TELEPHONE ENCOUNTER
Patient's wife, Yamile Bonilla, states patient is out of medication & needs to get refill approval Asap. Please call if any questions or to advise when done.  Thank you    Yamilerodríguez Bonilla reminded of [address] Turn around on refills

## 2022-12-16 RX ORDER — BUMETANIDE 1 MG/1
TABLET ORAL
Qty: 90 TABLET | Refills: 2 | Status: SHIPPED | OUTPATIENT
Start: 2022-12-16

## 2022-12-19 ENCOUNTER — TELEPHONE (OUTPATIENT)
Dept: INTERNAL MEDICINE CLINIC | Age: 77
End: 2022-12-19

## 2022-12-19 NOTE — TELEPHONE ENCOUNTER
Received a form from Rigo Ledezma. Please advise patient or family that he needs to see a podiatrist for completion of diabetic foot exam. Once we receive their report, we can assist with diabetic shoe order. Called patient. Two patient identifiers verified.   Spoke with patient
home

## 2023-01-09 RX ORDER — GLIMEPIRIDE 1 MG/1
TABLET ORAL
Qty: 90 TABLET | Refills: 0 | Status: SHIPPED | OUTPATIENT
Start: 2023-01-09

## 2023-01-09 RX ORDER — DULOXETIN HYDROCHLORIDE 60 MG/1
CAPSULE, DELAYED RELEASE ORAL
Qty: 90 CAPSULE | Refills: 1 | Status: SHIPPED | OUTPATIENT
Start: 2023-01-09

## 2023-01-09 NOTE — TELEPHONE ENCOUNTER
Caller requests Refill of:  glimepiride (AMARYL) 1 mg tablet  DULoxetine (CYMBALTA) 60 mg capsule      Please send to:  [address], [address]  [phone]      Visit Appointment History:   Future:   2/13/23  Previous: 11/10/22     Caller confirmed instructions and dosages as correct. Caller was advised that Meds will be refilled as soon as possible, however there can be a 48-72 business hour turn around on refill requests.

## 2023-01-11 RX ORDER — LISINOPRIL 2.5 MG/1
TABLET ORAL
Qty: 90 TABLET | Refills: 0 | Status: SHIPPED | OUTPATIENT
Start: 2023-01-11

## 2023-02-14 NOTE — PROGRESS NOTES
Reina Deal is a 78 y.o. male who presents for preop clearance for upcoming cataract surgery, right.  3/7. [facility]. Prior left cataract surgery. He is followed by nephrology for CKD. Followed every 6 months. Creatinine 2.2. Hgb 12. Diabetic. Followed by Dr. Conchis Mayers, endocrinology. Changing to Dr Zoe Medina, endo. HbA1C 6.4%. 2022. Treated for hypertension. BP controlled. Followed by cardiology. Not active.        Past Medical History:   Diagnosis Date    Arthritis     Asthma     CAD (coronary artery disease)     Calculus of kidney     Carotid artery disease (HCC)     Cervical herniated disc     Chronic systolic (congestive) heart failure (HCC)     Diabetes ([address]     Diabetic shock due to low blood sugar ([address]     Glaucoma     pt is on drops unsure of what the names are    Hypercholesterolemia     Hypertension     Morbid obesity ([address]     Rheumatic fever     When he was 17    Sleep apnea     compliant with cpap as stated 2019    Venous stasis ulcer of right lower leg with edema of right lower leg ([address] 2022       Family History   Problem Relation Age of Onset    Heart Disease Brother     Diabetes Nephew     Hypertension Mother        Social History     Socioeconomic History    Marital status:      Spouse name: Not on file    Number of children: Not on file    Years of education: Not on file    Highest education level: Not on file   Occupational History    Not on file   Tobacco Use    Smoking status: Former     Packs/day: 5.00     Years: 15.00     Pack years: 75.00     Types: Cigarettes     Quit date: 1963     Years since quittin.8    Smokeless tobacco: Never   Vaping Use    Vaping Use: Never used   Substance and Sexual Activity    Alcohol use: No     Alcohol/week: 0.0 standard drinks    Drug use: Never    Sexual activity: Yes     Partners: Female     Birth control/protection: None   Other Topics Concern     Service Not Asked    Blood Transfusions Not Asked    Caffeine Concern Not Asked    Occupational Exposure Not Asked    Hobby Hazards Not Asked    Sleep Concern Not Asked    Stress Concern Not Asked    Weight Concern Not Asked    Special Diet Not Asked    Back Care Not Asked    Exercise Not Asked    Bike Helmet Not Asked    Seat Belt Not Asked    Self-Exams Not Asked   Social History Narrative    ** Merged History Encounter **          Social Determinants of Health     Financial Resource Strain: High Risk    Difficulty of Paying Living Expenses: Very hard   Food Insecurity: Food Insecurity Present    Worried About Running Out of Food in the Last Year: Often true    Ran Out of Food in the Last Year: Often true   Transportation Needs: Not on file   Physical Activity: Not on file   Stress: Not on file   Social Connections: Not on file   Intimate Partner Violence: Not on file   Housing Stability: Not on file       Current Outpatient Medications on File Prior to Visit   Medication Sig Dispense Refill    lisinopriL (PRINIVIL, ZESTRIL) 2.5 mg tablet Take 1 tablet by mouth once daily 90 Tablet 0    glimepiride (AMARYL) 1 mg tablet Taking 0.5 tab daily. MUST ESTABLISH WITH NEW ENDOCRINOLOGIST OR CONTACT PCP FOR FURTHER REFILLS 90 Tablet 0    DULoxetine (CYMBALTA) 60 mg capsule TAKE 1 CAPSULE BY MOUTH ONCE DAILY FOR  FEET  PAIN. SEND FUTURE REFILL REQUESTS TO PCP AS DR MAI IS NO LONGER WITH THIS PRACTICE 90 Capsule 1    bumetanide (BUMEX) 1 mg tablet TAKE 1 TABLET BY MOUTH IN THE MORNING AND 2 IN THE EVENING 90 Tablet 2    Blood-Glucose Meter (ReliOn Prime Meter) misc Blood glucose testing twice daily Dx: E11.6 1 Each 0    cephALEXin (KEFLEX) 500 mg capsule TAKE 1 CAPSULE BY MOUTH EVERY 8 HOURS FOR 10 DAYS      metOLazone (ZAROXOLYN) 2.5 mg tablet Take 2.5 mg by mouth.  Monday, Wednesday, Friday      isosorbide mononitrate ER (IMDUR) 60 mg CR tablet Take 1 tablet by mouth once daily 90 Tablet 0    simvastatin (ZOCOR) 10 mg tablet TAKE 1 TABLET BY MOUTH EVERY OTHER DAY . APPOINTMENT REQUIRED FOR FUTURE REFILLS 45 Tablet 0    carvediloL (COREG) 12.5 mg tablet TAKE 1 TABLET BY MOUTH TWICE DAILY . APPOINTMENT REQUIRED FOR FUTURE REFILLS 180 Tablet 0    acetaminophen (TYLENOL) 650 mg TbER Take 650 mg by mouth every eight (8) hours. dextran 70/hypromellose/PF (NATURAL TEARS, PF, OP) Apply 1 Drop to eye as needed. acetaminophen (TYLENOL) 500 mg tablet Take 1,000 mg by mouth every six (6) hours as needed for Pain. cholecalciferol, VITAMIN D3, (VITAMIN D3) 5,000 unit tab tablet Take 1 Tab by mouth daily. 30 Tab 5     No current facility-administered medications on file prior to visit. Review of Systems  Pertinent items are noted in HPI. Objective:     Visit Vitals  /79   Pulse 78   Temp 98.2 °F (36.8 °C) (Temporal)   Resp 14   Ht 5' 3\" (1.6 m)   Wt 202 lb (91.6 kg)   SpO2 98%   BMI 35.78 kg/m²     Gen: well appearing male  HEENT:   PERRL,normal conjunctiva. OP no erythema, no exudates, MMM  Neck:   No masses or LAD  Resp:  No wheezing, no rhonchi, no rales. CV:  RRR, normal S1S2    GI: soft, nontender, limited by habitus. Extrem:  +2 chronic edema, warm distally      Assessment/Plan:       ICD-10-CM ICD-9-CM    1. Senile cataract of right eye, unspecified age-related cataract type  H25.9 366.10       2. Stage 3b chronic kidney disease (HCC)  N18.32 585.3       3. Chronic systolic congestive heart failure (HCC)  I50.22 428.22      428.0       4. Type 2 diabetes with nephropathy (HCC)  E11.21 250.40      583.81       5. Preop examination  Z01.818 V72.84         MEDICALLY STABLE FOR CATARACT SURGERY    Follow-up and Dispositions    Return in about 6 months (around 8/15/2023).          Eliseo Travis MD

## 2023-02-15 ENCOUNTER — OFFICE VISIT (OUTPATIENT)
Dept: INTERNAL MEDICINE CLINIC | Age: 78
End: 2023-02-15
Payer: MEDICARE

## 2023-02-15 ENCOUNTER — TELEPHONE (OUTPATIENT)
Dept: INTERNAL MEDICINE CLINIC | Age: 78
End: 2023-02-15

## 2023-02-15 VITALS
OXYGEN SATURATION: 98 % | DIASTOLIC BLOOD PRESSURE: 79 MMHG | SYSTOLIC BLOOD PRESSURE: 134 MMHG | TEMPERATURE: 98.2 F | HEART RATE: 78 BPM | RESPIRATION RATE: 14 BRPM | BODY MASS INDEX: 35.79 KG/M2 | HEIGHT: 63 IN | WEIGHT: 202 LBS

## 2023-02-15 DIAGNOSIS — L97.919 VENOUS STASIS ULCER OF RIGHT LOWER LEG WITH EDEMA OF RIGHT LOWER LEG (HCC): ICD-10-CM

## 2023-02-15 DIAGNOSIS — I83.019 VENOUS STASIS ULCER OF RIGHT LOWER LEG WITH EDEMA OF RIGHT LOWER LEG (HCC): ICD-10-CM

## 2023-02-15 DIAGNOSIS — R60.9 VENOUS STASIS ULCER OF RIGHT LOWER LEG WITH EDEMA OF RIGHT LOWER LEG (HCC): ICD-10-CM

## 2023-02-15 DIAGNOSIS — E11.21 TYPE 2 DIABETES WITH NEPHROPATHY (HCC): ICD-10-CM

## 2023-02-15 DIAGNOSIS — I83.891 VENOUS STASIS ULCER OF RIGHT LOWER LEG WITH EDEMA OF RIGHT LOWER LEG (HCC): ICD-10-CM

## 2023-02-15 DIAGNOSIS — Z01.818 PREOP EXAMINATION: ICD-10-CM

## 2023-02-15 DIAGNOSIS — N18.32 STAGE 3B CHRONIC KIDNEY DISEASE (HCC): ICD-10-CM

## 2023-02-15 DIAGNOSIS — I50.22 CHRONIC SYSTOLIC CONGESTIVE HEART FAILURE (HCC): ICD-10-CM

## 2023-02-15 DIAGNOSIS — H25.9 SENILE CATARACT OF RIGHT EYE, UNSPECIFIED AGE-RELATED CATARACT TYPE: Primary | ICD-10-CM

## 2023-02-15 PROBLEM — G95.9 MYELOPATHY (HCC): Status: RESOLVED | Noted: 2022-05-04 | Resolved: 2023-02-15

## 2023-02-15 PROBLEM — N18.30 CHRONIC RENAL DISEASE, STAGE III (HCC): Status: ACTIVE | Noted: 2023-02-15

## 2023-02-15 PROCEDURE — G8417 CALC BMI ABV UP PARAM F/U: HCPCS | Performed by: FAMILY MEDICINE

## 2023-02-15 PROCEDURE — G8536 NO DOC ELDER MAL SCRN: HCPCS | Performed by: FAMILY MEDICINE

## 2023-02-15 PROCEDURE — G8510 SCR DEP NEG, NO PLAN REQD: HCPCS | Performed by: FAMILY MEDICINE

## 2023-02-15 PROCEDURE — 3017F COLORECTAL CA SCREEN DOC REV: CPT | Performed by: FAMILY MEDICINE

## 2023-02-15 PROCEDURE — G8427 DOCREV CUR MEDS BY ELIG CLIN: HCPCS | Performed by: FAMILY MEDICINE

## 2023-02-15 PROCEDURE — 3046F HEMOGLOBIN A1C LEVEL >9.0%: CPT | Performed by: FAMILY MEDICINE

## 2023-02-15 PROCEDURE — 1101F PT FALLS ASSESS-DOCD LE1/YR: CPT | Performed by: FAMILY MEDICINE

## 2023-02-15 PROCEDURE — 99214 OFFICE O/P EST MOD 30 MIN: CPT | Performed by: FAMILY MEDICINE

## 2023-02-15 PROCEDURE — 2022F DILAT RTA XM EVC RTNOPTHY: CPT | Performed by: FAMILY MEDICINE

## 2023-02-15 NOTE — PROGRESS NOTES
1. \"Have you been to the ER, urgent care clinic since your last visit? Hospitalized since your last visit? \" No    2. \"Have you seen or consulted any other health care providers outside of the [address] [place] [address] since your last visit? \" Yes [facility]       3. For patients aged 39-70: Has the patient had a colonoscopy / FIT/ Cologuard?  No

## 2023-02-15 NOTE — TELEPHONE ENCOUNTER
Patient was seen by Dr. Valarie Martines on November 10. At that time PSA was elevated to 25, referred to urologist. daughter reports saw urologist, has not followed up. Per daughter, he did not want to have biopsy. Patient states he would have had a biopsy, had diarrhea and needed to reschedule. Requesting notes from urologist. Strongly advised patient and daughter to follow up with urologist regarding very high PSA.

## 2023-03-14 DIAGNOSIS — I50.22 CHRONIC SYSTOLIC CONGESTIVE HEART FAILURE (HCC): ICD-10-CM

## 2023-03-14 RX ORDER — BUMETANIDE 1 MG/1
TABLET ORAL
Qty: 90 TABLET | Refills: 2 | Status: SHIPPED | OUTPATIENT
Start: 2023-03-14

## 2023-03-14 NOTE — TELEPHONE ENCOUNTER
Caller requests Refill of:  bumetanide (BUMEX) 1 mg tablet      Please send to:  [address], [address]  [phone]      Visit Appointment History:  Future Appointments:  Future Appointments   Date Time Provider Magda Graf   5/18/2023  3:30 PM Sally Vale MD [facility] [address]         Last Appointment With Me:  2/15/2023         Caller confirmed instructions and dosages as correct. Caller was advised that Meds will be refilled as soon as possible, however there can be a 48-72 business hour turn around on refill requests.

## 2023-03-14 NOTE — TELEPHONE ENCOUNTER
Future Appointments:  Future Appointments   Date Time Provider Magda Gruberi   5/18/2023  3:30 PM Donis Abdalla MD [facility] [address] [facility]        Last Appointment With Me:  2/15/2023     Requested Prescriptions     Pending Prescriptions Disp Refills    bumetanide (BUMEX) 1 mg tablet 90 Tablet 2

## 2023-05-18 ENCOUNTER — OFFICE VISIT (OUTPATIENT)
Age: 78
End: 2023-05-18
Payer: COMMERCIAL

## 2023-05-18 VITALS
BODY MASS INDEX: 36.46 KG/M2 | HEIGHT: 63 IN | DIASTOLIC BLOOD PRESSURE: 68 MMHG | WEIGHT: 205.8 LBS | SYSTOLIC BLOOD PRESSURE: 123 MMHG | HEART RATE: 88 BPM

## 2023-05-18 DIAGNOSIS — E66.9 OBESITY (BMI 30-39.9): ICD-10-CM

## 2023-05-18 DIAGNOSIS — I10 ESSENTIAL (PRIMARY) HYPERTENSION: ICD-10-CM

## 2023-05-18 DIAGNOSIS — E11.22 TYPE 2 DIABETES MELLITUS WITH STAGE 4 CHRONIC KIDNEY DISEASE, WITHOUT LONG-TERM CURRENT USE OF INSULIN (HCC): Primary | ICD-10-CM

## 2023-05-18 DIAGNOSIS — N18.4 TYPE 2 DIABETES MELLITUS WITH STAGE 4 CHRONIC KIDNEY DISEASE, WITHOUT LONG-TERM CURRENT USE OF INSULIN (HCC): Primary | ICD-10-CM

## 2023-05-18 DIAGNOSIS — Z95.5 HISTORY OF CORONARY ANGIOPLASTY WITH INSERTION OF STENT: ICD-10-CM

## 2023-05-18 PROCEDURE — 3078F DIAST BP <80 MM HG: CPT | Performed by: GENERAL ACUTE CARE HOSPITAL

## 2023-05-18 PROCEDURE — 1123F ACP DISCUSS/DSCN MKR DOCD: CPT | Performed by: GENERAL ACUTE CARE HOSPITAL

## 2023-05-18 PROCEDURE — 99214 OFFICE O/P EST MOD 30 MIN: CPT | Performed by: GENERAL ACUTE CARE HOSPITAL

## 2023-05-18 PROCEDURE — 3074F SYST BP LT 130 MM HG: CPT | Performed by: GENERAL ACUTE CARE HOSPITAL

## 2023-07-05 RX ORDER — DULOXETIN HYDROCHLORIDE 60 MG/1
CAPSULE, DELAYED RELEASE ORAL
Qty: 90 CAPSULE | Refills: 0 | Status: SHIPPED | OUTPATIENT
Start: 2023-07-05

## 2023-07-10 RX ORDER — GLIMEPIRIDE 1 MG/1
TABLET ORAL
Qty: 90 TABLET | Refills: 0 | Status: SHIPPED | OUTPATIENT
Start: 2023-07-10

## 2023-07-21 LAB — HBA1C MFR BLD HPLC: 6.7 %

## 2023-08-15 RX ORDER — BUMETANIDE 1 MG/1
TABLET ORAL
Qty: 90 TABLET | Refills: 1 | Status: SHIPPED | OUTPATIENT
Start: 2023-08-15

## 2023-08-15 NOTE — TELEPHONE ENCOUNTER
Caller requests Refill of:  bumetanide (BUMEX) 1 MG tablet      Please send to:  [facility] [address] [place] [address] [address] [phone] Bibiana Villegas [phone]          Visit / Appointment History:  Future Appointments:  Future Appointments   Date Time Provider [address]   8/17/2023  2:00 PM Nery Mathias MD [address]   8/22/2023  2:10 PM Moriah Antony MD R[facility] [address] [facility]         Last Appointment With Me:  2/15/2023         Caller confirmed instructions and dosages as correct. Caller was advised that Meds will be refilled as soon as possible, however there can be a 48-72 business hour turn around on refill requests.

## 2023-08-15 NOTE — TELEPHONE ENCOUNTER
PCP: Kristal Reinoso MD    Last appt:   2/15/2023    Future Appointments   Date Time Provider [address]   8/17/2023  2:00 PM Mariann Mcgowan MD [facility]   8/22/2023  2:10 PM Lakisha William MD R[facility] [address] AM[facility]       Requested Prescriptions     Pending Prescriptions Disp Refills    bumetanide (BUMEX) 1 MG tablet 90 tablet 1     Sig: TAKE 1 TABLET BY MOUTH IN THE MORNING AND 2 IN THE EVENING

## 2023-08-16 LAB
CHOLEST SERPL-MCNC: 92 MG/DL (ref 100–199)
HBA1C MFR BLD: 6.5 % (ref 4.8–5.6)
HDLC SERPL-MCNC: 43 MG/DL
IMP & REVIEW OF LAB RESULTS: NORMAL
LDLC SERPL CALC-MCNC: 32 MG/DL (ref 0–99)
REPORT: NORMAL
TRIGL SERPL-MCNC: 83 MG/DL (ref 0–149)
TSH SERPL DL<=0.005 MIU/L-ACNC: 0.58 UIU/ML (ref 0.45–4.5)
VLDLC SERPL CALC-MCNC: 17 MG/DL (ref 5–40)

## 2023-08-18 DIAGNOSIS — N18.4 TYPE 2 DIABETES MELLITUS WITH STAGE 4 CHRONIC KIDNEY DISEASE, WITHOUT LONG-TERM CURRENT USE OF INSULIN (HCC): ICD-10-CM

## 2023-08-18 DIAGNOSIS — E11.22 TYPE 2 DIABETES MELLITUS WITH STAGE 4 CHRONIC KIDNEY DISEASE, WITHOUT LONG-TERM CURRENT USE OF INSULIN (HCC): ICD-10-CM

## 2023-08-23 ENCOUNTER — OFFICE VISIT (OUTPATIENT)
Age: 78
End: 2023-08-23
Payer: COMMERCIAL

## 2023-08-23 DIAGNOSIS — E66.9 OBESITY (BMI 30-39.9): ICD-10-CM

## 2023-08-23 DIAGNOSIS — N18.4 TYPE 2 DIABETES MELLITUS WITH STAGE 4 CHRONIC KIDNEY DISEASE, WITHOUT LONG-TERM CURRENT USE OF INSULIN (HCC): Primary | ICD-10-CM

## 2023-08-23 DIAGNOSIS — E11.22 TYPE 2 DIABETES MELLITUS WITH STAGE 4 CHRONIC KIDNEY DISEASE, WITHOUT LONG-TERM CURRENT USE OF INSULIN (HCC): Primary | ICD-10-CM

## 2023-08-23 PROCEDURE — 99442 PR PHYS/QHP TELEPHONE EVALUATION 11-20 MIN: CPT | Performed by: GENERAL ACUTE CARE HOSPITAL

## 2023-08-23 RX ORDER — SIMVASTATIN 10 MG
10 TABLET ORAL NIGHTLY
COMMUNITY

## 2023-08-23 NOTE — PROGRESS NOTES
[facility] ENDOCRINOLOGY  DR EFRAIN Lovekayy is a 78 y.o. male  has a past medical history of Arthritis, Asthma, CAD (coronary artery disease), Calculus of kidney, Carotid artery disease ([address]), Cervical herniated disc, Chronic systolic (congestive) heart failure ([address]), Diabetes ([address]), Diabetic shock due to low blood sugar ([address]), Glaucoma, Hypercholesterolemia, Hypertension, Morbid obesity ([address]), Rheumatic fever, Sleep apnea, and Venous stasis ulcer of right lower leg with edema of right lower leg ([address] Presents for : DM2    Shaggy Ajay, was evaluated through a synchronous (real-time) audio encounter. The patient (or guardian if applicable) is aware that this is a billable service, which includes applicable co-pays. This Virtual Visit was conducted with patient's (and/or legal guardian's) consent. Patient identification was verified, and a caregiver was present when appropriate. The patient was located at Home: [address]  Provider was located at [facility] ([address]): [address] [address],  [facility] [address]    ASSESSMENT AND PLAN:       1. Type 2 diabetes mellitus with hyperglycemia, without long-term current use of insulin (HCC)  Takes half tab of glimepride daily only, no hypoglycemia episodes  a1c good (better than needs to be for age)  May not need sulfonylurea but will keep while leg continues to heal (wound care ctr)  He is now following with new podiatrist and will request diabetic shoes    2. Stage 3b chronic kidney disease, without long-term current use of insulin (HCC)  GFR stable in 30s per Nephrology    3. Primary hypertension  Phone visit, says its controlled    4. Mixed hyperlipidemia  Management deferred to cardiology given hx of CAD LDL target would be <55, but he is 78 yrs old, therefore target is <70    5.  Coronary artery disease involving native coronary artery of native heart without

## 2023-09-13 PROBLEM — N18.4 TYPE 2 DIABETES MELLITUS WITH STAGE 4 CHRONIC KIDNEY DISEASE, WITHOUT LONG-TERM CURRENT USE OF INSULIN (HCC): Status: ACTIVE | Noted: 2018-04-23

## 2023-09-13 PROBLEM — E11.22 TYPE 2 DIABETES MELLITUS WITH STAGE 4 CHRONIC KIDNEY DISEASE, WITHOUT LONG-TERM CURRENT USE OF INSULIN (HCC): Status: ACTIVE | Noted: 2018-04-23

## 2023-09-13 PROBLEM — E66.9 OBESITY (BMI 30-39.9): Status: ACTIVE | Noted: 2023-09-13

## 2023-09-13 RX ORDER — GLIMEPIRIDE 1 MG/1
TABLET ORAL
Qty: 90 TABLET | Refills: 0 | Status: SHIPPED | OUTPATIENT
Start: 2023-09-13

## 2023-10-09 RX ORDER — DULOXETIN HYDROCHLORIDE 60 MG/1
CAPSULE, DELAYED RELEASE ORAL
Qty: 90 CAPSULE | Refills: 0 | Status: SHIPPED | OUTPATIENT
Start: 2023-10-09

## 2023-11-28 ENCOUNTER — HOSPITAL ENCOUNTER (OUTPATIENT)
Facility: HOSPITAL | Age: 78
Discharge: HOME | End: 2023-11-28
Attending: FAMILY MEDICINE
Payer: COMMERCIAL

## 2023-11-28 VITALS
SYSTOLIC BLOOD PRESSURE: 137 MMHG | HEART RATE: 89 BPM | RESPIRATION RATE: 18 BRPM | TEMPERATURE: 97.7 F | DIASTOLIC BLOOD PRESSURE: 64 MMHG

## 2023-11-28 DIAGNOSIS — I87.2 PERIPHERAL VENOUS INSUFFICIENCY: ICD-10-CM

## 2023-11-28 DIAGNOSIS — I50.9 CONGESTIVE HEART FAILURE, UNSPECIFIED HF CHRONICITY, UNSPECIFIED HEART FAILURE TYPE (HCC): ICD-10-CM

## 2023-11-28 DIAGNOSIS — L97.221 SKIN ULCER OF LEFT CALF, LIMITED TO BREAKDOWN OF SKIN (HCC): Primary | ICD-10-CM

## 2023-11-28 DIAGNOSIS — R60.0 EDEMA OF EXTREMITIES: ICD-10-CM

## 2023-11-28 DIAGNOSIS — L97.211 SKIN ULCER OF RIGHT CALF, LIMITED TO BREAKDOWN OF SKIN (HCC): ICD-10-CM

## 2023-11-28 PROCEDURE — 29581 APPL MULTLAYER CMPRN SYS LEG: CPT

## 2023-11-28 PROCEDURE — 99213 OFFICE O/P EST LOW 20 MIN: CPT

## 2023-11-28 RX ORDER — TRIAMCINOLONE ACETONIDE 1 MG/G
OINTMENT TOPICAL 2 TIMES DAILY
Qty: 80 G | Refills: 0 | Status: SHIPPED | OUTPATIENT
Start: 2023-11-28 | End: 2023-12-12

## 2023-11-28 RX ORDER — LIDOCAINE HYDROCHLORIDE 20 MG/ML
JELLY TOPICAL ONCE
OUTPATIENT
Start: 2023-11-28 | End: 2023-11-28

## 2023-11-28 RX ORDER — LIDOCAINE 40 MG/G
CREAM TOPICAL ONCE
OUTPATIENT
Start: 2023-11-28 | End: 2023-11-28

## 2023-11-28 RX ORDER — GINSENG 100 MG
CAPSULE ORAL ONCE
OUTPATIENT
Start: 2023-11-28 | End: 2023-11-28

## 2023-11-28 RX ORDER — TRIAMCINOLONE ACETONIDE 1 MG/G
OINTMENT TOPICAL ONCE
OUTPATIENT
Start: 2023-11-28 | End: 2023-11-28

## 2023-11-28 RX ORDER — LIDOCAINE 50 MG/G
OINTMENT TOPICAL ONCE
OUTPATIENT
Start: 2023-11-28 | End: 2023-11-28

## 2023-11-28 RX ORDER — GENTAMICIN SULFATE 1 MG/G
OINTMENT TOPICAL ONCE
OUTPATIENT
Start: 2023-11-28 | End: 2023-11-28

## 2023-11-28 RX ORDER — CLOBETASOL PROPIONATE 0.5 MG/G
OINTMENT TOPICAL ONCE
OUTPATIENT
Start: 2023-11-28 | End: 2023-11-28

## 2023-11-28 RX ORDER — IBUPROFEN 200 MG
TABLET ORAL ONCE
OUTPATIENT
Start: 2023-11-28 | End: 2023-11-28

## 2023-11-28 RX ORDER — BACITRACIN ZINC AND POLYMYXIN B SULFATE 500; 1000 [USP'U]/G; [USP'U]/G
OINTMENT TOPICAL ONCE
OUTPATIENT
Start: 2023-11-28 | End: 2023-11-28

## 2023-11-28 RX ORDER — BETAMETHASONE DIPROPIONATE 0.5 MG/G
CREAM TOPICAL ONCE
OUTPATIENT
Start: 2023-11-28 | End: 2023-11-28

## 2023-11-28 RX ORDER — SODIUM CHLOR/HYPOCHLOROUS ACID 0.033 %
SOLUTION, IRRIGATION IRRIGATION ONCE
OUTPATIENT
Start: 2023-11-28 | End: 2023-11-28

## 2023-11-28 RX ORDER — LIDOCAINE HYDROCHLORIDE 40 MG/ML
SOLUTION TOPICAL ONCE
OUTPATIENT
Start: 2023-11-28 | End: 2023-11-28

## 2023-11-28 ASSESSMENT — PAIN - FUNCTIONAL ASSESSMENT: PAIN_FUNCTIONAL_ASSESSMENT: PREVENTS OR INTERFERES SOME ACTIVE ACTIVITIES AND ADLS

## 2023-11-28 ASSESSMENT — PAIN DESCRIPTION - DESCRIPTORS: DESCRIPTORS: BURNING

## 2023-11-28 ASSESSMENT — PAIN DESCRIPTION - ORIENTATION: ORIENTATION: RIGHT;LEFT

## 2023-11-28 ASSESSMENT — PAIN DESCRIPTION - ONSET: ONSET: ON-GOING

## 2023-11-28 ASSESSMENT — PAIN DESCRIPTION - LOCATION: LOCATION: LEG

## 2023-11-28 ASSESSMENT — PAIN DESCRIPTION - FREQUENCY: FREQUENCY: CONTINUOUS

## 2023-11-28 ASSESSMENT — PAIN DESCRIPTION - PAIN TYPE: TYPE: CHRONIC PAIN

## 2023-11-28 ASSESSMENT — PAIN SCALES - GENERAL: PAINLEVEL_OUTOF10: 10

## 2023-11-28 NOTE — PROGRESS NOTES
Wound Center  Progress Note / Procedure Note      Chief Complaint:  Katharina Martinez is a 78 y.o.  male  with lower extrmeity wounds of many months duration. Referred by:  Dr Hill Done      Assessment/Plan     78 y.o. male with Dm2, chronic leg edema, CHF    -R lower leg anterior chronic ulcer. Signs of chronic stasis dermatitis, along with scabbed areas, minimal weeping  Partial thickness scabs/clustered    -L lower leg anterior chronic ulcer. Signs of chronic stasis dermatitis, along with scabbed areas, moderate weeping  Partial thickness scabs/clustered    -leg edema/chronic  Venous insufficiency, CHF  Compliant with diurectics  Discussed  Compression  Elevation  Order velcro compression next week    -pruritus  Scratatch marks thighs, lower leg, arms    -stasis dermatitis    -left arm rash  Scabs, chronic redness, some raised areas  Triamcinolone twice daily    -DM2  A1c 6.5 (8/2023)          Following discussed with patient / daughter in law  Needs :  Serial debridement- prn    Good local wound care  Dressing: Triamcinolone, xeroform, 2 layer compression  Frequency :  once weekly      -Edema management    -Good Diabetic control    -Good offloading    Patient/ CG understood and agrees with plan. Questions answered.       Follow up with me in 1 week    Subjective:       HPI:     History of chronic leg swelling, with weeping  [facility] used to have unnas, then was told to get compression socks, wore in beginning but stopped  Now referred  here for same issue  Some weeping  Very itchy  Dry skin skin  Redness    Also rash on arm    Per daughter in law has not bathed in 5 years    History/Chart/Medications reviewed    Wound caused by:  swelling  Current wound care:See flowsheet  Offloading wound:   Elevating legs: no  Compression compliance:no  Appetite: good  Wound associated pain: See flowsheet  Diabetic: yes  Smoker: no  ROS: no N/V/D, no T/chills; no local rash, no

## 2023-11-28 NOTE — FLOWSHEET NOTE
11/28/23 0920   Right Leg Edema Point of Measurement   Leg circumference 42 cm   Ankle circumference 27 cm   Compression Therapy Compression not ordered   Left Leg Edema Point of Measurement   Leg circumference 42.5 cm   Ankle circumference 26 cm   Compression Therapy Compression not ordered   RLE Neurovascular Assessment   Capillary Refill Less than/Equal to 3 seconds   Color Appropriate for Ethnicity   Temperature Warm   R Pedal Pulse +2   LLE Neurovascular Assessment   Capillary Refill Less than/Equal to 3 seconds   Color Appropriate for Ethnicity   Temperature Warm   L Pedal Pulse +2   Wound 11/28/23 Leg Left #1   Date First Assessed/Time First Assessed: 11/28/23 0923   Present on Original Admission: Yes  Wound Approximate Age at First Assessment (Weeks): 104 weeks  Primary Wound Type: Venous Ulcer  Location: Leg  Wound Location Orientation: Left  Wound Descrip. .. Wound Image    Wound Etiology Venous   Dressing Status Old drainage noted   Wound Cleansed Cleansed with saline   Dressing/Treatment   (Open to air)   Wound Length (cm) 10 cm   Wound Width (cm) 4 cm   Wound Depth (cm) 0.1 cm   Wound Surface Area (cm^2) 40 cm^2   Wound Volume (cm^3) 4 cm^3   Wound Assessment Pink/red;Eschar dry   Drainage Amount Moderate (25-50%)   Drainage Description Serous   Odor None   Kristan-wound Assessment Blanchable erythema   Margins Undefined edges   Wound Thickness Description not for Pressure Injury Partial thickness   Wound 11/28/23 Leg Right #2   Date First Assessed/Time First Assessed: 11/28/23 0924   Present on Original Admission: Yes  Wound Approximate Age at First Assessment (Weeks): 104 weeks  Primary Wound Type: Venous Ulcer  Location: Leg  Wound Location Orientation: Right  Wound Descri. ..    Wound Image    Wound Etiology Venous   Dressing Status   (Open to air)   Wound Cleansed Cleansed with saline   Wound Length (cm) 12.5 cm   Wound Width (cm) 4.5 cm   Wound Depth (cm) 0.1 cm   Wound Surface Area (cm^2) 56.25

## 2023-11-28 NOTE — FLOWSHEET NOTE
Multilayer Compression Wrap   (Not Unna) Below the Knee    NAME:  Millicent Hoffman OF BIRTH:  1945  MEDICAL RECORD NUMBER:  [id number]  DATE:  11/28/2023 11/28/23 1004   Right Leg Edema Point of Measurement   Compression Therapy 2 layer compression wrap   Left Leg Edema Point of Measurement   Compression Therapy 2 layer compression wrap   Wound 11/28/23 Leg Left #1   Date First Assessed/Time First Assessed: 11/28/23 3953   Present on Original Admission: Yes  Wound Approximate Age at First Assessment (Weeks): 104 weeks  Primary Wound Type: Venous Ulcer  Location: Leg  Wound Location Orientation: Left  Wound Descrip. .. Dressing/Treatment Xeroform;ABD  (2 layers with calamine, triamcinalone on red area to BLE)   Wound 11/28/23 Leg Right #2   Date First Assessed/Time First Assessed: 11/28/23 0924   Present on Original Admission: Yes  Wound Approximate Age at First Assessment (Weeks): 104 weeks  Primary Wound Type: Venous Ulcer  Location: Leg  Wound Location Orientation: Right  Wound Descri. .. Dressing Status New dressing applied   Dressing/Treatment Xeroform;ABD       Applied moisturizing agent to dry skin as needed. , Applied primary and secondary dressing as ordered. , Applied multilayered dressing below the knee to right lower leg., Applied multilayered dressing below the knee to left lower leg., Instructed patient/caregiver not to remove dressing and to keep it clean and dry. , Instructed patient/caregiver on complications to report to provider, such as pain, numbness in toes, heavy drainage, and slippage of dressing. , and Instructed patient on purpose of compression dressing and on activity and exercise recommendations.       Electronically signed by Chapis Lara RN on 11/28/2023 at 10:08 AM

## 2023-12-05 ENCOUNTER — HOSPITAL ENCOUNTER (OUTPATIENT)
Facility: HOSPITAL | Age: 78
Discharge: HOME | End: 2023-12-05
Attending: FAMILY MEDICINE
Payer: COMMERCIAL

## 2023-12-05 VITALS
RESPIRATION RATE: 18 BRPM | DIASTOLIC BLOOD PRESSURE: 69 MMHG | TEMPERATURE: 97.5 F | HEART RATE: 98 BPM | SYSTOLIC BLOOD PRESSURE: 153 MMHG

## 2023-12-05 DIAGNOSIS — L97.221 SKIN ULCER OF LEFT CALF, LIMITED TO BREAKDOWN OF SKIN (HCC): Primary | ICD-10-CM

## 2023-12-05 DIAGNOSIS — R60.0 EDEMA OF EXTREMITIES: ICD-10-CM

## 2023-12-05 DIAGNOSIS — I87.2 PERIPHERAL VENOUS INSUFFICIENCY: ICD-10-CM

## 2023-12-05 DIAGNOSIS — L97.211 SKIN ULCER OF RIGHT CALF, LIMITED TO BREAKDOWN OF SKIN (HCC): ICD-10-CM

## 2023-12-05 PROCEDURE — 29581 APPL MULTLAYER CMPRN SYS LEG: CPT

## 2023-12-05 RX ORDER — LIDOCAINE HYDROCHLORIDE 20 MG/ML
JELLY TOPICAL ONCE
OUTPATIENT
Start: 2023-12-05 | End: 2023-12-05

## 2023-12-05 RX ORDER — SODIUM CHLOR/HYPOCHLOROUS ACID 0.033 %
SOLUTION, IRRIGATION IRRIGATION ONCE
OUTPATIENT
Start: 2023-12-05 | End: 2023-12-05

## 2023-12-05 RX ORDER — LIDOCAINE HYDROCHLORIDE 40 MG/ML
SOLUTION TOPICAL ONCE
OUTPATIENT
Start: 2023-12-05 | End: 2023-12-05

## 2023-12-05 RX ORDER — CLOBETASOL PROPIONATE 0.5 MG/G
OINTMENT TOPICAL ONCE
OUTPATIENT
Start: 2023-12-05 | End: 2023-12-05

## 2023-12-05 RX ORDER — GINSENG 100 MG
CAPSULE ORAL ONCE
OUTPATIENT
Start: 2023-12-05 | End: 2023-12-05

## 2023-12-05 RX ORDER — LIDOCAINE 50 MG/G
OINTMENT TOPICAL ONCE
OUTPATIENT
Start: 2023-12-05 | End: 2023-12-05

## 2023-12-05 RX ORDER — IBUPROFEN 200 MG
TABLET ORAL ONCE
OUTPATIENT
Start: 2023-12-05 | End: 2023-12-05

## 2023-12-05 RX ORDER — TRIAMCINOLONE ACETONIDE 1 MG/G
OINTMENT TOPICAL ONCE
OUTPATIENT
Start: 2023-12-05 | End: 2023-12-05

## 2023-12-05 RX ORDER — GENTAMICIN SULFATE 1 MG/G
OINTMENT TOPICAL ONCE
OUTPATIENT
Start: 2023-12-05 | End: 2023-12-05

## 2023-12-05 RX ORDER — BACITRACIN ZINC AND POLYMYXIN B SULFATE 500; 1000 [USP'U]/G; [USP'U]/G
OINTMENT TOPICAL ONCE
OUTPATIENT
Start: 2023-12-05 | End: 2023-12-05

## 2023-12-05 RX ORDER — BETAMETHASONE DIPROPIONATE 0.5 MG/G
CREAM TOPICAL ONCE
OUTPATIENT
Start: 2023-12-05 | End: 2023-12-05

## 2023-12-05 RX ORDER — LIDOCAINE 40 MG/G
CREAM TOPICAL ONCE
OUTPATIENT
Start: 2023-12-05 | End: 2023-12-05

## 2023-12-05 NOTE — DISCHARGE INSTRUCTIONS
Discharge Instructions/Wound Care Orders  [address] [place] [address] [facility] [address]  Telephone: [phone]     Wound Care Orders:  Bilateral lower extremity wounds :Cleanse with soap and water, apply betamethasone,   Apply 2 layer compression wrap with calamine  Pt./pcg/HH nurse to change (freq) Once a week in clinic  and as needed for compromise. F/U in 1 week. Treatment Orders:   Elevate leg(s) above the level of the heart when sitting, if swelling present. Avoid prolonged standing in one place. Wear off-loading shoe/boot on the affected foot when walking. Do not get dressing/cast wet. Do not use objects to scratch inside cast/wrap. Follow diet as prescribed:  [] Diet as tolerated: [] Diabetic Diet: Low carb no sugar [] No Added Salt:  [] Increase Protein: [] Other:Limit the amount of liquid you are drinking and avoid drinking in between meals             Follow-up:  [x] Return Appointment: With Dr. Joya Mondragon in  [address] [place] [address])  [] Ordered tests:    Electronically signed Jyothi Guo RN on 12/5/2023 at 10:30 AM     [address]: Should you experience any significant changes in your wound(s) or have questions about your wound care, please contact the [address] [place] [address] at [address] [place] [address] 8:00 am - 4:30. If you need help with your wound outside these hours and cannot wait until we are again available, contact your PCP or go to the hospital emergency room. PLEASE NOTE: IF YOU ARE UNABLE TO OBTAIN WOUND SUPPLIES, CONTINUE TO USE THE SUPPLIES YOU HAVE AVAILABLE UNTIL YOU ARE ABLE TO REACH US. IT IS MOST IMPORTANT TO KEEP THE WOUND COVERED AT ALL TIMES.      Physician Signature:_______________________    Date: ___________ Time:  ____________

## 2023-12-05 NOTE — PROGRESS NOTES
Wound Center  Progress Note / Procedure Note      Chief Complaint:  Joel Jamil is a 78 y.o.  male  with lower extrmeity wounds of many months duration. Referred by:  Dr Giovana Borden      Assessment/Plan     78 y.o. male with Dm2, chronic leg edema, CHF    -R lower leg anterior chronic ulcer. Signs of chronic stasis dermatitis, along with scabbed areas, minimal weeping  Partial thickness scabs/clustered  Improved leg swelling and areas    -L lower leg anterior chronic ulcer. Signs of chronic stasis dermatitis, along with scabbed areas, moderate weeping  Partial thickness scabs/clustered  Improved leg swelling and areas    -leg edema/chronic  Venous insufficiency, CHF  Compliant with diurectics  Discussed  Compression  Elevation  Order velcro compression today    -pruritus  Scratatch marks thighs, lower leg, arms  Triamcinolone twice prn    -stasis dermatitis    -left arm rash  Scabs, chronic redness, some raised areas  Almost healed  Continue Triamcinolone twice daily    -DM2  A1c 6.5 (8/2023)          Following discussed with patient / daughter in law  Needs :  Serial debridement- prn    Good local wound care  Dressing: Triamcinolone, D/C xeroform, 2 layer compression  Frequency :  once weekly      -Edema management    -Good Diabetic control    -Good offloading    Patient/ CG understood and agrees with plan. Questions answered.       Follow up with me in 1 week    Subjective:     Since last visit  No new issues  Rash on arm better    HPI:     History of chronic leg swelling, with weeping  [facility] used to have unnas, then was told to get compression socks, wore in beginning but stopped  Now referred  here for same issue  Some weeping  Very itchy  Dry skin skin  Redness    Also rash on arm    Per daughter in law has not bathed in 5 years    History/Chart/Medications reviewed    Wound caused by:  swelling  Current wound care:See flowsheet  Offloading wound:

## 2023-12-05 NOTE — FLOWSHEET NOTE
12/05/23 1005   Right Leg Edema Point of Measurement   Leg circumference 37 cm   Ankle circumference 24.8 cm   Left Leg Edema Point of Measurement   Leg circumference 37 cm   Ankle circumference 24.5 cm   RLE Neurovascular Assessment   Capillary Refill Less than/Equal to 3 seconds   Color Yellow-Brown/Hemosiderin Staining   Temperature Warm   R Pedal Pulse +2   LLE Neurovascular Assessment   Capillary Refill Less than/Equal to 3 seconds   Color Yellow-Brown/Hemosiderin Staining   Temperature Warm   L Pedal Pulse +3   Wound 11/28/23 Leg Left #1   Date First Assessed/Time First Assessed: 11/28/23 0923   Present on Original Admission: Yes  Wound Approximate Age at First Assessment (Weeks): 104 weeks  Primary Wound Type: Venous Ulcer  Location: Leg  Wound Location Orientation: Left  Wound Descrip. .. Wound Image    Wound Etiology Venous   Dressing Status Old drainage noted   Wound Cleansed Soap and water   Wound Length (cm) 3 cm   Wound Width (cm) 4 cm   Wound Depth (cm) 0.1 cm   Wound Surface Area (cm^2) 12 cm^2   Change in Wound Size % (l*w) 70   Wound Volume (cm^3) 1.2 cm^3   Wound Healing % 70   Wound Assessment Epithelialization   Drainage Amount Small (< 25%)   Drainage Description Serous   Odor None   Kristan-wound Assessment Blanchable erythema   Wound 11/28/23 Leg Right #2   Date First Assessed/Time First Assessed: 11/28/23 0924   Present on Original Admission: Yes  Wound Approximate Age at First Assessment (Weeks): 104 weeks  Primary Wound Type: Venous Ulcer  Location: Leg  Wound Location Orientation: Right  Wound Descri. ..    Wound Image    Wound Etiology Venous   Dressing Status Old drainage noted   Wound Cleansed Soap and water   Wound Length (cm) 0.4 cm   Wound Width (cm) 0.3 cm   Wound Depth (cm) 0.1 cm   Wound Surface Area (cm^2) 0.12 cm^2   Change in Wound Size % (l*w) 99.79   Wound Volume (cm^3) 0.012 cm^3   Wound Healing % 100   Wound Assessment Epithelialization;Pink/red   Drainage Amount Small (<

## 2023-12-12 ENCOUNTER — HOSPITAL ENCOUNTER (OUTPATIENT)
Facility: HOSPITAL | Age: 78
Discharge: HOME | End: 2023-12-12
Attending: FAMILY MEDICINE
Payer: COMMERCIAL

## 2023-12-12 VITALS
RESPIRATION RATE: 18 BRPM | HEART RATE: 97 BPM | TEMPERATURE: 97 F | DIASTOLIC BLOOD PRESSURE: 64 MMHG | SYSTOLIC BLOOD PRESSURE: 139 MMHG

## 2023-12-12 DIAGNOSIS — L97.211 SKIN ULCER OF RIGHT CALF, LIMITED TO BREAKDOWN OF SKIN (HCC): ICD-10-CM

## 2023-12-12 DIAGNOSIS — I87.2 PERIPHERAL VENOUS INSUFFICIENCY: ICD-10-CM

## 2023-12-12 DIAGNOSIS — R60.0 EDEMA OF EXTREMITIES: Primary | ICD-10-CM

## 2023-12-12 DIAGNOSIS — L97.221 SKIN ULCER OF LEFT CALF, LIMITED TO BREAKDOWN OF SKIN (HCC): ICD-10-CM

## 2023-12-12 PROCEDURE — 99213 OFFICE O/P EST LOW 20 MIN: CPT

## 2023-12-12 RX ORDER — GINSENG 100 MG
CAPSULE ORAL ONCE
Status: CANCELLED | OUTPATIENT
Start: 2023-12-12 | End: 2023-12-12

## 2023-12-12 RX ORDER — SODIUM CHLOR/HYPOCHLOROUS ACID 0.033 %
SOLUTION, IRRIGATION IRRIGATION ONCE
Status: CANCELLED | OUTPATIENT
Start: 2023-12-12 | End: 2023-12-12

## 2023-12-12 RX ORDER — LIDOCAINE HYDROCHLORIDE 40 MG/ML
SOLUTION TOPICAL ONCE
Status: CANCELLED | OUTPATIENT
Start: 2023-12-12 | End: 2023-12-12

## 2023-12-12 RX ORDER — LIDOCAINE 40 MG/G
CREAM TOPICAL ONCE
Status: CANCELLED | OUTPATIENT
Start: 2023-12-12 | End: 2023-12-12

## 2023-12-12 RX ORDER — GENTAMICIN SULFATE 1 MG/G
OINTMENT TOPICAL ONCE
Status: CANCELLED | OUTPATIENT
Start: 2023-12-12 | End: 2023-12-12

## 2023-12-12 RX ORDER — CLOBETASOL PROPIONATE 0.5 MG/G
OINTMENT TOPICAL ONCE
Status: CANCELLED | OUTPATIENT
Start: 2023-12-12 | End: 2023-12-12

## 2023-12-12 RX ORDER — LIDOCAINE HYDROCHLORIDE 20 MG/ML
JELLY TOPICAL ONCE
Status: CANCELLED | OUTPATIENT
Start: 2023-12-12 | End: 2023-12-12

## 2023-12-12 RX ORDER — LIDOCAINE 50 MG/G
OINTMENT TOPICAL ONCE
Status: CANCELLED | OUTPATIENT
Start: 2023-12-12 | End: 2023-12-12

## 2023-12-12 RX ORDER — BACITRACIN ZINC AND POLYMYXIN B SULFATE 500; 1000 [USP'U]/G; [USP'U]/G
OINTMENT TOPICAL ONCE
Status: CANCELLED | OUTPATIENT
Start: 2023-12-12 | End: 2023-12-12

## 2023-12-12 RX ORDER — IBUPROFEN 200 MG
TABLET ORAL ONCE
Status: CANCELLED | OUTPATIENT
Start: 2023-12-12 | End: 2023-12-12

## 2023-12-12 RX ORDER — BETAMETHASONE DIPROPIONATE 0.5 MG/G
CREAM TOPICAL ONCE
Status: CANCELLED | OUTPATIENT
Start: 2023-12-12 | End: 2023-12-12

## 2023-12-12 RX ORDER — TRIAMCINOLONE ACETONIDE 1 MG/G
OINTMENT TOPICAL ONCE
Status: CANCELLED | OUTPATIENT
Start: 2023-12-12 | End: 2023-12-12

## 2023-12-12 ASSESSMENT — PAIN DESCRIPTION - FREQUENCY: FREQUENCY: CONTINUOUS

## 2023-12-12 ASSESSMENT — PAIN DESCRIPTION - ONSET: ONSET: ON-GOING

## 2023-12-12 ASSESSMENT — PAIN DESCRIPTION - ORIENTATION: ORIENTATION: RIGHT;LEFT

## 2023-12-12 ASSESSMENT — PAIN DESCRIPTION - PAIN TYPE: TYPE: CHRONIC PAIN

## 2023-12-12 ASSESSMENT — PAIN DESCRIPTION - DESCRIPTORS: DESCRIPTORS: ITCHING

## 2023-12-12 ASSESSMENT — PAIN - FUNCTIONAL ASSESSMENT: PAIN_FUNCTIONAL_ASSESSMENT: PREVENTS OR INTERFERES SOME ACTIVE ACTIVITIES AND ADLS

## 2023-12-12 ASSESSMENT — PAIN DESCRIPTION - LOCATION: LOCATION: LEG

## 2023-12-12 ASSESSMENT — PAIN SCALES - GENERAL: PAINLEVEL_OUTOF10: 7

## 2023-12-12 NOTE — PROGRESS NOTES
Venous 12/12/23 0946   Dressing Status Old drainage noted 12/12/23 0946   Wound Cleansed Soap and water 12/12/23 0946   Dressing/Treatment Xeroform;ABD 11/28/23 1004   Wound Length (cm) 0.5 cm 12/12/23 0946   Wound Width (cm) 0.5 cm 12/12/23 0946   Wound Depth (cm) 0.1 cm 12/12/23 0946   Wound Surface Area (cm^2) 0.25 cm^2 12/12/23 0946   Change in Wound Size % (l*w) 99.56 12/12/23 0946   Wound Volume (cm^3) 0.025 cm^3 12/12/23 0946   Wound Healing % 100 12/12/23 0946   Wound Assessment Epithelialization 12/12/23 0946   Drainage Amount Small (< 25%) 12/12/23 0946   Drainage Description Serous 12/12/23 0946   Odor None 12/12/23 0946   Kristan-wound Assessment Blanchable erythema 12/12/23 0946   Margins Undefined edges 12/12/23 0946   Wound Thickness Description not for Pressure Injury Partial thickness 12/12/23 0946   Number of days: 14          -------    Past Medical History:   Diagnosis Date    Arthritis     Asthma     CAD (coronary artery disease)     Calculus of kidney     Carotid artery disease (HCC)     Cervical herniated disc     Chronic systolic (congestive) heart failure ([address])     Diabetes ([address])     Diabetic shock due to low blood sugar ([address]) 2016    Glaucoma     pt is on drops unsure of what the names are    Hypercholesterolemia     Hypertension     Morbid obesity ([address])     Rheumatic fever     When he was 17    Sleep apnea     compliant with cpap as stated 9/6/2019    Venous stasis ulcer of right lower leg with edema of right lower leg ([address]) 7/5/2022     Past Surgical History:   Procedure Laterality Date    CARDIAC CATHETERIZATION      x 5 total, 4 in heart, 1 in carotid    CAROTID STENT PLACEMENT  1990's    with cath    CATARACT REMOVAL Left     CERVICAL FUSION  05/21/2019    C3- C6 ACFD     CHOLECYSTECTOMY      CIRCUMCISION      HEENT Left     ear torn off and repaired    OTHER SURGICAL HISTORY Right     Right hand reconstruction    SHOULDER ARTHROSCOPY Right     dislocated with fall, came out, he stated

## 2023-12-12 NOTE — DISCHARGE INSTRUCTIONS
Discharge Instructions/Wound Care Orders  [address] [place] [address], [facility] [address]  Telephone: [phone]     Wound Care Orders:  Bilateral lower leg :Cleanse with soap and water, apply Triamcinolone ointment, . Apply Farrow wrap (single tubigrip F liner for now). Pt./pcg/HH nurse to change (freq) Daily and as needed for compromise. No further follow-up needed. Treatment Orders:   Elevate leg(s) above the level of the heart when sitting, if swelling present. Avoid prolonged standing in one place. Wear off-loading shoe/boot on the affected foot when walking. Do not get dressing/cast wet. Do not use objects to scratch inside cast/wrap. Follow diet as prescribed:  [x] Diet as tolerated: [] Diabetic Diet: Low carb no sugar [x] No Added Salt:  [] Increase Protein: [] Other:Limit the amount of liquid you are drinking and avoid drinking in between meals             Follow-up:  [x] Return Appointment: No further follow-up needed. [] Ordered tests:    Electronically signed Ilana Walls on 12/12/2023 at 10:04 AM     [address] [place] [address] Information: Should you experience any significant changes in your wound(s) or have questions about your wound care, please contact the [address] [place] [address] at [address] [place] [address] 8:00 am - 4:30. If you need help with your wound outside these hours and cannot wait until we are again available, contact your PCP or go to the hospital emergency room. PLEASE NOTE: IF YOU ARE UNABLE TO OBTAIN WOUND SUPPLIES, CONTINUE TO USE THE SUPPLIES YOU HAVE AVAILABLE UNTIL YOU ARE ABLE TO REACH US. IT IS MOST IMPORTANT TO KEEP THE WOUND COVERED AT ALL TIMES.      Physician Signature:_______________________    Date: ___________ Time:  ____________

## 2023-12-12 NOTE — FLOWSHEET NOTE
12/12/23 0946   Right Leg Edema Point of Measurement   Leg circumference 41 cm   Ankle circumference 24 cm   Compression Therapy 2 layer compression wrap   Left Leg Edema Point of Measurement   Leg circumference 40 cm   Ankle circumference 24 cm   Compression Therapy 2 layer compression wrap   RLE Neurovascular Assessment   Capillary Refill Less than/Equal to 3 seconds   Color Red   Temperature Warm   R Pedal Pulse +2   LLE Neurovascular Assessment   Capillary Refill Less than/Equal to 3 seconds   Color Red   Temperature Warm   L Pedal Pulse +2   Wound 11/28/23 Leg Left #1   Date First Assessed/Time First Assessed: 11/28/23 2034   Present on Original Admission: Yes  Wound Approximate Age at First Assessment (Weeks): 104 weeks  Primary Wound Type: Venous Ulcer  Location: Leg  Wound Location Orientation: Left  Wound Descrip. .. Wound Image    Wound Etiology Venous   Dressing Status Old drainage noted   Wound Cleansed Soap and water   Dressing/Treatment   (Xeroform, gauze, 2 layer with calamine)   Wound Length (cm) 1 cm   Wound Width (cm) 1 cm   Wound Depth (cm) 0.1 cm   Wound Surface Area (cm^2) 1 cm^2   Change in Wound Size % (l*w) 97.5   Wound Volume (cm^3) 0.1 cm^3   Wound Healing % 98   Wound Assessment Epithelialization   Drainage Amount Small (< 25%)   Drainage Description Serous   Odor None   Kristan-wound Assessment Blanchable erythema   Margins Undefined edges   Wound Thickness Description not for Pressure Injury Partial thickness   Wound 11/28/23 Leg Right #2   Date First Assessed/Time First Assessed: 11/28/23 0924   Present on Original Admission: Yes  Wound Approximate Age at First Assessment (Weeks): 104 weeks  Primary Wound Type: Venous Ulcer  Location: Leg  Wound Location Orientation: Right  Wound Descri. ..    Wound Image    Wound Etiology Venous   Dressing Status Old drainage noted   Wound Cleansed Soap and water   Dressing/Treatment   (Xeroform, gauze, 2 layer with calamine)   Wound Length (cm) 0.5 cm

## 2024-01-15 RX ORDER — DULOXETIN HYDROCHLORIDE 60 MG/1
CAPSULE, DELAYED RELEASE ORAL
Qty: 90 CAPSULE | Refills: 0 | OUTPATIENT
Start: 2024-01-15

## 2024-01-15 RX ORDER — GLIMEPIRIDE 1 MG/1
TABLET ORAL
Qty: 90 TABLET | Refills: 0 | OUTPATIENT
Start: 2024-01-15

## 2024-01-15 NOTE — TELEPHONE ENCOUNTER
Patient,s Wife, Kathryn, states she is calling to check on refill request received today from Pharmacy. Kathryn reminded of [address] on refills. Please call if any questions. Thank you

## 2024-01-18 RX ORDER — DULOXETIN HYDROCHLORIDE 60 MG/1
CAPSULE, DELAYED RELEASE ORAL
Qty: 90 CAPSULE | Refills: 0 | OUTPATIENT
Start: 2024-01-18

## 2024-01-18 RX ORDER — DULOXETIN HYDROCHLORIDE 60 MG/1
CAPSULE, DELAYED RELEASE ORAL
Qty: 30 CAPSULE | Refills: 0 | Status: SHIPPED | OUTPATIENT
Start: 2024-01-18

## 2024-01-18 NOTE — TELEPHONE ENCOUNTER
DULoxetine (CYMBALTA) 60 MG extended release capsule    Refill to [facility] #[phone]      Not able to schedule appt as daughter will have to call to schedule as they work with her schedule.     Wife is asking that this med go to  [facility] today.

## 2024-02-16 ENCOUNTER — TELEPHONE (OUTPATIENT)
Age: 79
End: 2024-02-16

## 2024-02-16 RX ORDER — DULOXETIN HYDROCHLORIDE 60 MG/1
CAPSULE, DELAYED RELEASE ORAL
Qty: 30 CAPSULE | Refills: 0 | OUTPATIENT
Start: 2024-02-16

## 2024-02-16 NOTE — TELEPHONE ENCOUNTER
A message was left for a refill for patients duloxetine.  It looks like it is prescribed by his pcp.  Please advise.

## 2024-02-22 NOTE — TELEPHONE ENCOUNTER
2nd attempt:   Left generic message. Attempted to schedule next available annual wellness visit.    Mailing letter.

## 2024-02-23 RX ORDER — DULOXETIN HYDROCHLORIDE 30 MG/1
30 CAPSULE, DELAYED RELEASE ORAL DAILY
Qty: 90 CAPSULE | Refills: 1 | Status: SHIPPED | OUTPATIENT
Start: 2024-02-23

## 2024-02-23 NOTE — TELEPHONE ENCOUNTER
Please let mr Sosa know that due to his CKD 3b with GFR 30 will recommend to lower his duloxetine to 30 mg daily, as with 60mg daily is not recommended with lower GFR levels, last kidney function on 07/2023 (done 7 months ago), if he is ok with that I will send the 30mg daily, I understand he could not tolerate gabapentin or pregablin (lyrica) in the past

## 2024-02-23 NOTE — TELEPHONE ENCOUNTER
A female (no name give) left a voice message stating that his pcp stated that being that we are his Diabetic doctor he should obtain his duloxetine from our office due to it is used to treat his foot pain.   I called the patient x 2 again after receiving this message and again no answer.

## 2024-02-23 NOTE — TELEPHONE ENCOUNTER
Mr. Sosa and his spouse was notified of the message noted and voiced okay to the message noted per Dr. Ambrose.  He would like the 30 mg dose sent to his pharmacy.     Patient is out of medication.

## 2024-06-24 ENCOUNTER — TELEPHONE (OUTPATIENT)
Age: 79
End: 2024-06-24

## 2024-06-24 NOTE — TELEPHONE ENCOUNTER
Pt's daughter in law (Sugey), LVM stating the pt diabetes is under control however, his legs are still bright red. Sugey would like for Dr. Ambrose to call the pt's wife (Kathryn) at [phone] to discuss what he should do.

## 2024-06-27 NOTE — TELEPHONE ENCOUNTER
Please have them discuss with cardiologist as this likely relates to circulation being affected, it is likely he may need to see vascular specialist too, but have them first ask Cardiologist for guidance on that

## 2024-07-10 RX ORDER — GLIMEPIRIDE 1 MG/1
TABLET ORAL
Qty: 45 TABLET | Refills: 1 | Status: SHIPPED | OUTPATIENT
Start: 2024-07-10

## 2024-07-10 RX ORDER — DULOXETIN HYDROCHLORIDE 30 MG/1
CAPSULE, DELAYED RELEASE ORAL
Qty: 90 CAPSULE | Refills: 1 | Status: SHIPPED | OUTPATIENT
Start: 2024-07-10

## 2024-09-03 NOTE — PROGRESS NOTES
Patient presented today for a diabetes foot exam.     Diabetic foot exam:     Left Foot:   Visual Exam: callous - cared for, stable and clean, thick nails   Pulse DP: 2+ (normal)   Filament test: reduced sensation    Vibratory sensation: Vibratory sensation: diminished       Right Foot:   Visual Exam: callous - on great toe, cleaned, thick nails noted   Pulse DP: 2+ (normal)   Filament test: reduced sensation    Vibratory sensation: Vibratory sensation: diminished     Patient is diabetic with advanced peripheral neuropathy with evidence of callus formation for which he receives routine maintenance. * I am treating the patient under a comprehensive plan of care for diabetes and the patient would benefit from diabetic footwear to protect their feet, and this includes shoes and insoles. Patient is also seen by a podiatrist, see separate podiatry notes / examination. 12/20/19    [facility] Tasha.  [address] [facility] End[facility]  [address] [place] [address] Never

## 2024-11-19 ENCOUNTER — OFFICE VISIT (OUTPATIENT)
Age: 79
End: 2024-11-19
Payer: COMMERCIAL

## 2024-11-19 VITALS
SYSTOLIC BLOOD PRESSURE: 138 MMHG | HEART RATE: 89 BPM | HEIGHT: 63 IN | DIASTOLIC BLOOD PRESSURE: 72 MMHG | WEIGHT: 197.5 LBS | BODY MASS INDEX: 34.99 KG/M2

## 2024-11-19 DIAGNOSIS — G89.29 CHRONIC PAIN OF BOTH KNEES: ICD-10-CM

## 2024-11-19 DIAGNOSIS — E11.42 DIABETIC POLYNEUROPATHY ASSOCIATED WITH TYPE 2 DIABETES MELLITUS (HCC): ICD-10-CM

## 2024-11-19 DIAGNOSIS — E66.9 OBESITY (BMI 30-39.9): ICD-10-CM

## 2024-11-19 DIAGNOSIS — E78.2 MIXED HYPERLIPIDEMIA: ICD-10-CM

## 2024-11-19 DIAGNOSIS — N18.32 TYPE 2 DIABETES MELLITUS WITH STAGE 3B CHRONIC KIDNEY DISEASE, WITHOUT LONG-TERM CURRENT USE OF INSULIN (HCC): Primary | ICD-10-CM

## 2024-11-19 DIAGNOSIS — G47.33 OSA (OBSTRUCTIVE SLEEP APNEA): ICD-10-CM

## 2024-11-19 DIAGNOSIS — M25.562 CHRONIC PAIN OF BOTH KNEES: ICD-10-CM

## 2024-11-19 DIAGNOSIS — Z95.5 HISTORY OF CORONARY ANGIOPLASTY WITH INSERTION OF STENT: ICD-10-CM

## 2024-11-19 DIAGNOSIS — I87.8 CHRONIC VENOUS STASIS: ICD-10-CM

## 2024-11-19 DIAGNOSIS — E11.22 TYPE 2 DIABETES MELLITUS WITH STAGE 3B CHRONIC KIDNEY DISEASE, WITHOUT LONG-TERM CURRENT USE OF INSULIN (HCC): Primary | ICD-10-CM

## 2024-11-19 DIAGNOSIS — M25.561 CHRONIC PAIN OF BOTH KNEES: ICD-10-CM

## 2024-11-19 LAB — HBA1C MFR BLD: 6.7 %

## 2024-11-19 PROCEDURE — 83036 HEMOGLOBIN GLYCOSYLATED A1C: CPT | Performed by: GENERAL ACUTE CARE HOSPITAL

## 2024-11-19 PROCEDURE — 1123F ACP DISCUSS/DSCN MKR DOCD: CPT | Performed by: GENERAL ACUTE CARE HOSPITAL

## 2024-11-19 PROCEDURE — 99215 OFFICE O/P EST HI 40 MIN: CPT | Performed by: GENERAL ACUTE CARE HOSPITAL

## 2024-11-19 RX ORDER — GLIMEPIRIDE 1 MG/1
TABLET ORAL
Qty: 45 TABLET | Refills: 1 | Status: SHIPPED | OUTPATIENT
Start: 2024-11-19

## 2024-11-19 NOTE — PATIENT INSTRUCTIONS
Continue on Glimepiride   Obtain fasting labs  Please follow with the Orthopedic and Wound Care clinic   Please follow with Cardiology

## 2024-11-19 NOTE — PROGRESS NOTES
Value Date    MALBCR 321 (H) 11/10/2022      Blood pressure is well controlled today, cont current meds    Obesity Body mass index is 34.99 kg/m².   Discussed important lifestyle modifications, will benefit from weight loss  Discussed lifestyle changes and limiting carb portions, he is having limited physical activity due to LE ulcers, will continue to monitor    Arthritis  Knee Pain  - Reports having arthritis since age 17  - Reports knee pain, possibly related to arthritis  - Referral to orthopedic doctor, Dr. Shrestha, for further evaluation      -------------------------------------------------------------------------------------------------    Last A1c:    Ref Range & Units 8/15/23 1205 7/22/22 1256 12/21/21 1058 9/12/19 1632   Hemoglobin A1C 4.8 - 5.6 % 6.5 High   6.2 High  CM  6.3 High  R, CM  5.9 High  CM        11/19/24     History of Present Illness  The patient presents for evaluation of multiple medical concerns. He is accompanied by an adult female.    Not seen since 08/2023 as patient missed his appointments. He reports persistent pain in his knees and feet, which has limited his mobility. His physical activity is restricted to walking to the bathroom and kitchen. He has been using a TV tray for support and has discontinued his anti-inflammatory medication due to foot pain. He experiences knee pain when using air compression devices and has been taking Tylenol for pain relief. He has had arthritis since he was 17 but has not been seeing a rheumatologist or orthopedic doctor. He has been experiencing hand pain and has been doing hand exercises.    He has not been hospitalized since his last visit 1.5 years ago, and there have been no significant changes in his medication regimen. He has not seen a cardiologist recently but has undergone an EKG and stress test. He reports no leg swelling but mentions that one leg used to weep slightly. He does not wear diabetic socks. He has dry, flaky skin on his legs and

## 2024-11-20 PROBLEM — Z95.5 HISTORY OF CORONARY ANGIOPLASTY WITH INSERTION OF STENT: Status: ACTIVE | Noted: 2024-11-20

## 2024-11-20 PROBLEM — M25.562 CHRONIC PAIN OF BOTH KNEES: Status: ACTIVE | Noted: 2024-11-20

## 2024-11-20 PROBLEM — M25.561 CHRONIC PAIN OF BOTH KNEES: Status: ACTIVE | Noted: 2024-11-20

## 2024-11-20 PROBLEM — E78.2 MIXED HYPERLIPIDEMIA: Status: ACTIVE | Noted: 2024-11-20

## 2024-11-20 PROBLEM — G89.29 CHRONIC PAIN OF BOTH KNEES: Status: ACTIVE | Noted: 2024-11-20

## 2024-11-20 PROBLEM — E11.42 DIABETIC POLYNEUROPATHY ASSOCIATED WITH TYPE 2 DIABETES MELLITUS (HCC): Status: ACTIVE | Noted: 2024-11-20

## 2024-11-20 PROBLEM — I87.8 CHRONIC VENOUS STASIS: Status: ACTIVE | Noted: 2024-11-20

## 2024-12-03 ENCOUNTER — HOSPITAL ENCOUNTER (OUTPATIENT)
Facility: HOSPITAL | Age: 79
Discharge: HOME | End: 2024-12-03
Attending: SURGERY
Payer: COMMERCIAL

## 2024-12-03 VITALS
RESPIRATION RATE: 24 BRPM | TEMPERATURE: 97.3 F | HEART RATE: 79 BPM | SYSTOLIC BLOOD PRESSURE: 143 MMHG | DIASTOLIC BLOOD PRESSURE: 78 MMHG

## 2024-12-03 DIAGNOSIS — I83.019 VENOUS STASIS ULCER OF RIGHT LOWER LEG WITH EDEMA OF RIGHT LOWER LEG (HCC): Primary | ICD-10-CM

## 2024-12-03 DIAGNOSIS — L97.919 VENOUS STASIS ULCER OF RIGHT LOWER LEG WITH EDEMA OF RIGHT LOWER LEG (HCC): Primary | ICD-10-CM

## 2024-12-03 DIAGNOSIS — I83.891 VENOUS STASIS ULCER OF RIGHT LOWER LEG WITH EDEMA OF RIGHT LOWER LEG (HCC): Primary | ICD-10-CM

## 2024-12-03 DIAGNOSIS — R60.0 VENOUS STASIS ULCER OF RIGHT LOWER LEG WITH EDEMA OF RIGHT LOWER LEG (HCC): Primary | ICD-10-CM

## 2024-12-03 PROCEDURE — 99203 OFFICE O/P NEW LOW 30 MIN: CPT | Performed by: SURGERY

## 2024-12-03 PROCEDURE — 29581 APPL MULTLAYER CMPRN SYS LEG: CPT

## 2024-12-03 PROCEDURE — 99213 OFFICE O/P EST LOW 20 MIN: CPT

## 2024-12-03 RX ORDER — CLOBETASOL PROPIONATE 0.5 MG/G
OINTMENT TOPICAL ONCE
OUTPATIENT
Start: 2024-12-03 | End: 2024-12-03

## 2024-12-03 RX ORDER — LIDOCAINE 50 MG/G
OINTMENT TOPICAL ONCE
OUTPATIENT
Start: 2024-12-03 | End: 2024-12-03

## 2024-12-03 RX ORDER — BACITRACIN ZINC AND POLYMYXIN B SULFATE 500; 1000 [USP'U]/G; [USP'U]/G
OINTMENT TOPICAL ONCE
OUTPATIENT
Start: 2024-12-03 | End: 2024-12-03

## 2024-12-03 RX ORDER — LIDOCAINE HYDROCHLORIDE 20 MG/ML
JELLY TOPICAL ONCE
Status: CANCELLED | OUTPATIENT
Start: 2024-12-03 | End: 2024-12-03

## 2024-12-03 RX ORDER — MUPIROCIN 20 MG/G
OINTMENT TOPICAL ONCE
OUTPATIENT
Start: 2024-12-03 | End: 2024-12-03

## 2024-12-03 RX ORDER — CLOBETASOL PROPIONATE 0.5 MG/G
OINTMENT TOPICAL ONCE
Status: CANCELLED | OUTPATIENT
Start: 2024-12-03 | End: 2024-12-03

## 2024-12-03 RX ORDER — SILVER SULFADIAZINE 10 MG/G
CREAM TOPICAL ONCE
Status: CANCELLED | OUTPATIENT
Start: 2024-12-03 | End: 2024-12-03

## 2024-12-03 RX ORDER — LIDOCAINE 50 MG/G
OINTMENT TOPICAL ONCE
Status: CANCELLED | OUTPATIENT
Start: 2024-12-03 | End: 2024-12-03

## 2024-12-03 RX ORDER — LIDOCAINE HYDROCHLORIDE 40 MG/ML
SOLUTION TOPICAL ONCE
Status: CANCELLED | OUTPATIENT
Start: 2024-12-03 | End: 2024-12-03

## 2024-12-03 RX ORDER — LIDOCAINE 40 MG/G
CREAM TOPICAL ONCE
Status: CANCELLED | OUTPATIENT
Start: 2024-12-03 | End: 2024-12-03

## 2024-12-03 RX ORDER — LIDOCAINE 40 MG/G
CREAM TOPICAL ONCE
OUTPATIENT
Start: 2024-12-03 | End: 2024-12-03

## 2024-12-03 RX ORDER — TRIAMCINOLONE ACETONIDE 1 MG/G
OINTMENT TOPICAL ONCE
Status: CANCELLED | OUTPATIENT
Start: 2024-12-03 | End: 2024-12-03

## 2024-12-03 RX ORDER — NEOMYCIN/BACITRACIN/POLYMYXINB 3.5-400-5K
OINTMENT (GRAM) TOPICAL ONCE
OUTPATIENT
Start: 2024-12-03 | End: 2024-12-03

## 2024-12-03 RX ORDER — LIDOCAINE HYDROCHLORIDE 20 MG/ML
JELLY TOPICAL ONCE
OUTPATIENT
Start: 2024-12-03 | End: 2024-12-03

## 2024-12-03 RX ORDER — NEOMYCIN/BACITRACIN/POLYMYXINB 3.5-400-5K
OINTMENT (GRAM) TOPICAL ONCE
Status: CANCELLED | OUTPATIENT
Start: 2024-12-03 | End: 2024-12-03

## 2024-12-03 RX ORDER — GENTAMICIN SULFATE 1 MG/G
OINTMENT TOPICAL ONCE
Status: CANCELLED | OUTPATIENT
Start: 2024-12-03 | End: 2024-12-03

## 2024-12-03 RX ORDER — BACITRACIN ZINC AND POLYMYXIN B SULFATE 500; 1000 [USP'U]/G; [USP'U]/G
OINTMENT TOPICAL ONCE
Status: CANCELLED | OUTPATIENT
Start: 2024-12-03 | End: 2024-12-03

## 2024-12-03 RX ORDER — BETAMETHASONE DIPROPIONATE 0.5 MG/G
CREAM TOPICAL ONCE
Status: CANCELLED | OUTPATIENT
Start: 2024-12-03 | End: 2024-12-03

## 2024-12-03 RX ORDER — SODIUM CHLOR/HYPOCHLOROUS ACID 0.033 %
SOLUTION, IRRIGATION IRRIGATION ONCE
OUTPATIENT
Start: 2024-12-03 | End: 2024-12-03

## 2024-12-03 RX ORDER — SODIUM CHLOR/HYPOCHLOROUS ACID 0.033 %
SOLUTION, IRRIGATION IRRIGATION ONCE
Status: CANCELLED | OUTPATIENT
Start: 2024-12-03 | End: 2024-12-03

## 2024-12-03 RX ORDER — MUPIROCIN 20 MG/G
OINTMENT TOPICAL ONCE
Status: CANCELLED | OUTPATIENT
Start: 2024-12-03 | End: 2024-12-03

## 2024-12-03 RX ORDER — GENTAMICIN SULFATE 1 MG/G
OINTMENT TOPICAL ONCE
OUTPATIENT
Start: 2024-12-03 | End: 2024-12-03

## 2024-12-03 RX ORDER — TRIAMCINOLONE ACETONIDE 1 MG/G
OINTMENT TOPICAL ONCE
OUTPATIENT
Start: 2024-12-03 | End: 2024-12-03

## 2024-12-03 RX ORDER — GINSENG 100 MG
CAPSULE ORAL ONCE
Status: CANCELLED | OUTPATIENT
Start: 2024-12-03 | End: 2024-12-03

## 2024-12-03 RX ORDER — SILVER SULFADIAZINE 10 MG/G
CREAM TOPICAL ONCE
OUTPATIENT
Start: 2024-12-03 | End: 2024-12-03

## 2024-12-03 RX ORDER — GINSENG 100 MG
CAPSULE ORAL ONCE
OUTPATIENT
Start: 2024-12-03 | End: 2024-12-03

## 2024-12-03 RX ORDER — BETAMETHASONE DIPROPIONATE 0.5 MG/G
CREAM TOPICAL ONCE
OUTPATIENT
Start: 2024-12-03 | End: 2024-12-03

## 2024-12-03 RX ORDER — LIDOCAINE HYDROCHLORIDE 40 MG/ML
SOLUTION TOPICAL ONCE
OUTPATIENT
Start: 2024-12-03 | End: 2024-12-03

## 2024-12-03 ASSESSMENT — PAIN DESCRIPTION - PAIN TYPE: TYPE: CHRONIC PAIN

## 2024-12-03 ASSESSMENT — PAIN DESCRIPTION - ONSET: ONSET: AWAKENED FROM SLEEP

## 2024-12-03 ASSESSMENT — PAIN DESCRIPTION - DESCRIPTORS: DESCRIPTORS: ACHING

## 2024-12-03 ASSESSMENT — PAIN DESCRIPTION - LOCATION: LOCATION: GENERALIZED

## 2024-12-03 ASSESSMENT — PAIN DESCRIPTION - FREQUENCY: FREQUENCY: CONTINUOUS

## 2024-12-03 ASSESSMENT — PAIN - FUNCTIONAL ASSESSMENT: PAIN_FUNCTIONAL_ASSESSMENT: PREVENTS OR INTERFERES SOME ACTIVE ACTIVITIES AND ADLS

## 2024-12-03 ASSESSMENT — PAIN SCALES - GENERAL: PAINLEVEL_OUTOF10: 10

## 2024-12-03 NOTE — CONSULTS
WOUND CARE initial consult      Subjective:     Patient is a 78-year-old male with coronary artery disease, history of venous stasis insufficiency on the right and possibly left legs.  Patient with longstanding venous stasis disease and venous stasis insufficiency but also noncompliant, who has been seen here before or by wound care in the past but now has new bilateral calf skin breakdown.  Patient lives with his son and daughter-in-law, apparently there is some social issues patient reports his son does not want any more chairs in the house so he sleeps and basically a folding chair, with no offloading.  He says he does not sleep in a bed because his family is concerned he will roll off the bed.  No significant drainage from the leg wounds.          Review of Systems   Constitutional:         Otherwise see HPI   All other systems reviewed and are negative.      Objective:     There were no vitals taken for this visit.    No data recorded.      Physical Exam  Vitals and nursing note reviewed. Exam conducted with a chaperone present.   Constitutional:       General: He is not in acute distress.     Appearance: Normal appearance. He is not ill-appearing.      Comments: Very sedentary appearing, patient has very limited mobility   HENT:      Head: Normocephalic and atraumatic.      Nose: Nose normal.   Eyes:      Conjunctiva/sclera: Conjunctivae normal.   Cardiovascular:      Rate and Rhythm: Normal rate.   Pulmonary:      Effort: Pulmonary effort is normal.   Abdominal:      General: Abdomen is flat.   Neurological:      General: No focal deficit present.      Mental Status: He is alert and oriented to person, place, and time.   Psychiatric:         Mood and Affect: Mood normal.         Thought Content: Thought content normal.         Judgment: Judgment normal.      Comments: Patient does not seem very engaged in taking care of himself     Bilateral dorsalis pedis pulses palpable but bilateral 2-3+ calf and foot

## 2024-12-03 NOTE — FLOWSHEET NOTE
12/03/24 0929   Wound 12/03/24 Leg Left;Lower #1   Date First Assessed/Time First Assessed: 12/03/24 0928   Present on Original Admission: Yes  Wound Approximate Age at First Assessment (Weeks): 3 weeks  Primary Wound Type: Venous Ulcer  Location: Leg  Wound Location Orientation: Left;Lower  Wound Bharathi...   Wound Image    Wound Etiology Venous   Dressing Status   (Open to air)   Wound Cleansed Soap and water   Wound Length (cm) 6 cm   Wound Width (cm) 3.3 cm   Wound Depth (cm) 0.1 cm   Wound Surface Area (cm^2) 19.8 cm^2   Wound Volume (cm^3) 1.98 cm^3   Wound Assessment Pink/red   Drainage Amount Moderate (25-50%)   Drainage Description Serous   Odor None   Kristan-wound Assessment Fragile   Margins Undefined edges   Wound Thickness Description not for Pressure Injury Full thickness   Wound 12/03/24 Leg Right;Lower #2   Date First Assessed/Time First Assessed: 12/03/24 0928   Present on Original Admission: Yes  Wound Approximate Age at First Assessment (Weeks): 2 weeks  Primary Wound Type: Venous Ulcer  Location: Leg  Wound Location Orientation: Right;Lower  Wound De...   Wound Image    Wound Etiology Venous   Dressing Status   (Open to air)   Wound Cleansed Soap and water   Wound Length (cm) 1.6 cm   Wound Width (cm) 5 cm   Wound Depth (cm) 0.1 cm   Wound Surface Area (cm^2) 8 cm^2   Wound Volume (cm^3) 0.8 cm^3   Wound Assessment Crozet/red;Slough   Drainage Amount Moderate (25-50%)   Drainage Description Serous   Odor None   Kristan-wound Assessment Fragile   Margins Undefined edges   Wound Thickness Description not for Pressure Injury Full thickness     BP (!) 143/78   Pulse 79   Temp 97.3 °F (36.3 °C) (Temporal)   Resp 24

## 2024-12-03 NOTE — FLOWSHEET NOTE
12/03/24 1000   Wound 12/03/24 Leg Left;Lower #1   Date First Assessed/Time First Assessed: 12/03/24 0928   Present on Original Admission: Yes  Wound Approximate Age at First Assessment (Weeks): 3 weeks  Primary Wound Type: Venous Ulcer  Location: Leg  Wound Location Orientation: Left;Lower  Wound Bharathi...   Dressing Status New dressing applied   Dressing/Treatment   (Xeroform  covered with secondary dressing ABD.  Apply 2 layer compression wrap with calamine)   Wound 12/03/24 Leg Right;Lower #2   Date First Assessed/Time First Assessed: 12/03/24 0928   Present on Original Admission: Yes  Wound Approximate Age at First Assessment (Weeks): 2 weeks  Primary Wound Type: Venous Ulcer  Location: Leg  Wound Location Orientation: Right;Lower  Wound De...   Dressing Status New dressing applied   Dressing/Treatment   (Xeroform  covered with secondary dressing ABD.  Apply 2 layer compression wrap with calamine)     Multilayer Compression Wrap  (Not Unna) Below the Knee    NAME:  Paul Sosa Sr  YOB: 1945  MEDICAL RECORD NUMBER:  [id number]  DATE:  12/3/2024    Removed old Multilayer wrap if indicated and wash leg with mild soap/water.  Applied moisturizing agent to dry skin as needed.   Applied primary and secondary dressing as ordered.  Applied multilayered dressing below the knee to Left and Right lower leg.  Instructed patient/caregiver not to remove dressing and to keep it clean and dry.   Instructed patient/caregiver on complications to report to provider, such as pain, numbness in toes, heavy drainage, and slippage of dressing.  Instructed patient on purpose of compression dressing and on activity and exercise recommendations.    Patient tolerated procedure well with no impairment to circulation noted.    Electronically signed by Marsha Betancur RN on 12/3/2024 at 10:00 AM

## 2024-12-18 ENCOUNTER — TELEPHONE (OUTPATIENT)
Age: 79
End: 2024-12-18

## 2024-12-18 NOTE — TELEPHONE ENCOUNTER
Pt needs a fold up walker.  Pt having a hard time getting around walking.  A script will need to be put in for this.     She states that doctor would know what durable med equipment company to send this to.  Then they will bill insurance.     Please call to discuss.      Trouble with phone.  She will call back and hope to get you.     Also suggested a lift chair to get pt out of chair.      Also needs to discuss a higher toilet.

## 2025-01-14 DIAGNOSIS — E11.22 TYPE 2 DIABETES MELLITUS WITH STAGE 3B CHRONIC KIDNEY DISEASE, WITHOUT LONG-TERM CURRENT USE OF INSULIN (HCC): Primary | ICD-10-CM

## 2025-01-14 DIAGNOSIS — N18.32 TYPE 2 DIABETES MELLITUS WITH STAGE 3B CHRONIC KIDNEY DISEASE, WITHOUT LONG-TERM CURRENT USE OF INSULIN (HCC): Primary | ICD-10-CM

## 2025-01-15 ENCOUNTER — TELEPHONE (OUTPATIENT)
Age: 80
End: 2025-01-15

## 2025-01-15 RX ORDER — GLIMEPIRIDE 1 MG/1
TABLET ORAL
Qty: 45 TABLET | Refills: 3 | Status: SHIPPED | OUTPATIENT
Start: 2025-01-15

## 2025-01-15 NOTE — TELEPHONE ENCOUNTER
Spoke to the pt's wife. She stated about a month or 2 ago she saw on the news that Duloxetine was on recall and the pt was prescribed 30 mg of the medication. She stated she had stopped giving him the medication for a couple weeks and resumed giving him the medication today. She called [facility] and the pharmacist informed her that the lot number of the Duloxetine that was dispensed to the pt is not on recall and is okay for the pt to take however, the pharmacist informed Mrs. Sosa to contact our office to double check if it is still okay to give the pt the medication.    all other ROS negative except as per HPI

## 2025-01-24 NOTE — TELEPHONE ENCOUNTER
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       Tristan Renee
I attempted to return this call and reached a voice mail. I left a message relaying the information from Dr. Susan Gomez and said if they could get it here by 5 today then it would be ready tomorrow. I said if they had any further questions, to give me a shanelle back.   Ainsley Fajardo
I have stopped by the office to review all documents. I did not see anything for patient or family member. Would not be able to offer documentation until I return next week. Patient's son would need to contact either his or his son's PCP for such documentation if needed before sometime mid-next week. Generally we ask for at least 1 weeks notice for any documentation, however in this case I am away for the rest of the week,     Thanks ! Hoang Shah.  [address] [facility] End[facility]  [address] [place] [address]
I will be coming in to clinic this afternoon. They can drop it off at the clinic and I will review and complete it today, and will leave it on Heidi's desk. Thanks ! Payton Jansen.  [address] [facility]  [address] [place] [address]
DC instructions

## 2025-02-10 ENCOUNTER — TELEPHONE (OUTPATIENT)
Age: 80
End: 2025-02-10

## 2025-02-10 NOTE — TELEPHONE ENCOUNTER
Kathryn states that they are looking for a fold up walker, NOT a wheelchair.    Pt had to cancel appt on Wed as he won't drive  I tried to have him hold off, but he wouldn't.    Please call pt back to schedule if needed.

## 2025-03-25 ENCOUNTER — TELEPHONE (OUTPATIENT)
Age: 80
End: 2025-03-25

## 2025-03-25 RX ORDER — BUMETANIDE 1 MG/1
TABLET ORAL
Qty: 90 TABLET | Refills: 1 | Status: SHIPPED | OUTPATIENT
Start: 2025-03-25

## 2025-03-25 RX ORDER — BUMETANIDE 1 MG/1
TABLET ORAL
Qty: 90 TABLET | Refills: 1 | Status: CANCELLED | OUTPATIENT
Start: 2025-03-25

## 2025-04-21 RX ORDER — DULOXETIN HYDROCHLORIDE 30 MG/1
CAPSULE, DELAYED RELEASE ORAL
Qty: 90 CAPSULE | Refills: 0 | OUTPATIENT
Start: 2025-04-21

## 2025-05-21 DIAGNOSIS — E11.22 TYPE 2 DIABETES MELLITUS WITH STAGE 3B CHRONIC KIDNEY DISEASE, WITHOUT LONG-TERM CURRENT USE OF INSULIN (HCC): ICD-10-CM

## 2025-05-21 DIAGNOSIS — N18.32 TYPE 2 DIABETES MELLITUS WITH STAGE 3B CHRONIC KIDNEY DISEASE, WITHOUT LONG-TERM CURRENT USE OF INSULIN (HCC): ICD-10-CM

## 2025-05-21 NOTE — TELEPHONE ENCOUNTER
Spoke with wife. Needs duloxetine and glimepiride refills. Advised will get 90 days with a refill.  Advised will mail list of new endocrinologists.

## 2025-05-23 RX ORDER — DULOXETIN HYDROCHLORIDE 30 MG/1
CAPSULE, DELAYED RELEASE ORAL
Qty: 90 CAPSULE | Refills: 1 | Status: SHIPPED | OUTPATIENT
Start: 2025-05-23

## 2025-05-23 RX ORDER — GLIMEPIRIDE 1 MG/1
TABLET ORAL
Qty: 45 TABLET | Refills: 1 | Status: SHIPPED | OUTPATIENT
Start: 2025-05-23

## 2025-07-29 NOTE — PROGRESS NOTES
Received patient from PACU via stretcher, placed on monitor. Procedure To Be Performed At Next Visit: Cryotherapy Detail Level: Detailed Introduction Text (Please End With A Colon): The following procedure was deferred:

## (undated) DEVICE — GOWN,SIRUS,NONRNF,SETINSLV,2XL,18/CS: Brand: MEDLINE

## (undated) DEVICE — SOLUTION IV 1000ML 0.9% SOD CHL

## (undated) DEVICE — DRAIN SURG 10FR L1/8IN DIA3.2MM SIL CHN RND HUBLESS FULL

## (undated) DEVICE — SUT ETHLN 3-0 18IN PS2 BLK --

## (undated) DEVICE — YANKAUER,TAPERED BULBOUS TIP,W/O VENT: Brand: MEDLINE

## (undated) DEVICE — BAG SPEC BIOHZRD 10 X 10 IN --

## (undated) DEVICE — SUTURE PERMAHAND SZ 2-0 L30IN NONABSORBABLE BLK SILK W/O A305H

## (undated) DEVICE — THE MONARCH® "D" CARTRIDGE IS A SINGLE-USE POLYPROPYLENE CARTRIDGE FOR POSTERIOR CHAMBER IOL DELIVERY: Brand: MONARCH® III

## (undated) DEVICE — ADHESIVE SKIN CLOSURE 4X22 CM PREMIERPRO EXOFINFUSION DISP

## (undated) DEVICE — DRESSING N ADH W3XL4IN NONWOVEN

## (undated) DEVICE — SURGICAL PROCEDURE PACK CATRCT CUST

## (undated) DEVICE — BNDG ELAS HK LOOP 2X5YD NS -- MATRIX

## (undated) DEVICE — HAND I-LF: Brand: MEDLINE INDUSTRIES, INC.

## (undated) DEVICE — APPLICATOR,COTTON-TIP,WOOD,3,STRL: Brand: MEDLINE

## (undated) DEVICE — MEDI-VAC NON-CONDUCTIVE SUCTION TUBING: Brand: CARDINAL HEALTH

## (undated) DEVICE — Z CONVERTED USE 2107985 COVER FLROSCP W36XL28IN 4 SIDE ADH

## (undated) DEVICE — Z DISCONTINUED USE 2717541 SUTURE STRATAFIX SZ 3-0 L30CM NONABSORBABLE UD L26MM FS 3/8

## (undated) DEVICE — SYR 10ML LUER LOK 1/5ML GRAD --

## (undated) DEVICE — SYR 3ML LL TIP 1/10ML GRAD --

## (undated) DEVICE — HYPODERMIC SAFETY NEEDLE: Brand: MAGELLAN

## (undated) DEVICE — INSULATED BLADE ELECTRODE: Brand: EDGE

## (undated) DEVICE — BASIN EMSIS 16OZ GRAPHITE PLAS KID SHP MOLD GRAD FOR ORAL

## (undated) DEVICE — LAMINECTOMY RICHMOND-LF: Brand: MEDLINE INDUSTRIES, INC.

## (undated) DEVICE — SOLUTION IV STRL H2O 500 ML AQUALITE POUR BTL

## (undated) DEVICE — STERILE POLYISOPRENE POWDER-FREE SURGICAL GLOVES WITH EMOLLIENT COATING: Brand: PROTEXIS

## (undated) DEVICE — BLOCK BITE ENDOSCP AD 21 MM W/ DIL BLU LF DISP

## (undated) DEVICE — 3M™ TEGADERM™ TRANSPARENT FILM DRESSING FRAME STYLE, 1626W, 4 IN X 4-3/4 IN (10 CM X 12 CM), 50/CT 4CT/CASE: Brand: 3M™ TEGADERM™

## (undated) DEVICE — SURGICAL PROCEDURE PACK EYE CUST DR CHNDLR

## (undated) DEVICE — BIPOLAR FORCEPS CORD: Brand: VALLEYLAB

## (undated) DEVICE — INFECTION CONTROL KIT SYS

## (undated) DEVICE — ZIMMER® STERILE DISPOSABLE TOURNIQUET CUFF WITH PLC, DUAL PORT, SINGLE BLADDER, 18 IN. (46 CM)

## (undated) DEVICE — SOLUTION LACTATED RINGERS INJECTION USP

## (undated) DEVICE — SPONGE: SPECIALTY PEANUT XR 100/CS: Brand: MEDICAL ACTION INDUSTRIES

## (undated) DEVICE — FORCEPS BX L160CM DIA8MM GRSP DISECT CUP TIP NONLOCKING ROT

## (undated) DEVICE — Device

## (undated) DEVICE — AEGIS 1" DISK 4MM HOLE, PEEL OPEN: Brand: MEDLINE

## (undated) DEVICE — COVER,MAYO STAND,STERILE: Brand: MEDLINE

## (undated) DEVICE — 1200 GUARD II KIT W/5MM TUBE W/O VAC TUBE: Brand: GUARDIAN

## (undated) DEVICE — SPONGE GZ W4XL4IN COT 12 PLY TYP VII WVN C FLD DSGN

## (undated) DEVICE — DRAPE,LAPAROTOMY,PCH,STERILE: Brand: MEDLINE

## (undated) DEVICE — DRESSING,GAUZE,XEROFORM,CURAD,1"X8",ST: Brand: CURAD

## (undated) DEVICE — SOLUTION IV 250ML 0.9% SOD CHL CLR INJ FLX BG CONT PRT CLSR

## (undated) DEVICE — 3M™ STERI-DRAPE™ INSTRUMENT POUCH 1018: Brand: STERI-DRAPE™

## (undated) DEVICE — CATH IV AUTOGRD BC PNK 20GA 25 -- INSYTE

## (undated) DEVICE — TOWEL 4 PLY TISS 19X30 SUE WHT

## (undated) DEVICE — (D)PREP SKN CHLRAPRP APPL 26ML -- CONVERT TO ITEM 371833

## (undated) DEVICE — Z DISCONTINUED PER MEDLINE LINE GAS SAMPLING O2/CO2 LNG AD 13 FT NSL W/ TBNG FILTERLINE

## (undated) DEVICE — PREP CHLORAPREP 10.5 ML ORG --

## (undated) DEVICE — STERILE POLYISOPRENE POWDER-FREE SURGICAL GLOVES: Brand: PROTEXIS

## (undated) DEVICE — NDL FLTR TIP 5 MIC 18GX1.5IN --

## (undated) DEVICE — TUBING IRRIG L77IN DIA0.241IN L BOR FOR CYSTO W/ NVENT

## (undated) DEVICE — MAGNETIC INSTRUMENT PAD 10" X 16"; MEDIUM; DISPOSABLE: Brand: CARDINAL HEALTH

## (undated) DEVICE — KENDALL DL ECG CABLE AND LEAD WIRE SYSTEM, 3-LEAD, SINGLE PATIENT USE: Brand: KENDALL

## (undated) DEVICE — DRAPE MICSCP W54XL150IN STD OCU CVR CLEARLENS TECHNOLOGY FOR

## (undated) DEVICE — SYR 5ML 1/5 GRAD LL NSAF LF --

## (undated) DEVICE — COVER LT HNDL PLAS RIG 1 PER PK

## (undated) DEVICE — 3M™ TEGADERM™ TRANSPARENT FILM DRESSING FRAME STYLE, 1624W, 2-3/8 IN X 2-3/4 IN (6 CM X 7 CM), 100/CT 4CT/CASE: Brand: 3M™ TEGADERM™

## (undated) DEVICE — CONTAINER SPEC 20 ML LID NEUT BUFF FORMALIN 10 % POLYPR STS

## (undated) DEVICE — CASPAR DISTR PIN12MMSTER: Brand: AESCULAP

## (undated) DEVICE — SOL INJ SOD CL 0.9% 1000ML BG --

## (undated) DEVICE — SET ADMIN 16ML TBNG L100IN 2 Y INJ SITE IV PIGGY BK DISP

## (undated) DEVICE — SUTURE VCRL UD BR CT 3-0 18IN CT1 J838D

## (undated) DEVICE — FLOSEAL MATRIX IS INDICATED IN SURGICAL PROCEDURES (OTHER THAN IN OPHTHALMIC) AS AN ADJUNCT TO HEMOSTASIS WHEN CONTROL OF BLEEDING BY LIGATURE OR CONVENTIONALPROCEDURES IS INEFFECTIVE OR IMPRACTICAL.: Brand: FLOSEAL HEMOSTATIC MATRIX

## (undated) DEVICE — COVER,TABLE,60X90,STERILE: Brand: MEDLINE

## (undated) DEVICE — HANDLE LT SNAP ON ULT DURABLE LENS FOR TRUMPF ALC DISPOSABLE

## (undated) DEVICE — TOWEL SURG W17XL27IN STD BLU COT NONFENESTRATED PREWASHED

## (undated) DEVICE — HALTER TRACTION HD W/ TRI COTTON LINING FOAM LTX

## (undated) DEVICE — CONTINU-FLO SOLUTION SET, 2 INJECTION SITES, MALE LUER LOCK ADAPTER WITH RETRACTABLE COLLAR, LARGE BORE STOPCOCK WITH ROTATING MALE LUER LOCK EXTENSION SET, 2 INJECTION SITES, MALE LUER LOCK ADAPTER WITH RETRACTABLE COLLAR: Brand: INTERLINK/CONTINU-FLO

## (undated) DEVICE — SLIM BODY SKIN STAPLER: Brand: APPOSE ULC

## (undated) DEVICE — SLING ARM LIFETEC ORTH UNIV --

## (undated) DEVICE — SOLUTION IRRIG 1000ML H2O STRL BLT

## (undated) DEVICE — TOOL 14MH30 LEGEND 14CM 3MM: Brand: MIDAS REX ™

## (undated) DEVICE — KENDALL SCD EXPRESS SLEEVES, KNEE LENGTH, MEDIUM: Brand: KENDALL SCD

## (undated) DEVICE — SOLIDIFIER FLD 2OZ 1500CC N DISINF IN BTL DISP SAFESORB

## (undated) DEVICE — DRILL BIT 12MM

## (undated) DEVICE — ELECTRODE,RADIOTRANSLUCENT,FOAM,5PK: Brand: MEDLINE

## (undated) DEVICE — SUTURE VCRL SZ 2-0 L18IN ABSRB UD CT-1 L36MM 1/2 CIR J839D

## (undated) DEVICE — BONE WAX WHITE: Brand: BONE WAX WHITE

## (undated) DEVICE — NEONATAL-ADULT SPO2 SENSOR: Brand: NELLCOR

## (undated) DEVICE — PAD,NON-ADHERENT,3X8,STERILE,LF,1/PK: Brand: MEDLINE

## (undated) DEVICE — SEALANT SURG CORNEA PROPHYLACTIC STRL RESURE